# Patient Record
Sex: MALE | Race: WHITE | NOT HISPANIC OR LATINO | Employment: OTHER | URBAN - METROPOLITAN AREA
[De-identification: names, ages, dates, MRNs, and addresses within clinical notes are randomized per-mention and may not be internally consistent; named-entity substitution may affect disease eponyms.]

---

## 2019-02-11 ENCOUNTER — HOSPITAL ENCOUNTER (OUTPATIENT)
Dept: LAB | Facility: HOSPITAL | Age: 74
Discharge: HOME | End: 2019-02-11
Attending: PHYSICIAN ASSISTANT

## 2019-02-11 LAB
25(OH)D3 SERPL-MCNC: 52 NG/ML (ref 30–100)
ALBUMIN SERPL-MCNC: 4.4 G/DL (ref 3.5–5)
ALBUMIN/GLOB SERPL: 1.3 {RATIO} (ref 1.4–2.6)
ALP SERPL-CCNC: 43 U/L (ref 56–155)
ALT SERPL-CCNC: 24 U/L (ref 10–40)
ANION GAP SERPL CALC-SCNC: 23 MMOL/L (ref 8–19)
AST SERPL-CCNC: 15 U/L (ref 15–50)
BASOPHILS # BLD AUTO: 0.07 10*3/UL (ref 0–0.2)
BASOPHILS NFR BLD AUTO: 0.77 % (ref 0–3)
BILIRUB SERPL-MCNC: 0.34 MG/DL (ref 0.2–1.3)
BUN SERPL-MCNC: 16 MG/DL (ref 5–25)
BUN/CREAT SERPL: 17 {RATIO} (ref 6–20)
CALCIUM SERPL-MCNC: 9.7 MG/DL (ref 8.7–10.4)
CHLORIDE SERPL-SCNC: 100 MMOL/L (ref 99–111)
CHOLEST SERPL-MCNC: 150 MG/DL (ref 107–200)
CHOLEST/HDLC SERPL: 3.7 {RATIO} (ref 3–6)
CONV CO2: 20 MMOL/L (ref 22–32)
CONV TOTAL PROTEIN: 7.9 G/DL (ref 6.3–8.2)
CREAT UR-MCNC: 0.93 MG/DL (ref 0.7–1.2)
EOSINOPHIL # BLD AUTO: 0.41 10*3/UL (ref 0–0.7)
EOSINOPHIL # BLD AUTO: 4.58 % (ref 0–7)
ERYTHROCYTE [DISTWIDTH] IN BLOOD BY AUTOMATED COUNT: 12.1 % (ref 11.5–14.5)
GFR SERPLBLD BASED ON 1.73 SQ M-ARVRAT: >60 ML/MIN/{1.73_M2}
GLOBULIN UR ELPH-MCNC: 3.5 G/DL (ref 2–3.5)
GLUCOSE SERPL-MCNC: 141 MG/DL (ref 70–99)
HBA1C MFR BLD: 13.3 G/DL (ref 14–18)
HCT VFR BLD AUTO: 38.9 % (ref 42–52)
HDLC SERPL-MCNC: 41 MG/DL (ref 40–60)
LDLC SERPL CALC-MCNC: 73 MG/DL (ref 70–100)
LYMPHOCYTES # BLD AUTO: 3.28 10*3/UL (ref 1–5)
MCH RBC QN AUTO: 30 PG (ref 27–31)
MCHC RBC AUTO-ENTMCNC: 34.3 G/DL (ref 33–37)
MCV RBC AUTO: 87.6 FL (ref 80–96)
MONOCYTES # BLD AUTO: 0.7 10*3/UL (ref 0.2–1.2)
MONOCYTES NFR BLD AUTO: 7.87 % (ref 3–10)
NEUTROPHILS # BLD AUTO: 4.41 10*3/UL (ref 2–8)
NEUTROPHILS NFR BLD AUTO: 49.8 % (ref 30–85)
NRBC BLD AUTO-RTO: 0 % (ref 0–0.01)
OSMOLALITY SERPL CALC.SUM OF ELEC: 290 MOSM/KG (ref 273–304)
PLATELET # BLD AUTO: 277 10*3/UL (ref 130–400)
PMV BLD AUTO: 8.3 FL (ref 7.4–10.4)
POTASSIUM SERPL-SCNC: 4.8 MMOL/L (ref 3.5–5.3)
PSA SERPL-MCNC: 2.44 NG/ML (ref 0–4)
RBC # BLD AUTO: 4.43 10*6/UL (ref 4.7–6.1)
SODIUM SERPL-SCNC: 138 MMOL/L (ref 135–147)
T4 FREE SERPL-MCNC: 1 NG/DL (ref 0.9–1.8)
TRIGL SERPL-MCNC: 178 MG/DL (ref 40–150)
TSH SERPL-ACNC: 3.6 M[IU]/L (ref 0.27–4.2)
VARIANT LYMPHS NFR BLD MANUAL: 37 % (ref 20–45)
VLDLC SERPL-MCNC: 36 MG/DL (ref 5–37)
WBC # BLD AUTO: 8.86 10*3/UL (ref 4.8–10.8)

## 2019-02-28 ENCOUNTER — HOSPITAL ENCOUNTER (OUTPATIENT)
Dept: LAB | Facility: HOSPITAL | Age: 74
Discharge: HOME | End: 2019-02-28
Attending: PHYSICIAN ASSISTANT

## 2019-02-28 LAB
EST. AVERAGE GLUCOSE BLD GHB EST-MCNC: 169 MG/DL
HBA1C MFR BLD: 7.5 % (ref 3.5–5.7)

## 2019-08-21 ENCOUNTER — HOSPITAL ENCOUNTER (OUTPATIENT)
Dept: LAB | Facility: HOSPITAL | Age: 74
Discharge: HOME | End: 2019-08-21
Attending: PHYSICIAN ASSISTANT

## 2019-08-21 LAB
25(OH)D3 SERPL-MCNC: 53.4 NG/ML (ref 30–100)
ALBUMIN SERPL-MCNC: 4.6 G/DL (ref 3.5–5)
ALBUMIN/GLOB SERPL: 1.5 {RATIO} (ref 1.4–2.6)
ALP SERPL-CCNC: 39 U/L (ref 56–155)
ALT SERPL-CCNC: 19 U/L (ref 10–40)
ANION GAP SERPL CALC-SCNC: 21 MMOL/L (ref 8–19)
AST SERPL-CCNC: 18 U/L (ref 15–50)
BASOPHILS # BLD AUTO: 0.09 10*3/UL (ref 0–0.2)
BASOPHILS NFR BLD AUTO: 1.6 % (ref 0–3)
BILIRUB SERPL-MCNC: 0.43 MG/DL (ref 0.2–1.3)
BUN SERPL-MCNC: 16 MG/DL (ref 5–25)
BUN/CREAT SERPL: 20 {RATIO} (ref 6–20)
CALCIUM SERPL-MCNC: 9.4 MG/DL (ref 8.7–10.4)
CHLORIDE SERPL-SCNC: 98 MMOL/L (ref 99–111)
CHOLEST SERPL-MCNC: 116 MG/DL (ref 107–200)
CHOLEST/HDLC SERPL: 3.2 {RATIO} (ref 3–6)
CONV ABS IMM GRAN: 0.01 10*3/UL (ref 0–0.2)
CONV CO2: 23 MMOL/L (ref 22–32)
CONV IMMATURE GRAN: 0.2 % (ref 0–1.8)
CONV TOTAL PROTEIN: 7.6 G/DL (ref 6.3–8.2)
CREAT UR-MCNC: 0.79 MG/DL (ref 0.7–1.2)
DEPRECATED RDW RBC AUTO: 41.9 FL (ref 35.1–43.9)
EOSINOPHIL # BLD AUTO: 0.27 10*3/UL (ref 0–0.7)
EOSINOPHIL # BLD AUTO: 4.8 % (ref 0–7)
ERYTHROCYTE [DISTWIDTH] IN BLOOD BY AUTOMATED COUNT: 12.8 % (ref 11.6–14.4)
EST. AVERAGE GLUCOSE BLD GHB EST-MCNC: 146 MG/DL
GFR SERPLBLD BASED ON 1.73 SQ M-ARVRAT: >60 ML/MIN/{1.73_M2}
GLOBULIN UR ELPH-MCNC: 3 G/DL (ref 2–3.5)
GLUCOSE SERPL-MCNC: 155 MG/DL (ref 70–99)
HBA1C MFR BLD: 6.7 % (ref 3.5–5.7)
HCT VFR BLD AUTO: 38.7 % (ref 42–52)
HDLC SERPL-MCNC: 36 MG/DL (ref 40–60)
HGB BLD-MCNC: 12.6 G/DL (ref 14–18)
LDLC SERPL CALC-MCNC: 61 MG/DL (ref 70–100)
LYMPHOCYTES # BLD AUTO: 1.99 10*3/UL (ref 1–5)
LYMPHOCYTES NFR BLD AUTO: 35.3 % (ref 20–45)
MCH RBC QN AUTO: 29.6 PG (ref 27–31)
MCHC RBC AUTO-ENTMCNC: 32.6 G/DL (ref 33–37)
MCV RBC AUTO: 90.8 FL (ref 80–96)
MONOCYTES # BLD AUTO: 0.39 10*3/UL (ref 0.2–1.2)
MONOCYTES NFR BLD AUTO: 6.9 % (ref 3–10)
NEUTROPHILS # BLD AUTO: 2.89 10*3/UL (ref 2–8)
NEUTROPHILS NFR BLD AUTO: 51.2 % (ref 30–85)
NRBC CBCN: 0 % (ref 0–0.7)
OSMOLALITY SERPL CALC.SUM OF ELEC: 288 MOSM/KG (ref 273–304)
PLATELET # BLD AUTO: 260 10*3/UL (ref 130–400)
PMV BLD AUTO: 11.8 FL (ref 9.4–12.4)
POTASSIUM SERPL-SCNC: 4.8 MMOL/L (ref 3.5–5.3)
RBC # BLD AUTO: 4.26 10*6/UL (ref 4.7–6.1)
SODIUM SERPL-SCNC: 137 MMOL/L (ref 135–147)
T4 FREE SERPL-MCNC: 1.1 NG/DL (ref 0.9–1.8)
TRIGL SERPL-MCNC: 96 MG/DL (ref 40–150)
TSH SERPL-ACNC: 1.76 M[IU]/L (ref 0.27–4.2)
VLDLC SERPL-MCNC: 19 MG/DL (ref 5–37)
WBC # BLD AUTO: 5.64 10*3/UL (ref 4.8–10.8)

## 2019-09-27 ENCOUNTER — HOSPITAL ENCOUNTER (OUTPATIENT)
Dept: LAB | Facility: HOSPITAL | Age: 74
Discharge: HOME | End: 2019-09-27
Attending: PHYSICIAN ASSISTANT

## 2019-09-27 LAB
BASOPHILS # BLD AUTO: 0.09 10*3/UL (ref 0–0.2)
BASOPHILS NFR BLD AUTO: 1.3 % (ref 0–3)
CONV ABS IMM GRAN: 0.02 10*3/UL (ref 0–0.2)
CONV IMMATURE GRAN: 0.3 % (ref 0–1.8)
DEPRECATED RDW RBC AUTO: 40.5 FL (ref 35.1–43.9)
EOSINOPHIL # BLD AUTO: 0.35 10*3/UL (ref 0–0.7)
EOSINOPHIL # BLD AUTO: 4.9 % (ref 0–7)
ERYTHROCYTE [DISTWIDTH] IN BLOOD BY AUTOMATED COUNT: 12.3 % (ref 11.6–14.4)
FOLATE SERPL-MCNC: 19.3 NG/ML (ref 4.8–20)
HCT VFR BLD AUTO: 38.5 % (ref 42–52)
HGB BLD-MCNC: 12.7 G/DL (ref 14–18)
IRON SATN MFR SERPL: 24 % (ref 20–55)
IRON SERPL-MCNC: 86 UG/DL (ref 70–180)
LYMPHOCYTES # BLD AUTO: 1.95 10*3/UL (ref 1–5)
LYMPHOCYTES NFR BLD AUTO: 27.4 % (ref 20–45)
MCH RBC QN AUTO: 30 PG (ref 27–31)
MCHC RBC AUTO-ENTMCNC: 33 G/DL (ref 33–37)
MCV RBC AUTO: 90.8 FL (ref 80–96)
MONOCYTES # BLD AUTO: 0.46 10*3/UL (ref 0.2–1.2)
MONOCYTES NFR BLD AUTO: 6.5 % (ref 3–10)
NEUTROPHILS # BLD AUTO: 4.24 10*3/UL (ref 2–8)
NEUTROPHILS NFR BLD AUTO: 59.6 % (ref 30–85)
NRBC CBCN: 0 % (ref 0–0.7)
PLATELET # BLD AUTO: 245 10*3/UL (ref 130–400)
PMV BLD AUTO: 11.5 FL (ref 9.4–12.4)
RBC # BLD AUTO: 4.24 10*6/UL (ref 4.7–6.1)
TIBC SERPL-MCNC: 360 UG/DL (ref 245–450)
TRANSFERRIN SERPL-MCNC: 252 MG/DL (ref 215–365)
VIT B12 SERPL-MCNC: 733 PG/ML (ref 211–911)
WBC # BLD AUTO: 7.11 10*3/UL (ref 4.8–10.8)

## 2020-02-14 ENCOUNTER — HOSPITAL ENCOUNTER (OUTPATIENT)
Dept: LAB | Facility: HOSPITAL | Age: 75
Discharge: HOME | End: 2020-02-14
Attending: PHYSICIAN ASSISTANT

## 2020-02-14 LAB
25(OH)D3 SERPL-MCNC: 50.9 NG/ML (ref 30–100)
ALBUMIN SERPL-MCNC: 4.5 G/DL (ref 3.5–5)
ALBUMIN/GLOB SERPL: 1.4 {RATIO} (ref 1.4–2.6)
ALP SERPL-CCNC: 37 U/L (ref 56–155)
ALT SERPL-CCNC: 26 U/L (ref 10–40)
ANION GAP SERPL CALC-SCNC: 23 MMOL/L (ref 8–19)
AST SERPL-CCNC: 20 U/L (ref 15–50)
BASOPHILS # BLD AUTO: 0.11 10*3/UL (ref 0–0.2)
BASOPHILS NFR BLD AUTO: 1.7 % (ref 0–3)
BILIRUB SERPL-MCNC: 0.43 MG/DL (ref 0.2–1.3)
BUN SERPL-MCNC: 11 MG/DL (ref 5–25)
BUN/CREAT SERPL: 14 {RATIO} (ref 6–20)
CALCIUM SERPL-MCNC: 9.2 MG/DL (ref 8.7–10.4)
CHLORIDE SERPL-SCNC: 98 MMOL/L (ref 99–111)
CHOLEST SERPL-MCNC: 141 MG/DL (ref 107–200)
CHOLEST/HDLC SERPL: 3.6 {RATIO} (ref 3–6)
CONV ABS IMM GRAN: 0.01 10*3/UL (ref 0–0.2)
CONV CO2: 20 MMOL/L (ref 22–32)
CONV IMMATURE GRAN: 0.2 % (ref 0–1.8)
CONV TOTAL PROTEIN: 7.7 G/DL (ref 6.3–8.2)
CREAT UR-MCNC: 0.81 MG/DL (ref 0.7–1.2)
DEPRECATED RDW RBC AUTO: 40.9 FL (ref 35.1–43.9)
EOSINOPHIL # BLD AUTO: 0.28 10*3/UL (ref 0–0.7)
EOSINOPHIL # BLD AUTO: 4.2 % (ref 0–7)
ERYTHROCYTE [DISTWIDTH] IN BLOOD BY AUTOMATED COUNT: 12.5 % (ref 11.6–14.4)
EST. AVERAGE GLUCOSE BLD GHB EST-MCNC: 160 MG/DL
FOLATE SERPL-MCNC: 12.9 NG/ML (ref 4.8–20)
GFR SERPLBLD BASED ON 1.73 SQ M-ARVRAT: >60 ML/MIN/{1.73_M2}
GLOBULIN UR ELPH-MCNC: 3.2 G/DL (ref 2–3.5)
GLUCOSE SERPL-MCNC: 141 MG/DL (ref 70–99)
HBA1C MFR BLD: 7.2 % (ref 3.5–5.7)
HCT VFR BLD AUTO: 41.2 % (ref 42–52)
HDLC SERPL-MCNC: 39 MG/DL (ref 40–60)
HGB BLD-MCNC: 13.4 G/DL (ref 14–18)
IRON SATN MFR SERPL: 22 % (ref 20–55)
IRON SERPL-MCNC: 87 UG/DL (ref 70–180)
LDLC SERPL CALC-MCNC: 77 MG/DL (ref 70–100)
LYMPHOCYTES # BLD AUTO: 2.16 10*3/UL (ref 1–5)
LYMPHOCYTES NFR BLD AUTO: 32.5 % (ref 20–45)
MCH RBC QN AUTO: 29.3 PG (ref 27–31)
MCHC RBC AUTO-ENTMCNC: 32.5 G/DL (ref 33–37)
MCV RBC AUTO: 90.2 FL (ref 80–96)
MONOCYTES # BLD AUTO: 0.46 10*3/UL (ref 0.2–1.2)
MONOCYTES NFR BLD AUTO: 6.9 % (ref 3–10)
NEUTROPHILS # BLD AUTO: 3.62 10*3/UL (ref 2–8)
NEUTROPHILS NFR BLD AUTO: 54.5 % (ref 30–85)
NRBC CBCN: 0 % (ref 0–0.7)
OSMOLALITY SERPL CALC.SUM OF ELEC: 284 MOSM/KG (ref 273–304)
PLATELET # BLD AUTO: 268 10*3/UL (ref 130–400)
PMV BLD AUTO: 11.6 FL (ref 9.4–12.4)
POTASSIUM SERPL-SCNC: 4.5 MMOL/L (ref 3.5–5.3)
PSA SERPL-MCNC: 2.95 NG/ML (ref 0–4)
RBC # BLD AUTO: 4.57 10*6/UL (ref 4.7–6.1)
SODIUM SERPL-SCNC: 136 MMOL/L (ref 135–147)
T4 FREE SERPL-MCNC: 1 NG/DL (ref 0.9–1.8)
TIBC SERPL-MCNC: 402 UG/DL (ref 245–450)
TRANSFERRIN SERPL-MCNC: 281 MG/DL (ref 215–365)
TRIGL SERPL-MCNC: 124 MG/DL (ref 40–150)
TSH SERPL-ACNC: 1.91 M[IU]/L (ref 0.27–4.2)
VIT B12 SERPL-MCNC: 923 PG/ML (ref 211–911)
VLDLC SERPL-MCNC: 25 MG/DL (ref 5–37)
WBC # BLD AUTO: 6.64 10*3/UL (ref 4.8–10.8)

## 2020-08-11 ENCOUNTER — HOSPITAL ENCOUNTER (OUTPATIENT)
Dept: GENERAL RADIOLOGY | Facility: HOSPITAL | Age: 75
Discharge: HOME | End: 2020-08-11
Attending: PHYSICIAN ASSISTANT

## 2020-08-12 ENCOUNTER — HOSPITAL ENCOUNTER (OUTPATIENT)
Dept: CT IMAGING | Facility: HOSPITAL | Age: 75
Discharge: HOME | End: 2020-08-12
Attending: PHYSICIAN ASSISTANT

## 2020-08-18 ENCOUNTER — HOSPITAL ENCOUNTER (OUTPATIENT)
Dept: LAB | Facility: HOSPITAL | Age: 75
Discharge: HOME | End: 2020-08-18
Attending: PHYSICIAN ASSISTANT

## 2020-08-18 LAB
25(OH)D3 SERPL-MCNC: 39.2 NG/ML (ref 30–100)
ALBUMIN SERPL-MCNC: 4.2 G/DL (ref 3.5–5)
ALBUMIN/GLOB SERPL: 1.3 {RATIO} (ref 1.4–2.6)
ALP SERPL-CCNC: 43 U/L (ref 56–155)
ALT SERPL-CCNC: 26 U/L (ref 10–40)
ANION GAP SERPL CALC-SCNC: 21 MMOL/L (ref 8–19)
AST SERPL-CCNC: 19 U/L (ref 15–50)
BASOPHILS # BLD AUTO: 0.08 10*3/UL (ref 0–0.2)
BASOPHILS NFR BLD AUTO: 1.1 % (ref 0–3)
BILIRUB SERPL-MCNC: 0.34 MG/DL (ref 0.2–1.3)
BUN SERPL-MCNC: 15 MG/DL (ref 5–25)
BUN/CREAT SERPL: 15 {RATIO} (ref 6–20)
CALCIUM SERPL-MCNC: 9.9 MG/DL (ref 8.7–10.4)
CHLORIDE SERPL-SCNC: 96 MMOL/L (ref 99–111)
CHOLEST SERPL-MCNC: 139 MG/DL (ref 107–200)
CHOLEST/HDLC SERPL: 3.9 {RATIO} (ref 3–6)
CONV ABS IMM GRAN: 0.02 10*3/UL (ref 0–0.2)
CONV CO2: 23 MMOL/L (ref 22–32)
CONV IMMATURE GRAN: 0.3 % (ref 0–1.8)
CONV TOTAL PROTEIN: 7.4 G/DL (ref 6.3–8.2)
CREAT UR-MCNC: 0.97 MG/DL (ref 0.7–1.2)
DEPRECATED RDW RBC AUTO: 40.7 FL (ref 35.1–43.9)
EOSINOPHIL # BLD AUTO: 0.41 10*3/UL (ref 0–0.7)
EOSINOPHIL # BLD AUTO: 5.6 % (ref 0–7)
ERYTHROCYTE [DISTWIDTH] IN BLOOD BY AUTOMATED COUNT: 12.2 % (ref 11.6–14.4)
EST. AVERAGE GLUCOSE BLD GHB EST-MCNC: 166 MG/DL
GFR SERPLBLD BASED ON 1.73 SQ M-ARVRAT: >60 ML/MIN/{1.73_M2}
GLOBULIN UR ELPH-MCNC: 3.2 G/DL (ref 2–3.5)
GLUCOSE SERPL-MCNC: 162 MG/DL (ref 70–99)
HBA1C MFR BLD: 7.4 % (ref 3.5–5.7)
HCT VFR BLD AUTO: 39.7 % (ref 42–52)
HDLC SERPL-MCNC: 36 MG/DL (ref 40–60)
HGB BLD-MCNC: 12.9 G/DL (ref 14–18)
LDLC SERPL CALC-MCNC: 75 MG/DL (ref 70–100)
LYMPHOCYTES # BLD AUTO: 2.59 10*3/UL (ref 1–5)
LYMPHOCYTES NFR BLD AUTO: 35.4 % (ref 20–45)
MCH RBC QN AUTO: 29.7 PG (ref 27–31)
MCHC RBC AUTO-ENTMCNC: 32.5 G/DL (ref 33–37)
MCV RBC AUTO: 91.3 FL (ref 80–96)
MONOCYTES # BLD AUTO: 0.53 10*3/UL (ref 0.2–1.2)
MONOCYTES NFR BLD AUTO: 7.2 % (ref 3–10)
NEUTROPHILS # BLD AUTO: 3.69 10*3/UL (ref 2–8)
NEUTROPHILS NFR BLD AUTO: 50.4 % (ref 30–85)
NRBC CBCN: 0 % (ref 0–0.7)
OSMOLALITY SERPL CALC.SUM OF ELEC: 284 MOSM/KG (ref 273–304)
PLATELET # BLD AUTO: 289 10*3/UL (ref 130–400)
PMV BLD AUTO: 11 FL (ref 9.4–12.4)
POTASSIUM SERPL-SCNC: 5 MMOL/L (ref 3.5–5.3)
RBC # BLD AUTO: 4.35 10*6/UL (ref 4.7–6.1)
SODIUM SERPL-SCNC: 135 MMOL/L (ref 135–147)
T4 FREE SERPL-MCNC: 1 NG/DL (ref 0.9–1.8)
TRIGL SERPL-MCNC: 140 MG/DL (ref 40–150)
TSH SERPL-ACNC: 1.9 M[IU]/L (ref 0.27–4.2)
VLDLC SERPL-MCNC: 28 MG/DL (ref 5–37)
WBC # BLD AUTO: 7.32 10*3/UL (ref 4.8–10.8)

## 2021-01-13 ENCOUNTER — HOSPITAL ENCOUNTER (OUTPATIENT)
Dept: LAB | Facility: HOSPITAL | Age: 76
Discharge: HOME | End: 2021-01-13
Attending: PHYSICIAN ASSISTANT

## 2021-01-13 LAB
25(OH)D3 SERPL-MCNC: 36.1 NG/ML (ref 30–100)
ALBUMIN SERPL-MCNC: 4.5 G/DL (ref 3.5–5)
ALBUMIN/GLOB SERPL: 1.5 {RATIO} (ref 1.4–2.6)
ALP SERPL-CCNC: 44 U/L (ref 56–155)
ALT SERPL-CCNC: 23 U/L (ref 10–40)
AMPHETAMINES UR QL SCN: NEGATIVE
ANION GAP SERPL CALC-SCNC: 22 MMOL/L (ref 8–19)
AST SERPL-CCNC: 16 U/L (ref 15–50)
BARBITURATES UR QL SCN: NEGATIVE
BASOPHILS # BLD AUTO: 0.08 10*3/UL (ref 0–0.2)
BASOPHILS NFR BLD AUTO: 1.1 % (ref 0–3)
BENZODIAZ UR QL SCN: NEGATIVE
BILIRUB SERPL-MCNC: 0.47 MG/DL (ref 0.2–1.3)
BUN SERPL-MCNC: 14 MG/DL (ref 5–25)
BUN/CREAT SERPL: 17 {RATIO} (ref 6–20)
CALCIUM SERPL-MCNC: 9.5 MG/DL (ref 8.7–10.4)
CHLORIDE SERPL-SCNC: 95 MMOL/L (ref 99–111)
CHOLEST SERPL-MCNC: 111 MG/DL (ref 107–200)
CHOLEST/HDLC SERPL: 3.1 {RATIO} (ref 3–6)
CONV ABS IMM GRAN: 0.02 10*3/UL (ref 0–0.2)
CONV CO2: 20 MMOL/L (ref 22–32)
CONV COCAINE, UR: NEGATIVE
CONV CREATININE URINE, RANDOM: 53.8 MG/DL (ref 10–300)
CONV IMMATURE GRAN: 0.3 % (ref 0–1.8)
CONV MICROALBUM.,U,RANDOM: <12 MG/L (ref 0–20)
CONV TOTAL PROTEIN: 7.5 G/DL (ref 6.3–8.2)
CREAT UR-MCNC: 0.84 MG/DL (ref 0.7–1.2)
DEPRECATED RDW RBC AUTO: 40.4 FL (ref 35.1–43.9)
EOSINOPHIL # BLD AUTO: 0.25 10*3/UL (ref 0–0.7)
EOSINOPHIL # BLD AUTO: 3.5 % (ref 0–7)
ERYTHROCYTE [DISTWIDTH] IN BLOOD BY AUTOMATED COUNT: 12.3 % (ref 11.6–14.4)
EST. AVERAGE GLUCOSE BLD GHB EST-MCNC: 140 MG/DL
FOLATE SERPL-MCNC: 12.1 NG/ML (ref 4.8–20)
GFR SERPLBLD BASED ON 1.73 SQ M-ARVRAT: >60 ML/MIN/{1.73_M2}
GLOBULIN UR ELPH-MCNC: 3 G/DL (ref 2–3.5)
GLUCOSE SERPL-MCNC: 127 MG/DL (ref 70–99)
HBA1C MFR BLD: 6.5 % (ref 3.5–5.7)
HCT VFR BLD AUTO: 38.8 % (ref 42–52)
HDLC SERPL-MCNC: 36 MG/DL (ref 40–60)
HGB BLD-MCNC: 12.6 G/DL (ref 14–18)
IRON SATN MFR SERPL: 20 % (ref 20–55)
IRON SERPL-MCNC: 79 UG/DL (ref 70–180)
LDLC SERPL CALC-MCNC: 53 MG/DL (ref 70–100)
LYMPHOCYTES # BLD AUTO: 2.49 10*3/UL (ref 1–5)
LYMPHOCYTES NFR BLD AUTO: 35.2 % (ref 20–45)
MCH RBC QN AUTO: 28.9 PG (ref 27–31)
MCHC RBC AUTO-ENTMCNC: 32.5 G/DL (ref 33–37)
MCV RBC AUTO: 89 FL (ref 80–96)
METHADONE UR QL SCN: NEGATIVE
MICROALBUMIN/CREAT UR: 22.3 MG/G{CRE} (ref 0–25)
MONOCYTES # BLD AUTO: 0.51 10*3/UL (ref 0.2–1.2)
MONOCYTES NFR BLD AUTO: 7.2 % (ref 3–10)
NEUTROPHILS # BLD AUTO: 3.72 10*3/UL (ref 2–8)
NEUTROPHILS NFR BLD AUTO: 52.7 % (ref 30–85)
NRBC CBCN: 0 % (ref 0–0.7)
OPIATES TESTED UR SCN: NEGATIVE
OSMOLALITY SERPL CALC.SUM OF ELEC: 276 MOSM/KG (ref 273–304)
OXYCODONE UR QL SCN: NEGATIVE
PCP UR QL: NEGATIVE
PLATELET # BLD AUTO: 292 10*3/UL (ref 130–400)
PMV BLD AUTO: 11.2 FL (ref 9.4–12.4)
POTASSIUM SERPL-SCNC: 4.6 MMOL/L (ref 3.5–5.3)
RBC # BLD AUTO: 4.36 10*6/UL (ref 4.7–6.1)
SODIUM SERPL-SCNC: 132 MMOL/L (ref 135–147)
T4 FREE SERPL-MCNC: 1 NG/DL (ref 0.9–1.8)
THC SERPLBLD CFM-MCNC: NEGATIVE NG/ML
TIBC SERPL-MCNC: 393 UG/DL (ref 245–450)
TRANSFERRIN SERPL-MCNC: 275 MG/DL (ref 215–365)
TRIGL SERPL-MCNC: 108 MG/DL (ref 40–150)
TSH SERPL-ACNC: 1.71 M[IU]/L (ref 0.27–4.2)
VIT B12 SERPL-MCNC: 704 PG/ML (ref 211–911)
VLDLC SERPL-MCNC: 22 MG/DL (ref 5–37)
WBC # BLD AUTO: 7.07 10*3/UL (ref 4.8–10.8)

## 2021-01-27 ENCOUNTER — HOSPITAL ENCOUNTER (OUTPATIENT)
Dept: GENERAL RADIOLOGY | Facility: HOSPITAL | Age: 76
Discharge: HOME | End: 2021-01-27
Attending: PHYSICIAN ASSISTANT

## 2021-01-29 ENCOUNTER — HOSPITAL ENCOUNTER (OUTPATIENT)
Dept: LAB | Facility: HOSPITAL | Age: 76
Discharge: HOME | End: 2021-01-29
Attending: PHYSICIAN ASSISTANT

## 2021-01-29 LAB
ANION GAP SERPL CALC-SCNC: 21 MMOL/L (ref 8–19)
BUN SERPL-MCNC: 18 MG/DL (ref 5–25)
BUN/CREAT SERPL: 20 {RATIO} (ref 6–20)
CALCIUM SERPL-MCNC: 9.6 MG/DL (ref 8.7–10.4)
CHLORIDE SERPL-SCNC: 101 MMOL/L (ref 99–111)
CONV CO2: 20 MMOL/L (ref 22–32)
CREAT UR-MCNC: 0.9 MG/DL (ref 0.7–1.2)
GFR SERPLBLD BASED ON 1.73 SQ M-ARVRAT: >60 ML/MIN/{1.73_M2}
GLUCOSE SERPL-MCNC: 194 MG/DL (ref 70–99)
OSMOLALITY SERPL CALC.SUM OF ELEC: 291 MOSM/KG (ref 273–304)
POTASSIUM SERPL-SCNC: 5 MMOL/L (ref 3.5–5.3)
SODIUM SERPL-SCNC: 137 MMOL/L (ref 135–147)

## 2021-02-01 ENCOUNTER — OFFICE VISIT CONVERTED (OUTPATIENT)
Dept: SURGERY | Facility: CLINIC | Age: 76
End: 2021-02-01
Attending: NURSE PRACTITIONER

## 2021-02-22 ENCOUNTER — HOSPITAL ENCOUNTER (OUTPATIENT)
Dept: PREADMISSION TESTING | Facility: HOSPITAL | Age: 76
Discharge: HOME | End: 2021-02-22
Attending: SURGERY

## 2021-02-23 LAB — SARS-COV-2 RNA SPEC QL NAA+PROBE: NOT DETECTED

## 2021-02-26 ENCOUNTER — HOSPITAL ENCOUNTER (OUTPATIENT)
Dept: GASTROENTEROLOGY | Facility: HOSPITAL | Age: 76
Setting detail: HOSPITAL OUTPATIENT SURGERY
Discharge: HOME | End: 2021-02-26
Attending: SURGERY

## 2021-02-26 LAB — GLUCOSE BLD-MCNC: 181 MG/DL (ref 70–99)

## 2021-03-10 ENCOUNTER — OFFICE VISIT CONVERTED (OUTPATIENT)
Dept: SURGERY | Facility: CLINIC | Age: 76
End: 2021-03-10
Attending: SURGERY

## 2021-04-14 ENCOUNTER — HOSPITAL ENCOUNTER (OUTPATIENT)
Dept: LAB | Facility: HOSPITAL | Age: 76
Discharge: HOME | End: 2021-04-14
Attending: PHYSICIAN ASSISTANT

## 2021-04-14 LAB
25(OH)D3 SERPL-MCNC: 37.3 NG/ML (ref 30–100)
ALBUMIN SERPL-MCNC: 4.5 G/DL (ref 3.5–5)
ALBUMIN/GLOB SERPL: 1.4 {RATIO} (ref 1.4–2.6)
ALP SERPL-CCNC: 44 U/L (ref 56–155)
ALT SERPL-CCNC: 20 U/L (ref 10–40)
ANION GAP SERPL CALC-SCNC: 19 MMOL/L (ref 8–19)
AST SERPL-CCNC: 17 U/L (ref 15–50)
BASOPHILS # BLD AUTO: 0.08 10*3/UL (ref 0–0.2)
BASOPHILS NFR BLD AUTO: 1.1 % (ref 0–3)
BILIRUB SERPL-MCNC: 0.33 MG/DL (ref 0.2–1.3)
BUN SERPL-MCNC: 21 MG/DL (ref 5–25)
BUN/CREAT SERPL: 22 {RATIO} (ref 6–20)
CALCIUM SERPL-MCNC: 9.7 MG/DL (ref 8.7–10.4)
CHLORIDE SERPL-SCNC: 94 MMOL/L (ref 99–111)
CHOLEST SERPL-MCNC: 138 MG/DL (ref 107–200)
CHOLEST/HDLC SERPL: 3.4 {RATIO} (ref 3–6)
CONV ABS IMM GRAN: 0.03 10*3/UL (ref 0–0.2)
CONV CO2: 22 MMOL/L (ref 22–32)
CONV IMMATURE GRAN: 0.4 % (ref 0–1.8)
CONV TOTAL PROTEIN: 7.8 G/DL (ref 6.3–8.2)
CREAT UR-MCNC: 0.97 MG/DL (ref 0.7–1.2)
DEPRECATED RDW RBC AUTO: 39.7 FL (ref 35.1–43.9)
EOSINOPHIL # BLD AUTO: 0.37 10*3/UL (ref 0–0.7)
EOSINOPHIL # BLD AUTO: 5.1 % (ref 0–7)
ERYTHROCYTE [DISTWIDTH] IN BLOOD BY AUTOMATED COUNT: 12.7 % (ref 11.6–14.4)
EST. AVERAGE GLUCOSE BLD GHB EST-MCNC: 154 MG/DL
GFR SERPLBLD BASED ON 1.73 SQ M-ARVRAT: >60 ML/MIN/{1.73_M2}
GLOBULIN UR ELPH-MCNC: 3.3 G/DL (ref 2–3.5)
GLUCOSE SERPL-MCNC: 129 MG/DL (ref 70–99)
HBA1C MFR BLD: 7 % (ref 3.5–5.7)
HCT VFR BLD AUTO: 38.2 % (ref 42–52)
HDLC SERPL-MCNC: 41 MG/DL (ref 40–60)
HGB BLD-MCNC: 12.6 G/DL (ref 14–18)
LDLC SERPL CALC-MCNC: 72 MG/DL (ref 70–100)
LYMPHOCYTES # BLD AUTO: 2.57 10*3/UL (ref 1–5)
LYMPHOCYTES NFR BLD AUTO: 35.4 % (ref 20–45)
MCH RBC QN AUTO: 28.6 PG (ref 27–31)
MCHC RBC AUTO-ENTMCNC: 33 G/DL (ref 33–37)
MCV RBC AUTO: 86.6 FL (ref 80–96)
MONOCYTES # BLD AUTO: 0.58 10*3/UL (ref 0.2–1.2)
MONOCYTES NFR BLD AUTO: 8 % (ref 3–10)
NEUTROPHILS # BLD AUTO: 3.63 10*3/UL (ref 2–8)
NEUTROPHILS NFR BLD AUTO: 50 % (ref 30–85)
NRBC CBCN: 0 % (ref 0–0.7)
OSMOLALITY SERPL CALC.SUM OF ELEC: 275 MOSM/KG (ref 273–304)
PLATELET # BLD AUTO: 278 10*3/UL (ref 130–400)
PMV BLD AUTO: 11.1 FL (ref 9.4–12.4)
POTASSIUM SERPL-SCNC: 5.1 MMOL/L (ref 3.5–5.3)
PSA SERPL-MCNC: 3.26 NG/ML (ref 0–4)
RBC # BLD AUTO: 4.41 10*6/UL (ref 4.7–6.1)
SODIUM SERPL-SCNC: 130 MMOL/L (ref 135–147)
T4 FREE SERPL-MCNC: 1.1 NG/DL (ref 0.9–1.8)
TRIGL SERPL-MCNC: 127 MG/DL (ref 40–150)
TSH SERPL-ACNC: 1.34 M[IU]/L (ref 0.27–4.2)
VLDLC SERPL-MCNC: 25 MG/DL (ref 5–37)
WBC # BLD AUTO: 7.26 10*3/UL (ref 4.8–10.8)

## 2021-04-21 ENCOUNTER — HOSPITAL ENCOUNTER (OUTPATIENT)
Dept: LAB | Facility: HOSPITAL | Age: 76
Discharge: HOME | End: 2021-04-21
Attending: PHYSICIAN ASSISTANT

## 2021-04-21 LAB
ANION GAP SERPL CALC-SCNC: 21 MMOL/L (ref 8–19)
BUN SERPL-MCNC: 22 MG/DL (ref 5–25)
BUN/CREAT SERPL: 24 {RATIO} (ref 6–20)
CALCIUM SERPL-MCNC: 9.7 MG/DL (ref 8.7–10.4)
CHLORIDE SERPL-SCNC: 102 MMOL/L (ref 99–111)
CONV CO2: 19 MMOL/L (ref 22–32)
CREAT UR-MCNC: 0.91 MG/DL (ref 0.7–1.2)
GFR SERPLBLD BASED ON 1.73 SQ M-ARVRAT: >60 ML/MIN/{1.73_M2}
GLUCOSE SERPL-MCNC: 160 MG/DL (ref 70–99)
OSMOLALITY SERPL CALC.SUM OF ELEC: 291 MOSM/KG (ref 273–304)
POTASSIUM SERPL-SCNC: 4.6 MMOL/L (ref 3.5–5.3)
SODIUM SERPL-SCNC: 137 MMOL/L (ref 135–147)

## 2021-05-10 NOTE — H&P
History and Physical      Patient Name: Trenton Adames   Patient ID: [id number]   Sex: Male   YOB: 1945    Primary Care Provider: Gogo Palomino PA-C   Referring Provider: Gogo Palomino PA-C    Visit Date: February 1, 2021    Provider: DARRELL Wynn   Location: [facility]   Location Address: [address] [place] [address]  [id number]   Location Phone: [phone]          Chief Complaint  · Age 50 or over  · Constipation  · Difficulty Swallowing  · GERD  · Requesting EGD and Colonoscopy     iron def anemia       History Of Present Illness  The patient is a 75 year old /White male presenting to the Surgical Specialist office on a referral from Gogo Palomino PA-C.   Trenton Adames needs to have a diagnostic colonoscopy and EGD.   Patient states that they have had a colonoscopy. 4 years ago   Patient currently complains of: constipation, GERD, difficulty swallowing, and iron def anemia   Patient Does not have family history of colon cancer.      Patient presents today on referral from Gogo Diaz for iron deficiency anemia (rbc: 4.36; hgb: 12.6; hct: 38.8).  Patient reports that he has been a fighting anemia issues for at least a year.  He takes iron twice daily.  Admits to dysphagia, GERD, and constipation issues.  Denies any abdominal pain or rectal bleeding.  Denies any family history of colorectal cancer.  Admits to a history of colonic polyps.    Swallow study (1/21) reveals:   1. No evidence of aspiration or penetration.  2. Vallecular residue.    10/17: Colonoscopy (Julienne): Normal colon.     6/12: Colonoscopy (Casey): sigmoid - serrated hyperplastic polyp.     1/10: Colonoscopy (Casey): Hepatic flexure - tubular adenoma; Rectum - tubular adenoma; Rectum: hyperplastic.       Past Medical History  Disease Name Date Onset Notes   Allergic rhinitis, chronic --  --    Anemia, Unspecified --  --    Asthma --  --    Cervical radiculopathy --   --    Diabetes --  --    Diabetes Mellitus, Type II --  --    Erectile Dysfunction, Organic --  --    High blood pressure --  --    High cholesterol --  --    Hypertension --  --    Kidney stones --  --    Prostate Disorder --  --    Prostatic Hypertrophy, Benign --  --    Renal Calculus --  --    Shortness Of Air --  --          Past Surgical History  Procedure Name Date Notes   *Other --  Umbilical growth surgery 2014-exc of anal tag   Cataract exctraction with lens implant --  --    Colonoscopy --  --    Hemorrhoidectomy --  --    Sinus Surgery --  --          Medication List  Name Date Started Instructions   albuterol sulfate 90 mcg/actuation inhalation HFA aerosol inhaler  inhale 1 puff (90 mcg) by inhalation route every 6 hours as needed   amlodipine 2.5 mg oral tablet  take 1 tablet (2.5 mg) by oral route once daily   aspirin 81 mg oral tablet,delayed release (DR/EC)  take 1 tablet (81 mg) by oral route once daily   B12 5,000-100 mcg sublingual lozenge  --    fluticasone 50 mcg/actuation nasal spray,suspension  inhale 1 spray (50 mcg) in each nostril by intranasal route once daily   gabapentin 300 mg oral capsule  take 1 capsule (300 mg) by oral route 3 times per day   iron 325 mg (65 mg iron) oral capsule, extended release  take 1 capsule by oral route 2 times a day   lisinopril 20 mg oral tablet  take 1 tablet by oral route 2 times a day   loratadine 10 mg oral tablet  take 1 tablet (10 mg) by oral route once daily   MegaRed Omega-3 Krill Oil 908-32-25-50 mg oral capsule  take 1 capsule by oral route daily   meloxicam 7.5 mg oral tablet  take 1 tablet (7.5 mg) by oral route once daily   metformin 1,000 mg oral tablet  take 1 tablet (1,000 mg) by oral route 2 times per day with morning and evening meals   montelukast 10 mg oral tablet  take 1 tablet (10 mg) by oral route once daily in the evening   Stool Softener 100 mg oral capsule  take 1 capsule (100 mg) by oral route 2 times per day   Vitamin D3 1,000  unit oral tablet  take 1 tablet by oral route daily         Allergy List  Allergen Name Date Reaction Notes   NO KNOWN DRUG ALLERGIES --  --  --        Allergies Reconciled  Family Medical History  Disease Name Relative/Age Notes   Heart Disease  --    Cancer, Unspecified  --    Diabetes, unspecified type Father/  Grandmother (paternal)/   Father; Grandmother (paternal)  Father; Grandmother (paternal)  Father   Heart Attack (MI)  --    Family history of breast cancer Mother/40s   Mother/40s  Mother/40s  Mother/30s   Bladder calculus Mother/   Mother         Social History  Finding Status Start/Stop Quantity Notes   Alcohol Current some day --/-- --  drinks weekly; wine and beer  drinks rarely; beer   Caffeine Current every day --/-- --  drinks regularly; coffee; 1-2 times per day  drinks regularly; coffee; 3-4 times per day   Retired --  --/-- --  --    Second hand smoke exposure Never --/-- --  no   Tobacco Former [age] --  former smoker; started smoking at age 18; quit smoking at age 49; smoked 30 cigarette(s) per day  former smoker; started smoking at age 22; quit smoking at age 52         Review of Systems  · Constitutional  o Admits  o : fatigue  o Denies  o : fever, chills  · Eyes  o Denies  o : yellowish discoloration of eyes  · HENT  o Denies  o : difficulty swallowing  · Cardiovascular  o Denies  o : chest pain, chest pain on exertion  · Respiratory  o Denies  o : shortness of breath  · Gastrointestinal  o Admits  o : constipation, dysphagia, heartburn, reflux  o Denies  o : nausea, vomiting, diarrhea  · Genitourinary  o Denies  o : abnormal color of urine  · Integument  o Denies  o : rash  · Neurologic  o Denies  o : tingling or numbness  · Musculoskeletal  o Denies  o : joint pain  · Endocrine  o Denies  o : weight gain, weight loss      Vitals  Date Time BP Position Site L\R Cuff Size HR RR TEMP (F) WT  HT  BMI kg/m2 BSA m2 O2 Sat FR L/min FiO2 HC       02/01/2021 10:53 AM       16  192lbs 4oz 5'  " 10\" 27.58 2.08             Physical Examination  · Constitutional  o Appearance  o : well developed, well-nourished, patient in no apparent distress  · Head and Face  o Head  o :   § Inspection  § : atraumatic, normocephalic  o Face  o :   § Inspection  § : no facial lesions  · Eyes  o Conjunctivae  o : conjunctivae normal  o Sclerae  o : sclerae white  · Neck  o Inspection/Palpation  o : normal appearance, no masses or tenderness, trachea midline  · Respiratory  o Respiratory Effort  o : breathing unlabored  · Skin and Subcutaneous Tissue  o General Inspection  o : no lesions present, no areas of discoloration, skin turgor normal, texture normal  · Neurologic  o Mental Status Examination  o :   § Orientation  § : grossly oriented to person, place and time  § Attention  § : attention normal, concentration abilities normal  § Fund of Knowledge  § : fund of knowledge within normal limits, patient aware of current events  o Gait and Station  o : normal gait, able to stand without difficulty  · Psychiatric  o Judgement and Insight  o : judgment and insight intact  o Mood and Affect  o : mood normal, affect appropriate     Abdomen: flat, soft, non-tender, non-distended, no guarding, no rebound tenderness, BS+.               Assessment  · Constipation     564.00/K59.00  · Difficulty in swallowing     787.20/R13.10  · Iron deficiency anemia     280.9/D50.9  · Pre-op testing     V72.84/Z01.818    Problems Reconciled  Plan  · Orders  o Consent for Colonoscopy with Possible Biopsy - Possible risks/complications, benefits, and alternatives to surgical or invasive procedure have been explained to patient and/or legal guardian. -Patient has been evaluated and can tolerate anesthesia and/or sedation. Risks, benefits, and alternatives to anesthesia and sedation have been explained to patient and/or legal guardian. (49426) - 564.00/K59.00, 787.20/R13.10, 280.9/D50.9 - 02/26/2021  o Consent for Esophagogastroduodenoscopy (EGD) with " dilation - Possible risks/complications, benefits, and alternatives to surgical or invasive procedure have been explained to patient and/or legal guardian. - Patient has been evaluated and can tolerate anesthesia and/or sedation. Risks, benefits, and alternatives to anesthesia and sedation have been explained to patient and/or legal guardian. ([id number]) - 564.00/K59.00, 787.20/R13.10, 280.9/D50.9 - 02/26/2021  o [facility] [place] Covid-19 Screening ([id number]) - V72.84/Z01.818 - 02/22/2021   [facility]  · Medications  o Medications have been Reconciled  o Transition of Care or Provider Policy  · Instructions  o Surgical Facility: [facility]  o Handouts Provided Pre-Procedure Instructions including date, time, and location of procedure.   o PLAN: Proceeed with colonoscopy. Patient understands risks/benefits and is willing to proceed.   o PLAN: Proceeed with EGD. Patient understands risks/benefits and is willing to proceed.   o ***Surgical Orders***  o RISK AND BENEFITS:  o Given these options, the patient has verbally expressed an understanding of the risks of the surgery and finds these risks acceptable. Will proceed with surgery as soon as possible.  o O.R. PREP: Per protocol   o IV: Per Anesthesia  o Please sign permit for: Colonoscopy with possible biopsies by Dr. Causey.  o Please sign permit for: Esophagogastroduodenoscopy with possible biopsies and dilation by Dr. Causey.  o The above History and Physical Examination has been completed within 30 days of admission.  o ***Patient Status***  o Outpatient  o Follow up in the in the office post procedure.  o Electronically Identified Patient Education Materials Provided Electronically  · Disposition  o Call or Return if symptoms worsen or persist.            Electronically Signed by: DARRELL Wynn -Author on February 1, 2021 01:06:31 PM

## 2021-05-14 VITALS — WEIGHT: 194.37 LBS | HEIGHT: 70 IN | RESPIRATION RATE: 14 BRPM | BODY MASS INDEX: 27.82 KG/M2

## 2021-05-14 VITALS — RESPIRATION RATE: 16 BRPM | BODY MASS INDEX: 27.52 KG/M2 | WEIGHT: 192.25 LBS | HEIGHT: 70 IN

## 2021-05-14 NOTE — PROGRESS NOTES
Progress Note      Patient Name: Trenton Adames   Patient ID: [id number]   Sex: Male   YOB: 1945    Primary Care Provider: Gogo Palomino PA-C   Referring Provider: Gogo Palomino PA-C    Visit Date: March 10, 2021    Provider: Fawad Causey MD   Location: [facility]   Location Address: [address] [place] [address]  [id number]   Location Phone: [phone]          Chief Complaint  · Follow Up Surgery      History Of Present Illness  Trenton Adames is a 75 year old /White male who presents today for a postoperative visit. He follows-up status post EGD and colonoscopy. The patient has done well after his procedures. He is not reporting any unexpected signs or symptoms. He is still having the same symptoms he was having prior to the procedures but nothing new. He is still having a little bit of sore throat and dysphagia.       Past Medical History  Allergic rhinitis, chronic; Anemia, Unspecified; Asthma; Cervical radiculopathy; Diabetes; Diabetes Mellitus, Type II; Erectile Dysfunction, Organic; High cholesterol; Hypertension; Kidney stones; Prostate Disorder; Prostatic Hypertrophy, Benign; Renal Calculus; Shortness Of Air         Past Surgical History  *Other; Cataract exctraction with lens implant; Colonoscopy; Hemorrhoidectomy; Sinus Surgery         Medication List  albuterol sulfate 90 mcg/actuation inhalation HFA aerosol inhaler; amlodipine 2.5 mg oral tablet; aspirin 81 mg oral tablet,delayed release (DR/EC); B12 5,000-100 mcg sublingual lozenge; fluconazole 200 mg oral tablet; fluticasone 50 mcg/actuation nasal spray,suspension; gabapentin 300 mg oral capsule; iron 325 mg (65 mg iron) oral capsule, extended release; lisinopril 20 mg oral tablet; loratadine 10 mg oral tablet; MegaRed Omega-3 Krill Oil 627-57-57-50 mg oral capsule; meloxicam 7.5 mg oral tablet; metformin 1,000 mg oral tablet; montelukast 10 mg oral tablet; Stool Softener 100 mg  "oral capsule; Vitamin D3 1,000 unit oral tablet         Allergy List  NO KNOWN DRUG ALLERGIES         Family Medical History  Heart Disease; Cancer, Unspecified; Diabetes, unspecified type; Heart Attack (MI); Family history of breast cancer; Bladder calculus         Social History  Alcohol (Current some day); Caffeine (Current every day); Retired; Second hand smoke exposure (Never); Tobacco (Former)         Review of Systems  · Cardiovascular  o Denies  o : chest pain on exertion, shortness of breath, lower extremity swelling  · Respiratory  o Denies  o : shortness of breath, coughing up blood  · Gastrointestinal  o Denies  o : chronic abdominal pain      Vitals  Date Time BP Position Site L\R Cuff Size HR RR TEMP (F) WT  HT  BMI kg/m2 BSA m2 O2 Sat FR L/min FiO2 HC       03/10/2021 09:23 AM       14  194lbs 6oz 5'  10\" 27.89 2.09             Physical Examination  · Constitutional  o Appearance  o : well developed, well-nourished, alert and in no acute distress  · Head and Face  o Head  o :   § Inspection  § : no deformities or lesions  · Eyes  o Conjunctivae  o : clear  o Sclerae  o : nonicteric  · Neck  o Inspection/Palpation  o : normal appearance, no masses or tenderness, trachea midline  · Respiratory  o Respiratory Effort  o : breathing unlabored  o Inspection of Chest  o : normal appearance, no retractions  · Cardiovascular  o Heart  o : regular rate and rhythm  · Gastrointestinal  o Abdominal Examination  o :   § Abdomen  § : abdomen is soft.   · Lymphatic  o Neck  o : no lymphadenopathy present  o Axilla  o : no lymphadenopathy present  o Groin  o : no lymphadenopathy present  · Skin and Subcutaneous Tissue  o General Inspection  o : no rashes present, no lesions present, no areas of discoloration  · Neurologic  o Cranial Nerves  o : grossly intact  o Sensation  o : grossly intact  o Gait and Station  o :   § Gait Screening  § : normal gait, able to stand without diffculty  o Cerebellar Function  o : no " obvious abnormalities  · Psychiatric  o Judgement and Insight  o : judgment and insight intact  o Mood and Affect  o : mood normal, affect appropriate          Assessment  · Encounter for examination following surgery     V67.00/Z09       Patient status post EGD and colonoscopy. His colonoscopy showed polyps as well as some internal hemorrhoids. His pathology on his polyp came back as a hyperplastic polyp. Given the fact that he had a polyp, I would recommend a repeat colonoscopy in five years if medically reasonable.     In regards to his EGD, he had the findings of some mild inactive gastritis and a little bit of reflux. In the mid esophagus, he did have findings for candidal esophagitis. I had given him Nystatin oral swish and swallow, which he says has helped a little bit.       Plan  · Medications  o Medications have been Reconciled  o Transition of Care or Provider Policy  · Instructions  o Electronically Identified Patient Education Materials Provided Electronically     In regards to the colonoscopy as stated, if medically reasonable, I would recommend a repeat colonoscopy in five years for the finding of polyps. His internal hemorrhoids are completely asymptomatic and I don't recommend any therapy.     In regards to his findings on the EGD, I will prescribe him some systemic antifungal medications and see if that doesn't help his symptoms. If these symptoms persist, he may need to follow up with GI. I also recommend he get in touch with his PCP, as candidal esophagitis is not terribly common. It can sometimes be associated with an immune system disorder. The patient reports he does take Advair and there is a possibility that the Advair has led to this infection.     He can follow-up with me after he completes his course of antibiotics and we will proceed with any further investigation if necessary.             Electronically Signed by: Daija Stephens-, -Author on March 10, 2021 02:24:37  PM  Electronically Co-signed by: Fawad Causey MD -Reviewer on March 10, 2021 04:35:24 PM

## 2021-07-20 DIAGNOSIS — E11.43 TYPE 2 DIABETES MELLITUS WITH AUTONOMIC NEUROPATHY, UNSPECIFIED WHETHER LONG TERM INSULIN USE (HCC): Primary | ICD-10-CM

## 2021-07-20 RX ORDER — GABAPENTIN 300 MG/1
300 CAPSULE ORAL 3 TIMES DAILY
COMMUNITY
End: 2021-07-22 | Stop reason: SDUPTHER

## 2021-07-20 RX ORDER — AMLODIPINE BESYLATE 2.5 MG/1
2.5 TABLET ORAL DAILY
COMMUNITY
End: 2021-07-22 | Stop reason: SDUPTHER

## 2021-07-20 RX ORDER — GABAPENTIN 300 MG/1
300 CAPSULE ORAL 3 TIMES DAILY
Qty: 90 CAPSULE | Refills: 0 | OUTPATIENT
Start: 2021-07-20

## 2021-07-20 NOTE — TELEPHONE ENCOUNTER
1 of these is a controlled substance and I cannot refill this without seeing the patient first.  The patient can have an appointment with me or you can ask the provider who prescribed at last.

## 2021-07-22 RX ORDER — GABAPENTIN 300 MG/1
300 CAPSULE ORAL 3 TIMES DAILY
Qty: 90 CAPSULE | Refills: 0 | Status: SHIPPED | OUTPATIENT
Start: 2021-07-22 | End: 2021-08-23 | Stop reason: SDUPTHER

## 2021-07-22 RX ORDER — AMLODIPINE BESYLATE 2.5 MG/1
2.5 TABLET ORAL DAILY
Qty: 90 TABLET | Refills: 0 | Status: SHIPPED | OUTPATIENT
Start: 2021-07-22 | End: 2021-11-01 | Stop reason: SDUPTHER

## 2021-07-29 PROBLEM — E11.9 DIABETES MELLITUS, TYPE II: Status: ACTIVE | Noted: 2021-07-29

## 2021-07-29 PROBLEM — N20.0 KIDNEY STONES: Status: ACTIVE | Noted: 2021-07-29

## 2021-07-29 PROBLEM — N42.9 PROSTATE DISORDER: Status: ACTIVE | Noted: 2021-07-29

## 2021-07-29 PROBLEM — D64.9 ANEMIA: Status: ACTIVE | Noted: 2021-07-29

## 2021-07-29 PROBLEM — J30.9 ALLERGIC RHINITIS: Status: ACTIVE | Noted: 2021-07-29

## 2021-07-29 PROBLEM — E78.00 HIGH CHOLESTEROL: Status: ACTIVE | Noted: 2021-07-29

## 2021-07-29 PROBLEM — J45.909 ASTHMA: Status: ACTIVE | Noted: 2021-07-29

## 2021-07-29 PROBLEM — N40.0 BENIGN PROSTATIC HYPERPLASIA: Status: ACTIVE | Noted: 2021-07-29

## 2021-07-29 PROBLEM — R06.02 SHORTNESS OF BREATH: Status: ACTIVE | Noted: 2021-07-29

## 2021-07-29 PROBLEM — I10 HYPERTENSION: Status: ACTIVE | Noted: 2021-07-29

## 2021-07-29 PROBLEM — N20.0 RENAL CALCULUS: Status: ACTIVE | Noted: 2021-07-29

## 2021-07-29 PROBLEM — M54.12 CERVICAL RADICULOPATHY: Status: ACTIVE | Noted: 2021-07-29

## 2021-07-29 PROBLEM — N52.9 IMPOTENCE OF ORGANIC ORIGIN: Status: ACTIVE | Noted: 2021-07-29

## 2021-07-29 RX ORDER — PSEUDOEPHEDRINE HCL 30 MG
TABLET ORAL
COMMUNITY
End: 2021-12-10 | Stop reason: SDUPTHER

## 2021-07-29 RX ORDER — SILDENAFIL 100 MG/1
1 TABLET, FILM COATED ORAL AS NEEDED
COMMUNITY
End: 2021-08-23

## 2021-07-29 RX ORDER — ATORVASTATIN CALCIUM 10 MG/1
1 TABLET, FILM COATED ORAL NIGHTLY
COMMUNITY
End: 2021-08-23

## 2021-07-29 RX ORDER — LORATADINE 10 MG/1
1 TABLET ORAL DAILY
COMMUNITY

## 2021-07-29 RX ORDER — MELOXICAM 7.5 MG/1
1 TABLET ORAL DAILY
COMMUNITY
End: 2021-08-23 | Stop reason: SDUPTHER

## 2021-07-29 RX ORDER — ASPIRIN 81 MG/1
1 TABLET ORAL DAILY
COMMUNITY

## 2021-07-29 RX ORDER — FLUTICASONE PROPIONATE 50 MCG
1 SPRAY, SUSPENSION (ML) NASAL DAILY
COMMUNITY

## 2021-07-29 RX ORDER — LISINOPRIL 20 MG/1
1 TABLET ORAL DAILY
COMMUNITY
End: 2021-08-23

## 2021-07-29 RX ORDER — MELATONIN
1 DAILY
COMMUNITY
End: 2021-08-23

## 2021-07-29 RX ORDER — MONTELUKAST SODIUM 10 MG/1
1 TABLET ORAL DAILY
COMMUNITY
End: 2022-05-02 | Stop reason: SDUPTHER

## 2021-07-29 RX ORDER — ALBUTEROL SULFATE 90 UG/1
2 AEROSOL, METERED RESPIRATORY (INHALATION) EVERY 4 HOURS PRN
COMMUNITY
End: 2022-05-25 | Stop reason: SDUPTHER

## 2021-08-02 ENCOUNTER — CLINICAL SUPPORT (OUTPATIENT)
Dept: INTERNAL MEDICINE | Facility: CLINIC | Age: 76
End: 2021-08-02

## 2021-08-02 DIAGNOSIS — E53.8 B12 DEFICIENCY: ICD-10-CM

## 2021-08-02 PROCEDURE — 96372 THER/PROPH/DIAG INJ SC/IM: CPT | Performed by: NURSE PRACTITIONER

## 2021-08-02 RX ORDER — CYANOCOBALAMIN 1000 UG/ML
1000 INJECTION, SOLUTION INTRAMUSCULAR; SUBCUTANEOUS
Status: DISCONTINUED | OUTPATIENT
Start: 2021-08-02 | End: 2022-05-29

## 2021-08-02 RX ADMIN — CYANOCOBALAMIN 1000 MCG: 1000 INJECTION, SOLUTION INTRAMUSCULAR; SUBCUTANEOUS at 17:29

## 2021-08-17 ENCOUNTER — TELEPHONE (OUTPATIENT)
Dept: INTERNAL MEDICINE | Facility: CLINIC | Age: 76
End: 2021-08-17

## 2021-08-17 DIAGNOSIS — E03.9 HYPOTHYROIDISM, UNSPECIFIED TYPE: ICD-10-CM

## 2021-08-17 DIAGNOSIS — E78.5 HYPERLIPIDEMIA, UNSPECIFIED HYPERLIPIDEMIA TYPE: ICD-10-CM

## 2021-08-17 DIAGNOSIS — I10 ESSENTIAL HYPERTENSION: ICD-10-CM

## 2021-08-17 DIAGNOSIS — E55.9 VITAMIN D DEFICIENCY: ICD-10-CM

## 2021-08-17 DIAGNOSIS — E11.43 TYPE 2 DIABETES MELLITUS WITH AUTONOMIC NEUROPATHY, UNSPECIFIED WHETHER LONG TERM INSULIN USE (HCC): Primary | ICD-10-CM

## 2021-08-18 ENCOUNTER — LAB (OUTPATIENT)
Dept: LAB | Facility: HOSPITAL | Age: 76
End: 2021-08-18

## 2021-08-18 DIAGNOSIS — I10 ESSENTIAL HYPERTENSION: ICD-10-CM

## 2021-08-18 DIAGNOSIS — E03.9 HYPOTHYROIDISM, UNSPECIFIED TYPE: ICD-10-CM

## 2021-08-18 DIAGNOSIS — E78.5 HYPERLIPIDEMIA, UNSPECIFIED HYPERLIPIDEMIA TYPE: ICD-10-CM

## 2021-08-18 DIAGNOSIS — E55.9 VITAMIN D DEFICIENCY: ICD-10-CM

## 2021-08-18 DIAGNOSIS — E11.43 TYPE 2 DIABETES MELLITUS WITH AUTONOMIC NEUROPATHY, UNSPECIFIED WHETHER LONG TERM INSULIN USE (HCC): ICD-10-CM

## 2021-08-18 LAB
25(OH)D3 SERPL-MCNC: 41 NG/ML
ALBUMIN SERPL-MCNC: 4.7 G/DL (ref 3.5–5.2)
ALBUMIN/GLOB SERPL: 1.4 G/DL
ALP SERPL-CCNC: 39 U/L (ref 39–117)
ALT SERPL W P-5'-P-CCNC: 23 U/L (ref 1–41)
ANION GAP SERPL CALCULATED.3IONS-SCNC: 11.9 MMOL/L (ref 5–15)
AST SERPL-CCNC: 18 U/L (ref 1–40)
BASOPHILS # BLD AUTO: 0.09 10*3/MM3 (ref 0–0.2)
BASOPHILS NFR BLD AUTO: 1.3 % (ref 0–1.5)
BILIRUB SERPL-MCNC: 0.4 MG/DL (ref 0–1.2)
BUN SERPL-MCNC: 12 MG/DL (ref 8–23)
BUN/CREAT SERPL: 14.1 (ref 7–25)
CALCIUM SPEC-SCNC: 9.5 MG/DL (ref 8.6–10.5)
CHLORIDE SERPL-SCNC: 100 MMOL/L (ref 98–107)
CHOLEST SERPL-MCNC: 152 MG/DL (ref 0–200)
CO2 SERPL-SCNC: 24.1 MMOL/L (ref 22–29)
CREAT SERPL-MCNC: 0.85 MG/DL (ref 0.76–1.27)
DEPRECATED RDW RBC AUTO: 38.9 FL (ref 37–54)
EOSINOPHIL # BLD AUTO: 0.28 10*3/MM3 (ref 0–0.4)
EOSINOPHIL NFR BLD AUTO: 4 % (ref 0.3–6.2)
ERYTHROCYTE [DISTWIDTH] IN BLOOD BY AUTOMATED COUNT: 12.4 % (ref 12.3–15.4)
GFR SERPL CREATININE-BSD FRML MDRD: 88 ML/MIN/1.73
GLOBULIN UR ELPH-MCNC: 3.3 GM/DL
GLUCOSE SERPL-MCNC: 148 MG/DL (ref 65–99)
HBA1C MFR BLD: 7.02 % (ref 4.8–5.6)
HCT VFR BLD AUTO: 40.3 % (ref 37.5–51)
HDLC SERPL-MCNC: 39 MG/DL (ref 40–60)
HGB BLD-MCNC: 13.9 G/DL (ref 13–17.7)
IMM GRANULOCYTES # BLD AUTO: 0.01 10*3/MM3 (ref 0–0.05)
IMM GRANULOCYTES NFR BLD AUTO: 0.1 % (ref 0–0.5)
LDLC SERPL CALC-MCNC: 87 MG/DL (ref 0–100)
LDLC/HDLC SERPL: 2.15 {RATIO}
LYMPHOCYTES # BLD AUTO: 2.23 10*3/MM3 (ref 0.7–3.1)
LYMPHOCYTES NFR BLD AUTO: 32.1 % (ref 19.6–45.3)
MCH RBC QN AUTO: 30.1 PG (ref 26.6–33)
MCHC RBC AUTO-ENTMCNC: 34.5 G/DL (ref 31.5–35.7)
MCV RBC AUTO: 87.2 FL (ref 79–97)
MONOCYTES # BLD AUTO: 0.51 10*3/MM3 (ref 0.1–0.9)
MONOCYTES NFR BLD AUTO: 7.3 % (ref 5–12)
NEUTROPHILS NFR BLD AUTO: 3.82 10*3/MM3 (ref 1.7–7)
NEUTROPHILS NFR BLD AUTO: 55.2 % (ref 42.7–76)
NRBC BLD AUTO-RTO: 0 /100 WBC (ref 0–0.2)
PLATELET # BLD AUTO: 254 10*3/MM3 (ref 140–450)
PMV BLD AUTO: 11.6 FL (ref 6–12)
POTASSIUM SERPL-SCNC: 4.6 MMOL/L (ref 3.5–5.2)
PROT SERPL-MCNC: 8 G/DL (ref 6–8.5)
RBC # BLD AUTO: 4.62 10*6/MM3 (ref 4.14–5.8)
SODIUM SERPL-SCNC: 136 MMOL/L (ref 136–145)
T4 FREE SERPL-MCNC: 1 NG/DL (ref 0.93–1.7)
TRIGL SERPL-MCNC: 145 MG/DL (ref 0–150)
TSH SERPL DL<=0.05 MIU/L-ACNC: 1.83 UIU/ML (ref 0.27–4.2)
VLDLC SERPL-MCNC: 26 MG/DL (ref 5–40)
WBC # BLD AUTO: 6.94 10*3/MM3 (ref 3.4–10.8)

## 2021-08-18 PROCEDURE — 84439 ASSAY OF FREE THYROXINE: CPT

## 2021-08-18 PROCEDURE — 84443 ASSAY THYROID STIM HORMONE: CPT

## 2021-08-18 PROCEDURE — 36415 COLL VENOUS BLD VENIPUNCTURE: CPT

## 2021-08-18 PROCEDURE — 80061 LIPID PANEL: CPT

## 2021-08-18 PROCEDURE — 82306 VITAMIN D 25 HYDROXY: CPT

## 2021-08-18 PROCEDURE — 85025 COMPLETE CBC W/AUTO DIFF WBC: CPT

## 2021-08-18 PROCEDURE — 80053 COMPREHEN METABOLIC PANEL: CPT

## 2021-08-18 PROCEDURE — 83036 HEMOGLOBIN GLYCOSYLATED A1C: CPT

## 2021-08-21 PROBLEM — E78.5 HYPERLIPIDEMIA: Status: ACTIVE | Noted: 2021-07-29

## 2021-08-21 PROBLEM — E53.8 VITAMIN B 12 DEFICIENCY: Status: ACTIVE | Noted: 2021-08-21

## 2021-08-21 PROBLEM — E55.9 VITAMIN D DEFICIENCY: Status: ACTIVE | Noted: 2021-08-21

## 2021-08-22 NOTE — PROGRESS NOTES
"Chief Complaint  Follow-up (4 mo f/u )    Subjective    History of Present Illness:  Trenton Adames presents to [facility]  for follow-up chronic disease management.  The patient is not having any medication side effects. Labs were reviewed.  Diabetes at goal.  See chart below.  Home blood sugars have been 140-150s.   Denies chest pain, shortness of air, abdominal pain, or change in bowel habits. He has had 2 doses of covid vaccine.    Component      Latest Ref Rng & Units 2/28/2019 8/21/2019 2/14/2020 8/18/2020   Hemoglobin A1C      4.80 - 5.60 % 7.5 (H) 6.7 (H) 7.2 (H) 7.4 (H)     Component      Latest Ref Rng & Units 1/13/2021 4/14/2021 8/18/2021   Hemoglobin A1C      4.80 - 5.60 % 6.5 (H) 7.0 (H) 7.02 (H)       Objective   Vital Signs:   /75 (BP Location: Left arm, Patient Position: Sitting, Cuff Size: Adult)   Pulse 62   Temp 97.5 °F (36.4 °C) (Temporal)   Ht 177.8 cm (70\")   Wt 88 kg (194 lb)   BMI 27.84 kg/m²       Physical Exam :  General: Well nourished, well hydrated, in no acute distress  HEENT: Conjunctiva clear, no exudate bilateral  Neck: Supple, non-tender, no adenopathy, no bruit  Skin: Warm and dry  Cardiovascular: S1 S2, regular rate and rhythm, no murmur  Respiratory: Clear to auscultation bilateral, no wheezes, rhonchi, or rales  Chest: Normal shape, no deformity, normal work of breathing  Extremities: No lower extremity edema  Neurological: Alert, conversing normally  Psychological: Good eye contact, mood good, and judgment intact        Assessment and Plan:  Diagnoses and all orders for this visit:    1. Type 2 diabetes mellitus with diabetic neuropathy, unspecified whether long term insulin use (CMS/LTAC, located within [facility]) (Primary)  Comments:   Stable on medication and to continue current treatment plan.  Orders:  -     Lipid Panel; Future  -     TSH; Future  -     T4, Free; Future  -     Hemoglobin A1c; Future  -     CBC & Differential; Future  -     " Comprehensive Metabolic Panel; Future  -     gabapentin (NEURONTIN) 300 MG capsule; Take 1 capsule by mouth 3 (Three) Times a Day for 90 days.  Dispense: 90 capsule; Refill: 1  -     metFORMIN (GLUCOPHAGE) 1000 MG tablet; Take 1 tablet by mouth 2 (two) times a day for 90 days.  Dispense: 180 tablet; Refill: 1    2. Essential hypertension  Comments:  Stable on medication and to continue current treatment plan.  Orders:  -     Lipid Panel; Future  -     TSH; Future  -     T4, Free; Future  -     CBC & Differential; Future  -     Comprehensive Metabolic Panel; Future    3. Hyperlipidemia, unspecified hyperlipidemia type  Comments:  Stable on medication and to continue current treatment plan.  Orders:  -     Lipid Panel; Future  -     TSH; Future  -     T4, Free; Future  -     CBC & Differential; Future  -     Comprehensive Metabolic Panel; Future    4. Iron deficiency anemia, unspecified iron deficiency anemia type  Comments:  Improved and to continue current treatment plan. Check lab next  visit.  Orders:  -     ferrous sulfate 325 (65 FE) MG EC tablet; Take 1 tablet by mouth 2 (Two) Times a Day With Meals for 90 days.  Dispense: 60 tablet; Refill: 0    5. Vitamin B 12 deficiency  Comments:  Stable on medication and to continue current treatment plan.  Orders:  -     Vitamin B12; Future    6. Vitamin D deficiency  Comments:  Stable on medication and to continue current treatment plan.  Orders:  -     Vitamin D 25 Hydroxy; Future    7. Other long term (current) drug therapy  Comments:  HERMILO reviewed. Discussed controlled substance contract including addiction, security, side effects, disposal, etc Plan to get UDS at next visit.  Orders:  -     Urine Drug Screen - Urine, Clean Catch; Future    Other orders  -     meloxicam (MOBIC) 7.5 MG tablet; Take 1 tablet by mouth Daily for 90 days.  Dispense: 90 tablet; Refill: 1  -     clobetasol (TEMOVATE) 0.05 % cream; Apply 1 application topically to the appropriate area as  directed 2 (Two) Times a Day.  Dispense: 30 g; Refill: 2            Follow Up:  Patient was given instructions and counseling regarding condition. Return in about 3 months (around 11/23/2021) for Recheck.   Answers for HPI/ROS submitted by the patient on 8/16/2021  What is the primary reason for your visit?: Diabetes

## 2021-08-23 ENCOUNTER — OFFICE VISIT (OUTPATIENT)
Dept: INTERNAL MEDICINE | Facility: CLINIC | Age: 76
End: 2021-08-23

## 2021-08-23 VITALS
WEIGHT: 194 LBS | HEART RATE: 62 BPM | SYSTOLIC BLOOD PRESSURE: 180 MMHG | HEIGHT: 70 IN | BODY MASS INDEX: 27.77 KG/M2 | DIASTOLIC BLOOD PRESSURE: 75 MMHG | TEMPERATURE: 97.5 F

## 2021-08-23 DIAGNOSIS — E55.9 VITAMIN D DEFICIENCY: ICD-10-CM

## 2021-08-23 DIAGNOSIS — I10 ESSENTIAL HYPERTENSION: ICD-10-CM

## 2021-08-23 DIAGNOSIS — Z79.899 OTHER LONG TERM (CURRENT) DRUG THERAPY: ICD-10-CM

## 2021-08-23 DIAGNOSIS — E11.40 TYPE 2 DIABETES MELLITUS WITH DIABETIC NEUROPATHY, UNSPECIFIED WHETHER LONG TERM INSULIN USE (HCC): Primary | ICD-10-CM

## 2021-08-23 DIAGNOSIS — D50.9 IRON DEFICIENCY ANEMIA, UNSPECIFIED IRON DEFICIENCY ANEMIA TYPE: ICD-10-CM

## 2021-08-23 DIAGNOSIS — E78.5 HYPERLIPIDEMIA, UNSPECIFIED HYPERLIPIDEMIA TYPE: ICD-10-CM

## 2021-08-23 DIAGNOSIS — E53.8 VITAMIN B 12 DEFICIENCY: ICD-10-CM

## 2021-08-23 PROCEDURE — 99214 OFFICE O/P EST MOD 30 MIN: CPT | Performed by: NURSE PRACTITIONER

## 2021-08-23 RX ORDER — LANOLIN ALCOHOL/MO/W.PET/CERES
325 CREAM (GRAM) TOPICAL 2 TIMES DAILY WITH MEALS
COMMUNITY
End: 2021-08-23 | Stop reason: SDUPTHER

## 2021-08-23 RX ORDER — ATORVASTATIN CALCIUM 40 MG/1
40 TABLET, FILM COATED ORAL DAILY
COMMUNITY
End: 2022-05-05 | Stop reason: SDUPTHER

## 2021-08-23 RX ORDER — GABAPENTIN 300 MG/1
300 CAPSULE ORAL 3 TIMES DAILY
Qty: 90 CAPSULE | Refills: 1 | Status: SHIPPED | OUTPATIENT
Start: 2021-08-23 | End: 2021-10-22

## 2021-08-23 RX ORDER — CLOBETASOL PROPIONATE 0.5 MG/G
1 CREAM TOPICAL 2 TIMES DAILY
Qty: 30 G | Refills: 2 | Status: SHIPPED | OUTPATIENT
Start: 2021-08-23

## 2021-08-23 RX ORDER — LISINOPRIL 40 MG/1
40 TABLET ORAL DAILY
COMMUNITY
End: 2021-12-14 | Stop reason: SDUPTHER

## 2021-08-23 RX ORDER — CLOBETASOL PROPIONATE 0.5 MG/G
1 CREAM TOPICAL 2 TIMES DAILY
COMMUNITY
End: 2021-08-23 | Stop reason: SDUPTHER

## 2021-08-23 RX ORDER — LANOLIN ALCOHOL/MO/W.PET/CERES
325 CREAM (GRAM) TOPICAL 2 TIMES DAILY WITH MEALS
Qty: 60 TABLET | Refills: 0 | Status: SHIPPED | OUTPATIENT
Start: 2021-08-23 | End: 2021-08-25

## 2021-08-23 RX ORDER — KRILL/OM-3/DHA/EPA/PHOSPHO/AST 500-110 MG
1 CAPSULE ORAL DAILY
COMMUNITY

## 2021-08-23 RX ORDER — MELOXICAM 7.5 MG/1
7.5 TABLET ORAL DAILY
Qty: 90 TABLET | Refills: 1 | Status: SHIPPED | OUTPATIENT
Start: 2021-08-23 | End: 2021-11-21

## 2021-08-25 DIAGNOSIS — D50.9 IRON DEFICIENCY ANEMIA, UNSPECIFIED IRON DEFICIENCY ANEMIA TYPE: ICD-10-CM

## 2021-08-25 DIAGNOSIS — E11.40 TYPE 2 DIABETES MELLITUS WITH DIABETIC NEUROPATHY, UNSPECIFIED WHETHER LONG TERM INSULIN USE (HCC): ICD-10-CM

## 2021-08-25 RX ORDER — FERROUS SULFATE 325(65) MG
325 TABLET ORAL
Qty: 90 TABLET | Refills: 0 | Status: CANCELLED | OUTPATIENT
Start: 2021-08-25

## 2021-08-25 RX ORDER — GABAPENTIN 300 MG/1
300 CAPSULE ORAL 3 TIMES DAILY
Qty: 270 CAPSULE | Refills: 0 | Status: CANCELLED | OUTPATIENT
Start: 2021-08-25 | End: 2021-11-23

## 2021-08-25 RX ORDER — FERROUS SULFATE 325(65) MG
325 TABLET ORAL 2 TIMES DAILY WITH MEALS
COMMUNITY

## 2021-08-30 ENCOUNTER — CLINICAL SUPPORT (OUTPATIENT)
Dept: INTERNAL MEDICINE | Facility: CLINIC | Age: 76
End: 2021-08-30

## 2021-08-30 PROCEDURE — 96372 THER/PROPH/DIAG INJ SC/IM: CPT | Performed by: NURSE PRACTITIONER

## 2021-08-30 RX ADMIN — CYANOCOBALAMIN 1000 MCG: 1000 INJECTION, SOLUTION INTRAMUSCULAR; SUBCUTANEOUS at 10:55

## 2021-09-22 RX ORDER — FERROUS SULFATE 325(65) MG
325 TABLET ORAL 2 TIMES DAILY
Qty: 180 TABLET | Refills: 0 | Status: SHIPPED | OUTPATIENT
Start: 2021-09-22 | End: 2021-12-19 | Stop reason: SDUPTHER

## 2021-10-01 ENCOUNTER — CLINICAL SUPPORT (OUTPATIENT)
Dept: INTERNAL MEDICINE | Facility: CLINIC | Age: 76
End: 2021-10-01

## 2021-10-01 DIAGNOSIS — E53.8 VITAMIN B 12 DEFICIENCY: ICD-10-CM

## 2021-10-01 PROCEDURE — 96372 THER/PROPH/DIAG INJ SC/IM: CPT | Performed by: NURSE PRACTITIONER

## 2021-10-01 RX ADMIN — CYANOCOBALAMIN 1000 MCG: 1000 INJECTION, SOLUTION INTRAMUSCULAR; SUBCUTANEOUS at 11:09

## 2021-10-22 DIAGNOSIS — E11.40 TYPE 2 DIABETES MELLITUS WITH DIABETIC NEUROPATHY, UNSPECIFIED WHETHER LONG TERM INSULIN USE (HCC): ICD-10-CM

## 2021-10-22 RX ORDER — GABAPENTIN 300 MG/1
300 CAPSULE ORAL 3 TIMES DAILY
Qty: 90 CAPSULE | Refills: 1 | Status: CANCELLED | OUTPATIENT
Start: 2021-10-22 | End: 2022-01-20

## 2021-10-22 RX ORDER — GABAPENTIN 300 MG/1
300 CAPSULE ORAL 3 TIMES DAILY
Qty: 90 CAPSULE | Refills: 2 | Status: SHIPPED | OUTPATIENT
Start: 2021-10-22 | End: 2022-01-22 | Stop reason: SDUPTHER

## 2021-11-01 ENCOUNTER — CLINICAL SUPPORT (OUTPATIENT)
Dept: INTERNAL MEDICINE | Facility: CLINIC | Age: 76
End: 2021-11-01

## 2021-11-01 DIAGNOSIS — E53.8 VITAMIN B 12 DEFICIENCY: ICD-10-CM

## 2021-11-01 PROCEDURE — 96372 THER/PROPH/DIAG INJ SC/IM: CPT | Performed by: NURSE PRACTITIONER

## 2021-11-01 RX ORDER — AMLODIPINE BESYLATE 2.5 MG/1
2.5 TABLET ORAL DAILY
Qty: 90 TABLET | Refills: 0 | Status: SHIPPED | OUTPATIENT
Start: 2021-11-01 | End: 2021-11-24 | Stop reason: DRUGHIGH

## 2021-11-01 RX ADMIN — CYANOCOBALAMIN 1000 MCG: 1000 INJECTION, SOLUTION INTRAMUSCULAR; SUBCUTANEOUS at 14:26

## 2021-11-17 ENCOUNTER — LAB (OUTPATIENT)
Dept: LAB | Facility: HOSPITAL | Age: 76
End: 2021-11-17

## 2021-11-17 DIAGNOSIS — E11.40 TYPE 2 DIABETES MELLITUS WITH DIABETIC NEUROPATHY, UNSPECIFIED WHETHER LONG TERM INSULIN USE (HCC): ICD-10-CM

## 2021-11-17 DIAGNOSIS — I10 ESSENTIAL HYPERTENSION: ICD-10-CM

## 2021-11-17 DIAGNOSIS — E53.8 VITAMIN B 12 DEFICIENCY: ICD-10-CM

## 2021-11-17 DIAGNOSIS — E55.9 VITAMIN D DEFICIENCY: ICD-10-CM

## 2021-11-17 DIAGNOSIS — Z79.899 OTHER LONG TERM (CURRENT) DRUG THERAPY: ICD-10-CM

## 2021-11-17 DIAGNOSIS — E78.5 HYPERLIPIDEMIA, UNSPECIFIED HYPERLIPIDEMIA TYPE: ICD-10-CM

## 2021-11-17 LAB
25(OH)D3 SERPL-MCNC: 47.3 NG/ML
ALBUMIN SERPL-MCNC: 4.7 G/DL (ref 3.5–5.2)
ALBUMIN/GLOB SERPL: 1.5 G/DL
ALP SERPL-CCNC: 47 U/L (ref 39–117)
ALT SERPL W P-5'-P-CCNC: 25 U/L (ref 1–41)
AMPHET+METHAMPHET UR QL: NEGATIVE
ANION GAP SERPL CALCULATED.3IONS-SCNC: 9.6 MMOL/L (ref 5–15)
AST SERPL-CCNC: 24 U/L (ref 1–40)
BARBITURATES UR QL SCN: NEGATIVE
BASOPHILS # BLD AUTO: 0.09 10*3/MM3 (ref 0–0.2)
BASOPHILS NFR BLD AUTO: 1.1 % (ref 0–1.5)
BENZODIAZ UR QL SCN: NEGATIVE
BILIRUB SERPL-MCNC: 0.4 MG/DL (ref 0–1.2)
BUN SERPL-MCNC: 17 MG/DL (ref 8–23)
BUN/CREAT SERPL: 19.1 (ref 7–25)
CALCIUM SPEC-SCNC: 9.5 MG/DL (ref 8.6–10.5)
CANNABINOIDS SERPL QL: NEGATIVE
CHLORIDE SERPL-SCNC: 99 MMOL/L (ref 98–107)
CHOLEST SERPL-MCNC: 155 MG/DL (ref 0–200)
CO2 SERPL-SCNC: 24.4 MMOL/L (ref 22–29)
COCAINE UR QL: NEGATIVE
CREAT SERPL-MCNC: 0.89 MG/DL (ref 0.76–1.27)
DEPRECATED RDW RBC AUTO: 38.1 FL (ref 37–54)
EOSINOPHIL # BLD AUTO: 0.35 10*3/MM3 (ref 0–0.4)
EOSINOPHIL NFR BLD AUTO: 4.3 % (ref 0.3–6.2)
ERYTHROCYTE [DISTWIDTH] IN BLOOD BY AUTOMATED COUNT: 12.2 % (ref 12.3–15.4)
GFR SERPL CREATININE-BSD FRML MDRD: 84 ML/MIN/1.73
GLOBULIN UR ELPH-MCNC: 3.1 GM/DL
GLUCOSE SERPL-MCNC: 142 MG/DL (ref 65–99)
HBA1C MFR BLD: 7.61 % (ref 4.8–5.6)
HCT VFR BLD AUTO: 38.3 % (ref 37.5–51)
HDLC SERPL-MCNC: 37 MG/DL (ref 40–60)
HGB BLD-MCNC: 13.2 G/DL (ref 13–17.7)
IMM GRANULOCYTES # BLD AUTO: 0.03 10*3/MM3 (ref 0–0.05)
IMM GRANULOCYTES NFR BLD AUTO: 0.4 % (ref 0–0.5)
LDLC SERPL CALC-MCNC: 94 MG/DL (ref 0–100)
LDLC/HDLC SERPL: 2.45 {RATIO}
LYMPHOCYTES # BLD AUTO: 3.13 10*3/MM3 (ref 0.7–3.1)
LYMPHOCYTES NFR BLD AUTO: 38.2 % (ref 19.6–45.3)
MCH RBC QN AUTO: 29.7 PG (ref 26.6–33)
MCHC RBC AUTO-ENTMCNC: 34.5 G/DL (ref 31.5–35.7)
MCV RBC AUTO: 86.3 FL (ref 79–97)
METHADONE UR QL SCN: NEGATIVE
MONOCYTES # BLD AUTO: 0.6 10*3/MM3 (ref 0.1–0.9)
MONOCYTES NFR BLD AUTO: 7.3 % (ref 5–12)
NEUTROPHILS NFR BLD AUTO: 3.99 10*3/MM3 (ref 1.7–7)
NEUTROPHILS NFR BLD AUTO: 48.7 % (ref 42.7–76)
NRBC BLD AUTO-RTO: 0 /100 WBC (ref 0–0.2)
OPIATES UR QL: NEGATIVE
OXYCODONE UR QL SCN: NEGATIVE
PLATELET # BLD AUTO: 273 10*3/MM3 (ref 140–450)
PMV BLD AUTO: 11.6 FL (ref 6–12)
POTASSIUM SERPL-SCNC: 4.7 MMOL/L (ref 3.5–5.2)
PROT SERPL-MCNC: 7.8 G/DL (ref 6–8.5)
RBC # BLD AUTO: 4.44 10*6/MM3 (ref 4.14–5.8)
SODIUM SERPL-SCNC: 133 MMOL/L (ref 136–145)
T4 FREE SERPL-MCNC: 1.1 NG/DL (ref 0.93–1.7)
TRIGL SERPL-MCNC: 136 MG/DL (ref 0–150)
TSH SERPL DL<=0.05 MIU/L-ACNC: 1.75 UIU/ML (ref 0.27–4.2)
VIT B12 BLD-MCNC: 655 PG/ML (ref 211–946)
VLDLC SERPL-MCNC: 24 MG/DL (ref 5–40)
WBC NRBC COR # BLD: 8.19 10*3/MM3 (ref 3.4–10.8)

## 2021-11-17 PROCEDURE — 80307 DRUG TEST PRSMV CHEM ANLYZR: CPT

## 2021-11-17 PROCEDURE — 36415 COLL VENOUS BLD VENIPUNCTURE: CPT

## 2021-11-17 PROCEDURE — 82607 VITAMIN B-12: CPT

## 2021-11-17 PROCEDURE — 80053 COMPREHEN METABOLIC PANEL: CPT

## 2021-11-17 PROCEDURE — 84439 ASSAY OF FREE THYROXINE: CPT

## 2021-11-17 PROCEDURE — 82306 VITAMIN D 25 HYDROXY: CPT

## 2021-11-17 PROCEDURE — 84443 ASSAY THYROID STIM HORMONE: CPT

## 2021-11-17 PROCEDURE — 83036 HEMOGLOBIN GLYCOSYLATED A1C: CPT

## 2021-11-17 PROCEDURE — 85025 COMPLETE CBC W/AUTO DIFF WBC: CPT

## 2021-11-17 PROCEDURE — 80061 LIPID PANEL: CPT

## 2021-11-23 PROBLEM — D50.9 IRON DEFICIENCY ANEMIA: Status: ACTIVE | Noted: 2021-11-23

## 2021-11-24 ENCOUNTER — OFFICE VISIT (OUTPATIENT)
Dept: INTERNAL MEDICINE | Facility: CLINIC | Age: 76
End: 2021-11-24

## 2021-11-24 VITALS
DIASTOLIC BLOOD PRESSURE: 81 MMHG | TEMPERATURE: 98.3 F | OXYGEN SATURATION: 97 % | SYSTOLIC BLOOD PRESSURE: 169 MMHG | HEART RATE: 64 BPM | HEIGHT: 70 IN | BODY MASS INDEX: 27.77 KG/M2 | WEIGHT: 194 LBS

## 2021-11-24 DIAGNOSIS — E78.5 HYPERLIPIDEMIA, UNSPECIFIED HYPERLIPIDEMIA TYPE: Chronic | ICD-10-CM

## 2021-11-24 DIAGNOSIS — I10 PRIMARY HYPERTENSION: Chronic | ICD-10-CM

## 2021-11-24 DIAGNOSIS — E55.9 VITAMIN D DEFICIENCY: ICD-10-CM

## 2021-11-24 DIAGNOSIS — E11.9 TYPE 2 DIABETES MELLITUS WITHOUT COMPLICATION, UNSPECIFIED WHETHER LONG TERM INSULIN USE (HCC): Primary | Chronic | ICD-10-CM

## 2021-11-24 PROBLEM — B02.30 OPHTHALMIC HERPES ZOSTER: Status: ACTIVE | Noted: 2021-11-24

## 2021-11-24 PROCEDURE — 99214 OFFICE O/P EST MOD 30 MIN: CPT | Performed by: NURSE PRACTITIONER

## 2021-11-24 RX ORDER — AMLODIPINE BESYLATE 5 MG/1
5 TABLET ORAL DAILY
Qty: 90 TABLET | Refills: 1 | Status: SHIPPED | OUTPATIENT
Start: 2021-11-24 | End: 2022-05-17 | Stop reason: SDUPTHER

## 2021-11-24 RX ORDER — PREDNISOLONE ACETATE 10 MG/ML
SUSPENSION/ DROPS OPHTHALMIC
COMMUNITY
Start: 2021-11-03

## 2021-11-24 RX ORDER — VALACYCLOVIR HYDROCHLORIDE 500 MG/1
TABLET, FILM COATED ORAL
COMMUNITY
Start: 2021-11-03 | End: 2021-11-24

## 2021-11-24 NOTE — PROGRESS NOTES
Chief Complaint  Follow-up (follow up on you bloodwork. pt has a few question. pt is needing a rapid covid test, he is going on a cruise. )  Subjective        History of Present Illness  Trenton Adames is a 76 y.o. male who presents to [facility] for follow-up of chronic disease management.  The patient is not having any medication side effects. Denies chest pain, shortness of air, abdominal pain, or change in bowel habits. Recent labs were reviewed. Home blood pressures have been 140-160/70-80s. He is going on a cruise in January.    Past Medical History  Allergic rhinitis, chronic; Anemia, Unspecified; Asthma; Cervical radiculopathy; Diabetes; Diabetes Mellitus, Type II; Erectile Dysfunction, Organic; High cholesterol; Hypertension; Kidney stones; Prostate Disorder; Prostatic Hypertrophy, Benign; Renal Calculus; Shortness Of Air      Past Surgical History  *Other; Cataract exctraction with lens implant; Colonoscopy; Hemorrhoidectomy; Sinus Surgery          Current Outpatient Medications:   •  albuterol sulfate  (90 Base) MCG/ACT inhaler, Inhale 2 puffs Every 4 (Four) Hours As Needed., Disp: , Rfl:   •  aspirin (aspirin) 81 MG EC tablet, Take 1 tablet by mouth Daily., Disp: , Rfl:   •  atorvastatin (LIPITOR) 40 MG tablet, Take 40 mg by mouth Daily., Disp: , Rfl:   •  clobetasol (TEMOVATE) 0.05 % cream, Apply 1 application topically to the appropriate area as directed 2 (Two) Times a Day., Disp: 30 g, Rfl: 2  •  docusate sodium 100 MG capsule, Stool Softener 100 mg oral capsule take 1 capsule (100 mg) by oral route 2 times per day   Active, Disp: , Rfl:   •  esomeprazole (nexIUM) 20 MG capsule, Take 20 mg by mouth Every Morning Before Breakfast., Disp: , Rfl:   •  ferrous sulfate (FeroSul) 325 (65 FE) MG tablet, Take 1 tablet by mouth 2 (two) times a day., Disp: 180 tablet, Rfl: 0  •  ferrous sulfate 325 (65 FE) MG tablet, Take 325 mg by mouth 2 (Two) Times a Day With  "Meals., Disp: , Rfl:   •  fluticasone (FLONASE) 50 MCG/ACT nasal spray, 1 spray into the nostril(s) as directed by provider Daily., Disp: , Rfl:   •  gabapentin (NEURONTIN) 300 MG capsule, Take 1 capsule by mouth 3 (Three) Times a Day., Disp: 90 capsule, Rfl: 2  •  Krill Oil (Omega-3) 500 MG capsule, Take 1 capsule by mouth Daily., Disp: , Rfl:   •  lisinopril (PRINIVIL,ZESTRIL) 40 MG tablet, Take 40 mg by mouth Daily., Disp: , Rfl:   •  loratadine (Claritin) 10 MG tablet, Take 1 tablet by mouth Daily., Disp: , Rfl:   •  metFORMIN (GLUCOPHAGE) 1000 MG tablet, Take 1 tablet by mouth 2 (two) times a day for 90 days., Disp: 180 tablet, Rfl: 1  •  montelukast (SINGULAIR) 10 MG tablet, Take 1 tablet by mouth Daily., Disp: , Rfl:   •  prednisoLONE acetate (PRED FORTE) 1 % ophthalmic suspension, , Disp: , Rfl:   •  vitamin D3 125 MCG (5000 UT) capsule capsule, Take 5,000 Units by mouth Daily., Disp: , Rfl:   •  amLODIPine (NORVASC) 5 MG tablet, Take 1 tablet by mouth Daily., Disp: 90 tablet, Rfl: 1    Current Facility-Administered Medications:   •  cyanocobalamin injection 1,000 mcg, 1,000 mcg, Intramuscular, Q28 Days, Jacquelin Pelaez APRN, 1,000 mcg at 11/01/21 1426  Objective   /81 (BP Location: Right arm, Patient Position: Sitting, Cuff Size: Adult)   Pulse 64   Temp 98.3 °F (36.8 °C)   Ht 177.8 cm (70\")   Wt 88 kg (194 lb)   SpO2 97%   BMI 27.84 kg/m²    Estimated body mass index is 27.84 kg/m² as calculated from the following:    Height as of this encounter: 177.8 cm (70\").    Weight as of this encounter: 88 kg (194 lb).   Physical Exam  Vitals reviewed.   Constitutional:       General: He is not in acute distress.     Appearance: Normal appearance.   HENT:      Head: Normocephalic and atraumatic.   Eyes:      Conjunctiva/sclera: Conjunctivae normal.   Neck:      Comments: No thyroid enlargement  Cardiovascular:      Rate and Rhythm: Normal rate and regular rhythm.      Heart sounds: Normal heart sounds. "   Pulmonary:      Effort: Pulmonary effort is normal.      Breath sounds: Normal breath sounds. No wheezing, rhonchi or rales.   Musculoskeletal:      Cervical back: Neck supple. No tenderness.      Right lower leg: No edema.      Left lower leg: No edema.   Lymphadenopathy:      Cervical: No cervical adenopathy.   Skin:     General: Skin is warm and dry.   Neurological:      General: No focal deficit present.      Mental Status: He is alert.   Psychiatric:         Mood and Affect: Mood normal.         Behavior: Behavior normal.         Thought Content: Thought content normal.         Judgment: Judgment normal.        Result Review :   The following data was reviewed by: DARRELL Pardo on 11/24/2021:  CMP    CMP 4/21/21 8/18/21 11/17/21   Glucose 160 (A) 148 (A) 142 (A)   BUN 22 12 17   Creatinine 0.91 0.85 0.89   eGFR Non African Am  88 84   Sodium 137 136 133 (A)   Potassium 4.6 4.6 4.7   Chloride 102 100 99   Calcium 9.7 9.5 9.5   Albumin  4.70 4.70   Total Bilirubin  0.4 0.4   Alkaline Phosphatase  39 47   AST (SGOT)  18 24   ALT (SGPT)  23 25   (A) Abnormal value            CBC w/diff    CBC w/Diff 4/14/21 8/18/21 11/17/21   WBC 7.26 6.94 8.19   RBC 4.41 (A) 4.62 4.44   Hemoglobin 12.6 (A) 13.9 13.2   Hematocrit 38.2 (A) 40.3 38.3   MCV 86.6 87.2 86.3   MCH 28.6 30.1 29.7   MCHC 33.0 34.5 34.5   RDW 12.7 12.4 12.2 (A)   Platelets 278 254 273   Neutrophil Rel % 50.0 55.2 48.7   Immature Granulocyte Rel %  0.1 0.4   Lymphocyte Rel % 35.4 32.1 38.2   Monocyte Rel % 8.0 7.3 7.3   Eosinophil Rel % 5.1 4.0 4.3   Basophil Rel % 1.1 1.3 1.1   (A) Abnormal value            Lipid Panel    Lipid Panel 4/14/21 8/18/21 11/17/21   Total Cholesterol  152 155   Total Cholesterol 138     Triglycerides 127 145 136   HDL Cholesterol 41 39 (A) 37 (A)   VLDL Cholesterol 25 26 24   LDL Cholesterol  72 87 94   LDL/HDL Ratio  2.15 2.45   (A) Abnormal value       Comments are available for some flowsheets but are not being  displayed.           TSH    TSH 4/14/21 8/18/21 11/17/21   TSH 1.340 1.830 1.750           Most Recent A1C    HGBA1C Most Recent 11/17/21   Hemoglobin A1C 7.61 (A)   (A) Abnormal value                        Assessment and Plan    Diagnoses and all orders for this visit:    1. Type 2 diabetes mellitus without complication, unspecified whether long term insulin use (HCC) (Primary)  Comments:  Slightly worse and discussed diet. Continue medication and monitor.  Orders:  -     Hemoglobin A1c; Future    2. Primary hypertension  Comments:  Stable and to continue medication and current treatment plan.  Orders:  -     Comprehensive metabolic panel; Future  -     CBC w AUTO Differential; Future  -     amLODIPine (NORVASC) 5 MG tablet; Take 1 tablet by mouth Daily.  Dispense: 90 tablet; Refill: 1    3. Hyperlipidemia, unspecified hyperlipidemia type  Comments:  Stable on medicationa and to continue currenet treatment.  Orders:  -     Lipid panel; Future    4. Vitamin D deficiency  Comments:  Improved and to continue to montor.      Follow Up     Patient was given instructions and counseling regarding his condition or for health maintenance advice. Please see specific information pulled into the AVS if appropriate.   Return in about 6 months (around 5/24/2022) for Recheck.    DARRELL Pardo

## 2021-11-30 ENCOUNTER — CLINICAL SUPPORT (OUTPATIENT)
Dept: INTERNAL MEDICINE | Facility: CLINIC | Age: 76
End: 2021-11-30

## 2021-11-30 PROCEDURE — 96372 THER/PROPH/DIAG INJ SC/IM: CPT | Performed by: NURSE PRACTITIONER

## 2021-11-30 RX ADMIN — CYANOCOBALAMIN 1000 MCG: 1000 INJECTION, SOLUTION INTRAMUSCULAR; SUBCUTANEOUS at 14:10

## 2021-12-10 RX ORDER — PSEUDOEPHEDRINE HCL 30 MG
100 TABLET ORAL 2 TIMES DAILY
Qty: 90 CAPSULE | Refills: 1 | Status: SHIPPED | OUTPATIENT
Start: 2021-12-10 | End: 2022-01-23 | Stop reason: SDUPTHER

## 2021-12-14 DIAGNOSIS — I10 ESSENTIAL HYPERTENSION: ICD-10-CM

## 2021-12-14 RX ORDER — LISINOPRIL 40 MG/1
40 TABLET ORAL DAILY
Qty: 90 TABLET | Refills: 1 | Status: SHIPPED | OUTPATIENT
Start: 2021-12-14 | End: 2022-06-06 | Stop reason: SDUPTHER

## 2021-12-21 RX ORDER — FERROUS SULFATE 325(65) MG
325 TABLET ORAL 2 TIMES DAILY WITH MEALS
Qty: 180 TABLET | Refills: 3 | Status: SHIPPED | OUTPATIENT
Start: 2021-12-21 | End: 2022-06-20 | Stop reason: SDUPTHER

## 2022-01-04 ENCOUNTER — CLINICAL SUPPORT (OUTPATIENT)
Dept: INTERNAL MEDICINE | Facility: CLINIC | Age: 77
End: 2022-01-04

## 2022-01-04 DIAGNOSIS — E53.8 VITAMIN B 12 DEFICIENCY: Primary | ICD-10-CM

## 2022-01-04 PROCEDURE — 96372 THER/PROPH/DIAG INJ SC/IM: CPT | Performed by: NURSE PRACTITIONER

## 2022-01-04 RX ADMIN — CYANOCOBALAMIN 1000 MCG: 1000 INJECTION, SOLUTION INTRAMUSCULAR; SUBCUTANEOUS at 09:55

## 2022-01-22 DIAGNOSIS — E11.40 TYPE 2 DIABETES MELLITUS WITH DIABETIC NEUROPATHY, UNSPECIFIED WHETHER LONG TERM INSULIN USE: ICD-10-CM

## 2022-01-24 RX ORDER — PSEUDOEPHEDRINE HCL 30 MG
100 TABLET ORAL 2 TIMES DAILY
Qty: 90 CAPSULE | Refills: 1 | Status: SHIPPED | OUTPATIENT
Start: 2022-01-24 | End: 2022-04-29 | Stop reason: SDUPTHER

## 2022-01-24 RX ORDER — GABAPENTIN 300 MG/1
300 CAPSULE ORAL 3 TIMES DAILY
Qty: 90 CAPSULE | Refills: 2 | Status: SHIPPED | OUTPATIENT
Start: 2022-01-24 | End: 2022-04-22 | Stop reason: SDUPTHER

## 2022-02-01 ENCOUNTER — CLINICAL SUPPORT (OUTPATIENT)
Dept: INTERNAL MEDICINE | Facility: CLINIC | Age: 77
End: 2022-02-01

## 2022-02-01 DIAGNOSIS — E53.8 VITAMIN B 12 DEFICIENCY: ICD-10-CM

## 2022-02-01 PROCEDURE — 96372 THER/PROPH/DIAG INJ SC/IM: CPT

## 2022-02-01 RX ADMIN — CYANOCOBALAMIN 1000 MCG: 1000 INJECTION, SOLUTION INTRAMUSCULAR; SUBCUTANEOUS at 09:39

## 2022-02-01 NOTE — TELEPHONE ENCOUNTER
I am fixing the pts medications. His docusate sodium he takes twice a day, so he needs 180 quantity. And he also needed us to add fluticasone propionate diskus.

## 2022-02-07 ENCOUNTER — LAB (OUTPATIENT)
Dept: LAB | Facility: HOSPITAL | Age: 77
End: 2022-02-07

## 2022-02-07 ENCOUNTER — OFFICE VISIT (OUTPATIENT)
Dept: INTERNAL MEDICINE | Facility: CLINIC | Age: 77
End: 2022-02-07

## 2022-02-07 VITALS
HEIGHT: 70 IN | DIASTOLIC BLOOD PRESSURE: 78 MMHG | WEIGHT: 192.8 LBS | HEART RATE: 79 BPM | TEMPERATURE: 97.2 F | BODY MASS INDEX: 27.6 KG/M2 | OXYGEN SATURATION: 98 % | SYSTOLIC BLOOD PRESSURE: 153 MMHG

## 2022-02-07 DIAGNOSIS — J06.9 ACUTE URI: ICD-10-CM

## 2022-02-07 DIAGNOSIS — J06.9 ACUTE URI: Primary | ICD-10-CM

## 2022-02-07 DIAGNOSIS — J45.20 MILD INTERMITTENT ASTHMA, UNSPECIFIED WHETHER COMPLICATED: ICD-10-CM

## 2022-02-07 PROCEDURE — 99214 OFFICE O/P EST MOD 30 MIN: CPT | Performed by: NURSE PRACTITIONER

## 2022-02-07 PROCEDURE — U0004 COV-19 TEST NON-CDC HGH THRU: HCPCS

## 2022-02-07 RX ORDER — AMOXICILLIN AND CLAVULANATE POTASSIUM 875; 125 MG/1; MG/1
1 TABLET, FILM COATED ORAL 2 TIMES DAILY
Qty: 14 TABLET | Refills: 0 | Status: SHIPPED | OUTPATIENT
Start: 2022-02-07 | End: 2022-02-14

## 2022-02-07 NOTE — PATIENT INSTRUCTIONS

## 2022-02-07 NOTE — PROGRESS NOTES
Chief Complaint  Sinus Problem (started late friday afternooon. hes using saline, and is complaining of congestion. pt is having drainage. he says the left side is thick. the pts ears are not hurting. )  Subjective        History of Present Illness  Trenton Adames is a 76 y.o. male who presents to [facility] for complaint of snius pain and pressure for the past 5 days and worsening.  The sinus pain is on the left side.  He has thick yellow to green and slightly bloody nasal secretions.  Positive for scratchy sore throat, fatigue, malaise, and shortness of air with asthma.  Negative for loss of taste/smell, myalgia, fever, chills, nausea, vomiting, diarrhea, earache, or headaches.     Past Medical History:   Diagnosis Date   • Dysphagia, oral phase    • Essential (primary) hypertension    • Hyperlipidemia, unspecified    • Type 2 diabetes mellitus without complication (HCC)    • Vitamin D deficiency, unspecified         Past Surgical History:   Procedure Laterality Date   • CATARACT EXTRACTION, BILATERAL  1/28/2014; 1/7/2014   • HEMORRHOIDECTOMY  06/20/2014   • POLYPECTOMY          No Known Allergies       Current Outpatient Medications:   •  albuterol sulfate  (90 Base) MCG/ACT inhaler, Inhale 2 puffs Every 4 (Four) Hours As Needed., Disp: , Rfl:   •  amLODIPine (NORVASC) 5 MG tablet, Take 1 tablet by mouth Daily., Disp: 90 tablet, Rfl: 1  •  aspirin (aspirin) 81 MG EC tablet, Take 1 tablet by mouth Daily., Disp: , Rfl:   •  atorvastatin (LIPITOR) 40 MG tablet, Take 40 mg by mouth Daily., Disp: , Rfl:   •  clobetasol (TEMOVATE) 0.05 % cream, Apply 1 application topically to the appropriate area as directed 2 (Two) Times a Day., Disp: 30 g, Rfl: 2  •  docusate sodium 100 MG capsule, Take 1 capsule by mouth 2 (Two) Times a Day., Disp: 90 capsule, Rfl: 1  •  esomeprazole (nexIUM) 20 MG capsule, Take 20 mg by mouth Every Morning Before Breakfast., Disp: , Rfl:   •   "ferrous sulfate (FeroSul) 325 (65 FE) MG tablet, Take 1 tablet by mouth 2 (Two) Times a Day With Meals., Disp: 180 tablet, Rfl: 3  •  ferrous sulfate 325 (65 FE) MG tablet, Take 325 mg by mouth 2 (Two) Times a Day With Meals., Disp: , Rfl:   •  fluticasone (FLONASE) 50 MCG/ACT nasal spray, 1 spray into the nostril(s) as directed by provider Daily., Disp: , Rfl:   •  gabapentin (NEURONTIN) 300 MG capsule, Take 1 capsule by mouth 3 (Three) Times a Day., Disp: 90 capsule, Rfl: 2  •  Krill Oil (Omega-3) 500 MG capsule, Take 1 capsule by mouth Daily., Disp: , Rfl:   •  lisinopril (PRINIVIL,ZESTRIL) 40 MG tablet, Take 1 tablet by mouth Daily., Disp: 90 tablet, Rfl: 1  •  loratadine (Claritin) 10 MG tablet, Take 1 tablet by mouth Daily., Disp: , Rfl:   •  montelukast (SINGULAIR) 10 MG tablet, Take 1 tablet by mouth Daily., Disp: , Rfl:   •  prednisoLONE acetate (PRED FORTE) 1 % ophthalmic suspension, , Disp: , Rfl:   •  vitamin D3 125 MCG (5000 UT) capsule capsule, Take 5,000 Units by mouth Daily., Disp: , Rfl:   •  amoxicillin-clavulanate (Augmentin) 875-125 MG per tablet, Take 1 tablet by mouth 2 (Two) Times a Day for 7 days. Take with food., Disp: 14 tablet, Rfl: 0  •  fluticasone-salmeterol (Advair Diskus) 250-50 MCG/DOSE DISKUS, Inhale 2 puffs 2 (Two) Times a Day., Disp: 60 each, Rfl: 11  •  metFORMIN (GLUCOPHAGE) 1000 MG tablet, Take 1 tablet by mouth 2 (two) times a day for 90 days., Disp: 180 tablet, Rfl: 1    Current Facility-Administered Medications:   •  cyanocobalamin injection 1,000 mcg, 1,000 mcg, Intramuscular, Q28 Days, Jacquelin Pelaez APRN, 1,000 mcg at 02/01/22 0939    Objective   /78 (BP Location: Right arm, Patient Position: Sitting, Cuff Size: Adult)   Pulse 79   Temp 97.2 °F (36.2 °C) (Temporal)   Ht 177.8 cm (70\")   Wt 87.5 kg (192 lb 12.8 oz)   SpO2 98%   BMI 27.66 kg/m²    Estimated body mass index is 27.66 kg/m² as calculated from the following:    Height as of this encounter: 177.8 " "cm (70\").    Weight as of this encounter: 87.5 kg (192 lb 12.8 oz).   Physical Exam  Vitals reviewed.   Constitutional:       General: He is not in acute distress.     Appearance: Normal appearance.   HENT:      Head: Normocephalic and atraumatic.      Right Ear: Tympanic membrane and ear canal normal.      Left Ear: Tympanic membrane and ear canal normal.      Nose: Congestion present.      Right Sinus: No maxillary sinus tenderness or frontal sinus tenderness.      Left Sinus: Maxillary sinus tenderness present. No frontal sinus tenderness.      Mouth/Throat:      Tonsils: No tonsillar exudate.   Eyes:      Conjunctiva/sclera: Conjunctivae normal.      Right eye: Right conjunctiva is not injected. No exudate.     Left eye: Left conjunctiva is not injected. No exudate.  Cardiovascular:      Rate and Rhythm: Normal rate and regular rhythm.      Heart sounds: Normal heart sounds, S1 normal and S2 normal.   Pulmonary:      Effort: Pulmonary effort is normal. No respiratory distress.      Breath sounds: Normal breath sounds. No wheezing.   Lymphadenopathy:      Cervical: No cervical adenopathy.   Skin:     General: Skin is warm and dry.      Findings: No rash.   Neurological:      General: No focal deficit present.      Mental Status: He is alert.   Psychiatric:         Mood and Affect: Mood normal.         Behavior: Behavior normal.         Thought Content: Thought content normal.         Judgment: Judgment normal.           Assessment and Plan    Diagnoses and all orders for this visit:    1. Acute URI (Primary)  Comments:  Education on diagnosis, medication, and treatment plan. Continue nasal sinsus rinses; use twice a day and flonase after.   Orders:  -     Discontinue: Fluticasone Propionate, Inhal, 250 MCG/BLIST aerosol powder ; Inhale 1 puff 2 (Two) Times a Day.  Dispense: 60 each; Refill: 0  -     COVID-19,APTIMA PANTHER(SEAN),[facility], NP/OP SWAB IN UTM/VTM/SALINE TRANSPORT MEDIA,24 HR TAT - Swab, Nasal " Cavity; Future  -     amoxicillin-clavulanate (Augmentin) 875-125 MG per tablet; Take 1 tablet by mouth 2 (Two) Times a Day for 7 days. Take with food.  Dispense: 14 tablet; Refill: 0    2. Mild intermittent asthma, unspecified whether complicated  Comments:  Refill given and discussed thrush and to rinse mouth after each use.   Orders:  -     fluticasone-salmeterol (Advair Diskus) 250-50 MCG/DOSE DISKUS; Inhale 2 puffs 2 (Two) Times a Day.  Dispense: 60 each; Refill: 11      Follow Up     Patient was given instructions and counseling regarding his condition. Please see specific information pulled into the AVS if appropriate.   Return if symptoms worsen or fail to improve, for Next scheduled follow up.    DARRELL Pardo

## 2022-02-08 LAB — SARS-COV-2 RNA PNL SPEC NAA+PROBE: NOT DETECTED

## 2022-02-16 RX ORDER — MELOXICAM 7.5 MG/1
7.5 TABLET ORAL DAILY
Qty: 90 TABLET | Refills: 0 | Status: SHIPPED | OUTPATIENT
Start: 2022-02-16 | End: 2022-05-17 | Stop reason: SDUPTHER

## 2022-02-16 NOTE — TELEPHONE ENCOUNTER
Caller: Trenton Adames    Relationship: Self    Best call back number: [phone]    Requested Prescriptions:   Requested Prescriptions      No prescriptions requested or ordered in this encounter   MELOXICAM 7.5 MG TABLET  1 A DAY  90 DAY SUPPLY     Pharmacy where request should be sent:  [facility] [address] [place] [address], [place] - [address] AT [facility] [address] - [phone]  - [phone] FX  [phone]    Additional details provided by patient: PATIENT STATES HE HAD BEEN GETTING MEDICATIONS FILLED BY [facility] ON MYCHART BUT CANNOT SEE IT ANYMORE. HE IS NEARLY OUT.    Does the patient have less than a 3 day supply:  [x] Yes  [] No    Veronica Louie Rep   02/16/22 08:44 EST

## 2022-03-03 ENCOUNTER — CLINICAL SUPPORT (OUTPATIENT)
Dept: INTERNAL MEDICINE | Facility: CLINIC | Age: 77
End: 2022-03-03

## 2022-03-03 DIAGNOSIS — E53.8 VITAMIN B 12 DEFICIENCY: ICD-10-CM

## 2022-03-03 PROCEDURE — 96372 THER/PROPH/DIAG INJ SC/IM: CPT | Performed by: NURSE PRACTITIONER

## 2022-03-04 DIAGNOSIS — E11.40 TYPE 2 DIABETES MELLITUS WITH DIABETIC NEUROPATHY, UNSPECIFIED WHETHER LONG TERM INSULIN USE: ICD-10-CM

## 2022-03-24 RX ADMIN — CYANOCOBALAMIN 1000 MCG: 1000 INJECTION, SOLUTION INTRAMUSCULAR; SUBCUTANEOUS at 17:03

## 2022-04-01 ENCOUNTER — CLINICAL SUPPORT (OUTPATIENT)
Dept: INTERNAL MEDICINE | Facility: CLINIC | Age: 77
End: 2022-04-01

## 2022-04-01 DIAGNOSIS — E53.8 VITAMIN B 12 DEFICIENCY: ICD-10-CM

## 2022-04-22 DIAGNOSIS — E11.40 TYPE 2 DIABETES MELLITUS WITH DIABETIC NEUROPATHY, UNSPECIFIED WHETHER LONG TERM INSULIN USE: ICD-10-CM

## 2022-04-22 RX ORDER — GABAPENTIN 300 MG/1
300 CAPSULE ORAL 3 TIMES DAILY
Qty: 90 CAPSULE | Refills: 2 | Status: SHIPPED | OUTPATIENT
Start: 2022-04-22 | End: 2022-05-25 | Stop reason: DRUGHIGH

## 2022-04-29 RX ORDER — PSEUDOEPHEDRINE HCL 30 MG
100 TABLET ORAL 2 TIMES DAILY
Qty: 180 CAPSULE | Refills: 1 | Status: SHIPPED | OUTPATIENT
Start: 2022-04-29 | End: 2022-10-25 | Stop reason: SDUPTHER

## 2022-05-02 RX ORDER — MONTELUKAST SODIUM 10 MG/1
10 TABLET ORAL DAILY
Qty: 90 TABLET | Refills: 0 | Status: SHIPPED | OUTPATIENT
Start: 2022-05-02 | End: 2022-07-29 | Stop reason: SDUPTHER

## 2022-05-02 NOTE — TELEPHONE ENCOUNTER
Caller: Trenton Adames    Relationship: Self    Best call back number: [phone]    Requested Prescriptions:   Requested Prescriptions     Pending Prescriptions Disp Refills   • montelukast (SINGULAIR) 10 MG tablet       Sig: Take 1 tablet by mouth Daily.        Pharmacy where request should be sent: [facility] [address] [place] [address], [place] - [address] - [phone] [facility] [phone] FX     Additional details provided by patient: THE PATIENT STATED MYCHART IS NOT ALLOWING HIM TO REFILL    Does the patient have less than a 3 day supply:  [x] Yes  [] No    Veronica Perez Rep   05/02/22 09:10 EDT

## 2022-05-05 ENCOUNTER — CLINICAL SUPPORT (OUTPATIENT)
Dept: INTERNAL MEDICINE | Facility: CLINIC | Age: 77
End: 2022-05-05

## 2022-05-05 DIAGNOSIS — I10 ESSENTIAL HYPERTENSION: ICD-10-CM

## 2022-05-05 DIAGNOSIS — E53.8 VITAMIN B 12 DEFICIENCY: ICD-10-CM

## 2022-05-05 PROCEDURE — 96372 THER/PROPH/DIAG INJ SC/IM: CPT | Performed by: NURSE PRACTITIONER

## 2022-05-05 RX ORDER — ATORVASTATIN CALCIUM 40 MG/1
40 TABLET, FILM COATED ORAL DAILY
Qty: 90 TABLET | Refills: 1 | Status: SHIPPED | OUTPATIENT
Start: 2022-05-05 | End: 2022-11-02 | Stop reason: SDUPTHER

## 2022-05-17 DIAGNOSIS — I10 PRIMARY HYPERTENSION: Chronic | ICD-10-CM

## 2022-05-17 RX ORDER — AMLODIPINE BESYLATE 5 MG/1
5 TABLET ORAL DAILY
Qty: 90 TABLET | Refills: 1 | Status: SHIPPED | OUTPATIENT
Start: 2022-05-17 | End: 2022-11-14 | Stop reason: SDUPTHER

## 2022-05-17 RX ORDER — MELOXICAM 7.5 MG/1
7.5 TABLET ORAL DAILY
Qty: 90 TABLET | Refills: 0 | Status: SHIPPED | OUTPATIENT
Start: 2022-05-17 | End: 2022-08-16 | Stop reason: SDUPTHER

## 2022-05-18 ENCOUNTER — LAB (OUTPATIENT)
Dept: LAB | Facility: HOSPITAL | Age: 77
End: 2022-05-18

## 2022-05-18 DIAGNOSIS — E11.9 TYPE 2 DIABETES MELLITUS WITHOUT COMPLICATION, UNSPECIFIED WHETHER LONG TERM INSULIN USE: Chronic | ICD-10-CM

## 2022-05-18 DIAGNOSIS — E78.5 HYPERLIPIDEMIA, UNSPECIFIED HYPERLIPIDEMIA TYPE: Chronic | ICD-10-CM

## 2022-05-18 DIAGNOSIS — I10 PRIMARY HYPERTENSION: ICD-10-CM

## 2022-05-18 LAB
ALBUMIN SERPL-MCNC: 4.7 G/DL (ref 3.5–5.2)
ALBUMIN/GLOB SERPL: 1.7 G/DL
ALP SERPL-CCNC: 38 U/L (ref 39–117)
ALT SERPL W P-5'-P-CCNC: 36 U/L (ref 1–41)
ANION GAP SERPL CALCULATED.3IONS-SCNC: 14.6 MMOL/L (ref 5–15)
AST SERPL-CCNC: 28 U/L (ref 1–40)
BASOPHILS # BLD AUTO: 0.08 10*3/MM3 (ref 0–0.2)
BASOPHILS NFR BLD AUTO: 1.4 % (ref 0–1.5)
BILIRUB SERPL-MCNC: 0.4 MG/DL (ref 0–1.2)
BUN SERPL-MCNC: 14 MG/DL (ref 8–23)
BUN/CREAT SERPL: 16.7 (ref 7–25)
CALCIUM SPEC-SCNC: 9.6 MG/DL (ref 8.6–10.5)
CHLORIDE SERPL-SCNC: 101 MMOL/L (ref 98–107)
CHOLEST SERPL-MCNC: 140 MG/DL (ref 0–200)
CO2 SERPL-SCNC: 21.4 MMOL/L (ref 22–29)
CREAT SERPL-MCNC: 0.84 MG/DL (ref 0.76–1.27)
DEPRECATED RDW RBC AUTO: 39.9 FL (ref 37–54)
EGFRCR SERPLBLD CKD-EPI 2021: 91.5 ML/MIN/1.73
EOSINOPHIL # BLD AUTO: 0.26 10*3/MM3 (ref 0–0.4)
EOSINOPHIL NFR BLD AUTO: 4.4 % (ref 0.3–6.2)
ERYTHROCYTE [DISTWIDTH] IN BLOOD BY AUTOMATED COUNT: 12.4 % (ref 12.3–15.4)
GLOBULIN UR ELPH-MCNC: 2.8 GM/DL
GLUCOSE SERPL-MCNC: 136 MG/DL (ref 65–99)
HBA1C MFR BLD: 6.7 % (ref 4.8–5.6)
HCT VFR BLD AUTO: 39.9 % (ref 37.5–51)
HDLC SERPL-MCNC: 35 MG/DL (ref 40–60)
HGB BLD-MCNC: 13.2 G/DL (ref 13–17.7)
IMM GRANULOCYTES # BLD AUTO: 0.02 10*3/MM3 (ref 0–0.05)
IMM GRANULOCYTES NFR BLD AUTO: 0.3 % (ref 0–0.5)
LDLC SERPL CALC-MCNC: 85 MG/DL (ref 0–100)
LDLC/HDLC SERPL: 2.4 {RATIO}
LYMPHOCYTES # BLD AUTO: 2.04 10*3/MM3 (ref 0.7–3.1)
LYMPHOCYTES NFR BLD AUTO: 34.5 % (ref 19.6–45.3)
MCH RBC QN AUTO: 29.2 PG (ref 26.6–33)
MCHC RBC AUTO-ENTMCNC: 33.1 G/DL (ref 31.5–35.7)
MCV RBC AUTO: 88.3 FL (ref 79–97)
MONOCYTES # BLD AUTO: 0.5 10*3/MM3 (ref 0.1–0.9)
MONOCYTES NFR BLD AUTO: 8.5 % (ref 5–12)
NEUTROPHILS NFR BLD AUTO: 3.01 10*3/MM3 (ref 1.7–7)
NEUTROPHILS NFR BLD AUTO: 50.9 % (ref 42.7–76)
NRBC BLD AUTO-RTO: 0 /100 WBC (ref 0–0.2)
PLATELET # BLD AUTO: 262 10*3/MM3 (ref 140–450)
PMV BLD AUTO: 11.1 FL (ref 6–12)
POTASSIUM SERPL-SCNC: 4.9 MMOL/L (ref 3.5–5.2)
PROT SERPL-MCNC: 7.5 G/DL (ref 6–8.5)
RBC # BLD AUTO: 4.52 10*6/MM3 (ref 4.14–5.8)
SODIUM SERPL-SCNC: 137 MMOL/L (ref 136–145)
TRIGL SERPL-MCNC: 105 MG/DL (ref 0–150)
VLDLC SERPL-MCNC: 20 MG/DL (ref 5–40)
WBC NRBC COR # BLD: 5.91 10*3/MM3 (ref 3.4–10.8)

## 2022-05-18 PROCEDURE — 83036 HEMOGLOBIN GLYCOSYLATED A1C: CPT

## 2022-05-18 PROCEDURE — 36415 COLL VENOUS BLD VENIPUNCTURE: CPT

## 2022-05-18 PROCEDURE — 80061 LIPID PANEL: CPT

## 2022-05-18 PROCEDURE — 85025 COMPLETE CBC W/AUTO DIFF WBC: CPT

## 2022-05-18 PROCEDURE — 80053 COMPREHEN METABOLIC PANEL: CPT

## 2022-05-25 ENCOUNTER — OFFICE VISIT (OUTPATIENT)
Dept: INTERNAL MEDICINE | Facility: CLINIC | Age: 77
End: 2022-05-25

## 2022-05-25 VITALS
HEART RATE: 62 BPM | OXYGEN SATURATION: 99 % | BODY MASS INDEX: 27.2 KG/M2 | DIASTOLIC BLOOD PRESSURE: 84 MMHG | HEIGHT: 70 IN | WEIGHT: 190 LBS | TEMPERATURE: 97.3 F | SYSTOLIC BLOOD PRESSURE: 147 MMHG

## 2022-05-25 DIAGNOSIS — E78.5 HYPERLIPIDEMIA, UNSPECIFIED HYPERLIPIDEMIA TYPE: Chronic | ICD-10-CM

## 2022-05-25 DIAGNOSIS — E55.9 VITAMIN D DEFICIENCY: ICD-10-CM

## 2022-05-25 DIAGNOSIS — J45.20 MILD INTERMITTENT ASTHMA, UNSPECIFIED WHETHER COMPLICATED: Chronic | ICD-10-CM

## 2022-05-25 DIAGNOSIS — E11.40 TYPE 2 DIABETES MELLITUS WITH DIABETIC NEUROPATHY, WITHOUT LONG-TERM CURRENT USE OF INSULIN: Primary | Chronic | ICD-10-CM

## 2022-05-25 DIAGNOSIS — Z11.59 NEED FOR HEPATITIS C SCREENING TEST: ICD-10-CM

## 2022-05-25 DIAGNOSIS — I10 ESSENTIAL HYPERTENSION: Chronic | ICD-10-CM

## 2022-05-25 DIAGNOSIS — Z12.5 SCREENING FOR MALIGNANT NEOPLASM OF PROSTATE: ICD-10-CM

## 2022-05-25 DIAGNOSIS — Z79.899 OTHER LONG TERM (CURRENT) DRUG THERAPY: ICD-10-CM

## 2022-05-25 DIAGNOSIS — Z23 NEED FOR TDAP VACCINATION: ICD-10-CM

## 2022-05-25 PROCEDURE — 90715 TDAP VACCINE 7 YRS/> IM: CPT | Performed by: NURSE PRACTITIONER

## 2022-05-25 PROCEDURE — 90471 IMMUNIZATION ADMIN: CPT | Performed by: NURSE PRACTITIONER

## 2022-05-25 PROCEDURE — 99214 OFFICE O/P EST MOD 30 MIN: CPT | Performed by: NURSE PRACTITIONER

## 2022-05-25 RX ORDER — ALBUTEROL SULFATE 90 UG/1
2 AEROSOL, METERED RESPIRATORY (INHALATION) EVERY 4 HOURS PRN
Qty: 18 G | Refills: 5 | Status: SHIPPED | OUTPATIENT
Start: 2022-05-25

## 2022-05-25 RX ORDER — GABAPENTIN 400 MG/1
400 CAPSULE ORAL 3 TIMES DAILY
Qty: 90 CAPSULE | Refills: 5 | Status: SHIPPED | OUTPATIENT
Start: 2022-05-25 | End: 2022-11-22 | Stop reason: SDUPTHER

## 2022-05-25 NOTE — PROGRESS NOTES
Chief Complaint  Follow-up (Albuterol sulfate  (90 base) MCG/ACT inhaler he wants to discuss about requesting a refill when he needs it through Mychart & He is requesting a lab order for PSA it is due patient says.)  Subjective        History of Present Illness  Trenton Adames is a 76 y.o. male who presents to [facility] for follow-up of diabetes, hypertension, hyperlipidemia, and neuropathy.  The patient had a Medicare wellness exam done at his home; vaccinations were discussed but no screenings were ordered. Negative for chest pain, heart palpitations, or dizziness. The patient reports gabapentin is not working as well and he is taking 300 mg three times a day.    Past Medical History:   Diagnosis Date   • Dysphagia, oral phase    • Essential (primary) hypertension    • Hyperlipidemia, unspecified    • Type 2 diabetes mellitus without complication (HCC)    • Vitamin D deficiency, unspecified         Past Surgical History:   Procedure Laterality Date   • CATARACT EXTRACTION, BILATERAL  1/28/2014; 1/7/2014   • HEMORRHOIDECTOMY  06/20/2014   • POLYPECTOMY          No Known Allergies       Current Outpatient Medications:   •  albuterol sulfate  (90 Base) MCG/ACT inhaler, Inhale 2 puffs Every 4 (Four) Hours As Needed for Wheezing., Disp: 18 g, Rfl: 5  •  amLODIPine (NORVASC) 5 MG tablet, Take 1 tablet by mouth Daily., Disp: 90 tablet, Rfl: 1  •  aspirin 81 MG EC tablet, Take 1 tablet by mouth Daily., Disp: , Rfl:   •  atorvastatin (LIPITOR) 40 MG tablet, Take 1 tablet by mouth Daily., Disp: 90 tablet, Rfl: 1  •  clobetasol (TEMOVATE) 0.05 % cream, Apply 1 application topically to the appropriate area as directed 2 (Two) Times a Day., Disp: 30 g, Rfl: 2  •  docusate sodium 100 MG capsule, Take 1 capsule by mouth 2 (Two) Times a Day., Disp: 180 capsule, Rfl: 1  •  esomeprazole (nexIUM) 20 MG capsule, Take 20 mg by mouth Every Morning Before Breakfast., Disp: , Rfl:  "  •  ferrous sulfate (FeroSul) 325 (65 FE) MG tablet, Take 1 tablet by mouth 2 (Two) Times a Day With Meals., Disp: 180 tablet, Rfl: 3  •  ferrous sulfate 325 (65 FE) MG tablet, Take 325 mg by mouth 2 (Two) Times a Day With Meals., Disp: , Rfl:   •  fluticasone (FLONASE) 50 MCG/ACT nasal spray, 1 spray into the nostril(s) as directed by provider Daily., Disp: , Rfl:   •  fluticasone-salmeterol (Advair Diskus) 250-50 MCG/DOSE DISKUS, Inhale 2 puffs 2 (Two) Times a Day., Disp: 60 each, Rfl: 11  •  Krill Oil (Omega-3) 500 MG capsule, Take 1 capsule by mouth Daily., Disp: , Rfl:   •  lisinopril (PRINIVIL,ZESTRIL) 40 MG tablet, Take 1 tablet by mouth Daily., Disp: 90 tablet, Rfl: 1  •  loratadine (CLARITIN) 10 MG tablet, Take 1 tablet by mouth Daily., Disp: , Rfl:   •  meloxicam (Mobic) 7.5 MG tablet, Take 1 tablet by mouth Daily., Disp: 90 tablet, Rfl: 0  •  metFORMIN (GLUCOPHAGE) 1000 MG tablet, Take 1 tablet by mouth 2 (Two) Times a Day With Meals for 90 days., Disp: 180 tablet, Rfl: 1  •  montelukast (SINGULAIR) 10 MG tablet, Take 1 tablet by mouth Daily., Disp: 90 tablet, Rfl: 0  •  prednisoLONE acetate (PRED FORTE) 1 % ophthalmic suspension, , Disp: , Rfl:   •  vitamin D3 125 MCG (5000 UT) capsule capsule, Take 5,000 Units by mouth Daily., Disp: , Rfl:   •  gabapentin (NEURONTIN) 400 MG capsule, Take 1 capsule by mouth 3 (Three) Times a Day., Disp: 90 capsule, Rfl: 5    Current Facility-Administered Medications:   •  cyanocobalamin injection 1,000 mcg, 1,000 mcg, Intramuscular, Q28 Days, Jacquelin Pelaez APRN, 1,000 mcg at 03/24/22 1703    Objective   /84 (BP Location: Left arm, Patient Position: Sitting, Cuff Size: Adult)   Pulse 62   Temp 97.3 °F (36.3 °C) (Temporal)   Ht 177.8 cm (70\")   Wt 86.2 kg (190 lb)   SpO2 99%   BMI 27.26 kg/m²    Estimated body mass index is 27.26 kg/m² as calculated from the following:    Height as of this encounter: 177.8 cm (70\").    Weight as of this encounter: 86.2 kg " (190 lb).   Physical Exam  Vitals reviewed.   Constitutional:       General: He is not in acute distress.     Appearance: Normal appearance.   HENT:      Head: Normocephalic and atraumatic.      Right Ear: Tympanic membrane and ear canal normal.      Left Ear: Tympanic membrane and ear canal normal.   Eyes:      Conjunctiva/sclera: Conjunctivae normal.      Pupils: Pupils are equal, round, and reactive to light.   Cardiovascular:      Rate and Rhythm: Normal rate and regular rhythm.      Heart sounds: Normal heart sounds. No murmur heard.  Pulmonary:      Effort: Pulmonary effort is normal.      Breath sounds: Normal breath sounds. No wheezing, rhonchi or rales.   Abdominal:      General: There is no distension.      Palpations: Abdomen is soft. There is no mass.      Tenderness: There is no abdominal tenderness.   Musculoskeletal:      Right lower leg: No edema.      Left lower leg: No edema.   Lymphadenopathy:      Cervical: No cervical adenopathy.   Skin:     General: Skin is warm and dry.      Coloration: Skin is not jaundiced or pale.   Neurological:      General: No focal deficit present.      Mental Status: He is alert.   Psychiatric:         Mood and Affect: Mood normal.         Thought Content: Thought content normal.        Result Review :   The following data was reviewed by: DARRELL Pardo on 05/25/2022:  CMP    CMP 8/18/21 11/17/21 5/18/22   Glucose 148 (A) 142 (A) 136 (A)   BUN 12 17 14   Creatinine 0.85 0.89 0.84   eGFR Non African Am 88 84    Sodium 136 133 (A) 137   Potassium 4.6 4.7 4.9   Chloride 100 99 101   Calcium 9.5 9.5 9.6   Albumin 4.70 4.70 4.70   Total Bilirubin 0.4 0.4 0.4   Alkaline Phosphatase 39 47 38 (A)   AST (SGOT) 18 24 28   ALT (SGPT) 23 25 36   (A) Abnormal value            CBC    CBC 8/18/21 11/17/21 5/18/22   WBC 6.94 8.19 5.91   RBC 4.62 4.44 4.52   Hemoglobin 13.9 13.2 13.2   Hematocrit 40.3 38.3 39.9   MCV 87.2 86.3 88.3   MCH 30.1 29.7 29.2   MCHC 34.5 34.5 33.1    RDW 12.4 12.2 (A) 12.4   Platelets 254 273 262   (A) Abnormal value            CBC w/diff    CBC w/Diff 8/18/21 11/17/21 5/18/22   WBC 6.94 8.19 5.91   RBC 4.62 4.44 4.52   Hemoglobin 13.9 13.2 13.2   Hematocrit 40.3 38.3 39.9   MCV 87.2 86.3 88.3   MCH 30.1 29.7 29.2   MCHC 34.5 34.5 33.1   RDW 12.4 12.2 (A) 12.4   Platelets 254 273 262   Neutrophil Rel % 55.2 48.7 50.9   Immature Granulocyte Rel % 0.1 0.4 0.3   Lymphocyte Rel % 32.1 38.2 34.5   Monocyte Rel % 7.3 7.3 8.5   Eosinophil Rel % 4.0 4.3 4.4   Basophil Rel % 1.3 1.1 1.4   (A) Abnormal value            Lipid Panel    Lipid Panel 8/18/21 11/17/21 5/18/22   Total Cholesterol 152 155 140   Triglycerides 145 136 105   HDL Cholesterol 39 (A) 37 (A) 35 (A)   VLDL Cholesterol 26 24 20   LDL Cholesterol  87 94 85   LDL/HDL Ratio 2.15 2.45 2.40   (A) Abnormal value            A1C Last 3 Results    HGBA1C Last 3 Results 8/18/21 11/17/21 5/18/22   Hemoglobin A1C 7.02 (A) 7.61 (A) 6.70 (A)   (A) Abnormal value                     Assessment and Plan    Diagnoses and all orders for this visit:    1. Type 2 diabetes mellitus with diabetic neuropathy, without long-term current use of insulin (HCC) (Primary)  Comments:  Improved from 7.61 to 6.70 on metformin 1000 mg twice a day continue. Worsening of neuropathy and increased gabepentin to 400 mg tid.  Orders:  -     Microalbumin / Creatinine Urine Ratio - Urine, Clean Catch; Future  -     Comprehensive Metabolic Panel; Future  -     T4, Free; Future  -     TSH; Future  -     Hemoglobin A1c; Future  -     gabapentin (NEURONTIN) 400 MG capsule; Take 1 capsule by mouth 3 (Three) Times a Day.  Dispense: 90 capsule; Refill: 5    2. Essential hypertension  Comments:  Stable on amlodipine 5 mg and lisinopril 40 mg daily and to continue.  Orders:  -     CBC & Differential; Future    3. Hyperlipidemia, unspecified hyperlipidemia type  Comments:  Stable on Krill oil/omega-3 500 mg capsule and atorvastatin 40 mg half tablet  daily and to continue.  Orders:  -     Lipid Panel; Future    4. Mild intermittent asthma, unspecified whether complicated  Comments:  Refill albuterol IH to use when needed.     5. Vitamin D deficiency  -     Vitamin D 25 Hydroxy; Future    6. Screening for malignant neoplasm of prostate  -     PSA Screen; Future    7. Need for hepatitis C screening test  -     Hepatitis C Antibody; Future    8. Other long term (current) drug therapy  -     Urine Drug Screen - Urine, Clean Catch; Future    9. Need for Tdap vaccination  -     Tdap Vaccine Greater Than or Equal To 8yo IM    Other orders  -     albuterol sulfate  (90 Base) MCG/ACT inhaler; Inhale 2 puffs Every 4 (Four) Hours As Needed for Wheezing.  Dispense: 18 g; Refill: 5      Follow Up     Patient was given instructions and counseling regarding his condition or for health maintenance advice. Please see specific information pulled into the AVS if appropriate.   Return in 5 months (on 10/11/2022) for Recheck.    DARRELL Pardo

## 2022-05-25 NOTE — PROGRESS NOTES
"The ABCs of the Annual Wellness Visit  Subsequent Medicare Wellness Visit    No chief complaint on file.     Subjective    History of Present Illness:  Trenton Adames is a 76 y.o. male who presents for a Subsequent Medicare Wellness Visit.    The following portions of the patient's history were reviewed and   updated as appropriate: {history reviewed:20406::\"allergies\",\"current medications\",\"past family history\",\"past medical history\",\"past social history\",\"past surgical history\",\"problem list\"}.    Compared to one year ago, the patient feels his physical   health is {better worse same:59135}.    Compared to one year ago, the patient feels his mental   health is {better worse same:21966}.    Recent Hospitalizations:  {Hospital Admission Status in the last 365 days:33045}      Current Medical Providers:  Patient Care Team:  Jacquelin Pelaez APRN as PCP - General (Nurse Practitioner)    Outpatient Medications Prior to Visit   Medication Sig Dispense Refill   • albuterol sulfate  (90 Base) MCG/ACT inhaler Inhale 2 puffs Every 4 (Four) Hours As Needed.     • amLODIPine (NORVASC) 5 MG tablet Take 1 tablet by mouth Daily. 90 tablet 1   • aspirin (aspirin) 81 MG EC tablet Take 1 tablet by mouth Daily.     • atorvastatin (LIPITOR) 40 MG tablet Take 1 tablet by mouth Daily. 90 tablet 1   • clobetasol (TEMOVATE) 0.05 % cream Apply 1 application topically to the appropriate area as directed 2 (Two) Times a Day. 30 g 2   • docusate sodium 100 MG capsule Take 1 capsule by mouth 2 (Two) Times a Day. 180 capsule 1   • esomeprazole (nexIUM) 20 MG capsule Take 20 mg by mouth Every Morning Before Breakfast.     • ferrous sulfate (FeroSul) 325 (65 FE) MG tablet Take 1 tablet by mouth 2 (Two) Times a Day With Meals. 180 tablet 3   • ferrous sulfate 325 (65 FE) MG tablet Take 325 mg by mouth 2 (Two) Times a Day With Meals.     • fluticasone (FLONASE) 50 MCG/ACT nasal spray 1 spray into the nostril(s) as directed by " provider Daily.     • fluticasone-salmeterol (Advair Diskus) 250-50 MCG/DOSE DISKUS Inhale 2 puffs 2 (Two) Times a Day. 60 each 11   • gabapentin (NEURONTIN) 300 MG capsule Take 1 capsule by mouth 3 (Three) Times a Day. 90 capsule 2   • Krill Oil (Omega-3) 500 MG capsule Take 1 capsule by mouth Daily.     • lisinopril (PRINIVIL,ZESTRIL) 40 MG tablet Take 1 tablet by mouth Daily. 90 tablet 1   • loratadine (Claritin) 10 MG tablet Take 1 tablet by mouth Daily.     • meloxicam (Mobic) 7.5 MG tablet Take 1 tablet by mouth Daily. 90 tablet 0   • metFORMIN (GLUCOPHAGE) 1000 MG tablet Take 1 tablet by mouth 2 (Two) Times a Day With Meals for 90 days. 180 tablet 1   • montelukast (SINGULAIR) 10 MG tablet Take 1 tablet by mouth Daily. 90 tablet 0   • prednisoLONE acetate (PRED FORTE) 1 % ophthalmic suspension      • vitamin D3 125 MCG (5000 UT) capsule capsule Take 5,000 Units by mouth Daily.       Facility-Administered Medications Prior to Visit   Medication Dose Route Frequency Provider Last Rate Last Admin   • cyanocobalamin injection 1,000 mcg  1,000 mcg Intramuscular Q28 Days Jacquelin Pelaez APRN   1,000 mcg at 03/24/22 1703       No opioid medication identified on active medication list. I have reviewed chart for other potential  high risk medication/s and harmful drug interactions in the elderly.          Aspirin is on active medication list. {ASPIRIN ON MEDICATION LIST INDICATED/NOT INDICATED:82755}.      Patient Active Problem List   Diagnosis   • Allergic rhinitis   • Anemia   • Asthma   • Benign prostatic hyperplasia   • Cervical radiculopathy   • Diabetes mellitus, type II (HCC)   • Hyperlipidemia   • Hypertension   • Impotence of organic origin   • Kidney stones   • Renal calculus   • Prostate disorder   • Shortness of breath   • Vitamin D deficiency   • Vitamin B 12 deficiency   • Iron deficiency anemia   • Ophthalmic herpes zoster     Advance Care Planning  Advance Directive is not on file.  {ACP Discussion,  Advance Directive not in EMR:49948}          Objective    There were no vitals filed for this visit.  There is no height or weight on file to calculate BMI.  {BMIFOLLOWUP:34409}    Does the patient have evidence of cognitive impairment? {Yes/No:97233}    Physical Exam  Lab Results   Component Value Date    TRIG 105 2022    HDL 35 (L) 2022    LDL 85 2022    VLDL 20 2022    HGBA1C 6.70 (H) 2022            HEALTH RISK ASSESSMENT    Smoking Status:  Social History     Tobacco Use   Smoking Status Former Smoker   • Packs/day: 2.00   • Quit date:    • Years since quittin.4   Smokeless Tobacco Never Used     Alcohol Consumption:  Social History     Substance and Sexual Activity   Alcohol Use Yes    Comment: Occasional      Fall Risk Screen:    NASIM Fall Risk Assessment has not been completed.    Depression Screening:  PHQ-2/PHQ-9 Depression Screening 2021   Retired PHQ-9 Total Score 0   Retired Total Score 0       Health Habits and Functional and Cognitive Screening:  No flowsheet data found.    Age-appropriate Screening Schedule:  Refer to the list below for future screening recommendations based on patient's age, sex and/or medical conditions. Orders for these recommended tests are listed in the plan section. The patient has been provided with a written plan.    Health Maintenance   Topic Date Due   • TDAP/TD VACCINES (1 - Tdap) Never done   • ZOSTER VACCINE (1 of 2) Never done   • DIABETIC FOOT EXAM  Never done   • URINE MICROALBUMIN  2022   • INFLUENZA VACCINE  2022   • HEMOGLOBIN A1C  2022   • DIABETIC EYE EXAM  2023   • LIPID PANEL  2023              Assessment & Plan   CMS Preventative Services Quick Reference  Risk Factors Identified During Encounter  {Medicare Wellness Risk Factors:57337}  The above risks/problems have been discussed with the patient.  Follow up actions/plans if indicated are seen below in the Assessment/Plan  Section.  Pertinent information has been shared with the patient in the After Visit Summary.    There are no diagnoses linked to this encounter.    Follow Up:   No follow-ups on file.     An After Visit Summary and PPPS were made available to the patient.    {Optional Chart Navigation Links Wrapup  Review (Popup)  Advance Care Planning  Labs  CC  Problem List  Visit Diagnosis  Medications  Result Review  Imaging  [facility]  Quality  BestPractice  SmartSets  SnapShot  Encounters  Notes  Media  Procedures :23}      {Time Spent-Use for E/M Coding (Optional):81528}

## 2022-06-02 ENCOUNTER — CLINICAL SUPPORT (OUTPATIENT)
Dept: INTERNAL MEDICINE | Facility: CLINIC | Age: 77
End: 2022-06-02

## 2022-06-02 DIAGNOSIS — E53.8 VITAMIN B 12 DEFICIENCY: Primary | ICD-10-CM

## 2022-06-02 PROCEDURE — 96372 THER/PROPH/DIAG INJ SC/IM: CPT | Performed by: NURSE PRACTITIONER

## 2022-06-02 RX ORDER — CYANOCOBALAMIN 1000 UG/ML
1000 INJECTION, SOLUTION INTRAMUSCULAR; SUBCUTANEOUS
Status: SHIPPED | OUTPATIENT
Start: 2022-06-02

## 2022-06-02 RX ADMIN — CYANOCOBALAMIN 1000 MCG: 1000 INJECTION, SOLUTION INTRAMUSCULAR; SUBCUTANEOUS at 09:42

## 2022-06-06 DIAGNOSIS — I10 ESSENTIAL HYPERTENSION: ICD-10-CM

## 2022-06-06 RX ORDER — LISINOPRIL 40 MG/1
40 TABLET ORAL DAILY
Qty: 90 TABLET | Refills: 1 | Status: SHIPPED | OUTPATIENT
Start: 2022-06-06

## 2022-06-20 RX ORDER — FERROUS SULFATE 325(65) MG
325 TABLET ORAL 2 TIMES DAILY WITH MEALS
Qty: 180 TABLET | Refills: 3 | Status: SHIPPED | OUTPATIENT
Start: 2022-06-20

## 2022-07-01 ENCOUNTER — CLINICAL SUPPORT (OUTPATIENT)
Dept: INTERNAL MEDICINE | Facility: CLINIC | Age: 77
End: 2022-07-01

## 2022-07-01 DIAGNOSIS — E53.8 VITAMIN B 12 DEFICIENCY: ICD-10-CM

## 2022-07-01 PROCEDURE — 96372 THER/PROPH/DIAG INJ SC/IM: CPT | Performed by: NURSE PRACTITIONER

## 2022-07-01 RX ADMIN — CYANOCOBALAMIN 1000 MCG: 1000 INJECTION, SOLUTION INTRAMUSCULAR; SUBCUTANEOUS at 08:49

## 2022-07-29 RX ORDER — MONTELUKAST SODIUM 10 MG/1
10 TABLET ORAL DAILY
Qty: 90 TABLET | Refills: 0 | Status: SHIPPED | OUTPATIENT
Start: 2022-07-29 | End: 2022-10-25 | Stop reason: SDUPTHER

## 2022-08-01 ENCOUNTER — CLINICAL SUPPORT (OUTPATIENT)
Dept: INTERNAL MEDICINE | Facility: CLINIC | Age: 77
End: 2022-08-01

## 2022-08-01 DIAGNOSIS — E53.8 VITAMIN B 12 DEFICIENCY: ICD-10-CM

## 2022-08-01 PROCEDURE — 96372 THER/PROPH/DIAG INJ SC/IM: CPT | Performed by: NURSE PRACTITIONER

## 2022-08-01 RX ADMIN — CYANOCOBALAMIN 1000 MCG: 1000 INJECTION, SOLUTION INTRAMUSCULAR; SUBCUTANEOUS at 13:32

## 2022-08-16 RX ORDER — MELOXICAM 7.5 MG/1
7.5 TABLET ORAL DAILY
Qty: 90 TABLET | Refills: 0 | Status: SHIPPED | OUTPATIENT
Start: 2022-08-16 | End: 2022-11-14 | Stop reason: SDUPTHER

## 2022-08-30 DIAGNOSIS — E11.40 TYPE 2 DIABETES MELLITUS WITH DIABETIC NEUROPATHY, UNSPECIFIED WHETHER LONG TERM INSULIN USE: ICD-10-CM

## 2022-09-02 ENCOUNTER — CLINICAL SUPPORT (OUTPATIENT)
Dept: INTERNAL MEDICINE | Facility: CLINIC | Age: 77
End: 2022-09-02

## 2022-09-02 DIAGNOSIS — E53.8 VITAMIN B 12 DEFICIENCY: ICD-10-CM

## 2022-09-02 PROCEDURE — 96372 THER/PROPH/DIAG INJ SC/IM: CPT | Performed by: NURSE PRACTITIONER

## 2022-09-02 RX ADMIN — CYANOCOBALAMIN 1000 MCG: 1000 INJECTION, SOLUTION INTRAMUSCULAR; SUBCUTANEOUS at 09:18

## 2022-10-03 ENCOUNTER — CLINICAL SUPPORT (OUTPATIENT)
Dept: INTERNAL MEDICINE | Facility: CLINIC | Age: 77
End: 2022-10-03

## 2022-10-03 DIAGNOSIS — E53.8 VITAMIN B 12 DEFICIENCY: ICD-10-CM

## 2022-10-03 PROCEDURE — 96372 THER/PROPH/DIAG INJ SC/IM: CPT | Performed by: NURSE PRACTITIONER

## 2022-10-03 RX ADMIN — CYANOCOBALAMIN 1000 MCG: 1000 INJECTION, SOLUTION INTRAMUSCULAR; SUBCUTANEOUS at 11:28

## 2022-10-05 ENCOUNTER — LAB (OUTPATIENT)
Dept: INTERNAL MEDICINE | Facility: CLINIC | Age: 77
End: 2022-10-05

## 2022-10-05 DIAGNOSIS — E78.5 HYPERLIPIDEMIA, UNSPECIFIED HYPERLIPIDEMIA TYPE: Chronic | ICD-10-CM

## 2022-10-05 DIAGNOSIS — Z12.5 SCREENING FOR MALIGNANT NEOPLASM OF PROSTATE: ICD-10-CM

## 2022-10-05 DIAGNOSIS — Z11.59 NEED FOR HEPATITIS C SCREENING TEST: ICD-10-CM

## 2022-10-05 DIAGNOSIS — E11.40 TYPE 2 DIABETES MELLITUS WITH DIABETIC NEUROPATHY, WITHOUT LONG-TERM CURRENT USE OF INSULIN: Chronic | ICD-10-CM

## 2022-10-05 DIAGNOSIS — E55.9 VITAMIN D DEFICIENCY: ICD-10-CM

## 2022-10-05 DIAGNOSIS — I10 ESSENTIAL HYPERTENSION: ICD-10-CM

## 2022-10-05 DIAGNOSIS — Z79.899 OTHER LONG TERM (CURRENT) DRUG THERAPY: Primary | ICD-10-CM

## 2022-10-05 LAB
25(OH)D3 SERPL-MCNC: 62.1 NG/ML (ref 30–100)
ALBUMIN SERPL-MCNC: 4.7 G/DL (ref 3.5–5.2)
ALBUMIN UR-MCNC: <1.2 MG/DL
ALBUMIN/GLOB SERPL: 1.6 G/DL
ALP SERPL-CCNC: 42 U/L (ref 39–117)
ALT SERPL W P-5'-P-CCNC: 34 U/L (ref 1–41)
AMPHET+METHAMPHET UR QL: NEGATIVE
AMPHETAMINE INTERNAL CONTROL: ABNORMAL
AMPHETAMINES UR QL: NEGATIVE
ANION GAP SERPL CALCULATED.3IONS-SCNC: 12 MMOL/L (ref 5–15)
AST SERPL-CCNC: 33 U/L (ref 1–40)
BARBITURATE INTERNAL CONTROL: ABNORMAL
BARBITURATES UR QL SCN: NEGATIVE
BASOPHILS # BLD AUTO: 0.08 10*3/MM3 (ref 0–0.2)
BASOPHILS NFR BLD AUTO: 0.9 % (ref 0–1.5)
BENZODIAZ UR QL SCN: NEGATIVE
BENZODIAZEPINE INTERNAL CONTROL: ABNORMAL
BILIRUB SERPL-MCNC: 0.6 MG/DL (ref 0–1.2)
BUN SERPL-MCNC: 17 MG/DL (ref 8–23)
BUN/CREAT SERPL: 19.3 (ref 7–25)
BUPRENORPHINE INTERNAL CONTROL: ABNORMAL
BUPRENORPHINE SERPL-MCNC: NEGATIVE NG/ML
CALCIUM SPEC-SCNC: 9.9 MG/DL (ref 8.6–10.5)
CANNABINOIDS SERPL QL: NEGATIVE
CHLORIDE SERPL-SCNC: 101 MMOL/L (ref 98–107)
CHOLEST SERPL-MCNC: 152 MG/DL (ref 0–200)
CO2 SERPL-SCNC: 24 MMOL/L (ref 22–29)
COCAINE INTERNAL CONTROL: ABNORMAL
COCAINE UR QL: NEGATIVE
CREAT SERPL-MCNC: 0.88 MG/DL (ref 0.76–1.27)
CREAT UR-MCNC: 45 MG/DL
DEPRECATED RDW RBC AUTO: 37.9 FL (ref 37–54)
EGFRCR SERPLBLD CKD-EPI 2021: 90.2 ML/MIN/1.73
EOSINOPHIL # BLD AUTO: 0.26 10*3/MM3 (ref 0–0.4)
EOSINOPHIL NFR BLD AUTO: 2.8 % (ref 0.3–6.2)
ERYTHROCYTE [DISTWIDTH] IN BLOOD BY AUTOMATED COUNT: 12 % (ref 12.3–15.4)
EXPIRATION DATE: ABNORMAL
GLOBULIN UR ELPH-MCNC: 2.9 GM/DL
GLUCOSE SERPL-MCNC: 134 MG/DL (ref 65–99)
HBA1C MFR BLD: 6.6 % (ref 4.8–5.6)
HCT VFR BLD AUTO: 37.4 % (ref 37.5–51)
HCV AB SER DONR QL: NORMAL
HDLC SERPL-MCNC: 46 MG/DL (ref 40–60)
HGB BLD-MCNC: 13.2 G/DL (ref 13–17.7)
IMM GRANULOCYTES # BLD AUTO: 0.02 10*3/MM3 (ref 0–0.05)
IMM GRANULOCYTES NFR BLD AUTO: 0.2 % (ref 0–0.5)
LDLC SERPL CALC-MCNC: 88 MG/DL (ref 0–100)
LDLC/HDLC SERPL: 1.88 {RATIO}
LYMPHOCYTES # BLD AUTO: 2.15 10*3/MM3 (ref 0.7–3.1)
LYMPHOCYTES NFR BLD AUTO: 22.8 % (ref 19.6–45.3)
Lab: ABNORMAL
MCH RBC QN AUTO: 30.5 PG (ref 26.6–33)
MCHC RBC AUTO-ENTMCNC: 35.3 G/DL (ref 31.5–35.7)
MCV RBC AUTO: 86.4 FL (ref 79–97)
MDMA (ECSTASY) INTERNAL CONTROL: ABNORMAL
MDMA UR QL SCN: NEGATIVE
METHADONE INTERNAL CONTROL: ABNORMAL
METHADONE UR QL SCN: NEGATIVE
METHAMPHETAMINE INTERNAL CONTROL: ABNORMAL
MICROALBUMIN/CREAT UR: NORMAL MG/G{CREAT}
MONOCYTES # BLD AUTO: 0.59 10*3/MM3 (ref 0.1–0.9)
MONOCYTES NFR BLD AUTO: 6.3 % (ref 5–12)
NEUTROPHILS NFR BLD AUTO: 6.31 10*3/MM3 (ref 1.7–7)
NEUTROPHILS NFR BLD AUTO: 67 % (ref 42.7–76)
NRBC BLD AUTO-RTO: 0 /100 WBC (ref 0–0.2)
OPIATES INTERNAL CONTROL: ABNORMAL
OPIATES UR QL: NEGATIVE
OXYCODONE INTERNAL CONTROL: ABNORMAL
OXYCODONE UR QL SCN: NEGATIVE
PCP UR QL SCN: NEGATIVE
PHENCYCLIDINE INTERNAL CONTROL: ABNORMAL
PLATELET # BLD AUTO: 236 10*3/MM3 (ref 140–450)
PMV BLD AUTO: 11.4 FL (ref 6–12)
POTASSIUM SERPL-SCNC: 5.6 MMOL/L (ref 3.5–5.2)
PROT SERPL-MCNC: 7.6 G/DL (ref 6–8.5)
PSA SERPL-MCNC: 3.46 NG/ML (ref 0–4)
RBC # BLD AUTO: 4.33 10*6/MM3 (ref 4.14–5.8)
SODIUM SERPL-SCNC: 137 MMOL/L (ref 136–145)
T4 FREE SERPL-MCNC: 1.01 NG/DL (ref 0.93–1.7)
THC INTERNAL CONTROL: ABNORMAL
TRIGL SERPL-MCNC: 98 MG/DL (ref 0–150)
TSH SERPL DL<=0.05 MIU/L-ACNC: 1.21 UIU/ML (ref 0.27–4.2)
VLDLC SERPL-MCNC: 18 MG/DL (ref 5–40)
WBC NRBC COR # BLD: 9.41 10*3/MM3 (ref 3.4–10.8)

## 2022-10-05 PROCEDURE — 36415 COLL VENOUS BLD VENIPUNCTURE: CPT | Performed by: NURSE PRACTITIONER

## 2022-10-05 PROCEDURE — 85025 COMPLETE CBC W/AUTO DIFF WBC: CPT | Performed by: NURSE PRACTITIONER

## 2022-10-05 PROCEDURE — 80305 DRUG TEST PRSMV DIR OPT OBS: CPT | Performed by: NURSE PRACTITIONER

## 2022-10-05 PROCEDURE — 82570 ASSAY OF URINE CREATININE: CPT | Performed by: NURSE PRACTITIONER

## 2022-10-05 PROCEDURE — 84439 ASSAY OF FREE THYROXINE: CPT | Performed by: NURSE PRACTITIONER

## 2022-10-05 PROCEDURE — 83036 HEMOGLOBIN GLYCOSYLATED A1C: CPT | Performed by: NURSE PRACTITIONER

## 2022-10-05 PROCEDURE — 82306 VITAMIN D 25 HYDROXY: CPT | Performed by: NURSE PRACTITIONER

## 2022-10-05 PROCEDURE — 84443 ASSAY THYROID STIM HORMONE: CPT | Performed by: NURSE PRACTITIONER

## 2022-10-05 PROCEDURE — G0103 PSA SCREENING: HCPCS | Performed by: NURSE PRACTITIONER

## 2022-10-05 PROCEDURE — 80053 COMPREHEN METABOLIC PANEL: CPT | Performed by: NURSE PRACTITIONER

## 2022-10-05 PROCEDURE — 80061 LIPID PANEL: CPT | Performed by: NURSE PRACTITIONER

## 2022-10-05 PROCEDURE — 86803 HEPATITIS C AB TEST: CPT | Performed by: NURSE PRACTITIONER

## 2022-10-05 PROCEDURE — 82043 UR ALBUMIN QUANTITATIVE: CPT | Performed by: NURSE PRACTITIONER

## 2022-10-11 ENCOUNTER — OFFICE VISIT (OUTPATIENT)
Dept: INTERNAL MEDICINE | Facility: CLINIC | Age: 77
End: 2022-10-11

## 2022-10-11 VITALS
DIASTOLIC BLOOD PRESSURE: 60 MMHG | SYSTOLIC BLOOD PRESSURE: 100 MMHG | BODY MASS INDEX: 27.11 KG/M2 | HEIGHT: 70 IN | HEART RATE: 67 BPM | WEIGHT: 189.4 LBS | OXYGEN SATURATION: 98 % | TEMPERATURE: 97.3 F

## 2022-10-11 DIAGNOSIS — G89.29 CHRONIC LEFT-SIDED LOW BACK PAIN WITH LEFT-SIDED SCIATICA: ICD-10-CM

## 2022-10-11 DIAGNOSIS — I10 ESSENTIAL HYPERTENSION: ICD-10-CM

## 2022-10-11 DIAGNOSIS — Z87.891 ENCOUNTER FOR SCREENING FOR ABDOMINAL AORTIC ANEURYSM (AAA) IN PATIENT 50 YEARS OF AGE OR OLDER WITH HISTORY OF SMOKING: ICD-10-CM

## 2022-10-11 DIAGNOSIS — E11.40 TYPE 2 DIABETES MELLITUS WITH DIABETIC NEUROPATHY, WITHOUT LONG-TERM CURRENT USE OF INSULIN: Primary | ICD-10-CM

## 2022-10-11 DIAGNOSIS — E55.9 VITAMIN D DEFICIENCY: ICD-10-CM

## 2022-10-11 DIAGNOSIS — Z13.6 ENCOUNTER FOR SCREENING FOR ABDOMINAL AORTIC ANEURYSM (AAA) IN PATIENT 50 YEARS OF AGE OR OLDER WITH HISTORY OF SMOKING: ICD-10-CM

## 2022-10-11 DIAGNOSIS — Z23 NEED FOR INFLUENZA VACCINATION: ICD-10-CM

## 2022-10-11 DIAGNOSIS — J45.20 MILD INTERMITTENT ASTHMA, UNSPECIFIED WHETHER COMPLICATED: ICD-10-CM

## 2022-10-11 DIAGNOSIS — E78.5 HYPERLIPIDEMIA, UNSPECIFIED HYPERLIPIDEMIA TYPE: ICD-10-CM

## 2022-10-11 DIAGNOSIS — M54.42 CHRONIC LEFT-SIDED LOW BACK PAIN WITH LEFT-SIDED SCIATICA: ICD-10-CM

## 2022-10-11 PROCEDURE — 99214 OFFICE O/P EST MOD 30 MIN: CPT | Performed by: NURSE PRACTITIONER

## 2022-10-11 PROCEDURE — 90662 IIV NO PRSV INCREASED AG IM: CPT | Performed by: NURSE PRACTITIONER

## 2022-10-11 PROCEDURE — G0008 ADMIN INFLUENZA VIRUS VAC: HCPCS | Performed by: NURSE PRACTITIONER

## 2022-10-11 RX ORDER — PHENOL 1.4 %
10 AEROSOL, SPRAY (ML) MUCOUS MEMBRANE AS NEEDED
COMMUNITY

## 2022-10-11 NOTE — PROGRESS NOTES
Chief Complaint  Diabetes and Follow-up (5 month follow up. The patient states the sole of his left foot stays sore most of the time. He states it may be his lower back. )  Subjective        History of Present Illness  Trenton Adames is a 76 y.o. male who presents to [facility] for:     1.  Follow-up of diabetes with neuropathy, hypertension and hyperlipidemia.  The patient is not having any medication side effects. Gabapentin was increased at last visit to 400 mg three times per day.  Negative for chest pain, heart palpitations, dizziness, headaches, shortness of air and fatigue.  Recent labs reviewed. Potassium is high at 5.6.  The patient has history of smoking at age 11 until 1995.  He has never had a screening for aortic aneurysm.    2.  Complaint of chronic low back pain that radiates into his left leg all the way to his foot.  He has had this pain for 20 years.  The pain used to radiate down the right leg.  In the past 2 months he has had more consistent pain.  The pain is described as throbbing, sharp and jabbing the pain is worse with bending and stooping.  The patient denies bowel or bladder incontinence, dysuria, frequency and extremity weakness.  He has seen the neurosurgeon, Dr. Selby, for cervical disc problems in the past.    Past Medical History:   Diagnosis Date   • Dysphagia, oral phase    • Essential (primary) hypertension    • Hyperlipidemia, unspecified    • Type 2 diabetes mellitus without complication (HCC)    • Vitamin D deficiency, unspecified         Past Surgical History:   Procedure Laterality Date   • CATARACT EXTRACTION, BILATERAL  1/28/2014; 1/7/2014   • HEMORRHOIDECTOMY  06/20/2014   • POLYPECTOMY          No Known Allergies       Current Outpatient Medications:   •  albuterol sulfate  (90 Base) MCG/ACT inhaler, Inhale 2 puffs Every 4 (Four) Hours As Needed for Wheezing., Disp: 18 g, Rfl: 5  •  amLODIPine (NORVASC) 5 MG tablet, Take 1  tablet by mouth Daily., Disp: 90 tablet, Rfl: 1  •  aspirin 81 MG EC tablet, Take 1 tablet by mouth Daily., Disp: , Rfl:   •  atorvastatin (LIPITOR) 40 MG tablet, Take 1 tablet by mouth Daily., Disp: 90 tablet, Rfl: 1  •  clobetasol (TEMOVATE) 0.05 % cream, Apply 1 application topically to the appropriate area as directed 2 (Two) Times a Day., Disp: 30 g, Rfl: 2  •  docusate sodium 100 MG capsule, Take 1 capsule by mouth 2 (Two) Times a Day., Disp: 180 capsule, Rfl: 1  •  esomeprazole (nexIUM) 20 MG capsule, Take 20 mg by mouth Every Morning Before Breakfast., Disp: , Rfl:   •  ferrous sulfate (FeroSul) 325 (65 FE) MG tablet, Take 1 tablet by mouth 2 (Two) Times a Day With Meals., Disp: 180 tablet, Rfl: 3  •  ferrous sulfate 325 (65 FE) MG tablet, Take 325 mg by mouth 2 (Two) Times a Day With Meals., Disp: , Rfl:   •  fluticasone (FLONASE) 50 MCG/ACT nasal spray, 1 spray into the nostril(s) as directed by provider Daily., Disp: , Rfl:   •  fluticasone-salmeterol (Advair Diskus) 250-50 MCG/DOSE DISKUS, Inhale 2 puffs 2 (Two) Times a Day., Disp: 60 each, Rfl: 11  •  gabapentin (NEURONTIN) 400 MG capsule, Take 1 capsule by mouth 3 (Three) Times a Day., Disp: 90 capsule, Rfl: 5  •  Krill Oil (Omega-3) 500 MG capsule, Take 1 capsule by mouth Daily., Disp: , Rfl:   •  lisinopril (PRINIVIL,ZESTRIL) 40 MG tablet, Take 1 tablet by mouth Daily., Disp: 90 tablet, Rfl: 1  •  loratadine (CLARITIN) 10 MG tablet, Take 1 tablet by mouth Daily., Disp: , Rfl:   •  Melatonin 10 MG tablet, Take 1 tablet by mouth As Needed., Disp: , Rfl:   •  meloxicam (Mobic) 7.5 MG tablet, Take 1 tablet by mouth Daily., Disp: 90 tablet, Rfl: 0  •  metFORMIN (GLUCOPHAGE) 1000 MG tablet, Take 1 tablet by mouth 2 (Two) Times a Day With Meals for 90 days., Disp: 180 tablet, Rfl: 1  •  montelukast (SINGULAIR) 10 MG tablet, Take 1 tablet by mouth Daily., Disp: 90 tablet, Rfl: 0  •  prednisoLONE acetate (PRED FORTE) 1 % ophthalmic suspension, , Disp: , Rfl:  "  •  vitamin D3 125 MCG (5000 UT) capsule capsule, Take 5,000 Units by mouth Daily., Disp: , Rfl:     Current Facility-Administered Medications:   •  cyanocobalamin injection 1,000 mcg, 1,000 mcg, Intramuscular, Q28 Days, Jacquelin Pelaez APRN, 1,000 mcg at 10/03/22 1128    Objective   /60 (BP Location: Right arm, Patient Position: Sitting, Cuff Size: Large Adult)   Pulse 67   Temp 97.3 °F (36.3 °C) (Temporal)   Ht 177.8 cm (70\")   Wt 85.9 kg (189 lb 6.4 oz)   SpO2 98%   BMI 27.18 kg/m²    Estimated body mass index is 27.18 kg/m² as calculated from the following:    Height as of this encounter: 177.8 cm (70\").    Weight as of this encounter: 85.9 kg (189 lb 6.4 oz).   Physical Exam  Vitals reviewed.   Constitutional:       General: He is not in acute distress.  HENT:      Head: Normocephalic and atraumatic.   Eyes:      Conjunctiva/sclera: Conjunctivae normal.   Neck:      Comments: No thyroid enlargement  Cardiovascular:      Rate and Rhythm: Normal rate and regular rhythm.   Pulmonary:      Effort: Pulmonary effort is normal.      Breath sounds: Normal breath sounds. No wheezing, rhonchi or rales.   Musculoskeletal:      Lumbar back: No tenderness or bony tenderness.      Right lower leg: No edema.      Left lower leg: No edema.   Lymphadenopathy:      Cervical: No cervical adenopathy.   Skin:     General: Skin is warm and dry.   Neurological:      General: No focal deficit present.      Mental Status: He is alert.   Psychiatric:         Thought Content: Thought content normal.        Result Review :   The following data was reviewed by: DARRELL Pardo on 10/11/2022:  CMP    CMP 11/17/21 5/18/22 10/5/22   Glucose 142 (A) 136 (A) 134 (A)   BUN 17 14 17   Creatinine 0.89 0.84 0.88   eGFR Non African Am 84     Sodium 133 (A) 137 137   Potassium 4.7 4.9 5.6 (A)   Chloride 99 101 101   Calcium 9.5 9.6 9.9   Albumin 4.70 4.70 4.70   Total Bilirubin 0.4 0.4 0.6   Alkaline Phosphatase 47 38 (A) 42 "   AST (SGOT) 24 28 33   ALT (SGPT) 25 36 34   (A) Abnormal value            CBC w/diff    CBC w/Diff 11/17/21 5/18/22 10/5/22   WBC 8.19 5.91 9.41   RBC 4.44 4.52 4.33   Hemoglobin 13.2 13.2 13.2   Hematocrit 38.3 39.9 37.4 (A)   MCV 86.3 88.3 86.4   MCH 29.7 29.2 30.5   MCHC 34.5 33.1 35.3   RDW 12.2 (A) 12.4 12.0 (A)   Platelets 273 262 236   Neutrophil Rel % 48.7 50.9 67.0   Immature Granulocyte Rel % 0.4 0.3 0.2   Lymphocyte Rel % 38.2 34.5 22.8   Monocyte Rel % 7.3 8.5 6.3   Eosinophil Rel % 4.3 4.4 2.8   Basophil Rel % 1.1 1.4 0.9   (A) Abnormal value            Lipid Panel    Lipid Panel 11/17/21 5/18/22 10/5/22   Total Cholesterol 155 140 152   Triglycerides 136 105 98   HDL Cholesterol 37 (A) 35 (A) 46   VLDL Cholesterol 24 20 18   LDL Cholesterol  94 85 88   LDL/HDL Ratio 2.45 2.40 1.88   (A) Abnormal value            TSH    TSH 11/17/21 10/5/22   TSH 1.750 1.210           A1C Last 3 Results    HGBA1C Last 3 Results 11/17/21 5/18/22 10/5/22   Hemoglobin A1C 7.61 (A) 6.70 (A) 6.60 (A)   (A) Abnormal value            Microalbumin    Microalbumin 10/5/22   Microalbumin, Urine <1.2           PSA    PSA 10/5/22   PSA 3.460                       Assessment and Plan    Diagnoses and all orders for this visit:    1. Type 2 diabetes mellitus with diabetic neuropathy, without long-term current use of insulin (HCC) (Primary)  -     Ambulatory Referral to Podiatry  -     Hemoglobin A1c; Future    2. Essential hypertension  -     Basic metabolic panel; Future  -     Comprehensive Metabolic Panel; Future  -     CBC & Differential; Future  -     T4, Free; Future  -     TSH; Future    3. Hyperlipidemia, unspecified hyperlipidemia type  -     Lipid Panel; Future    4. Mild intermittent asthma, unspecified whether complicated    5. Vitamin D deficiency  -     Vitamin D 25 Hydroxy; Future    6. Chronic left-sided low back pain with left-sided sciatica  -     MRI Lumbar Spine Without Contrast; Future    7. Need for influenza  vaccination  -     Fluzone High-Dose 65+yrs (5414-4443)    8. Encounter for screening for abdominal aortic aneurysm (AAA) in patient 50 years of age or older with history of smoking  -     Abdominal Aortic Aneurysm Screening Medicare CAR; Future    1. Type 2 diabetes mellitus with diabetic neuropathy, without long-term current use of insulin:  Stable with HA1C 6.60 on metformin 1000 mg twice a day. Improved neuropathy on gabapentin to 400 mg tid. UDS and HERMILO reviewed.  Controlled substance contract discussed and signed today.    2. Hypertension:  BP well-controlled on amlodipine 5 mg. Stop lisinopril 40 mg daily. Potassium is 5.6.  Monitor BP and repeat BMP in 1-2 weeks. Bring in BP log (will increase amlodipine if elevated).     3. Hyperlipidemia:  LDL is 88 and lipid panel stable on Krill oil/omega-3 500 mg capsule and atorvastatin 40 mg half tablet daily and to continue.     4. Mild intermittent asthma: Stable on albuterol IH to use when needed.      5. Vitamin D deficiency: Level is normal. Monitor.    6. Low back pain with left sciatica: Worsening of pain over the past 2 months. MRI and follow-up post results. Has seen Dr. Selby for cervical spine in the past.      Follow Up     Patient was given instructions and counseling regarding his condition or for health maintenance advice. Please see specific information pulled into the AVS if appropriate.   Return in about 6 months (around 4/11/2023) for Medicare Wellness.    DARRELL Pardo

## 2022-10-24 ENCOUNTER — LAB (OUTPATIENT)
Dept: INTERNAL MEDICINE | Facility: CLINIC | Age: 77
End: 2022-10-24

## 2022-10-24 DIAGNOSIS — I10 ESSENTIAL HYPERTENSION: ICD-10-CM

## 2022-10-24 LAB
ANION GAP SERPL CALCULATED.3IONS-SCNC: 12.7 MMOL/L (ref 5–15)
BUN SERPL-MCNC: 16 MG/DL (ref 8–23)
BUN/CREAT SERPL: 22.2 (ref 7–25)
CALCIUM SPEC-SCNC: 9.7 MG/DL (ref 8.6–10.5)
CHLORIDE SERPL-SCNC: 98 MMOL/L (ref 98–107)
CO2 SERPL-SCNC: 25.3 MMOL/L (ref 22–29)
CREAT SERPL-MCNC: 0.72 MG/DL (ref 0.76–1.27)
EGFRCR SERPLBLD CKD-EPI 2021: 95.3 ML/MIN/1.73
GLUCOSE SERPL-MCNC: 118 MG/DL (ref 65–99)
POTASSIUM SERPL-SCNC: 5.1 MMOL/L (ref 3.5–5.2)
SODIUM SERPL-SCNC: 136 MMOL/L (ref 136–145)

## 2022-10-24 PROCEDURE — 36415 COLL VENOUS BLD VENIPUNCTURE: CPT | Performed by: NURSE PRACTITIONER

## 2022-10-24 PROCEDURE — 80048 BASIC METABOLIC PNL TOTAL CA: CPT | Performed by: NURSE PRACTITIONER

## 2022-10-25 RX ORDER — PSEUDOEPHEDRINE HCL 30 MG
100 TABLET ORAL 2 TIMES DAILY
Qty: 180 CAPSULE | Refills: 1 | Status: SHIPPED | OUTPATIENT
Start: 2022-10-25

## 2022-10-25 RX ORDER — MONTELUKAST SODIUM 10 MG/1
10 TABLET ORAL DAILY
Qty: 90 TABLET | Refills: 0 | Status: SHIPPED | OUTPATIENT
Start: 2022-10-25 | End: 2023-01-27 | Stop reason: SDUPTHER

## 2022-11-02 DIAGNOSIS — I10 ESSENTIAL HYPERTENSION: ICD-10-CM

## 2022-11-02 RX ORDER — ATORVASTATIN CALCIUM 40 MG/1
40 TABLET, FILM COATED ORAL DAILY
Qty: 90 TABLET | Refills: 1 | Status: SHIPPED | OUTPATIENT
Start: 2022-11-02

## 2022-11-03 ENCOUNTER — TELEPHONE (OUTPATIENT)
Dept: INTERNAL MEDICINE | Facility: CLINIC | Age: 77
End: 2022-11-03

## 2022-11-03 ENCOUNTER — CLINICAL SUPPORT (OUTPATIENT)
Dept: INTERNAL MEDICINE | Facility: CLINIC | Age: 77
End: 2022-11-03

## 2022-11-03 DIAGNOSIS — E53.8 VITAMIN B 12 DEFICIENCY: ICD-10-CM

## 2022-11-03 PROCEDURE — 96372 THER/PROPH/DIAG INJ SC/IM: CPT | Performed by: NURSE PRACTITIONER

## 2022-11-03 RX ADMIN — CYANOCOBALAMIN 1000 MCG: 1000 INJECTION, SOLUTION INTRAMUSCULAR; SUBCUTANEOUS at 09:43

## 2022-11-13 NOTE — TELEPHONE ENCOUNTER
This is ready   [address]  [facility] EMERGENCY DEPT  [address] [place] [address]  [place] [address]  [phone]    Work/School Note    Date: 11/13/2022    To Whom It May concern:    Meche Starr was seen and treated today in the emergency room by the following provider(s):  Attending Provider: Lino Mendez MD  Physician Assistant: Joaquim Estes, [address]. Meche Starr is excused from work/school on 11/13/2022 through 11/15/2022. She is medically clear to return to work/school on 11/16/2022.          Sincerely,          Keysha Dodd, [address]

## 2022-11-14 DIAGNOSIS — I10 PRIMARY HYPERTENSION: Chronic | ICD-10-CM

## 2022-11-14 RX ORDER — MELOXICAM 7.5 MG/1
7.5 TABLET ORAL DAILY
Qty: 90 TABLET | Refills: 0 | Status: SHIPPED | OUTPATIENT
Start: 2022-11-14 | End: 2023-02-14 | Stop reason: SDUPTHER

## 2022-11-14 RX ORDER — AMLODIPINE BESYLATE 5 MG/1
5 TABLET ORAL DAILY
Qty: 90 TABLET | Refills: 1 | Status: SHIPPED | OUTPATIENT
Start: 2022-11-14 | End: 2022-12-07 | Stop reason: DRUGHIGH

## 2022-11-22 DIAGNOSIS — E11.40 TYPE 2 DIABETES MELLITUS WITH DIABETIC NEUROPATHY, WITHOUT LONG-TERM CURRENT USE OF INSULIN: Chronic | ICD-10-CM

## 2022-11-22 RX ORDER — GABAPENTIN 400 MG/1
400 CAPSULE ORAL 3 TIMES DAILY
Qty: 90 CAPSULE | Refills: 5 | Status: SHIPPED | OUTPATIENT
Start: 2022-11-22 | End: 2022-12-20 | Stop reason: SDUPTHER

## 2022-12-01 ENCOUNTER — CLINICAL SUPPORT (OUTPATIENT)
Dept: INTERNAL MEDICINE | Facility: CLINIC | Age: 77
End: 2022-12-01

## 2022-12-01 DIAGNOSIS — E53.8 VITAMIN B 12 DEFICIENCY: Primary | ICD-10-CM

## 2022-12-01 PROCEDURE — 96372 THER/PROPH/DIAG INJ SC/IM: CPT | Performed by: NURSE PRACTITIONER

## 2022-12-01 RX ORDER — CYANOCOBALAMIN 1000 UG/ML
1000 INJECTION, SOLUTION INTRAMUSCULAR; SUBCUTANEOUS
Status: DISCONTINUED | OUTPATIENT
Start: 2022-12-01 | End: 2023-01-03

## 2022-12-01 RX ADMIN — CYANOCOBALAMIN 1000 MCG: 1000 INJECTION, SOLUTION INTRAMUSCULAR; SUBCUTANEOUS at 10:37

## 2022-12-07 RX ORDER — AMLODIPINE BESYLATE 10 MG/1
10 TABLET ORAL DAILY
Qty: 90 TABLET | Refills: 1 | Status: SHIPPED | OUTPATIENT
Start: 2022-12-07

## 2022-12-07 NOTE — TELEPHONE ENCOUNTER
Spoke with patient today. He is concerned because his potassium is 5.6. The Lisinopril was stopped and he was switched to Amlodipine 5 mgs. When he gets up, his B/P runs around 130-140/70-80's. After he gets up and going, it goes up to 150-160's/80. Please advise.

## 2022-12-20 DIAGNOSIS — E11.40 TYPE 2 DIABETES MELLITUS WITH DIABETIC NEUROPATHY, WITHOUT LONG-TERM CURRENT USE OF INSULIN: Chronic | ICD-10-CM

## 2022-12-20 RX ORDER — GABAPENTIN 400 MG/1
400 CAPSULE ORAL 3 TIMES DAILY
Qty: 90 CAPSULE | Refills: 5 | Status: SHIPPED | OUTPATIENT
Start: 2022-12-20 | End: 2023-01-24 | Stop reason: DRUGHIGH

## 2022-12-20 NOTE — TELEPHONE ENCOUNTER
Caller: Trenton Adames    Relationship: Self    Best call back number: [phone]    Requested Prescriptions:   Requested Prescriptions     Pending Prescriptions Disp Refills   • gabapentin (NEURONTIN) 400 MG capsule 90 capsule 5     Sig: Take 1 capsule by mouth 3 (Three) Times a Day.        Pharmacy where request should be sent: [facility] [id number]  [place], [place] - [address] AT [facility] [address] - [phone] [facility] [phone] FX     Additional details provided by patient:    THE PATIENT IS LEAVING FOR [place] EARLY TOMORROW. MORNING AND IS WANTING TO KNOW IF PCP WOULD OK IT TO BE REFILLED EARLY. HE SAID HE WENT TO  THE PHARMACY AND THEY SAID IT WAS A DAY  EARLY  FOR THEM TO FILL.  HE IS WANTING THIS TO BE FILLED TODAY.     PLEASE CALL AND ADVISE PATIENT           Does the patient have less than a 3 day supply:  [] Yes  [] No    Would you like a call back once the refill request has been completed: [x] Yes [] No    If the office needs to give you a call back, can they leave a voicemail: [x] Yes [] No    Veronica Mendoza Rep   12/20/22 08:53 EST

## 2023-01-03 ENCOUNTER — HOSPITAL ENCOUNTER (OUTPATIENT)
Dept: CARDIOLOGY | Facility: HOSPITAL | Age: 78
Discharge: HOME | End: 2023-01-03
Payer: MEDICARE

## 2023-01-03 ENCOUNTER — HOSPITAL ENCOUNTER (OUTPATIENT)
Dept: MRI IMAGING | Facility: HOSPITAL | Age: 78
Discharge: HOME | End: 2023-01-03
Payer: MEDICARE

## 2023-01-03 ENCOUNTER — CLINICAL SUPPORT (OUTPATIENT)
Dept: INTERNAL MEDICINE | Facility: CLINIC | Age: 78
End: 2023-01-03
Payer: MEDICARE

## 2023-01-03 DIAGNOSIS — G89.29 CHRONIC LEFT-SIDED LOW BACK PAIN WITH LEFT-SIDED SCIATICA: ICD-10-CM

## 2023-01-03 DIAGNOSIS — E53.8 VITAMIN B 12 DEFICIENCY: ICD-10-CM

## 2023-01-03 DIAGNOSIS — M54.42 CHRONIC LEFT-SIDED LOW BACK PAIN WITH LEFT-SIDED SCIATICA: ICD-10-CM

## 2023-01-03 DIAGNOSIS — G89.29 CHRONIC LEFT-SIDED LOW BACK PAIN WITH LEFT-SIDED SCIATICA: Primary | ICD-10-CM

## 2023-01-03 DIAGNOSIS — M54.42 CHRONIC LEFT-SIDED LOW BACK PAIN WITH LEFT-SIDED SCIATICA: Primary | ICD-10-CM

## 2023-01-03 DIAGNOSIS — Z87.891 ENCOUNTER FOR SCREENING FOR ABDOMINAL AORTIC ANEURYSM (AAA) IN PATIENT 50 YEARS OF AGE OR OLDER WITH HISTORY OF SMOKING: ICD-10-CM

## 2023-01-03 DIAGNOSIS — Z13.6 ENCOUNTER FOR SCREENING FOR ABDOMINAL AORTIC ANEURYSM (AAA) IN PATIENT 50 YEARS OF AGE OR OLDER WITH HISTORY OF SMOKING: ICD-10-CM

## 2023-01-03 LAB
BH CV ECHO MEAS - DIST AO DIAM: 1.39 CM
BH CV XLRA MEAS - MID AO DIAM: 1.66 CM
BH CV XLRA MEAS - PROX AO DIAM: 1.92 CM
MAXIMAL PREDICTED HEART RATE: 145 BPM
STRESS TARGET HR: 123 BPM

## 2023-01-03 PROCEDURE — 96372 THER/PROPH/DIAG INJ SC/IM: CPT | Performed by: NURSE PRACTITIONER

## 2023-01-03 PROCEDURE — 76775 US EXAM ABDO BACK WALL LIM: CPT

## 2023-01-03 PROCEDURE — 72148 MRI LUMBAR SPINE W/O DYE: CPT

## 2023-01-03 PROCEDURE — 76706 US ABDL AORTA SCREEN AAA: CPT | Performed by: SURGERY

## 2023-01-03 RX ADMIN — CYANOCOBALAMIN 1000 MCG: 1000 INJECTION, SOLUTION INTRAMUSCULAR; SUBCUTANEOUS at 12:55

## 2023-01-12 ENCOUNTER — OFFICE VISIT (OUTPATIENT)
Dept: PODIATRY | Facility: CLINIC | Age: 78
End: 2023-01-12
Payer: MEDICARE

## 2023-01-12 VITALS
BODY MASS INDEX: 26.63 KG/M2 | SYSTOLIC BLOOD PRESSURE: 153 MMHG | OXYGEN SATURATION: 100 % | WEIGHT: 186 LBS | HEART RATE: 77 BPM | TEMPERATURE: 97.3 F | DIASTOLIC BLOOD PRESSURE: 62 MMHG | HEIGHT: 70 IN

## 2023-01-12 DIAGNOSIS — Z79.4 TYPE 2 DIABETES MELLITUS WITH DIABETIC POLYNEUROPATHY, WITH LONG-TERM CURRENT USE OF INSULIN: Primary | ICD-10-CM

## 2023-01-12 DIAGNOSIS — E11.42 TYPE 2 DIABETES MELLITUS WITH DIABETIC POLYNEUROPATHY, WITH LONG-TERM CURRENT USE OF INSULIN: Primary | ICD-10-CM

## 2023-01-12 DIAGNOSIS — M79.672 FOOT PAIN, BILATERAL: ICD-10-CM

## 2023-01-12 DIAGNOSIS — M79.671 FOOT PAIN, BILATERAL: ICD-10-CM

## 2023-01-12 DIAGNOSIS — G62.9 NEUROPATHY: ICD-10-CM

## 2023-01-12 PROCEDURE — G8404 LOW EXTEMITY NEUR EXAM DOCUM: HCPCS | Performed by: PODIATRIST

## 2023-01-12 PROCEDURE — 99203 OFFICE O/P NEW LOW 30 MIN: CPT | Performed by: PODIATRIST

## 2023-01-12 RX ORDER — FLUOROURACIL 50 MG/G
CREAM TOPICAL
COMMUNITY
Start: 2022-11-14

## 2023-01-12 NOTE — PROGRESS NOTES
[facility] - PODIATRY    Today's Date: 23    Patient Name: Trenton Adames  MRN: [id number]  CSN: [id number]  PCP: Jacquelin Pelaez APRN, Last PCP Visit:  2022  Referring Provider: Jacquelin Pelaez APRN    SUBJECTIVE     Chief Complaint   Patient presents with   • Left Foot - Establish Care, Annual Exam, Diabetes, Pain     Pain on bottom of foot   • Right Foot - Establish Care, Annual Exam, Diabetes, Pain     Pain on bottom of foot     HPI: Trenton Adames, a 77 y.o.male, presents to clinic for a diabetic foot evaluation.    New, Established, New Problem:  new    Onset: Insidious    Nature:  NIDDM    Stable, worsening, improving:  stable    Patient controlling diabetes via:  Oral meds    Patient states their most recent blood glucose readin.      Patient denies any fevers, chills, nausea, vomiting, shortness of breath, nor any other constitutional signs nor symptoms.    Increasing neuropathy symptoms.    Past Medical History:   Diagnosis Date   • Asthma    • Dysphagia, oral phase    • Essential (primary) hypertension    • Foot pain, bilateral    • Hyperlipidemia, unspecified    • Neuropathy in diabetes (HCC)    • Type 2 diabetes mellitus without complication (HCC)    • Vitamin D deficiency, unspecified      Past Surgical History:   Procedure Laterality Date   • CATARACT EXTRACTION, BILATERAL  2014; 2014   • HEMORRHOIDECTOMY  2014   • POLYPECTOMY       Family History   Problem Relation Age of Onset   • Cancer Mother    • Heart disease Father    • Diabetes Father    • Heart disease Maternal Grandmother    • Cancer Maternal Grandfather    • Diabetes Paternal Grandmother      Social History     Socioeconomic History   • Marital status:    Tobacco Use   • Smoking status: Former     Packs/day: 2.00     Years: 15.00     Pack years: 30.00     Types: Cigarettes, Pipe     Quit date: 1995     Years since quittin.0   • Smokeless tobacco: Never   Vaping  Use   • Vaping Use: Never used   Substance and Sexual Activity   • Alcohol use: Yes     Alcohol/week: 9.0 standard drinks     Types: 6 Cans of beer, 3 Drinks containing 0.5 oz of alcohol per week     Comment: Occasional    • Drug use: Never   • Sexual activity: Not Currently     Partners: Female     No Known Allergies  Current Outpatient Medications   Medication Sig Dispense Refill   • albuterol sulfate  (90 Base) MCG/ACT inhaler Inhale 2 puffs Every 4 (Four) Hours As Needed for Wheezing. 18 g 5   • amLODIPine (NORVASC) 10 MG tablet Take 1 tablet by mouth Daily. 90 tablet 1   • aspirin 81 MG EC tablet Take 1 tablet by mouth Daily.     • atorvastatin (LIPITOR) 40 MG tablet Take 1 tablet by mouth Daily. 90 tablet 1   • clobetasol (TEMOVATE) 0.05 % cream Apply 1 application topically to the appropriate area as directed 2 (Two) Times a Day. 30 g 2   • docusate sodium 100 MG capsule Take 1 capsule by mouth 2 (Two) Times a Day. 180 capsule 1   • esomeprazole (nexIUM) 20 MG capsule Take 20 mg by mouth Every Morning Before Breakfast.     • ferrous sulfate (FeroSul) 325 (65 FE) MG tablet Take 1 tablet by mouth 2 (Two) Times a Day With Meals. 180 tablet 3   • ferrous sulfate 325 (65 FE) MG tablet Take 325 mg by mouth 2 (Two) Times a Day With Meals.     • fluorouracil (EFUDEX) 5 % cream      • fluticasone (FLONASE) 50 MCG/ACT nasal spray 1 spray into the nostril(s) as directed by provider Daily.     • fluticasone-salmeterol (Advair Diskus) 250-50 MCG/DOSE DISKUS Inhale 2 puffs 2 (Two) Times a Day. 60 each 11   • gabapentin (NEURONTIN) 400 MG capsule Take 1 capsule by mouth 3 (Three) Times a Day. 90 capsule 5   • Krill Oil (Omega-3) 500 MG capsule Take 1 capsule by mouth Daily.     • lisinopril (PRINIVIL,ZESTRIL) 40 MG tablet Take 1 tablet by mouth Daily. 90 tablet 1   • loratadine (CLARITIN) 10 MG tablet Take 1 tablet by mouth Daily.     • Melatonin 10 MG tablet Take 1 tablet by mouth As Needed.     • meloxicam (Mobic)  7.5 MG tablet Take 1 tablet by mouth Daily. 90 tablet 0   • montelukast (SINGULAIR) 10 MG tablet Take 1 tablet by mouth Daily. 90 tablet 0   • prednisoLONE acetate (PRED FORTE) 1 % ophthalmic suspension      • vitamin D3 125 MCG (5000 UT) capsule capsule Take 5,000 Units by mouth Daily.     • metFORMIN (GLUCOPHAGE) 1000 MG tablet Take 1 tablet by mouth 2 (Two) Times a Day With Meals for 90 days. 180 tablet 1     Current Facility-Administered Medications   Medication Dose Route Frequency Provider Last Rate Last Admin   • cyanocobalamin injection 1,000 mcg  1,000 mcg Intramuscular Q28 Days Latanya JacquelinDARRELL   1,000 mcg at 01/03/23 1255     Review of Systems   Constitutional: Negative.    Neurological: Positive for numbness.   All other systems reviewed and are negative.      OBJECTIVE     Vitals:    01/12/23 0847   BP: 153/62   Pulse: 77   Temp: 97.3 °F (36.3 °C)   SpO2: 100%       Body mass index is 26.69 kg/m².    Lab Results   Component Value Date    HGBA1C 6.60 (H) 10/05/2022       Lab Results   Component Value Date    GLUCOSE 118 (H) 10/24/2022    CALCIUM 9.7 10/24/2022     10/24/2022    K 5.1 10/24/2022    CO2 25.3 10/24/2022    CL 98 10/24/2022    BUN 16 10/24/2022    CREATININE 0.72 (L) 10/24/2022    EGFRIFNONA 84 11/17/2021    BCR 22.2 10/24/2022    ANIONGAP 12.7 10/24/2022       Patient seen in no apparent distress.      PHYSICAL EXAM:     Foot/Ankle Exam:       General:   Diabetic Foot Exam Performed    Appearance: elderly    Orientation: AAOx3    Affect: appropriate    Gait: unimpaired    Shoe Gear:  Casual shoes    VASCULAR      Right Foot Vascularity   Normal vascular exam    Dorsalis pedis:  2+  Posterior tibial:  2+  Skin Temperature: warm    Edema Grading:  None  CFT:  < 3 seconds  Pedal Hair Growth:  Present  Varicosities: mild varicosities       Left Foot Vascularity   Normal vascular exam    Dorsalis pedis:  2+  Posterior tibial:  2+  Skin Temperature: warm    Edema Grading:  None  CFT:   < 3 seconds  Pedal Hair Growth:  Present  Varicosities: mild varicosities        NEUROLOGIC     Right Foot Neurologic   Light touch sensation:  Diminished  Vibratory sensation:  Diminished  Hot/Cold sensation: diminished    Protective Sensation using Whatley-Henri Monofilament:  4     Left Foot Neurologic   Light touch sensation:  Diminished  Vibratory sensation:  Diminished  Hot/cold sensation: diminished    Protective Sensation using Whatley-Henri Monofilament:  4     MUSCLE STRENGTH     Right Foot Muscle Strength   Foot dorsiflexion:  4  Foot plantar flexion:  4  Foot inversion:  4  Foot eversion:  4     Left Foot Muscle Strength   Foot dorsiflexion:  4  Foot plantar flexion:  4  Foot inversion:  4  Foot eversion:  4     RANGE OF MOTION      Right Foot Range of Motion   Foot and ankle ROM within normal limits       Left Foot Range of Motion   Foot and ankle ROM within normal limits       DERMATOLOGIC     Right Foot Dermatologic   Skin: skin intact    Nails: normal       Left Foot Dermatologic   Skin: skin intact    Nails: normal        Diabetic Foot Exam Performed      ASSESSMENT/PLAN     Diagnoses and all orders for this visit:    1. Type 2 diabetes mellitus with diabetic polyneuropathy, with long-term current use of insulin (HCC) (Primary)    2. Neuropathy    3. Foot pain, bilateral    Rx:  Topical, compounded, neuropathy medication was written; see attached prescription.    Comprehensive lower extremity examination and evaluation was performed.    Discussed findings and treatment plan including risks, benefits, and treatment options with patient in detail. Patient agreed with treatment plan.    Medications and allergies reviewed.  Reviewed available blood glucose and HgB A1C lab values along with other pertinent labs.  These were discussed with the patient as to their importance of diabetic maintenance.    Diabetic foot exam performed and documented this date, compliant with [facility] required standards.  Detail of findings as noted in physical exam.  Lower extremity Neurologic exam for diabetic patient performed and documented this date, compliant with PQRS required standards. Detail of findings as noted in physical exam.  Advised patient importance of good routine lower extremity hygiene. Advised patient importance of evaluating for intact skin and pain free nail borders.  Advised patient to use mirror to evaluate plantar/ soles of feet for better visualization. Advised patient monitor and phone office to be seen if any cracking to skin, open lesions, painful nail borders or if nails become elongated prior to next visit. Advised patient importance of daily cleansing of lower extremities, followed by good skin cream to maintain normal hydration of skin. Also advised patient importance of close daily monitoring of blood sugar. Advised to regulate diet and medications to maintain control of blood sugar in optimal range. Contact primary care provider if difficulties maintaining blood sugar levels.  Advised Patient of presence of Diabetes Mellitus condition.  Advised Patient risk of progression and worsening or improvement, then return of condition.  Will monitor condition for any change in future. Treat with most appropriate treatment pending status of condition.  Counseled and advised patient extensively on nature and ramifications of diabetes. Standard instructions given to patient for good diabetic foot care and maintenance. Advised importance of careful monitoring to avoid break down and complications secondary to diabetes. Advised patient importance of strict maintenance of blood sugar control. Advised patient of possible ominous results from neglect of condition, i.e.: amputation/ loss of digits, feet and legs, or even death.  Patient states understands counseling, will monitor closely, continue good hygiene and routine diabetic foot care. Patient will contact office is questions or problems.      An After Visit  Summary was printed and given to the patient at discharge, including (if requested) any available informative/educational handouts regarding diagnosis, treatment, or medications. All questions were answered to patient/family satisfaction. Should symptoms fail to improve or worsen they agree to call or return to clinic or to go to the Emergency Department. Discussed the importance of following up with any needed screening tests/labs/specialist appointments and any requested follow-up recommended by me today. Importance of maintaining follow-up discussed and patient accepts that missed appointments can delay diagnosis and potentially lead to worsening of conditions.    Return in about 1 year (around 1/12/2024) for Podiatry Diabetic Foot Exam., or sooner if acute issues arise.    This document has been electronically signed by Sonny Parish DPM on January 12, 2023 09:16 EST

## 2023-01-23 NOTE — PROGRESS NOTES
Chief Complaint  Diabetes (Patient states he has been having right ankle swelling and he noticed a rash. BP Check. And would like to talk about gabapentin strength. )  Subjective      History of Present Illness  Trenton Adames is a 77 y.o. male with diabetes, neuropathy, hypertension and hyperlipidemia presents to [facility] for follow-up.  He feels his gabapentin is not working as well and is asking about increasing the dose. Amlodipine is now at 10 mg daily. Blood pressure cuff from home here and reading high compared to manual cuff. Chronic intermittent rash bilateral legs that improved with steroid cream. None today.  He follows with Dr. Stephens for other skin issues.  He is complaining of intermittent right ankle swelling and pain that started about a month ago. No known injury.    Past Medical History:   Diagnosis Date   • Asthma    • Dysphagia, oral phase    • Essential (primary) hypertension    • Foot pain, bilateral    • Hyperlipidemia, unspecified    • Neuropathy in diabetes (HCC)    • Type 2 diabetes mellitus without complication (HCC)    • Vitamin D deficiency, unspecified         Past Surgical History:   Procedure Laterality Date   • CATARACT EXTRACTION, BILATERAL  1/28/2014; 1/7/2014   • COLONOSCOPY  2022   • HEMORRHOIDECTOMY  06/20/2014   • POLYPECTOMY          No Known Allergies       Current Outpatient Medications:   •  albuterol sulfate  (90 Base) MCG/ACT inhaler, Inhale 2 puffs Every 4 (Four) Hours As Needed for Wheezing., Disp: 18 g, Rfl: 5  •  amLODIPine (NORVASC) 10 MG tablet, Take 1 tablet by mouth Daily., Disp: 90 tablet, Rfl: 1  •  aspirin 81 MG EC tablet, Take 1 tablet by mouth Daily., Disp: , Rfl:   •  atorvastatin (LIPITOR) 40 MG tablet, Take 1 tablet by mouth Daily., Disp: 90 tablet, Rfl: 1  •  clobetasol (TEMOVATE) 0.05 % cream, Apply 1 application topically to the appropriate area as directed 2 (Two) Times a Day., Disp: 30 g, Rfl: 2  •   docusate sodium 100 MG capsule, Take 1 capsule by mouth 2 (Two) Times a Day., Disp: 180 capsule, Rfl: 1  •  esomeprazole (nexIUM) 20 MG capsule, Take 20 mg by mouth Every Morning Before Breakfast., Disp: , Rfl:   •  ferrous sulfate (FeroSul) 325 (65 FE) MG tablet, Take 1 tablet by mouth 2 (Two) Times a Day With Meals., Disp: 180 tablet, Rfl: 3  •  ferrous sulfate 325 (65 FE) MG tablet, Take 325 mg by mouth 2 (Two) Times a Day With Meals., Disp: , Rfl:   •  fluorouracil (EFUDEX) 5 % cream, , Disp: , Rfl:   •  fluticasone (FLONASE) 50 MCG/ACT nasal spray, 1 spray into the nostril(s) as directed by provider Daily., Disp: , Rfl:   •  fluticasone-salmeterol (Advair Diskus) 250-50 MCG/DOSE DISKUS, Inhale 2 puffs 2 (Two) Times a Day., Disp: 60 each, Rfl: 11  •  Krill Oil (Omega-3) 500 MG capsule, Take 1 capsule by mouth Daily., Disp: , Rfl:   •  lisinopril (PRINIVIL,ZESTRIL) 40 MG tablet, Take 1 tablet by mouth Daily., Disp: 90 tablet, Rfl: 1  •  loratadine (CLARITIN) 10 MG tablet, Take 1 tablet by mouth Daily., Disp: , Rfl:   •  Melatonin 10 MG tablet, Take 1 tablet by mouth As Needed., Disp: , Rfl:   •  meloxicam (Mobic) 7.5 MG tablet, Take 1 tablet by mouth Daily., Disp: 90 tablet, Rfl: 0  •  montelukast (SINGULAIR) 10 MG tablet, Take 1 tablet by mouth Daily., Disp: 90 tablet, Rfl: 0  •  prednisoLONE acetate (PRED FORTE) 1 % ophthalmic suspension, , Disp: , Rfl:   •  vitamin D3 125 MCG (5000 UT) capsule capsule, Take 5,000 Units by mouth Daily., Disp: , Rfl:   •  gabapentin (NEURONTIN) 600 MG tablet, Take 1 tablet by mouth 3 (Three) Times a Day., Disp: 90 tablet, Rfl: 3  •  metFORMIN (GLUCOPHAGE) 1000 MG tablet, Take 1 tablet by mouth 2 (Two) Times a Day With Meals for 90 days., Disp: 180 tablet, Rfl: 1    Current Facility-Administered Medications:   •  cyanocobalamin injection 1,000 mcg, 1,000 mcg, Intramuscular, Q28 Days, Jacquelin Pelaez APRN, 1,000 mcg at 01/03/23 1255    Objective   /70 (BP Location: Right  "arm, Patient Position: Sitting, Cuff Size: Large Adult)   Pulse 67   Temp 97.6 °F (36.4 °C) (Temporal)   Ht 177.8 cm (70\")   Wt 87.8 kg (193 lb 9.6 oz)   SpO2 99%   BMI 27.78 kg/m²    Estimated body mass index is 27.78 kg/m² as calculated from the following:    Height as of this encounter: 177.8 cm (70\").    Weight as of this encounter: 87.8 kg (193 lb 9.6 oz).   Physical Exam  Vitals reviewed.   Constitutional:       General: He is not in acute distress.  HENT:      Head: Normocephalic and atraumatic.   Pulmonary:      Effort: Pulmonary effort is normal.   Musculoskeletal:         General: Swelling present.      Right ankle: Swelling present. No tenderness.      Left ankle: No swelling.   Neurological:      General: No focal deficit present.      Mental Status: He is alert.   Psychiatric:         Thought Content: Thought content normal.        Result Review :                  Assessment and Plan   Diagnoses and all orders for this visit:    1. Essential hypertension (Primary)    2. Type 2 diabetes mellitus with diabetic neuropathy, without long-term current use of insulin (HCC)  -     gabapentin (NEURONTIN) 600 MG tablet; Take 1 tablet by mouth 3 (Three) Times a Day.  Dispense: 90 tablet; Refill: 3    3. Pain and swelling of right ankle  -     XR Ankle 3+ View Right; Future    4. Rash    1. Hypertension:  Improved. Continue amlodipine 10 mg daily.     2. Type 2 diabetes mellitus with diabetic neuropathy:  Stable on metformin 1000 mg twice a day. Increase gabapentin to  600 mg tid.  HERMILO reviewed.      3. Pain and swelling in right ankle:  X-ray today and follow-up. Use Rx Alternative pain cream for feet on the ankle.    4. Rash: Improved for now. Follow up with dermatology.       Patient was given instructions and counseling regarding his condition or for health maintenance advice. Please see specific information pulled into the AVS if appropriate.     Follow Up   Return for Next scheduled follow " up.    Jacquelin Pelaez, APRN

## 2023-01-24 ENCOUNTER — OFFICE VISIT (OUTPATIENT)
Dept: INTERNAL MEDICINE | Facility: CLINIC | Age: 78
End: 2023-01-24
Payer: MEDICARE

## 2023-01-24 ENCOUNTER — HOSPITAL ENCOUNTER (OUTPATIENT)
Dept: GENERAL RADIOLOGY | Facility: HOSPITAL | Age: 78
Discharge: HOME | End: 2023-01-24
Admitting: NURSE PRACTITIONER
Payer: MEDICARE

## 2023-01-24 VITALS
OXYGEN SATURATION: 99 % | TEMPERATURE: 97.6 F | HEART RATE: 67 BPM | WEIGHT: 193.6 LBS | HEIGHT: 70 IN | DIASTOLIC BLOOD PRESSURE: 70 MMHG | SYSTOLIC BLOOD PRESSURE: 130 MMHG | BODY MASS INDEX: 27.72 KG/M2

## 2023-01-24 DIAGNOSIS — E11.40 TYPE 2 DIABETES MELLITUS WITH DIABETIC NEUROPATHY, WITHOUT LONG-TERM CURRENT USE OF INSULIN: ICD-10-CM

## 2023-01-24 DIAGNOSIS — I10 ESSENTIAL HYPERTENSION: Primary | ICD-10-CM

## 2023-01-24 DIAGNOSIS — R21 RASH: ICD-10-CM

## 2023-01-24 DIAGNOSIS — M25.571 PAIN AND SWELLING OF RIGHT ANKLE: ICD-10-CM

## 2023-01-24 DIAGNOSIS — M25.471 PAIN AND SWELLING OF RIGHT ANKLE: ICD-10-CM

## 2023-01-24 PROCEDURE — 73610 X-RAY EXAM OF ANKLE: CPT

## 2023-01-24 PROCEDURE — 99214 OFFICE O/P EST MOD 30 MIN: CPT | Performed by: NURSE PRACTITIONER

## 2023-01-24 RX ORDER — GABAPENTIN 600 MG/1
600 TABLET ORAL 3 TIMES DAILY
Qty: 90 TABLET | Refills: 3 | Status: SHIPPED | OUTPATIENT
Start: 2023-01-24

## 2023-01-27 RX ORDER — MONTELUKAST SODIUM 10 MG/1
10 TABLET ORAL DAILY
Qty: 90 TABLET | Refills: 0 | Status: SHIPPED | OUTPATIENT
Start: 2023-01-27

## 2023-01-30 ENCOUNTER — EXTERNAL PBMM DATA (OUTPATIENT)
Dept: PHARMACY | Facility: OTHER | Age: 78
End: 2023-01-30
Payer: MEDICARE

## 2023-02-03 ENCOUNTER — CLINICAL SUPPORT (OUTPATIENT)
Dept: INTERNAL MEDICINE | Facility: CLINIC | Age: 78
End: 2023-02-03
Payer: MEDICARE

## 2023-02-03 DIAGNOSIS — E53.8 VITAMIN B 12 DEFICIENCY: ICD-10-CM

## 2023-02-03 PROCEDURE — 96372 THER/PROPH/DIAG INJ SC/IM: CPT | Performed by: NURSE PRACTITIONER

## 2023-02-03 RX ADMIN — CYANOCOBALAMIN 1000 MCG: 1000 INJECTION, SOLUTION INTRAMUSCULAR; SUBCUTANEOUS at 10:48

## 2023-02-14 RX ORDER — MELOXICAM 7.5 MG/1
7.5 TABLET ORAL DAILY
Qty: 90 TABLET | Refills: 0 | Status: SHIPPED | OUTPATIENT
Start: 2023-02-14

## 2023-02-24 DIAGNOSIS — E11.40 TYPE 2 DIABETES MELLITUS WITH DIABETIC NEUROPATHY, UNSPECIFIED WHETHER LONG TERM INSULIN USE: ICD-10-CM

## 2023-02-27 ENCOUNTER — EXTERNAL PBMM DATA (OUTPATIENT)
Dept: PHARMACY | Facility: OTHER | Age: 78
End: 2023-02-27
Payer: MEDICARE

## 2023-03-01 ENCOUNTER — CLINICAL SUPPORT (OUTPATIENT)
Dept: INTERNAL MEDICINE | Facility: CLINIC | Age: 78
End: 2023-03-01
Payer: MEDICARE

## 2023-03-01 RX ADMIN — CYANOCOBALAMIN 1000 MCG: 1000 INJECTION, SOLUTION INTRAMUSCULAR; SUBCUTANEOUS at 13:29

## 2023-03-20 ENCOUNTER — OFFICE VISIT (OUTPATIENT)
Dept: INTERNAL MEDICINE | Facility: CLINIC | Age: 78
End: 2023-03-20
Payer: MEDICARE

## 2023-03-20 VITALS
OXYGEN SATURATION: 99 % | HEART RATE: 62 BPM | RESPIRATION RATE: 18 BRPM | BODY MASS INDEX: 27.66 KG/M2 | DIASTOLIC BLOOD PRESSURE: 72 MMHG | SYSTOLIC BLOOD PRESSURE: 170 MMHG | WEIGHT: 193.2 LBS | TEMPERATURE: 98 F | HEIGHT: 70 IN

## 2023-03-20 DIAGNOSIS — J06.9 ACUTE URI: Primary | ICD-10-CM

## 2023-03-20 DIAGNOSIS — J45.20 MILD INTERMITTENT ASTHMA, UNSPECIFIED WHETHER COMPLICATED: ICD-10-CM

## 2023-03-20 DIAGNOSIS — R05.1 ACUTE COUGH: ICD-10-CM

## 2023-03-20 LAB
EXPIRATION DATE: NORMAL
FLUAV AG UPPER RESP QL IA.RAPID: NOT DETECTED
FLUBV AG UPPER RESP QL IA.RAPID: NOT DETECTED
INTERNAL CONTROL: NORMAL
Lab: NORMAL
SARS-COV-2 AG UPPER RESP QL IA.RAPID: NOT DETECTED

## 2023-03-20 PROCEDURE — 1159F MED LIST DOCD IN RCRD: CPT

## 2023-03-20 PROCEDURE — 99214 OFFICE O/P EST MOD 30 MIN: CPT

## 2023-03-20 PROCEDURE — 87428 SARSCOV & INF VIR A&B AG IA: CPT

## 2023-03-20 PROCEDURE — 3078F DIAST BP <80 MM HG: CPT

## 2023-03-20 PROCEDURE — 1160F RVW MEDS BY RX/DR IN RCRD: CPT

## 2023-03-20 PROCEDURE — 3077F SYST BP >= 140 MM HG: CPT

## 2023-03-20 RX ORDER — PREDNISONE 20 MG/1
20 TABLET ORAL DAILY
Qty: 5 TABLET | Refills: 0 | Status: SHIPPED | OUTPATIENT
Start: 2023-03-20

## 2023-03-20 RX ORDER — AZITHROMYCIN 250 MG/1
TABLET, FILM COATED ORAL
Qty: 6 TABLET | Refills: 0 | Status: SHIPPED | OUTPATIENT
Start: 2023-03-20

## 2023-03-20 NOTE — ASSESSMENT & PLAN NOTE
Asthma is worsening.  The patient is experiencing frequent daytime asthma symptoms. He is experiencing weekly nighttime asthma symptoms.  We are adjusting patient's medicines today.  Patient is using albuterol frequently.  We will start him on Trelegy for 2-week trial.  If that works for him he may continue and we can send him in a prescription.  Also low-dose steroid will be sent for patient.

## 2023-03-20 NOTE — PROGRESS NOTES
Chief Complaint  Cough (Pt states that he feels like there is 20 pounds of weight sitting on his chest. He states that he a cough with a thick chunk of yellow/brownish sputum. He also feels pressure in his face, and stopped up ears that started last week. )    History of Present Illness  SUBJECTIVE  Trenton Adames presents to [facility]  With complaints of sinus pressure, congestion, shortness of breath and chest tightness, productive cough with yellow sputum.  Denies nausea, vomiting, diarrhea, fever, chills, myalgias.   This has been going on since about Friday. He has been travelling recently  Patient states that he rinsed his sinuses this morning and there was blood.   Pt is having to use his albuterol inhaler a little more often. Patient is not taking the advair.  He is using flonase, claritin.  Blood glucose was around 128 this morning.  He he states that rarely goes above 150..        Past Medical History:   Diagnosis Date   • Asthma    • Dysphagia, oral phase    • Essential (primary) hypertension    • Foot pain, bilateral    • Hyperlipidemia, unspecified    • Neuropathy in diabetes (HCC)    • Type 2 diabetes mellitus without complication (HCC)    • Vitamin D deficiency, unspecified       Family History   Problem Relation Age of Onset   • Cancer Mother    • Heart disease Father    • Diabetes Father    • Heart disease Maternal Grandmother    • Cancer Maternal Grandfather    • Diabetes Paternal Grandmother       Past Surgical History:   Procedure Laterality Date   • CATARACT EXTRACTION, BILATERAL  1/28/2014; 1/7/2014   • COLONOSCOPY  2022   • HEMORRHOIDECTOMY  06/20/2014   • POLYPECTOMY          Current Outpatient Medications:   •  albuterol sulfate  (90 Base) MCG/ACT inhaler, Inhale 2 puffs Every 4 (Four) Hours As Needed for Wheezing., Disp: 18 g, Rfl: 5  •  amLODIPine (NORVASC) 10 MG tablet, Take 1 tablet by mouth Daily., Disp: 90 tablet, Rfl: 1  •  aspirin 81  MG EC tablet, Take 1 tablet by mouth Daily., Disp: , Rfl:   •  atorvastatin (LIPITOR) 40 MG tablet, Take 1 tablet by mouth Daily., Disp: 90 tablet, Rfl: 1  •  clobetasol (TEMOVATE) 0.05 % cream, Apply 1 application topically to the appropriate area as directed 2 (Two) Times a Day., Disp: 30 g, Rfl: 2  •  docusate sodium 100 MG capsule, Take 1 capsule by mouth 2 (Two) Times a Day., Disp: 180 capsule, Rfl: 1  •  esomeprazole (nexIUM) 20 MG capsule, Take 1 capsule by mouth Every Morning Before Breakfast., Disp: , Rfl:   •  ferrous sulfate (FeroSul) 325 (65 FE) MG tablet, Take 1 tablet by mouth 2 (Two) Times a Day With Meals., Disp: 180 tablet, Rfl: 3  •  ferrous sulfate 325 (65 FE) MG tablet, Take 1 tablet by mouth 2 (Two) Times a Day With Meals., Disp: , Rfl:   •  fluorouracil (EFUDEX) 5 % cream, , Disp: , Rfl:   •  fluticasone (FLONASE) 50 MCG/ACT nasal spray, 1 spray into the nostril(s) as directed by provider Daily., Disp: , Rfl:   •  gabapentin (NEURONTIN) 600 MG tablet, Take 1 tablet by mouth 3 (Three) Times a Day., Disp: 90 tablet, Rfl: 3  •  Krill Oil (Omega-3) 500 MG capsule, Take 1 capsule by mouth Daily., Disp: , Rfl:   •  lisinopril (PRINIVIL,ZESTRIL) 40 MG tablet, Take 1 tablet by mouth Daily., Disp: 90 tablet, Rfl: 1  •  loratadine (CLARITIN) 10 MG tablet, Take 1 tablet by mouth Daily., Disp: , Rfl:   •  Melatonin 10 MG tablet, Take 1 tablet by mouth As Needed., Disp: , Rfl:   •  meloxicam (Mobic) 7.5 MG tablet, Take 1 tablet by mouth Daily., Disp: 90 tablet, Rfl: 0  •  metFORMIN (GLUCOPHAGE) 1000 MG tablet, TAKE ONE TABLET BY MOUTH TWICE A DAY WITH MEALS, Disp: 180 tablet, Rfl: 1  •  montelukast (SINGULAIR) 10 MG tablet, Take 1 tablet by mouth Daily., Disp: 90 tablet, Rfl: 0  •  prednisoLONE acetate (PRED FORTE) 1 % ophthalmic suspension, , Disp: , Rfl:   •  vitamin D3 125 MCG (5000 UT) capsule capsule, Take 1 capsule by mouth Daily., Disp: , Rfl:   •  azithromycin (Zithromax Z-Tanner) 250 MG tablet, Take 2  "tablets by mouth on day 1, then 1 tablet daily on days 2-5, Disp: 6 tablet, Rfl: 0  •  predniSONE (DELTASONE) 20 MG tablet, Take 1 tablet by mouth Daily., Disp: 5 tablet, Rfl: 0    Current Facility-Administered Medications:   •  cyanocobalamin injection 1,000 mcg, 1,000 mcg, Intramuscular, Q28 Days, Jacquelin Pelaez APRN, 1,000 mcg at 03/01/23 1329    OBJECTIVE  Vital Signs:   /72 (BP Location: Right arm)   Pulse 62   Temp 98 °F (36.7 °C) (Temporal)   Resp 18   Ht 177.8 cm (70\")   Wt 87.6 kg (193 lb 3.2 oz)   SpO2 99%   BMI 27.72 kg/m²    Estimated body mass index is 27.72 kg/m² as calculated from the following:    Height as of this encounter: 177.8 cm (70\").    Weight as of this encounter: 87.6 kg (193 lb 3.2 oz).     Wt Readings from Last 3 Encounters:   03/20/23 87.6 kg (193 lb 3.2 oz)   01/24/23 87.8 kg (193 lb 9.6 oz)   01/12/23 84.4 kg (186 lb)     BP Readings from Last 3 Encounters:   03/20/23 170/72   01/24/23 130/70   01/12/23 153/62       Physical Exam  Vitals and nursing note reviewed.   Constitutional:       Appearance: Normal appearance.   HENT:      Head: Normocephalic.      Right Ear: A middle ear effusion is present.      Left Ear: A middle ear effusion is present.      Nose: Congestion present.      Right Sinus: Maxillary sinus tenderness present.      Left Sinus: Maxillary sinus tenderness present.   Eyes:      Extraocular Movements: Extraocular movements intact.      Conjunctiva/sclera: Conjunctivae normal.   Cardiovascular:      Rate and Rhythm: Normal rate and regular rhythm.      Heart sounds: Normal heart sounds.   Pulmonary:      Effort: Pulmonary effort is normal.      Breath sounds: Wheezing (with forced expiration) present.   Abdominal:      General: Bowel sounds are normal.      Palpations: Abdomen is soft.      Tenderness: There is no abdominal tenderness. There is no guarding.   Musculoskeletal:         General: No swelling. Normal range of motion.      Cervical back: " Normal range of motion and neck supple.   Skin:     General: Skin is warm and dry.   Neurological:      General: No focal deficit present.      Mental Status: He is alert and oriented to person, place, and time. Mental status is at baseline.   Psychiatric:         Mood and Affect: Mood normal.         Behavior: Behavior normal.         Thought Content: Thought content normal.         Judgment: Judgment normal.          Result Review        XR Ankle 3+ View Right    Result Date: 1/24/2023   Mild degenerative change and atherosclerotic disease without acute osseous abnormality.      KATLYN SOTOMAYOR MD       Electronically Signed and Approved By: KATLYN SOTOMAYOR MD on 1/24/2023 at 15:40             MRI Lumbar Spine Without Contrast    Result Date: 1/3/2023    1. Multilevel degenerative disc disease and degenerative facet change resulting in multilevel canal stenosis and neural foraminal narrowing as detailed above. 2. No acute bony abnormality lumbar spine.      BELINDA THOMPSON MD       Electronically Signed and Approved By: BELINDA THOMPSON MD on 1/03/2023 at 13:15                The above data has been reviewed by DARRELL Echevarria 03/20/2023 10:23 EDT.          Patient Care Team:  Jacquelin Pelaez APRN as PCP - General (Nurse Practitioner)           ASSESSMENT & PLAN    Diagnoses and all orders for this visit:    1. Acute URI (Primary)  Assessment & Plan:  Patient complains of sinus pressure, congestion, shortness of breath and chest tightness.  Patient also has a productive cough with yellow sputum production.  He does have a history of asthma.  He states it has been acting up lately.  Patient denies any nausea, vomiting, diarrhea, fever, chills, myalgias, shortness of breath.  Patient has not been taking Advair.  He states that he is using Flonase and Claritin.  Patient is also doing saline nasal rinses.  Patient states he did it this morning and there was blood.  Patient has had symptoms for since about  Friday.  Patient COVID and flu were negative today in the office.  We will go ahead and start patient on Z-Tanner and low-dose prednisone as his blood sugars are well managed.  Patient was instructed to call the office if his sugars above 200.  His A1c is typically pretty well controlled.  We will start patient on Trelegy to start asthma exacerbation.  Patient to report any persistent or worsening symptoms.  Continue albuterol as needed.      2. Mild intermittent asthma, unspecified whether complicated  Assessment & Plan:  Asthma is worsening.  The patient is experiencing frequent daytime asthma symptoms. He is experiencing weekly nighttime asthma symptoms.  We are adjusting patient's medicines today.  Patient is using albuterol frequently.  We will start him on Trelegy for 2-week trial.  If that works for him he may continue and we can send him in a prescription.  Also low-dose steroid will be sent for patient.      Other orders  -     predniSONE (DELTASONE) 20 MG tablet; Take 1 tablet by mouth Daily.  Dispense: 5 tablet; Refill: 0  -     azithromycin (Zithromax Z-Tanner) 250 MG tablet; Take 2 tablets by mouth on day 1, then 1 tablet daily on days 2-5  Dispense: 6 tablet; Refill: 0       Tobacco Use: Medium Risk   • Smoking Tobacco Use: Former   • Smokeless Tobacco Use: Never   • Passive Exposure: Not on file       Follow Up     Return if symptoms worsen or fail to improve.    Please note that portions of this note were completed with a voice recognition program.    Patient was given instructions and counseling regarding his condition or for health maintenance advice. Please see specific information pulled into the AVS if appropriate.   I have reviewed information obtained and documented by others and I have confirmed the accuracy of this documented note.    DARRELL Echevarria

## 2023-03-20 NOTE — ASSESSMENT & PLAN NOTE
Patient complains of sinus pressure, congestion, shortness of breath and chest tightness.  Patient also has a productive cough with yellow sputum production.  He does have a history of asthma.  He states it has been acting up lately.  Patient denies any nausea, vomiting, diarrhea, fever, chills, myalgias, shortness of breath.  Patient has not been taking Advair.  He states that he is using Flonase and Claritin.  Patient is also doing saline nasal rinses.  Patient states he did it this morning and there was blood.  Patient has had symptoms for since about Friday.  Patient COVID and flu were negative today in the office.  We will go ahead and start patient on Z-Tanner and low-dose prednisone as his blood sugars are well managed.  Patient was instructed to call the office if his sugars above 200.  His A1c is typically pretty well controlled.  We will start patient on Trelegy to start asthma exacerbation.  Patient to report any persistent or worsening symptoms.  Continue albuterol as needed.

## 2023-04-04 ENCOUNTER — CLINICAL SUPPORT (OUTPATIENT)
Dept: INTERNAL MEDICINE | Facility: CLINIC | Age: 78
End: 2023-04-04
Payer: MEDICARE

## 2023-04-04 DIAGNOSIS — E53.8 VITAMIN B 12 DEFICIENCY: ICD-10-CM

## 2023-04-04 RX ADMIN — CYANOCOBALAMIN 1000 MCG: 1000 INJECTION, SOLUTION INTRAMUSCULAR; SUBCUTANEOUS at 09:23

## 2023-04-24 RX ORDER — MONTELUKAST SODIUM 10 MG/1
TABLET ORAL
Qty: 90 TABLET | Refills: 0 | Status: SHIPPED | OUTPATIENT
Start: 2023-04-24

## 2023-04-27 ENCOUNTER — OFFICE VISIT (OUTPATIENT)
Dept: NEUROSURGERY | Facility: CLINIC | Age: 78
End: 2023-04-27
Payer: MEDICARE

## 2023-04-27 VITALS
HEIGHT: 70 IN | BODY MASS INDEX: 27.33 KG/M2 | WEIGHT: 190.9 LBS | SYSTOLIC BLOOD PRESSURE: 138 MMHG | DIASTOLIC BLOOD PRESSURE: 67 MMHG

## 2023-04-27 DIAGNOSIS — G89.29 CHRONIC MIDLINE LOW BACK PAIN WITH LEFT-SIDED SCIATICA: Primary | ICD-10-CM

## 2023-04-27 DIAGNOSIS — M48.062 SPINAL STENOSIS OF LUMBAR REGION WITH NEUROGENIC CLAUDICATION: ICD-10-CM

## 2023-04-27 DIAGNOSIS — M51.36 DDD (DEGENERATIVE DISC DISEASE), LUMBAR: ICD-10-CM

## 2023-04-27 DIAGNOSIS — M54.42 CHRONIC MIDLINE LOW BACK PAIN WITH LEFT-SIDED SCIATICA: Primary | ICD-10-CM
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
## 2023-04-28 ENCOUNTER — PATIENT ROUNDING (BHMG ONLY) (OUTPATIENT)
Dept: NEUROSURGERY | Facility: CLINIC | Age: 78
End: 2023-04-28
Payer: MEDICARE

## 2023-04-28 RX ORDER — PSEUDOEPHEDRINE HCL 30 MG
100 TABLET ORAL 2 TIMES DAILY
Qty: 180 CAPSULE | Refills: 1 | Status: SHIPPED | OUTPATIENT
Start: 2023-04-28

## 2023-05-01 ENCOUNTER — CLINICAL SUPPORT (OUTPATIENT)
Dept: INTERNAL MEDICINE | Facility: CLINIC | Age: 78
End: 2023-05-01
Payer: MEDICARE

## 2023-05-01 DIAGNOSIS — E53.8 VITAMIN B 12 DEFICIENCY: ICD-10-CM

## 2023-05-01 RX ADMIN — CYANOCOBALAMIN 1000 MCG: 1000 INJECTION, SOLUTION INTRAMUSCULAR; SUBCUTANEOUS at 12:34

## 2023-05-17 RX ORDER — MELOXICAM 7.5 MG/1
7.5 TABLET ORAL DAILY
Qty: 90 TABLET | Refills: 0 | Status: SHIPPED | OUTPATIENT
Start: 2023-05-17

## 2023-05-19 ENCOUNTER — TREATMENT (OUTPATIENT)
Dept: PHYSICAL THERAPY | Facility: CLINIC | Age: 78
End: 2023-05-19
Payer: MEDICARE

## 2023-05-19 DIAGNOSIS — M48.062 SPINAL STENOSIS OF LUMBAR REGION WITH NEUROGENIC CLAUDICATION: ICD-10-CM

## 2023-05-19 DIAGNOSIS — G89.29 CHRONIC MIDLINE LOW BACK PAIN WITH LEFT-SIDED SCIATICA: Primary | ICD-10-CM

## 2023-05-19 DIAGNOSIS — M54.42 CHRONIC MIDLINE LOW BACK PAIN WITH LEFT-SIDED SCIATICA: Primary | ICD-10-CM

## 2023-05-19 DIAGNOSIS — R26.9 GAIT DISTURBANCE: ICD-10-CM

## 2023-05-19 NOTE — PROGRESS NOTES
Physical Therapy Initial Evaluation and Plan of Care     [address] [place] [id number]    Patient: Trenton Adames   : 1945  Diagnosis/ICD-10 Code:  Chronic midline low back pain with left-sided sciatica [M54.42, G89.29]  Referring practitioner: Dallin Selby MD  Date of Initial Visit: 2023  Today's Date: 2023  Patient seen for 1 sessions           Subjective Questionnaire: Oswestry: 12 = 24% limitation      Subjective  : Pt presents for low back pain with pain/numbness in both legs. Sometimes in both legs, can be in just one or the other. His right ankle also swells. He is usually fine in the morning, then the more he does really increases his pain and swelling. He keeps up with his yard and this really bothers him. He take tylenol and gabapentin. States the doctor wants him to try therapy, but if no relief surgery may be the best option. MRI shows multi level DDD and canal stenosis along with foraminal stenosis.    Pt occupation: retired, yard work     Current Pain 5/10  Best Pain: 4/10  Worst Pain: 10/10  Quality of pain: ache, sharp, numbness in legs     Aggravating Factors: standing, walking, or sitting for too long  Easing Factors: medication, change in position     Past Medical Hx: DM    Patient Goals: Relieve pain      Objective          Postural Observations    Additional Postural Observation Details  Flattened lumbar lordosis, increased thoracic kyphosis    Palpation   Left   Muscle spasm in the erector spinae.   Tenderness of the erector spinae.     Right   Muscle spasm in the erector spinae. Tenderness of the erector spinae.     Tenderness     Left Hip   Tenderness in the PSIS.     Right Hip   Tenderness in the PSIS.     Neurological Testing     Sensation     Lumbar   Left   Intact: light touch    Right   Intact: light touch    Active Range of Motion     Lumbar   Flexion: 80 degrees   Extension: 0 degrees   Left lateral flexion: 10 degrees   Right lateral flexion: 12  degrees     Additional Active Range of Motion Details  Rotation limited 50% bilaterally (stiffness)    Strength/Myotome Testing     Left Hip   Planes of Motion   Flexion: 4  Extension: 4  Abduction: 4+  Adduction: 4+    Right Hip   Planes of Motion   Flexion: 4+  Extension: 4  Abduction: 4+  Adduction: 4+    Left Knee   Flexion: 5  Extension: 5    Right Knee   Flexion: 5  Extension: 5    Left Ankle/Foot   Dorsiflexion: 5    Right Ankle/Foot   Dorsiflexion: 5    Tests       Thoracic   Negative slump.     Additional Tests Details  Pt did not have symptoms in the LE's at time of testing for repeated movements, but does seem to benefit from flexion preference    Ambulation     Comments   Ambulates with trunk flexed forward, decreased trunk dissociation, very stiff      See Exercise, Manual, and Modality Logs for complete treatment.     Assessment & Plan     Assessment  Impairments: abnormal muscle firing, abnormal or restricted ROM, activity intolerance, impaired physical strength and pain with function  Functional Limitations: carrying objects, lifting, walking, uncomfortable because of pain, sitting and standing  Assessment details: The patient presents to physical therapy with complaints of low back pain with bilateral LE referral and neurologic symptoms such as numbness and reports swelling in the right ankle increasing throughout the day. The patient presents with associated lower extremity weakness, lumbar stiffness, and functional deficits (OSWESTRY). The patient would benefit from skilled PT intervention to address the above mentioned functional limitations.     Prognosis: good    Goals  Plan Goals: LOW BACK PROBLEMS:    1. The patient complains of low back pain.  LTG 1: 12 weeks:  The patient will report a pain rating of 3/10 or better at worst in order to improve  tolerance to activities of daily living and improve sleep quality.  STATUS:  New  STG 1a: 6 weeks:  The patient will report a pain rating of 5/10 or  better at its worst.  STATUS:  New  TREATMENT:  Therapeutic exercises, manual therapy, aquatic therapy, home exercise   instruction, and modalities as needed for pain to include:  electrical stimulation, moist heat, ice,   ultrasound, and diathermy.      2. The patient demonstrates weakness of the B hip.  LTG 2: 12 weeks:  The patient will demonstrate 5 /5 strength for B hip flexion, abduction,  and extension in order to improve hip stability.  STATUS:  New  STG 2a: 6 weeks:  The patient will demonstrate 4+ /5 strength for B hip flexion, abduction,  and extension.  STATUS:  New  TREATMENT: Therapeutic exercises, manual therapy, aquatic therapy, home exercise instruction,  and modalities as needed for pain to include:  electrical stimulation, moist heat, ice, ultrasound, and   diathermy.        3. Mobility: Walking/Moving Around Functional Limitation    LTG 3: 12 weeks:  The patient will demonstrate 1-19 % limitation by achieving a score of 1-9 on the KHUSHBU.  STATUS:  New  STG 3 a: 6 weeks:  The patient will demonstrate independent HEP.  STATUS:  New  TREATMENT:  Manual therapy, therapeutic exercise, home exercise instruction, and modalities as needed to include: moist heat, electrical stimulation, and ultrasound.         Plan  Therapy options: will be seen for skilled therapy services  Planned modality interventions: TENS, cryotherapy, thermotherapy (hydrocollator packs), traction and dry needling  Other planned modality interventions: aquatic therapy  Planned therapy interventions: manual therapy, stretching, strengthening, therapeutic activities, neuromuscular re-education, home exercise program, joint mobilization, functional ROM exercises, soft tissue mobilization, spinal/joint mobilization, flexibility and gait training  Other planned therapy interventions: aquatic therapy  Frequency: 3x week  Duration in weeks: 12  Treatment plan discussed with: patient        Visit Diagnoses:    ICD-10-CM ICD-9-CM   1. Chronic  midline low back pain with left-sided sciatica  M54.42 724.2    G89.29 724.3     338.29   2. Spinal stenosis of lumbar region with neurogenic claudication  M48.062 724.03   3. Gait disturbance  R26.9 781.2       History # of Personal Factors and/or Comorbidities: MODERATE (1-2)  Examination of Body System(s): # of elements: MODERATE (3)  Clinical Presentation: STABLE   Clinical Decision Making: LOW    Timed:         Manual Therapy:    0     mins  43011;     Therapeutic Exercise:    15     mins  67887;     Neuromuscular Dayna:    0    mins  21781;    Therapeutic Activity:     0     mins  67128;     Gait Trainin     mins  70922;     Ultrasound:     0     mins  44091;    Ionto                               0    mins   94947  Self Care                       0     mins   48117        Un-Timed:  Electrical Stimulation:    0     mins  74600 ( );  Dry Needling     0     mins self-pay  Canalith Repos    0     mins 21572  Traction     0     mins 64243  Low Eval     30     Mins  07233  Mod Eval     0     Mins  91220  High Eval                       0     Mins  18571  Re-Eval                           0    mins  78210    Timed Treatment:   15   mins   Total Treatment:     45   mins    PT SIGNATURE: Royer Yuen PT     Electronically signed 2023    [place] License: PT - [id number]     Initial Certification  Certification Period: 2023 thru 2023  I certify that the therapy services are furnished while this patient is under my care.  The services outlined above are required by this patient, and will be reviewed every 90 days.     PHYSICIAN: Dallin Selby MD   NPI: [id number]                                        DATE:     Please sign and return via fax to [phone]. Thank you, [facility].

## 2023-05-22 ENCOUNTER — LAB (OUTPATIENT)
Dept: INTERNAL MEDICINE | Facility: CLINIC | Age: 78
End: 2023-05-22
Payer: MEDICARE

## 2023-05-22 DIAGNOSIS — E55.9 VITAMIN D DEFICIENCY: ICD-10-CM

## 2023-05-22 DIAGNOSIS — E11.40 TYPE 2 DIABETES MELLITUS WITH DIABETIC NEUROPATHY, WITHOUT LONG-TERM CURRENT USE OF INSULIN: ICD-10-CM

## 2023-05-22 DIAGNOSIS — E78.5 HYPERLIPIDEMIA, UNSPECIFIED HYPERLIPIDEMIA TYPE: ICD-10-CM

## 2023-05-22 DIAGNOSIS — I10 ESSENTIAL HYPERTENSION: ICD-10-CM

## 2023-05-22 LAB
25(OH)D3 SERPL-MCNC: 66.2 NG/ML (ref 30–100)
ALBUMIN SERPL-MCNC: 4.9 G/DL (ref 3.5–5.2)
ALBUMIN/GLOB SERPL: 1.6 G/DL
ALP SERPL-CCNC: 49 U/L (ref 39–117)
ALT SERPL W P-5'-P-CCNC: 32 U/L (ref 1–41)
ANION GAP SERPL CALCULATED.3IONS-SCNC: 13.3 MMOL/L (ref 5–15)
AST SERPL-CCNC: 21 U/L (ref 1–40)
BASOPHILS # BLD AUTO: 0.07 10*3/MM3 (ref 0–0.2)
BASOPHILS NFR BLD AUTO: 0.9 % (ref 0–1.5)
BILIRUB SERPL-MCNC: 0.5 MG/DL (ref 0–1.2)
BUN SERPL-MCNC: 14 MG/DL (ref 8–23)
BUN/CREAT SERPL: 17.1 (ref 7–25)
CALCIUM SPEC-SCNC: 9.7 MG/DL (ref 8.6–10.5)
CHLORIDE SERPL-SCNC: 98 MMOL/L (ref 98–107)
CHOLEST SERPL-MCNC: 182 MG/DL (ref 0–200)
CO2 SERPL-SCNC: 26.7 MMOL/L (ref 22–29)
CREAT SERPL-MCNC: 0.82 MG/DL (ref 0.76–1.27)
DEPRECATED RDW RBC AUTO: 40.5 FL (ref 37–54)
EGFRCR SERPLBLD CKD-EPI 2021: 91.6 ML/MIN/1.73
EOSINOPHIL # BLD AUTO: 0.22 10*3/MM3 (ref 0–0.4)
EOSINOPHIL NFR BLD AUTO: 3 % (ref 0.3–6.2)
ERYTHROCYTE [DISTWIDTH] IN BLOOD BY AUTOMATED COUNT: 12.8 % (ref 12.3–15.4)
GLOBULIN UR ELPH-MCNC: 3 GM/DL
GLUCOSE SERPL-MCNC: 177 MG/DL (ref 65–99)
HBA1C MFR BLD: 7.4 % (ref 4.8–5.6)
HCT VFR BLD AUTO: 41.1 % (ref 37.5–51)
HDLC SERPL-MCNC: 47 MG/DL (ref 40–60)
HGB BLD-MCNC: 14 G/DL (ref 13–17.7)
IMM GRANULOCYTES # BLD AUTO: 0.02 10*3/MM3 (ref 0–0.05)
IMM GRANULOCYTES NFR BLD AUTO: 0.3 % (ref 0–0.5)
LDLC SERPL CALC-MCNC: 107 MG/DL (ref 0–100)
LDLC/HDLC SERPL: 2.19 {RATIO}
LYMPHOCYTES # BLD AUTO: 2.35 10*3/MM3 (ref 0.7–3.1)
LYMPHOCYTES NFR BLD AUTO: 31.8 % (ref 19.6–45.3)
MCH RBC QN AUTO: 29.9 PG (ref 26.6–33)
MCHC RBC AUTO-ENTMCNC: 34.1 G/DL (ref 31.5–35.7)
MCV RBC AUTO: 87.8 FL (ref 79–97)
MONOCYTES # BLD AUTO: 0.47 10*3/MM3 (ref 0.1–0.9)
MONOCYTES NFR BLD AUTO: 6.4 % (ref 5–12)
NEUTROPHILS NFR BLD AUTO: 4.25 10*3/MM3 (ref 1.7–7)
NEUTROPHILS NFR BLD AUTO: 57.6 % (ref 42.7–76)
NRBC BLD AUTO-RTO: 0 /100 WBC (ref 0–0.2)
PLATELET # BLD AUTO: 257 10*3/MM3 (ref 140–450)
PMV BLD AUTO: 11.3 FL (ref 6–12)
POTASSIUM SERPL-SCNC: 4.1 MMOL/L (ref 3.5–5.2)
PROT SERPL-MCNC: 7.9 G/DL (ref 6–8.5)
RBC # BLD AUTO: 4.68 10*6/MM3 (ref 4.14–5.8)
SODIUM SERPL-SCNC: 138 MMOL/L (ref 136–145)
T4 FREE SERPL-MCNC: 1.02 NG/DL (ref 0.93–1.7)
TRIGL SERPL-MCNC: 160 MG/DL (ref 0–150)
TSH SERPL DL<=0.05 MIU/L-ACNC: 1.88 UIU/ML (ref 0.27–4.2)
VLDLC SERPL-MCNC: 28 MG/DL (ref 5–40)
WBC NRBC COR # BLD: 7.38 10*3/MM3 (ref 3.4–10.8)

## 2023-05-22 PROCEDURE — 83036 HEMOGLOBIN GLYCOSYLATED A1C: CPT | Performed by: NURSE PRACTITIONER

## 2023-05-22 PROCEDURE — 80053 COMPREHEN METABOLIC PANEL: CPT | Performed by: NURSE PRACTITIONER

## 2023-05-22 PROCEDURE — 84443 ASSAY THYROID STIM HORMONE: CPT | Performed by: NURSE PRACTITIONER

## 2023-05-22 PROCEDURE — 36415 COLL VENOUS BLD VENIPUNCTURE: CPT | Performed by: NURSE PRACTITIONER

## 2023-05-22 PROCEDURE — 80061 LIPID PANEL: CPT | Performed by: NURSE PRACTITIONER

## 2023-05-22 PROCEDURE — 85025 COMPLETE CBC W/AUTO DIFF WBC: CPT | Performed by: NURSE PRACTITIONER

## 2023-05-22 PROCEDURE — 84439 ASSAY OF FREE THYROXINE: CPT | Performed by: NURSE PRACTITIONER

## 2023-05-22 PROCEDURE — 82306 VITAMIN D 25 HYDROXY: CPT | Performed by: NURSE PRACTITIONER

## 2023-05-23 DIAGNOSIS — E11.40 TYPE 2 DIABETES MELLITUS WITH DIABETIC NEUROPATHY, WITHOUT LONG-TERM CURRENT USE OF INSULIN: ICD-10-CM

## 2023-05-23 RX ORDER — GABAPENTIN 600 MG/1
600 TABLET ORAL 3 TIMES DAILY
Qty: 90 TABLET | Refills: 0 | Status: SHIPPED | OUTPATIENT
Start: 2023-05-23

## 2023-05-23 RX ORDER — GABAPENTIN 600 MG/1
600 TABLET ORAL 3 TIMES DAILY
Qty: 90 TABLET | Refills: 3 | Status: CANCELLED | OUTPATIENT
Start: 2023-05-23

## 2023-05-29 NOTE — PROGRESS NOTES
The ABCs of the Annual Wellness Visit  Subsequent Medicare Wellness Visit    Subjective    Trenton Adames is a 77 y.o. male who presents for a Subsequent Medicare Wellness Visit.    The following portions of the patient's history were reviewed and   updated as appropriate:   He  has a past medical history of Arthritis, Asthma, Dysphagia, oral phase, Essential (primary) hypertension, Foot pain, bilateral, Hyperlipidemia, unspecified, Low back pain, Neuropathy in diabetes, Peripheral neuropathy, Type 2 diabetes mellitus without complication, and Vitamin D deficiency, unspecified.  He has Diabetes mellitus, type II; Hyperlipidemia; and Hypertension on their pertinent problem list.  He  has a past surgical history that includes Hemorrhoid surgery (06/20/2014); Cataract extraction, bilateral (1/28/2014; 1/7/2014); Polypectomy; and Colonoscopy (2022).  His family history includes Cancer in his maternal grandfather and mother; Diabetes in his father and paternal grandmother; Heart disease in his father and maternal grandmother.  He  reports that he quit smoking about 28 years ago. His smoking use included cigarettes and pipe. He has a 30.00 pack-year smoking history. He has never used smokeless tobacco. He reports current alcohol use of about 8.0 standard drinks per week. He reports that he does not use drugs.  Current Outpatient Medications   Medication Sig Dispense Refill   • albuterol sulfate  (90 Base) MCG/ACT inhaler Inhale 2 puffs Every 4 (Four) Hours As Needed for Wheezing. 18 g 5   • amLODIPine (NORVASC) 10 MG tablet Take 1 tablet by mouth Daily. 90 tablet 1   • aspirin 81 MG EC tablet Take 1 tablet by mouth Daily.     • atorvastatin (LIPITOR) 40 MG tablet Take 1 tablet by mouth Daily. 90 tablet 1   • clobetasol (TEMOVATE) 0.05 % cream Apply 1 application topically to the appropriate area as directed 2 (Two) Times a Day. 30 g 2   • docusate sodium 100 MG capsule Take 1 capsule by mouth 2 (Two) Times a  Day. 180 capsule 1   • esomeprazole (nexIUM) 20 MG capsule Take 1 capsule by mouth Every Morning Before Breakfast.     • ferrous sulfate (FeroSul) 325 (65 FE) MG tablet Take 1 tablet by mouth 2 (Two) Times a Day With Meals. 180 tablet 3   • ferrous sulfate 325 (65 FE) MG tablet Take 1 tablet by mouth 2 (Two) Times a Day With Meals.     • fluorouracil (EFUDEX) 5 % cream      • fluticasone (FLONASE) 50 MCG/ACT nasal spray 1 spray into the nostril(s) as directed by provider Daily.     • Krill Oil (Omega-3) 500 MG capsule Take 1 capsule by mouth Daily.     • lisinopril (PRINIVIL,ZESTRIL) 40 MG tablet Take 1 tablet by mouth Daily. 90 tablet 1   • loratadine (CLARITIN) 10 MG tablet Take 1 tablet by mouth Daily.     • Melatonin 10 MG tablet Take 1 tablet by mouth As Needed.     • meloxicam (Mobic) 7.5 MG tablet Take 1 tablet by mouth Daily. 90 tablet 0   • metFORMIN (GLUCOPHAGE) 1000 MG tablet TAKE ONE TABLET BY MOUTH TWICE A DAY WITH MEALS 180 tablet 1   • montelukast (SINGULAIR) 10 MG tablet TAKE ONE TABLET BY MOUTH DAILY 90 tablet 0   • prednisoLONE acetate (PRED FORTE) 1 % ophthalmic suspension      • predniSONE (DELTASONE) 20 MG tablet Take 1 tablet by mouth Daily. 5 tablet 0   • vitamin D3 125 MCG (5000 UT) capsule capsule Take 1 capsule by mouth Daily.     • gabapentin (Neurontin) 800 MG tablet Take 1 tablet by mouth 3 (Three) Times a Day. 90 tablet 5   • Lancets (onetouch ultrasoft) lancets Test 2-3 times a week. 100 each 12     Current Facility-Administered Medications   Medication Dose Route Frequency Provider Last Rate Last Admin   • cyanocobalamin injection 1,000 mcg  1,000 mcg Intramuscular Q28 Days Jacquelin Pelaez APRN   1,000 mcg at 05/01/23 1234     Current Outpatient Medications on File Prior to Visit   Medication Sig   • albuterol sulfate  (90 Base) MCG/ACT inhaler Inhale 2 puffs Every 4 (Four) Hours As Needed for Wheezing.   • amLODIPine (NORVASC) 10 MG tablet Take 1 tablet by mouth Daily.   •  aspirin 81 MG EC tablet Take 1 tablet by mouth Daily.   • atorvastatin (LIPITOR) 40 MG tablet Take 1 tablet by mouth Daily.   • clobetasol (TEMOVATE) 0.05 % cream Apply 1 application topically to the appropriate area as directed 2 (Two) Times a Day.   • docusate sodium 100 MG capsule Take 1 capsule by mouth 2 (Two) Times a Day.   • esomeprazole (nexIUM) 20 MG capsule Take 1 capsule by mouth Every Morning Before Breakfast.   • ferrous sulfate (FeroSul) 325 (65 FE) MG tablet Take 1 tablet by mouth 2 (Two) Times a Day With Meals.   • ferrous sulfate 325 (65 FE) MG tablet Take 1 tablet by mouth 2 (Two) Times a Day With Meals.   • fluorouracil (EFUDEX) 5 % cream    • fluticasone (FLONASE) 50 MCG/ACT nasal spray 1 spray into the nostril(s) as directed by provider Daily.   • Krill Oil (Omega-3) 500 MG capsule Take 1 capsule by mouth Daily.   • lisinopril (PRINIVIL,ZESTRIL) 40 MG tablet Take 1 tablet by mouth Daily.   • loratadine (CLARITIN) 10 MG tablet Take 1 tablet by mouth Daily.   • Melatonin 10 MG tablet Take 1 tablet by mouth As Needed.   • meloxicam (Mobic) 7.5 MG tablet Take 1 tablet by mouth Daily.   • metFORMIN (GLUCOPHAGE) 1000 MG tablet TAKE ONE TABLET BY MOUTH TWICE A DAY WITH MEALS   • montelukast (SINGULAIR) 10 MG tablet TAKE ONE TABLET BY MOUTH DAILY   • prednisoLONE acetate (PRED FORTE) 1 % ophthalmic suspension    • predniSONE (DELTASONE) 20 MG tablet Take 1 tablet by mouth Daily.   • vitamin D3 125 MCG (5000 UT) capsule capsule Take 1 capsule by mouth Daily.   • [DISCONTINUED] gabapentin (NEURONTIN) 600 MG tablet Take 1 tablet by mouth 3 (Three) Times a Day.     Current Facility-Administered Medications on File Prior to Visit   Medication   • cyanocobalamin injection 1,000 mcg     He has No Known Allergies..    Compared to one year ago, the patient feels his physical   health is worse due to back pain worsening.    Compared to one year ago, the patient feels his mental   health is the same.    Recent  Hospitalizations:  He was not admitted to the hospital during the last year.       Current Medical Providers:  Patient Care Team:  Jacquelin Pelaez APRN as PCP - General (Nurse Practitioner)    Outpatient Medications Prior to Visit   Medication Sig Dispense Refill   • albuterol sulfate  (90 Base) MCG/ACT inhaler Inhale 2 puffs Every 4 (Four) Hours As Needed for Wheezing. 18 g 5   • amLODIPine (NORVASC) 10 MG tablet Take 1 tablet by mouth Daily. 90 tablet 1   • aspirin 81 MG EC tablet Take 1 tablet by mouth Daily.     • atorvastatin (LIPITOR) 40 MG tablet Take 1 tablet by mouth Daily. 90 tablet 1   • clobetasol (TEMOVATE) 0.05 % cream Apply 1 application topically to the appropriate area as directed 2 (Two) Times a Day. 30 g 2   • docusate sodium 100 MG capsule Take 1 capsule by mouth 2 (Two) Times a Day. 180 capsule 1   • esomeprazole (nexIUM) 20 MG capsule Take 1 capsule by mouth Every Morning Before Breakfast.     • ferrous sulfate (FeroSul) 325 (65 FE) MG tablet Take 1 tablet by mouth 2 (Two) Times a Day With Meals. 180 tablet 3   • ferrous sulfate 325 (65 FE) MG tablet Take 1 tablet by mouth 2 (Two) Times a Day With Meals.     • fluorouracil (EFUDEX) 5 % cream      • fluticasone (FLONASE) 50 MCG/ACT nasal spray 1 spray into the nostril(s) as directed by provider Daily.     • Krill Oil (Omega-3) 500 MG capsule Take 1 capsule by mouth Daily.     • lisinopril (PRINIVIL,ZESTRIL) 40 MG tablet Take 1 tablet by mouth Daily. 90 tablet 1   • loratadine (CLARITIN) 10 MG tablet Take 1 tablet by mouth Daily.     • Melatonin 10 MG tablet Take 1 tablet by mouth As Needed.     • meloxicam (Mobic) 7.5 MG tablet Take 1 tablet by mouth Daily. 90 tablet 0   • metFORMIN (GLUCOPHAGE) 1000 MG tablet TAKE ONE TABLET BY MOUTH TWICE A DAY WITH MEALS 180 tablet 1   • montelukast (SINGULAIR) 10 MG tablet TAKE ONE TABLET BY MOUTH DAILY 90 tablet 0   • prednisoLONE acetate (PRED FORTE) 1 % ophthalmic suspension      • predniSONE  (DELTASONE) 20 MG tablet Take 1 tablet by mouth Daily. 5 tablet 0   • vitamin D3 125 MCG (5000 UT) capsule capsule Take 1 capsule by mouth Daily.     • gabapentin (NEURONTIN) 600 MG tablet Take 1 tablet by mouth 3 (Three) Times a Day. 90 tablet 0   • azithromycin (Zithromax Z-Tanner) 250 MG tablet Take 2 tablets by mouth on day 1, then 1 tablet daily on days 2-5 6 tablet 0     Facility-Administered Medications Prior to Visit   Medication Dose Route Frequency Provider Last Rate Last Admin   • cyanocobalamin injection 1,000 mcg  1,000 mcg Intramuscular Q28 Days Jacquelin Pelaez APRTANYA   1,000 mcg at 05/01/23 1234       No opioid medication identified on active medication list. I have reviewed chart for other potential  high risk medication/s and harmful drug interactions in the elderly.          Aspirin is on active medication list. Aspirin use is indicated based on review of current medical condition/s. Pros and cons of this therapy have been discussed today. Benefits of this medication outweigh potential harm.  Patient has been encouraged to continue taking this medication.  .      Patient Active Problem List   Diagnosis   • Allergic rhinitis   • Anemia   • Asthma   • Benign prostatic hyperplasia   • Cervical radiculopathy   • Diabetes mellitus, type II   • Hyperlipidemia   • Hypertension   • Impotence of organic origin   • Kidney stones   • Renal calculus   • Prostate disorder   • Shortness of breath   • Vitamin D deficiency   • Vitamin B 12 deficiency   • Iron deficiency anemia   • Ophthalmic herpes zoster   • Acute URI     Advance Care Planning   Advance Care Planning     Advance Directive is not on file.  ACP discussion was held with the patient during this visit. Patient does not have an advance directive, declines further assistance.     Objective    Vitals:    05/30/23 0804   BP: 118/60   BP Location: Right arm   Patient Position: Sitting   Cuff Size: Large Adult   Pulse: 66   Temp: 97.3 °F (36.3 °C)   TempSrc:  "Temporal   SpO2: 99%   Weight: 85.6 kg (188 lb 12.8 oz)   Height: 177.8 cm (70\")     Estimated body mass index is 27.09 kg/m² as calculated from the following:    Height as of this encounter: 177.8 cm (70\").    Weight as of this encounter: 85.6 kg (188 lb 12.8 oz).    BMI is >= 25 and <30. (Overweight) The following options were offered after discussion;: nutrition counseling/recommendations      Does the patient have evidence of cognitive impairment? No    Lab Results   Component Value Date    TRIG 160 (H) 2023    HDL 47 2023     (H) 2023    VLDL 28 2023    HGBA1C 7.40 (H) 2023        HEALTH RISK ASSESSMENT    Smoking Status:  Social History     Tobacco Use   Smoking Status Former   • Packs/day: 2.00   • Years: 15.00   • Pack years: 30.00   • Types: Cigarettes, Pipe   • Quit date: 1995   • Years since quittin.4   Smokeless Tobacco Never     Alcohol Consumption:  Social History     Substance and Sexual Activity   Alcohol Use Yes   • Alcohol/week: 8.0 standard drinks   • Types: 6 Cans of beer, 2 Drinks containing 0.5 oz of alcohol per week    Comment: Occasional      Fall Risk Screen:    NASIM Fall Risk Assessment has not been completed.    Depression Screenin/25/2022     3:27 PM   PHQ-2/PHQ-9 Depression Screening   Little Interest or Pleasure in Doing Things 0-->not at all   Feeling Down, Depressed or Hopeless 0-->not at all   PHQ-9: Brief Depression Severity Measure Score 0       Health Habits and Functional and Cognitive Screenin/25/2022     3:24 PM   Functional & Cognitive Status   Do you have difficulty preparing food and eating? No   Do you have difficulty bathing yourself, getting dressed or grooming yourself? No   Do you have difficulty using the toilet? No   Do you have difficulty moving around from place to place? No   Do you have trouble with steps or getting out of a bed or a chair? No   Current Diet Well Balanced Diet   Dental Exam Up to " date   Eye Exam Up to date   Exercise (times per week) 5 times per week   Current Exercises Include Stationary Bicycling/Spin Class;Treadmill;Walking;Gardening;Yard Work   Do you need help using the phone?  No   Are you deaf or do you have serious difficulty hearing?  No   Do you need help with transportation? No   Do you need help shopping? No   Do you need help preparing meals?  No   Do you need help with housework?  No   Do you need help with laundry? No   Do you need help taking your medications? No   Do you need help managing money? No   Do you ever drive or ride in a car without wearing a seat belt? No       Age-appropriate Screening Schedule:  Refer to the list below for future screening recommendations based on patient's age, sex and/or medical conditions. Orders for these recommended tests are listed in the plan section. The patient has been provided with a written plan.    Health Maintenance   Topic Date Due   • INFLUENZA VACCINE  08/01/2023   • URINE MICROALBUMIN  10/05/2023   • HEMOGLOBIN A1C  11/22/2023   • DIABETIC FOOT EXAM  01/12/2024   • DIABETIC EYE EXAM  04/24/2024   • LIPID PANEL  05/22/2024   • ANNUAL WELLNESS VISIT  05/30/2024   • COLORECTAL CANCER SCREENING  02/26/2031   • TDAP/TD VACCINES (2 - Td or Tdap) 05/25/2032   • HEPATITIS C SCREENING  Completed   • COVID-19 Vaccine  Completed   • Pneumococcal Vaccine 65+  Completed   • AAA SCREEN (ONE-TIME)  Completed   • ZOSTER VACCINE  Completed                  CMS Preventative Services Quick Reference  Risk Factors Identified During Encounter  Immunizations Discussed/Encouraged: Influenza  The above risks/problems have been discussed with the patient.  Pertinent information has been shared with the patient in the After Visit Summary.  An After Visit Summary and PPPS were made available to the patient.    Follow Up:   Next Medicare Wellness visit to be scheduled in 1 year.       Additional E&M Note during same encounter follows:  Patient has  "multiple medical problems which are significant and separately identifiable that require additional work above and beyond the Medicare Wellness Visit.      Chief Complaint  Medicare Wellness-subsequent (Would like to discuss swelling in right leg. Needs a script for one touch test strips. )    Subjective        HPI  Trenton Adames is also being seen today for follow up of diabetes, neuropathy, hypertension and hyperlipidemia.  The patient is not having any medication side effects. Negative for chest pain, heart palpitations, dizziness, headaches and shortness of air. He is having chronic back pain and seeing Dr. Selby; trying 6 weeks of physical therapy first.   Recent labs reviewed.    Objective   Vital Signs:  /60 (BP Location: Right arm, Patient Position: Sitting, Cuff Size: Large Adult)   Pulse 66   Temp 97.3 °F (36.3 °C) (Temporal)   Ht 177.8 cm (70\")   Wt 85.6 kg (188 lb 12.8 oz)   SpO2 99%   BMI 27.09 kg/m²     Physical Exam     The following data was reviewed by: DARRELL Pardo on 05/30/2023:  CMP        10/5/2022    09:16 10/24/2022    10:29 5/22/2023    08:54   CMP   Glucose 134   118   177     BUN 17   16   14     Creatinine 0.88   0.72   0.82     EGFR 90.2   95.3   91.6     Sodium 137   136   138     Potassium 5.6   5.1   4.1     Chloride 101   98   98     Calcium 9.9   9.7   9.7     Total Protein 7.6    7.9     Albumin 4.70    4.9     Globulin 2.9    3.0     Total Bilirubin 0.6    0.5     Alkaline Phosphatase 42    49     AST (SGOT) 33    21     ALT (SGPT) 34    32     Albumin/Globulin Ratio 1.6    1.6     BUN/Creatinine Ratio 19.3   22.2   17.1     Anion Gap 12.0   12.7   13.3       CBC w/diff        10/5/2022    09:16 5/22/2023    08:54   CBC w/Diff   WBC 9.41   7.38     RBC 4.33   4.68     Hemoglobin 13.2   14.0     Hematocrit 37.4   41.1     MCV 86.4   87.8     MCH 30.5   29.9     MCHC 35.3   34.1     RDW 12.0   12.8     Platelets 236   257     Neutrophil Rel % 67.0   57.6 "     Immature Granulocyte Rel % 0.2   0.3     Lymphocyte Rel % 22.8   31.8     Monocyte Rel % 6.3   6.4     Eosinophil Rel % 2.8   3.0     Basophil Rel % 0.9   0.9       Lipid Panel        10/5/2022    09:16 5/22/2023    08:54   Lipid Panel   Total Cholesterol 152   182     Triglycerides 98   160     HDL Cholesterol 46   47     VLDL Cholesterol 18   28     LDL Cholesterol  88   107     LDL/HDL Ratio 1.88   2.19       TSH        10/5/2022    09:16 5/22/2023    08:54   TSH   TSH 1.210   1.880       A1C Last 3 Results        10/5/2022    09:16 5/22/2023    08:54   HGBA1C Last 3 Results   Hemoglobin A1C 6.60   7.40                  Assessment and Plan   Diagnoses and all orders for this visit:    1. Encounter for annual wellness exam in Medicare patient (Primary)    2. Type 2 diabetes mellitus with diabetic neuropathy, without long-term current use of insulin  -     Hemoglobin A1c; Future  -     gabapentin (Neurontin) 800 MG tablet; Take 1 tablet by mouth 3 (Three) Times a Day.  Dispense: 90 tablet; Refill: 5    3. Essential hypertension  -     Comprehensive Metabolic Panel; Future  -     T4, Free; Future  -     TSH; Future  -     Magnesium; Future    4. Hyperlipidemia, unspecified hyperlipidemia type  -     Lipid Panel; Future    5. Vitamin D deficiency  -     Vitamin D,25-Hydroxy; Future    6. Vitamin B 12 deficiency  -     Vitamin B12; Future  -     Folate; Future    7. Iron deficiency anemia, unspecified iron deficiency anemia type  -     CBC & Differential; Future  -     Iron Profile; Future  -     Ferritin; Future    Other orders  -     Lancets (onetouch ultrasoft) lancets; Test 2-3 times a week.  Dispense: 100 each; Refill: 12      Annal exam: Care gaps reviewed.    Type 2 diabetes mellitus with diabetic neuropathy:  HA1C is 7.40 and worse  on metformin 1000 mg twice a day. Improving on  gabapentin to  600 mg tid and to increase to 800 mg tid.  HERMILO reviewed.      Hypertension:  Blood pressure well controlled.  Continue amlodipine 10 mg daily and lisinopril daily.     Hyperlipidemia: Lipids stable on atorvastatin 40 mg daily.       Follow Up   Return in about 3 months (around 8/30/2023) for Recheck.  Patient was given instructions and counseling regarding his condition or for health maintenance advice. Please see specific information pulled into the AVS if appropriate.

## 2023-05-30 ENCOUNTER — OFFICE VISIT (OUTPATIENT)
Dept: INTERNAL MEDICINE | Facility: CLINIC | Age: 78
End: 2023-05-30

## 2023-05-30 VITALS
TEMPERATURE: 97.3 F | DIASTOLIC BLOOD PRESSURE: 60 MMHG | HEIGHT: 70 IN | OXYGEN SATURATION: 99 % | SYSTOLIC BLOOD PRESSURE: 118 MMHG | BODY MASS INDEX: 27.03 KG/M2 | WEIGHT: 188.8 LBS | HEART RATE: 66 BPM

## 2023-05-30 DIAGNOSIS — Z00.00 ENCOUNTER FOR ANNUAL WELLNESS EXAM IN MEDICARE PATIENT: Primary | ICD-10-CM

## 2023-05-30 DIAGNOSIS — I10 ESSENTIAL HYPERTENSION: ICD-10-CM

## 2023-05-30 DIAGNOSIS — E11.40 TYPE 2 DIABETES MELLITUS WITH DIABETIC NEUROPATHY, WITHOUT LONG-TERM CURRENT USE OF INSULIN: ICD-10-CM

## 2023-05-30 DIAGNOSIS — R01.1 MURMUR: ICD-10-CM

## 2023-05-30 DIAGNOSIS — Z82.49 FAMILY HISTORY OF HEART DISEASE: ICD-10-CM

## 2023-05-30 DIAGNOSIS — E53.8 VITAMIN B 12 DEFICIENCY: ICD-10-CM

## 2023-05-30 DIAGNOSIS — E55.9 VITAMIN D DEFICIENCY: ICD-10-CM

## 2023-05-30 DIAGNOSIS — D50.9 IRON DEFICIENCY ANEMIA, UNSPECIFIED IRON DEFICIENCY ANEMIA TYPE: ICD-10-CM

## 2023-05-30 DIAGNOSIS — E78.5 HYPERLIPIDEMIA, UNSPECIFIED HYPERLIPIDEMIA TYPE: ICD-10-CM

## 2023-05-30 RX ORDER — GABAPENTIN 800 MG/1
800 TABLET ORAL 3 TIMES DAILY
Qty: 90 TABLET | Refills: 5 | Status: SHIPPED | OUTPATIENT
Start: 2023-05-30

## 2023-05-30 RX ORDER — LANCETS
EACH MISCELLANEOUS
Qty: 100 EACH | Refills: 12 | Status: SHIPPED | OUTPATIENT
Start: 2023-05-30

## 2023-05-30 RX ADMIN — CYANOCOBALAMIN 1000 MCG: 1000 INJECTION, SOLUTION INTRAMUSCULAR; SUBCUTANEOUS at 08:38

## 2023-05-30 NOTE — PROGRESS NOTES
The ABCs of the Annual Wellness Visit  Subsequent Medicare Wellness Visit    Subjective    Trenton Adames is a 77 y.o. male who presents for a Subsequent Medicare Wellness Visit.    The following portions of the patient's history were reviewed and   updated as appropriate:   He  has a past medical history of Arthritis, Asthma, Dysphagia, oral phase, Essential (primary) hypertension, Foot pain, bilateral, Hyperlipidemia, unspecified, Low back pain, Neuropathy in diabetes, Peripheral neuropathy, Type 2 diabetes mellitus without complication, and Vitamin D deficiency, unspecified.  He has Diabetes mellitus, type II; Hyperlipidemia; and Hypertension on their pertinent problem list.  He  has a past surgical history that includes Hemorrhoid surgery (06/20/2014); Cataract extraction, bilateral (1/28/2014; 1/7/2014); Polypectomy; and Colonoscopy (2022).  His family history includes Cancer in his maternal grandfather and mother; Diabetes in his father and paternal grandmother; Heart disease in his father and maternal grandmother.  He  reports that he quit smoking about 28 years ago. His smoking use included cigarettes and pipe. He has a 30.00 pack-year smoking history. He has never used smokeless tobacco. He reports current alcohol use of about 8.0 standard drinks per week. He reports that he does not use drugs.  Current Outpatient Medications   Medication Sig Dispense Refill   • albuterol sulfate  (90 Base) MCG/ACT inhaler Inhale 2 puffs Every 4 (Four) Hours As Needed for Wheezing. 18 g 5   • amLODIPine (NORVASC) 10 MG tablet Take 1 tablet by mouth Daily. 90 tablet 1   • aspirin 81 MG EC tablet Take 1 tablet by mouth Daily.     • atorvastatin (LIPITOR) 40 MG tablet Take 1 tablet by mouth Daily. 90 tablet 1   • clobetasol (TEMOVATE) 0.05 % cream Apply 1 application topically to the appropriate area as directed 2 (Two) Times a Day. 30 g 2   • docusate sodium 100 MG capsule Take 1 capsule by mouth 2 (Two) Times a  Day. 180 capsule 1   • esomeprazole (nexIUM) 20 MG capsule Take 1 capsule by mouth Every Morning Before Breakfast.     • ferrous sulfate (FeroSul) 325 (65 FE) MG tablet Take 1 tablet by mouth 2 (Two) Times a Day With Meals. 180 tablet 3   • ferrous sulfate 325 (65 FE) MG tablet Take 1 tablet by mouth 2 (Two) Times a Day With Meals.     • fluorouracil (EFUDEX) 5 % cream      • fluticasone (FLONASE) 50 MCG/ACT nasal spray 1 spray into the nostril(s) as directed by provider Daily.     • Krill Oil (Omega-3) 500 MG capsule Take 1 capsule by mouth Daily.     • lisinopril (PRINIVIL,ZESTRIL) 40 MG tablet Take 1 tablet by mouth Daily. 90 tablet 1   • loratadine (CLARITIN) 10 MG tablet Take 1 tablet by mouth Daily.     • Melatonin 10 MG tablet Take 1 tablet by mouth As Needed.     • meloxicam (Mobic) 7.5 MG tablet Take 1 tablet by mouth Daily. 90 tablet 0   • metFORMIN (GLUCOPHAGE) 1000 MG tablet TAKE ONE TABLET BY MOUTH TWICE A DAY WITH MEALS 180 tablet 1   • montelukast (SINGULAIR) 10 MG tablet TAKE ONE TABLET BY MOUTH DAILY 90 tablet 0   • prednisoLONE acetate (PRED FORTE) 1 % ophthalmic suspension      • predniSONE (DELTASONE) 20 MG tablet Take 1 tablet by mouth Daily. 5 tablet 0   • vitamin D3 125 MCG (5000 UT) capsule capsule Take 1 capsule by mouth Daily.     • gabapentin (Neurontin) 800 MG tablet Take 1 tablet by mouth 3 (Three) Times a Day. 90 tablet 5   • Lancets (onetouch ultrasoft) lancets Test 2-3 times a week. 100 each 12     Current Facility-Administered Medications   Medication Dose Route Frequency Provider Last Rate Last Admin   • cyanocobalamin injection 1,000 mcg  1,000 mcg Intramuscular Q28 Days Jacquelin Pelaez APRN   1,000 mcg at 05/30/23 0838     Current Outpatient Medications on File Prior to Visit   Medication Sig   • albuterol sulfate  (90 Base) MCG/ACT inhaler Inhale 2 puffs Every 4 (Four) Hours As Needed for Wheezing.   • amLODIPine (NORVASC) 10 MG tablet Take 1 tablet by mouth Daily.   •  aspirin 81 MG EC tablet Take 1 tablet by mouth Daily.   • atorvastatin (LIPITOR) 40 MG tablet Take 1 tablet by mouth Daily.   • clobetasol (TEMOVATE) 0.05 % cream Apply 1 application topically to the appropriate area as directed 2 (Two) Times a Day.   • docusate sodium 100 MG capsule Take 1 capsule by mouth 2 (Two) Times a Day.   • esomeprazole (nexIUM) 20 MG capsule Take 1 capsule by mouth Every Morning Before Breakfast.   • ferrous sulfate (FeroSul) 325 (65 FE) MG tablet Take 1 tablet by mouth 2 (Two) Times a Day With Meals.   • ferrous sulfate 325 (65 FE) MG tablet Take 1 tablet by mouth 2 (Two) Times a Day With Meals.   • fluorouracil (EFUDEX) 5 % cream    • fluticasone (FLONASE) 50 MCG/ACT nasal spray 1 spray into the nostril(s) as directed by provider Daily.   • Krill Oil (Omega-3) 500 MG capsule Take 1 capsule by mouth Daily.   • lisinopril (PRINIVIL,ZESTRIL) 40 MG tablet Take 1 tablet by mouth Daily.   • loratadine (CLARITIN) 10 MG tablet Take 1 tablet by mouth Daily.   • Melatonin 10 MG tablet Take 1 tablet by mouth As Needed.   • meloxicam (Mobic) 7.5 MG tablet Take 1 tablet by mouth Daily.   • metFORMIN (GLUCOPHAGE) 1000 MG tablet TAKE ONE TABLET BY MOUTH TWICE A DAY WITH MEALS   • montelukast (SINGULAIR) 10 MG tablet TAKE ONE TABLET BY MOUTH DAILY   • prednisoLONE acetate (PRED FORTE) 1 % ophthalmic suspension    • predniSONE (DELTASONE) 20 MG tablet Take 1 tablet by mouth Daily.   • vitamin D3 125 MCG (5000 UT) capsule capsule Take 1 capsule by mouth Daily.   • [DISCONTINUED] gabapentin (NEURONTIN) 600 MG tablet Take 1 tablet by mouth 3 (Three) Times a Day.     Current Facility-Administered Medications on File Prior to Visit   Medication   • cyanocobalamin injection 1,000 mcg     He has No Known Allergies..    Compared to one year ago, the patient feels his physical   health is worse due to back pain worsening.    Compared to one year ago, the patient feels his mental   health is the same.    Recent  Hospitalizations:  He was not admitted to the hospital during the last year.       Current Medical Providers:  Patient Care Team:  Jacquelin Pelaez APRN as PCP - General (Nurse Practitioner)    Outpatient Medications Prior to Visit   Medication Sig Dispense Refill   • albuterol sulfate  (90 Base) MCG/ACT inhaler Inhale 2 puffs Every 4 (Four) Hours As Needed for Wheezing. 18 g 5   • amLODIPine (NORVASC) 10 MG tablet Take 1 tablet by mouth Daily. 90 tablet 1   • aspirin 81 MG EC tablet Take 1 tablet by mouth Daily.     • atorvastatin (LIPITOR) 40 MG tablet Take 1 tablet by mouth Daily. 90 tablet 1   • clobetasol (TEMOVATE) 0.05 % cream Apply 1 application topically to the appropriate area as directed 2 (Two) Times a Day. 30 g 2   • docusate sodium 100 MG capsule Take 1 capsule by mouth 2 (Two) Times a Day. 180 capsule 1   • esomeprazole (nexIUM) 20 MG capsule Take 1 capsule by mouth Every Morning Before Breakfast.     • ferrous sulfate (FeroSul) 325 (65 FE) MG tablet Take 1 tablet by mouth 2 (Two) Times a Day With Meals. 180 tablet 3   • ferrous sulfate 325 (65 FE) MG tablet Take 1 tablet by mouth 2 (Two) Times a Day With Meals.     • fluorouracil (EFUDEX) 5 % cream      • fluticasone (FLONASE) 50 MCG/ACT nasal spray 1 spray into the nostril(s) as directed by provider Daily.     • Krill Oil (Omega-3) 500 MG capsule Take 1 capsule by mouth Daily.     • lisinopril (PRINIVIL,ZESTRIL) 40 MG tablet Take 1 tablet by mouth Daily. 90 tablet 1   • loratadine (CLARITIN) 10 MG tablet Take 1 tablet by mouth Daily.     • Melatonin 10 MG tablet Take 1 tablet by mouth As Needed.     • meloxicam (Mobic) 7.5 MG tablet Take 1 tablet by mouth Daily. 90 tablet 0   • metFORMIN (GLUCOPHAGE) 1000 MG tablet TAKE ONE TABLET BY MOUTH TWICE A DAY WITH MEALS 180 tablet 1   • montelukast (SINGULAIR) 10 MG tablet TAKE ONE TABLET BY MOUTH DAILY 90 tablet 0   • prednisoLONE acetate (PRED FORTE) 1 % ophthalmic suspension      • predniSONE  (DELTASONE) 20 MG tablet Take 1 tablet by mouth Daily. 5 tablet 0   • vitamin D3 125 MCG (5000 UT) capsule capsule Take 1 capsule by mouth Daily.     • gabapentin (NEURONTIN) 600 MG tablet Take 1 tablet by mouth 3 (Three) Times a Day. 90 tablet 0   • azithromycin (Zithromax Z-Tanner) 250 MG tablet Take 2 tablets by mouth on day 1, then 1 tablet daily on days 2-5 6 tablet 0     Facility-Administered Medications Prior to Visit   Medication Dose Route Frequency Provider Last Rate Last Admin   • cyanocobalamin injection 1,000 mcg  1,000 mcg Intramuscular Q28 Days Jacquelin Pelaez DARRELL   1,000 mcg at 05/30/23 0838       No opioid medication identified on active medication list. I have reviewed chart for other potential  high risk medication/s and harmful drug interactions in the elderly.          Aspirin is on active medication list. Aspirin use is indicated based on review of current medical condition/s. Pros and cons of this therapy have been discussed today. Benefits of this medication outweigh potential harm.  Patient has been encouraged to continue taking this medication.  .      Patient Active Problem List   Diagnosis   • Allergic rhinitis   • Anemia   • Asthma   • Benign prostatic hyperplasia   • Cervical radiculopathy   • Diabetes mellitus, type II   • Hyperlipidemia   • Hypertension   • Impotence of organic origin   • Kidney stones   • Renal calculus   • Prostate disorder   • Shortness of breath   • Vitamin D deficiency   • Vitamin B 12 deficiency   • Iron deficiency anemia   • Ophthalmic herpes zoster   • Acute URI     Advance Care Planning   Advance Care Planning     Advance Directive is not on file.  ACP discussion was held with the patient during this visit. Patient does not have an advance directive, declines further assistance.     Objective    Vitals:    05/30/23 0804   BP: 118/60   BP Location: Right arm   Patient Position: Sitting   Cuff Size: Large Adult   Pulse: 66   Temp: 97.3 °F (36.3 °C)   TempSrc:  "Temporal   SpO2: 99%   Weight: 85.6 kg (188 lb 12.8 oz)   Height: 177.8 cm (70\")     Estimated body mass index is 27.09 kg/m² as calculated from the following:    Height as of this encounter: 177.8 cm (70\").    Weight as of this encounter: 85.6 kg (188 lb 12.8 oz).    BMI is >= 25 and <30. (Overweight) The following options were offered after discussion;: nutrition counseling/recommendations      Does the patient have evidence of cognitive impairment? No    Lab Results   Component Value Date    TRIG 160 (H) 2023    HDL 47 2023     (H) 2023    VLDL 28 2023    HGBA1C 7.40 (H) 2023        HEALTH RISK ASSESSMENT    Smoking Status:  Social History     Tobacco Use   Smoking Status Former   • Packs/day: 2.00   • Years: 15.00   • Pack years: 30.00   • Types: Cigarettes, Pipe   • Quit date: 1995   • Years since quittin.4   Smokeless Tobacco Never     Alcohol Consumption:  Social History     Substance and Sexual Activity   Alcohol Use Yes   • Alcohol/week: 8.0 standard drinks   • Types: 6 Cans of beer, 2 Drinks containing 0.5 oz of alcohol per week    Comment: Occasional      Fall Risk Screen:    NASIM Fall Risk Assessment has not been completed.    Depression Screenin/30/2023     8:54 AM   PHQ-2/PHQ-9 Depression Screening   Little Interest or Pleasure in Doing Things 0-->not at all   Feeling Down, Depressed or Hopeless 0-->not at all   Trouble Falling or Staying Asleep, or Sleeping Too Much 3-->nearly every day   Feeling Tired or Having Little Energy 1-->several days   Poor Appetite or Overeating 0-->not at all   Feeling Bad about Yourself - or that You are a Failure or Have Let Yourself or Your Family Down 0-->not at all   Trouble Concentrating on Things, Such as Reading the Newspaper or Watching Television 0-->not at all   Moving or Speaking So Slowly that Other People Could Have Noticed? Or the Opposite - Being So Fidgety 0-->not at all   Thoughts that You Would be " Better Off Dead or of Hurting Yourself in Some Way 0-->not at all   PHQ-9: Brief Depression Severity Measure Score 4   If You Checked Off Any Problems, How Difficult Have These Problems Made It For You to Do Your Work, Take Care of Things at Home, or Get Along with Other People? not difficult at all       Health Habits and Functional and Cognitive Screenin/30/2023     8:53 AM   Functional & Cognitive Status   Do you have difficulty preparing food and eating? No   Do you have difficulty bathing yourself, getting dressed or grooming yourself? No   Do you have difficulty using the toilet? No   Do you have difficulty moving around from place to place? No   Do you have trouble with steps or getting out of a bed or a chair? No   Current Diet Well Balanced Diet   Dental Exam Up to date   Eye Exam Up to date   Exercise (times per week) Other   Current Exercises Include Gardening;Yard Work;Walking   Do you need help using the phone?  No   Are you deaf or do you have serious difficulty hearing?  No   Do you need help with transportation? No   Do you need help shopping? No   Do you need help preparing meals?  No   Do you need help with housework?  No   Do you need help with laundry? No   Do you need help taking your medications? No   Do you need help managing money? No   Do you ever drive or ride in a car without wearing a seat belt? No   Have you felt unusual stress, anger or loneliness in the last month? No   Who do you live with? Spouse   If you need help, do you have trouble finding someone available to you? No   Have you been bothered in the last four weeks by sexual problems? No   Do you have difficulty concentrating, remembering or making decisions? No       Age-appropriate Screening Schedule:  Refer to the list below for future screening recommendations based on patient's age, sex and/or medical conditions. Orders for these recommended tests are listed in the plan section. The patient has been provided with a  written plan.    Health Maintenance   Topic Date Due   • INFLUENZA VACCINE  08/01/2023   • URINE MICROALBUMIN  10/05/2023   • HEMOGLOBIN A1C  11/22/2023   • DIABETIC FOOT EXAM  01/12/2024   • DIABETIC EYE EXAM  04/24/2024   • LIPID PANEL  05/22/2024   • ANNUAL WELLNESS VISIT  05/30/2024   • COLORECTAL CANCER SCREENING  02/26/2031   • TDAP/TD VACCINES (2 - Td or Tdap) 05/25/2032   • HEPATITIS C SCREENING  Completed   • COVID-19 Vaccine  Completed   • Pneumococcal Vaccine 65+  Completed   • AAA SCREEN (ONE-TIME)  Completed   • ZOSTER VACCINE  Completed                  CMS Preventative Services Quick Reference  Risk Factors Identified During Encounter  Immunizations Discussed/Encouraged: Influenza  The above risks/problems have been discussed with the patient.  Pertinent information has been shared with the patient in the After Visit Summary.  An After Visit Summary and PPPS were made available to the patient.    Follow Up:   Next Medicare Wellness visit to be scheduled in 1 year.       Additional E&M Note during same encounter follows:  Patient has multiple medical problems which are significant and separately identifiable that require additional work above and beyond the Medicare Wellness Visit.      Chief Complaint  Medicare Wellness-subsequent (Would like to discuss swelling in right leg. Needs a script for one touch test strips. Patient received b12 shot today in right arm. )    Subjective        HPI  Trenton Adames is also being seen today for follow up of diabetes, neuropathy, hypertension and hyperlipidemia.  The patient is not having any medication side effects. Negative for chest pain, heart palpitations, dizziness, headaches and shortness of air.  Positive for fatigue.  He is having chronic back pain and seeing Dr. Selby; trying 6 weeks of physical therapy first.   Recent labs reviewed.    Objective   Vital Signs:  /60 (BP Location: Right arm, Patient Position: Sitting, Cuff Size: Large Adult)  "  Pulse 66   Temp 97.3 °F (36.3 °C) (Temporal)   Ht 177.8 cm (70\")   Wt 85.6 kg (188 lb 12.8 oz)   SpO2 99%   BMI 27.09 kg/m²     Physical Exam  Vitals reviewed.   Constitutional:       General: He is not in acute distress.  HENT:      Head: Normocephalic and atraumatic.      Right Ear: Tympanic membrane and ear canal normal.      Left Ear: Tympanic membrane and ear canal normal.   Eyes:      Conjunctiva/sclera: Conjunctivae normal.   Cardiovascular:      Rate and Rhythm: Normal rate and regular rhythm.      Heart sounds: Murmur heard.      Comments: Slight murmur.    Pulmonary:      Effort: Pulmonary effort is normal.      Breath sounds: Normal breath sounds. No wheezing, rhonchi or rales.   Abdominal:      General: There is no distension.      Palpations: Abdomen is soft. There is no mass.      Tenderness: There is no abdominal tenderness.   Musculoskeletal:      Right lower leg: No edema.      Left lower leg: No edema.   Lymphadenopathy:      Cervical: No cervical adenopathy.   Skin:     General: Skin is warm and dry.      Coloration: Skin is not jaundiced or pale.   Neurological:      General: No focal deficit present.      Mental Status: He is alert.   Psychiatric:         Mood and Affect: Mood normal.         Thought Content: Thought content normal.          The following data was reviewed by: DARRELL Pardo on 05/30/2023:  CMP        10/5/2022    09:16 10/24/2022    10:29 5/22/2023    08:54   CMP   Glucose 134   118   177     BUN 17   16   14     Creatinine 0.88   0.72   0.82     EGFR 90.2   95.3   91.6     Sodium 137   136   138     Potassium 5.6   5.1   4.1     Chloride 101   98   98     Calcium 9.9   9.7   9.7     Total Protein 7.6    7.9     Albumin 4.70    4.9     Globulin 2.9    3.0     Total Bilirubin 0.6    0.5     Alkaline Phosphatase 42    49     AST (SGOT) 33    21     ALT (SGPT) 34    32     Albumin/Globulin Ratio 1.6    1.6     BUN/Creatinine Ratio 19.3   22.2   17.1     Anion Gap " 12.0   12.7   13.3       CBC w/diff        10/5/2022    09:16 5/22/2023    08:54   CBC w/Diff   WBC 9.41   7.38     RBC 4.33   4.68     Hemoglobin 13.2   14.0     Hematocrit 37.4   41.1     MCV 86.4   87.8     MCH 30.5   29.9     MCHC 35.3   34.1     RDW 12.0   12.8     Platelets 236   257     Neutrophil Rel % 67.0   57.6     Immature Granulocyte Rel % 0.2   0.3     Lymphocyte Rel % 22.8   31.8     Monocyte Rel % 6.3   6.4     Eosinophil Rel % 2.8   3.0     Basophil Rel % 0.9   0.9       Lipid Panel        10/5/2022    09:16 5/22/2023    08:54   Lipid Panel   Total Cholesterol 152   182     Triglycerides 98   160     HDL Cholesterol 46   47     VLDL Cholesterol 18   28     LDL Cholesterol  88   107     LDL/HDL Ratio 1.88   2.19       TSH        10/5/2022    09:16 5/22/2023    08:54   TSH   TSH 1.210   1.880       A1C Last 3 Results        10/5/2022    09:16 5/22/2023    08:54   HGBA1C Last 3 Results   Hemoglobin A1C 6.60   7.40                   Assessment and Plan   Diagnoses and all orders for this visit:    1. Encounter for annual wellness exam in Medicare patient (Primary)    2. Type 2 diabetes mellitus with diabetic neuropathy, without long-term current use of insulin  -     Hemoglobin A1c; Future  -     gabapentin (Neurontin) 800 MG tablet; Take 1 tablet by mouth 3 (Three) Times a Day.  Dispense: 90 tablet; Refill: 5    3. Essential hypertension  -     Comprehensive Metabolic Panel; Future  -     T4, Free; Future  -     TSH; Future  -     Magnesium; Future    4. Hyperlipidemia, unspecified hyperlipidemia type  -     Lipid Panel; Future    5. Murmur  -     Adult Transthoracic Echo Complete W/ Cont if Necessary Per Protocol; Future    6. Family history of heart disease  -     Adult Transthoracic Echo Complete W/ Cont if Necessary Per Protocol; Future    7. Vitamin D deficiency  -     Vitamin D,25-Hydroxy; Future    8. Vitamin B 12 deficiency  -     Vitamin B12; Future  -     Folate; Future    9. Iron deficiency  anemia, unspecified iron deficiency anemia type  -     CBC & Differential; Future  -     Iron Profile; Future  -     Ferritin; Future    Other orders  -     Lancets (onetouch ultrasoft) lancets; Test 2-3 times a week.  Dispense: 100 each; Refill: 12      Annal exam: Care gaps reviewed.    Type 2 diabetes mellitus with diabetic neuropathy:  HA1C is 7.40 and worse. Continue metformin 1000 mg twice a day. Check HA1C in 3 months.  Improving on  gabapentin to  600 mg tid and to increase to 800 mg tid.  HERMILO reviewed.       Hypertension:  Blood pressure well controlled. Continue amlodipine 10 mg daily and lisinopril daily.     Hyperlipidemia: Lipids stable on atorvastatin 40 mg daily.    Murmur: Slight murmur heard today.  Family history of heart disease.  Echo ordered.       Follow Up   Return in about 3 months (around 8/30/2023) for Recheck.  Patient was given instructions and counseling regarding his condition or for health maintenance advice. Please see specific information pulled into the AVS if appropriate.

## 2023-06-01 DIAGNOSIS — E11.40 TYPE 2 DIABETES MELLITUS WITH DIABETIC NEUROPATHY, WITHOUT LONG-TERM CURRENT USE OF INSULIN: Primary | ICD-10-CM

## 2023-06-02 ENCOUNTER — TREATMENT (OUTPATIENT)
Dept: PHYSICAL THERAPY | Facility: CLINIC | Age: 78
End: 2023-06-02

## 2023-06-02 DIAGNOSIS — M48.062 SPINAL STENOSIS OF LUMBAR REGION WITH NEUROGENIC CLAUDICATION: ICD-10-CM

## 2023-06-02 DIAGNOSIS — M54.42 CHRONIC MIDLINE LOW BACK PAIN WITH LEFT-SIDED SCIATICA: Primary | ICD-10-CM

## 2023-06-02 DIAGNOSIS — R26.9 GAIT DISTURBANCE: ICD-10-CM

## 2023-06-02 DIAGNOSIS — G89.29 CHRONIC MIDLINE LOW BACK PAIN WITH LEFT-SIDED SCIATICA: Primary | ICD-10-CM

## 2023-06-02 NOTE — PROGRESS NOTES
Outpatient Physical Therapy  [address] [facility], [place] [address]                            Physical Therapy Daily Treatment Note    Patient: Trenton Adames   : 1945  Diagnosis/ICD-10 Code:  Chronic midline low back pain with left-sided sciatica [M54.42, G89.29]  Referring practitioner: Dallin Selby MD  Date of Initial Visit: Type: THERAPY  Noted: 2023  Today's Date: 2023  Patient seen for 2 sessions           Subjective   Trenton Adames reports: that he did a little more with his trike 2 days ago and the back is bothering him a little more today. He is about to road trip out to [place] and then up to [place]. Does feel like stretches have been helping some.     Objective   Increased discomfort when changing positions    See Exercise, Manual, and Modality Logs for complete treatment.     Assessment/Plan  Trenton still experiencing increased low back  pain, especially after changing the oil in his Trike. Pt had some increased discomfort with changing position on table. Pt would benefit from skilled PT to address Range of Motion  and Strength deficits, pain management and any concerns with ADLs.       Progress per Plan of Care         Timed:  Manual Therapy:    15     mins  97333;  Therapeutic Exercise:    15     mins  62393;     Neuromuscular Dayna:        mins  90078;    Therapeutic Activity:          mins  40589;     Gait Training:           mins  77053;    Aquatic Therapy:          mins  05257;       Untimed:  Electrical Stimulation:         mins  53735 ( );  Mechanical Traction:         mins  69277;       Timed Treatment:   30   mins   Total Treatment:     30   mins      Electronically signed:     Sangeetha Yuen PTA  Physical Therapist Assistant  [facility] License #: [id number]

## 2023-06-05 RX ORDER — AMLODIPINE BESYLATE 10 MG/1
TABLET ORAL
Qty: 90 TABLET | Refills: 1 | Status: SHIPPED | OUTPATIENT
Start: 2023-06-05

## 2023-06-16 ENCOUNTER — TREATMENT (OUTPATIENT)
Dept: PHYSICAL THERAPY | Facility: CLINIC | Age: 78
End: 2023-06-16
Payer: MEDICARE

## 2023-06-16 DIAGNOSIS — R26.9 GAIT DISTURBANCE: ICD-10-CM

## 2023-06-16 DIAGNOSIS — G89.29 CHRONIC MIDLINE LOW BACK PAIN WITH LEFT-SIDED SCIATICA: Primary | ICD-10-CM

## 2023-06-16 DIAGNOSIS — M48.062 SPINAL STENOSIS OF LUMBAR REGION WITH NEUROGENIC CLAUDICATION: ICD-10-CM

## 2023-06-16 DIAGNOSIS — M54.42 CHRONIC MIDLINE LOW BACK PAIN WITH LEFT-SIDED SCIATICA: Primary | ICD-10-CM

## 2023-06-16 NOTE — PROGRESS NOTES
Progress Assessment  [address] [place] [id number]        Patient: Trenton Adames   : 1945  Diagnosis/ICD-10 Code:  Chronic midline low back pain with left-sided sciatica [M54.42, G89.29]  Referring practitioner: Dallin Selby MD  Date of Initial Visit: Type: THERAPY  Noted: 2023  Today's Date: 2023  Patient seen for 3 sessions      Subjective:     Subjective Questionnaire: Oswestry:  = 36%   Clinical Progress: improved  Home Program Compliance: Yes  Treatment has included: therapeutic exercise, manual therapy, and therapeutic activity    Subjective : Trenton Adames reports his low back is sore today and he is having some sciatica down the left leg. Pt reports that he did too much yesterday washing his vehicles and his pain got up to a 9/10 with some swelling in his right ankle due to being up on his feet so much. Pt reports that his back did okay on his trip and he would take time to stretch at every rest stop.     Current Pain 7/10      Objective       Active Range of Motion      Lumbar   Flexion: 90 degrees   Extension: 10 degrees   Left lateral flexion: 29 degrees   Right lateral flexion: 27 degrees     Additional Active Range of Motion Details  Rotation limited >50% bilaterally (stiffness)      Strength/Myotome Testing      Left Hip   Planes of Motion   Flexion: 4  Extension: 4+  Abduction: 4+  Adduction: 4+     Right Hip   Planes of Motion   Flexion: 4+  Extension: 4+  Abduction: 4+  Adduction: 4+     Left Knee   Flexion: 5  Extension: 5     Right Knee   Flexion: 5  Extension: 5     Left Ankle/Foot   Dorsiflexion: 5     Right Ankle/Foot   Dorsiflexion: 5            Assessment & Plan     Assessment  Impairments: abnormal muscle firing, abnormal or restricted ROM, activity intolerance, impaired physical strength and pain with function  Functional Limitations: carrying objects, lifting, walking, uncomfortable because of pain, sitting and standing  Assessment details: The  patient presents to physical therapy with complaints of low back pain with bilateral LE referral and neurologic symptoms such as numbness and reports swelling in the right ankle increasing throughout the day. The patient presents with associated lower extremity weakness, lumbar stiffness, and functional deficits (OSWESTRY). The patient has improved strength and AROM at this time. The patient would continue to benefit from skilled PT intervention to address the above mentioned functional limitations.     Prognosis: good    Goals  Plan Goals: LOW BACK PROBLEMS:    1. The patient complains of low back pain.  LTG 1: 12 weeks:  The patient will report a pain rating of 3/10 or better at worst in order to improve  tolerance to activities of daily living and improve sleep quality.  STATUS:  Not met, ongoing   STG 1a: 6 weeks:  The patient will report a pain rating of 5/10 or better at its worst.  STATUS:  Not met, ongoing   TREATMENT:  Therapeutic exercises, manual therapy, aquatic therapy, home exercise   instruction, and modalities as needed for pain to include:  electrical stimulation, moist heat, ice,   ultrasound, and diathermy.      2. The patient demonstrates weakness of the B hip.  LTG 2: 12 weeks:  The patient will demonstrate 5 /5 strength for B hip flexion, abduction,  and extension in order to improve hip stability.  STATUS:  Not met, ongoing   STG 2a: 6 weeks:  The patient will demonstrate 4+ /5 strength for B hip flexion, abduction,  and extension.  STATUS:  Not met, ongoing   TREATMENT: Therapeutic exercises, manual therapy, aquatic therapy, home exercise instruction,  and modalities as needed for pain to include:  electrical stimulation, moist heat, ice, ultrasound, and   diathermy.        3. Mobility: Walking/Moving Around Functional Limitation    LTG 3: 12 weeks:  The patient will demonstrate 1-19 % limitation by achieving a score of 1-9 on the KHUSHBU.  STATUS:  Not met, ongoing  STG 3 a: 6 weeks:  The  patient will demonstrate independent HEP.  STATUS:  Met, ongoing   TREATMENT:  Manual therapy, therapeutic exercise, home exercise instruction, and modalities as needed to include: moist heat, electrical stimulation, and ultrasound.         Plan  Therapy options: will be seen for skilled therapy services  Planned modality interventions: TENS, cryotherapy, thermotherapy (hydrocollator packs), traction and dry needling  Other planned modality interventions: aquatic therapy  Planned therapy interventions: manual therapy, stretching, strengthening, therapeutic activities, neuromuscular re-education, home exercise program, joint mobilization, functional ROM exercises, soft tissue mobilization, spinal/joint mobilization, flexibility and gait training  Other planned therapy interventions: aquatic therapy  Frequency: 3x week  Duration in weeks: 12  Treatment plan discussed with: patient      Progress toward previous goals: Not Met    See Exercise, Manual, and Modality Logs for complete treatment.         Recommendations: Continue as planned  Timeframe: 2 months  Prognosis to achieve goals: jesus Turpin   Physical Therapist Student    Electronically signed 2023    Supervised by:   GILSON Hastings License: PT - [id number]    The supervising therapist was active and present throughout the duration of the treatment session.     Based upon review of the patient's progress and continued therapy plan, it is my medical opinion that Trenton Adames should continue physical therapy treatment at [facility]  [address]  [place] [address] [phone].      Timed:         Manual Therapy:    0     mins  12610;     Therapeutic Exercise:    31     mins  60635;     Neuromuscular Dayna:    0    mins  86339;    Therapeutic Activity:     0     mins  09706;     Gait Trainin     mins  28658;     Ultrasound:     0     mins  91667;    Self Care                       0      mins   [id number]  Aquatic                          0     mins [id number]          Timed Treatment:   31   mins   Total Treatment:     31   mins      I certify that the therapy services are furnished while this patient is under my care.  The services outlined above are required by this patient, and will be reviewed every 90 days.

## 2023-07-26 RX ORDER — MONTELUKAST SODIUM 10 MG/1
10 TABLET ORAL DAILY
Qty: 90 TABLET | Refills: 0 | Status: SHIPPED | OUTPATIENT
Start: 2023-07-26

## 2023-08-09 ENCOUNTER — OFFICE VISIT (OUTPATIENT)
Dept: SLEEP MEDICINE | Facility: HOSPITAL | Age: 78
End: 2023-08-09
Payer: MEDICARE

## 2023-08-09 VITALS
SYSTOLIC BLOOD PRESSURE: 158 MMHG | HEIGHT: 70 IN | WEIGHT: 193 LBS | HEART RATE: 68 BPM | DIASTOLIC BLOOD PRESSURE: 77 MMHG | BODY MASS INDEX: 27.63 KG/M2 | OXYGEN SATURATION: 97 %

## 2023-08-09 DIAGNOSIS — G47.10 HYPERSOMNIA, UNSPECIFIED: ICD-10-CM

## 2023-08-09 DIAGNOSIS — E78.5 HYPERLIPIDEMIA, UNSPECIFIED HYPERLIPIDEMIA TYPE: ICD-10-CM

## 2023-08-09 DIAGNOSIS — R29.818 SUSPECTED SLEEP APNEA: Primary | ICD-10-CM

## 2023-08-09 DIAGNOSIS — R06.83 SNORING: ICD-10-CM

## 2023-08-09 DIAGNOSIS — E66.3 OVERWEIGHT (BMI 25.0-29.9): ICD-10-CM

## 2023-08-09 DIAGNOSIS — I27.20 PULMONARY HTN: ICD-10-CM

## 2023-08-09 DIAGNOSIS — E11.9 TYPE 2 DIABETES MELLITUS WITHOUT COMPLICATION, WITHOUT LONG-TERM CURRENT USE OF INSULIN: ICD-10-CM

## 2023-08-09 DIAGNOSIS — I10 PRIMARY HYPERTENSION: ICD-10-CM

## 2023-08-09 PROCEDURE — 3077F SYST BP >= 140 MM HG: CPT | Performed by: INTERNAL MEDICINE

## 2023-08-09 PROCEDURE — 3078F DIAST BP <80 MM HG: CPT | Performed by: INTERNAL MEDICINE

## 2023-08-09 PROCEDURE — G0463 HOSPITAL OUTPT CLINIC VISIT: HCPCS

## 2023-08-09 NOTE — PROGRESS NOTES
Sleep Consultation    Patient Name: Trenton Adames  Age/Sex: 77 y.o. male  : 1945  MRN: [id number]    Date of Encounter Visit: 2023  Encounter Provider: Naheed George MD  Referring Provider: Marc Charles MD  Place of Service: [facility]  Patient Care Team:  Jacquelin Pelaez APRN as PCP - General (Nurse Practitioner)    Subjective:     Reason for Consult: Evaluate for sleep apnea    History of Present Illness:  Trenton Adames is a 77 y.o. male is here for evaluation of HILLARY due to symptoms.    Patient complains of daytime fatigue and sleepiness with an Oxford Sleepiness Scale (ESS) of 4.  The hypersomnia is not consistent and is not all the time and it does interfere depending on the quality of the night before  Patient complains of waking up with a dry mouth with snoring and frequent awakenings.  Denies any significant witnessed apnea.  Patient has nocturia with 3 trips to the bathroom on the average per night  He did report to his cardiologist about the snoring and he was referred for HILLARY evaluation   Denies any symptoms of restless leg syndrome.   Patient denies any cataplexy, sleep paralysis or other symptoms to suggest narcolepsy.  Patient denies any parasomnias.  Denies any history of seizure disorder or recent head trauma.  Patient spends adequate amount of time in bed with no evidence of sleep restriction or improper sleep hygiene. Bedtime is 10 PM wake up time at 6 in the morning with 7 hours of reported sleep in between, sleep onset is within normal range usually wake up feeling okay  Comorbidities include: Patient has multiple comorbidity with diabetes, hypertension, arthritis, asthma/COPD    Review of Systems:   A twelve-system review was conducted and was negative except for the following: Swelling in the legs, easy bruising.        Past Medical History:  Past Medical History:   Diagnosis Date    Arthritis     Asthma     Dysphagia, oral phase      Essential (primary) hypertension     Foot pain, bilateral     Hyperlipidemia, unspecified     Low back pain     Neuropathy in diabetes     Peripheral neuropathy     Pulmonary HTN 8/9/2023    Type 2 diabetes mellitus without complication     Vitamin D deficiency, unspecified        Past Surgical History:   Procedure Laterality Date    CATARACT EXTRACTION, BILATERAL  1/28/2014; 1/7/2014    COLONOSCOPY  2022    HEMORRHOIDECTOMY  06/20/2014    POLYPECTOMY      SINUS SURGERY         Home Medications:     Current Outpatient Medications:     Accu-Chek Softclix Lancets lancets, USE ONE LANCET 2-3 TIMES DAILY AS NEEDED, Disp: 100 each, Rfl: 12    albuterol sulfate  (90 Base) MCG/ACT inhaler, Inhale 2 puffs Every 4 (Four) Hours As Needed for Wheezing., Disp: 18 g, Rfl: 5    amLODIPine (NORVASC) 10 MG tablet, TAKE ONE TABLET BY MOUTH DAILY, Disp: 90 tablet, Rfl: 1    aspirin 81 MG EC tablet, Take 1 tablet by mouth Daily., Disp: , Rfl:     atorvastatin (LIPITOR) 40 MG tablet, Take 1 tablet by mouth Daily., Disp: 90 tablet, Rfl: 1    Blood Glucose Monitoring Suppl (Accu-Chek Guide) w/Device kit, 1 each 3 (Three) Times a Day As Needed (check 2-3 times daily as needed)., Disp: 1 kit, Rfl: 0    clobetasol (TEMOVATE) 0.05 % cream, Apply 1 application topically to the appropriate area as directed 2 (Two) Times a Day., Disp: 30 g, Rfl: 2    docusate sodium 100 MG capsule, Take 1 capsule by mouth 2 (Two) Times a Day., Disp: 180 capsule, Rfl: 1    esomeprazole (nexIUM) 20 MG capsule, Take 1 capsule by mouth Every Morning Before Breakfast., Disp: , Rfl:     ferrous sulfate (FeroSul) 325 (65 FE) MG tablet, Take 1 tablet by mouth 2 (Two) Times a Day With Meals. (Patient not taking: Reported on 7/17/2023), Disp: 180 tablet, Rfl: 3    ferrous sulfate 325 (65 FE) MG tablet, Take 1 tablet by mouth 2 (Two) Times a Day With Meals. (Patient not taking: Reported on 7/17/2023), Disp: , Rfl:     fluorouracil (EFUDEX) 5 % cream, , Disp: ,  Rfl:     fluticasone (FLONASE) 50 MCG/ACT nasal spray, 1 spray into the nostril(s) as directed by provider Daily., Disp: , Rfl:     gabapentin (Neurontin) 800 MG tablet, Take 1 tablet by mouth 3 (Three) Times a Day., Disp: 90 tablet, Rfl: 5    glucose blood (Accu-Chek Guide) test strip, USE ONE TEST STRIP 2-3 TIMES DAILY AS NEEDED., Disp: 100 each, Rfl: 12    glucose blood test strip, Test 2-3 times a week, Disp: 100 each, Rfl: 1    hydroCHLOROthiazide (HYDRODIURIL) 25 MG tablet, Take 1 tablet by mouth Daily., Disp: 90 tablet, Rfl: 3    Krill Oil (Omega-3) 500 MG capsule, Take 1 capsule by mouth Daily., Disp: , Rfl:     Lancets (onetouch ultrasoft) lancets, Test 2-3 times a week., Disp: 100 each, Rfl: 12    lisinopril (PRINIVIL,ZESTRIL) 40 MG tablet, Take 1 tablet by mouth Daily., Disp: 90 tablet, Rfl: 1    loratadine (CLARITIN) 10 MG tablet, Take 1 tablet by mouth Daily., Disp: , Rfl:     Melatonin 10 MG tablet, Take 1 tablet by mouth As Needed., Disp: , Rfl:     meloxicam (Mobic) 7.5 MG tablet, Take 1 tablet by mouth Daily., Disp: 90 tablet, Rfl: 0    metFORMIN (GLUCOPHAGE) 1000 MG tablet, TAKE ONE TABLET BY MOUTH TWICE A DAY WITH MEALS, Disp: 180 tablet, Rfl: 1    montelukast (SINGULAIR) 10 MG tablet, Take 1 tablet by mouth Daily., Disp: 90 tablet, Rfl: 0    prednisoLONE acetate (PRED FORTE) 1 % ophthalmic suspension, , Disp: , Rfl:     predniSONE (DELTASONE) 20 MG tablet, Take 1 tablet by mouth Daily. (Patient not taking: Reported on 7/17/2023), Disp: 5 tablet, Rfl: 0    vitamin D3 125 MCG (5000 UT) capsule capsule, Take 1 capsule by mouth Daily., Disp: , Rfl:     Current Facility-Administered Medications:     cyanocobalamin injection 1,000 mcg, 1,000 mcg, Intramuscular, Q28 Days, Jacquelin Pelaez APRN, 1,000 mcg at 07/03/23 0955    Allergies:  No Known Allergies    Past Social History:  Social History     Socioeconomic History    Marital status:    Tobacco Use    Smoking status: Former     Packs/day:  "2.00     Years: 15.00     Pack years: 30.00     Types: Cigarettes, Pipe     Quit date: 1995     Years since quittin.6    Smokeless tobacco: Never   Vaping Use    Vaping Use: Never used   Substance and Sexual Activity    Alcohol use: Yes     Alcohol/week: 8.0 standard drinks     Types: 6 Cans of beer, 2 Drinks containing 0.5 oz of alcohol per week     Comment: Occasional     Drug use: Never    Sexual activity: Not Currently     Partners: Female       Past Family History:  Family History   Problem Relation Age of Onset    Cancer Mother     Heart disease Father     Diabetes Father     Heart disease Maternal Grandmother     Cancer Maternal Grandfather     Diabetes Paternal Grandmother         Objective:        Vital Signs:   Visit Vitals  /77   Pulse 68   Ht 177.8 cm (70\")   Wt 87.5 kg (193 lb)   SpO2 97%   BMI 27.69 kg/mý     Wt Readings from Last 3 Encounters:   23 87.5 kg (193 lb)   23 88.1 kg (194 lb 3.2 oz)   23 85.6 kg (188 lb 12.8 oz)     Neck Circumference: 15 inches    Physical Exam:   GEN:  No acute distress, alert, cooperative, well developed   EYES:   Sclerae clear. No icterus. PERRL. Normal EOM  ENT:   External ears/nose normal, no oral lesions, no thrush, mucous membranes moist, Septum midline. Mallampati IV airway. Large uvula.    NECK:  Supple, midline trachea, no JVD  LUNGS: Normal chest on inspection, CTAB, no wheezes. No rhonchi. No crackles. Respirations regular, even and unlabored.   CV:  Regular rhythm and rate. Normal S1/S2. No murmurs, gallops, or rubs noted.  ABD:  Soft, nontender and nondistended. Normal bowel sounds. No guarding  EXT:  Moves all extremities well. No cyanosis. No redness. Trace Right edema.   Skin: Dry, intact, no bleeding      Diagnostic Data:  No prior sleep study on records    Echocardiogram 2023:    Left ventricular systolic function is normal. Left ventricular ejection fraction appears to be 61 - 65%.    Left ventricular diastolic " function was normal.    No regional wall motion abnormality    Estimated right ventricular systolic pressure from tricuspid regurgitation is moderately elevated (45-55 mmHg).    Assessment and Plan:       ICD-10-CM ICD-9-CM   1. Suspected sleep apnea  R29.818 781.99   2. Pulmonary HTN  I27.20 416.8   3. Type 2 diabetes mellitus without complication, without long-term current use of insulin  E11.9 250.00   4. Primary hypertension  I10 401.9   5. Hyperlipidemia, unspecified hyperlipidemia type  E78.5 272.4   6. Overweight (BMI 25.0-29.9)  E66.3 278.02   7. Snoring  R06.83 786.09   8. Hypersomnia, unspecified  G47.10 780.54       Recommendations:        Patient was educated in depth about HILLARY and cardiovascular consequences if left untreated, including but not limited to CHF, CAD, arrhythmias, CVA, and/or HTN. Education also provided about the diagnostic tools for HILLARY, including the polysomnography and the treatment modalities, including the CPAP.     If patient has obstructive sleep apnea the recommend treatment is CPAP and will start CPAP and patient will follow up within 31-90 days after starting CPAP for compliance review.   Will address alternative treatment option if intolerant to CPAP     Adherence to the CPAP is a key factor in successful treatment of HILLARY and the patient was encouraged to contact us in case of problem with the CPAP or the mask that can limit the tolerance of the compliance with the therapy.    Patient was educated about the impact of obesity on sleep apnea and the benefit of weight loss and weight loss was recommended    Orders Placed This Encounter   Procedures    Home Sleep Study     No orders of the defined types were placed in this encounter.     Return in about 3 months (around 11/9/2023).    Naheed George MD   [facility]   08/09/23  09:38 EDT    Dictated utilizing Dragon dictation

## 2023-08-10 ENCOUNTER — CLINICAL SUPPORT (OUTPATIENT)
Dept: INTERNAL MEDICINE | Facility: CLINIC | Age: 78
End: 2023-08-10
Payer: MEDICARE

## 2023-08-10 ENCOUNTER — HOSPITAL ENCOUNTER (OUTPATIENT)
Dept: SLEEP MEDICINE | Facility: HOSPITAL | Age: 78
End: 2023-08-10
Payer: MEDICARE

## 2023-08-10 DIAGNOSIS — R06.83 SNORING: ICD-10-CM

## 2023-08-10 DIAGNOSIS — E66.3 OVERWEIGHT (BMI 25.0-29.9): ICD-10-CM

## 2023-08-10 DIAGNOSIS — I10 PRIMARY HYPERTENSION: ICD-10-CM

## 2023-08-10 DIAGNOSIS — E53.8 B12 DEFICIENCY: Primary | ICD-10-CM

## 2023-08-10 DIAGNOSIS — E78.5 HYPERLIPIDEMIA, UNSPECIFIED HYPERLIPIDEMIA TYPE: ICD-10-CM

## 2023-08-10 DIAGNOSIS — G47.10 HYPERSOMNIA, UNSPECIFIED: ICD-10-CM

## 2023-08-10 DIAGNOSIS — R29.818 SUSPECTED SLEEP APNEA: ICD-10-CM

## 2023-08-10 DIAGNOSIS — I27.20 PULMONARY HTN: ICD-10-CM

## 2023-08-10 DIAGNOSIS — E11.9 TYPE 2 DIABETES MELLITUS WITHOUT COMPLICATION, WITHOUT LONG-TERM CURRENT USE OF INSULIN: ICD-10-CM

## 2023-08-10 PROCEDURE — 95806 SLEEP STUDY UNATT&RESP EFFT: CPT

## 2023-08-10 RX ORDER — CYANOCOBALAMIN 1000 UG/ML
1000 INJECTION, SOLUTION INTRAMUSCULAR; SUBCUTANEOUS
Status: SHIPPED | OUTPATIENT
Start: 2023-08-10

## 2023-08-10 RX ADMIN — CYANOCOBALAMIN 1000 MCG: 1000 INJECTION, SOLUTION INTRAMUSCULAR; SUBCUTANEOUS at 10:41

## 2023-08-14 RX ORDER — MELOXICAM 7.5 MG/1
7.5 TABLET ORAL DAILY
Qty: 90 TABLET | Refills: 0 | Status: SHIPPED | OUTPATIENT
Start: 2023-08-14

## 2023-08-21 ENCOUNTER — LAB (OUTPATIENT)
Dept: INTERNAL MEDICINE | Facility: CLINIC | Age: 78
End: 2023-08-21
Payer: MEDICARE

## 2023-08-21 DIAGNOSIS — D50.9 IRON DEFICIENCY ANEMIA, UNSPECIFIED IRON DEFICIENCY ANEMIA TYPE: ICD-10-CM

## 2023-08-21 DIAGNOSIS — E11.40 TYPE 2 DIABETES MELLITUS WITH DIABETIC NEUROPATHY, WITHOUT LONG-TERM CURRENT USE OF INSULIN: ICD-10-CM

## 2023-08-21 DIAGNOSIS — E78.5 HYPERLIPIDEMIA, UNSPECIFIED HYPERLIPIDEMIA TYPE: ICD-10-CM

## 2023-08-21 DIAGNOSIS — E53.8 VITAMIN B 12 DEFICIENCY: ICD-10-CM

## 2023-08-21 DIAGNOSIS — I10 ESSENTIAL HYPERTENSION: ICD-10-CM

## 2023-08-21 DIAGNOSIS — E55.9 VITAMIN D DEFICIENCY: ICD-10-CM

## 2023-08-21 LAB
25(OH)D3 SERPL-MCNC: 64.8 NG/ML (ref 30–100)
ALBUMIN SERPL-MCNC: 4.5 G/DL (ref 3.5–5.2)
ALBUMIN/GLOB SERPL: 1.6 G/DL
ALP SERPL-CCNC: 43 U/L (ref 39–117)
ALT SERPL W P-5'-P-CCNC: 39 U/L (ref 1–41)
ANION GAP SERPL CALCULATED.3IONS-SCNC: 16.8 MMOL/L (ref 5–15)
AST SERPL-CCNC: 34 U/L (ref 1–40)
BASOPHILS # BLD AUTO: 0.09 10*3/MM3 (ref 0–0.2)
BASOPHILS NFR BLD AUTO: 1.2 % (ref 0–1.5)
BILIRUB SERPL-MCNC: 0.5 MG/DL (ref 0–1.2)
BUN SERPL-MCNC: 16 MG/DL (ref 8–23)
BUN/CREAT SERPL: 19 (ref 7–25)
CALCIUM SPEC-SCNC: 9.5 MG/DL (ref 8.6–10.5)
CHLORIDE SERPL-SCNC: 97 MMOL/L (ref 98–107)
CHOLEST SERPL-MCNC: 154 MG/DL (ref 0–200)
CO2 SERPL-SCNC: 24.2 MMOL/L (ref 22–29)
CREAT SERPL-MCNC: 0.84 MG/DL (ref 0.76–1.27)
DEPRECATED RDW RBC AUTO: 39.5 FL (ref 37–54)
EGFRCR SERPLBLD CKD-EPI 2021: 90.9 ML/MIN/1.73
EOSINOPHIL # BLD AUTO: 0.31 10*3/MM3 (ref 0–0.4)
EOSINOPHIL NFR BLD AUTO: 4.2 % (ref 0.3–6.2)
ERYTHROCYTE [DISTWIDTH] IN BLOOD BY AUTOMATED COUNT: 12.3 % (ref 12.3–15.4)
FERRITIN SERPL-MCNC: 272 NG/ML (ref 30–400)
FOLATE SERPL-MCNC: 11.5 NG/ML (ref 4.78–24.2)
GLOBULIN UR ELPH-MCNC: 2.9 GM/DL
GLUCOSE SERPL-MCNC: 160 MG/DL (ref 65–99)
HBA1C MFR BLD: 7.3 % (ref 4.8–5.6)
HCT VFR BLD AUTO: 39.1 % (ref 37.5–51)
HDLC SERPL-MCNC: 36 MG/DL (ref 40–60)
HGB BLD-MCNC: 13.3 G/DL (ref 13–17.7)
IMM GRANULOCYTES # BLD AUTO: 0.02 10*3/MM3 (ref 0–0.05)
IMM GRANULOCYTES NFR BLD AUTO: 0.3 % (ref 0–0.5)
IRON 24H UR-MRATE: 82 MCG/DL (ref 59–158)
IRON SATN MFR SERPL: 20 % (ref 20–50)
LDLC SERPL CALC-MCNC: 90 MG/DL (ref 0–100)
LDLC/HDLC SERPL: 2.37 {RATIO}
LYMPHOCYTES # BLD AUTO: 2.34 10*3/MM3 (ref 0.7–3.1)
LYMPHOCYTES NFR BLD AUTO: 31.6 % (ref 19.6–45.3)
MAGNESIUM SERPL-MCNC: 1.2 MG/DL (ref 1.6–2.4)
MCH RBC QN AUTO: 30 PG (ref 26.6–33)
MCHC RBC AUTO-ENTMCNC: 34 G/DL (ref 31.5–35.7)
MCV RBC AUTO: 88.1 FL (ref 79–97)
MONOCYTES # BLD AUTO: 0.54 10*3/MM3 (ref 0.1–0.9)
MONOCYTES NFR BLD AUTO: 7.3 % (ref 5–12)
NEUTROPHILS NFR BLD AUTO: 4.11 10*3/MM3 (ref 1.7–7)
NEUTROPHILS NFR BLD AUTO: 55.4 % (ref 42.7–76)
NRBC BLD AUTO-RTO: 0 /100 WBC (ref 0–0.2)
PLATELET # BLD AUTO: 252 10*3/MM3 (ref 140–450)
PMV BLD AUTO: 11.8 FL (ref 6–12)
POTASSIUM SERPL-SCNC: 4.3 MMOL/L (ref 3.5–5.2)
PROT SERPL-MCNC: 7.4 G/DL (ref 6–8.5)
RBC # BLD AUTO: 4.44 10*6/MM3 (ref 4.14–5.8)
SODIUM SERPL-SCNC: 138 MMOL/L (ref 136–145)
T4 FREE SERPL-MCNC: 0.93 NG/DL (ref 0.93–1.7)
TIBC SERPL-MCNC: 404 MCG/DL (ref 298–536)
TRANSFERRIN SERPL-MCNC: 271 MG/DL (ref 200–360)
TRIGL SERPL-MCNC: 163 MG/DL (ref 0–150)
TSH SERPL DL<=0.05 MIU/L-ACNC: 1.63 UIU/ML (ref 0.27–4.2)
VIT B12 BLD-MCNC: 736 PG/ML (ref 211–946)
VLDLC SERPL-MCNC: 28 MG/DL (ref 5–40)
WBC NRBC COR # BLD: 7.41 10*3/MM3 (ref 3.4–10.8)

## 2023-08-21 PROCEDURE — 84443 ASSAY THYROID STIM HORMONE: CPT | Performed by: NURSE PRACTITIONER

## 2023-08-21 PROCEDURE — 80053 COMPREHEN METABOLIC PANEL: CPT | Performed by: NURSE PRACTITIONER

## 2023-08-21 PROCEDURE — 83735 ASSAY OF MAGNESIUM: CPT | Performed by: NURSE PRACTITIONER

## 2023-08-21 PROCEDURE — 85025 COMPLETE CBC W/AUTO DIFF WBC: CPT | Performed by: NURSE PRACTITIONER

## 2023-08-21 PROCEDURE — 83036 HEMOGLOBIN GLYCOSYLATED A1C: CPT | Performed by: NURSE PRACTITIONER

## 2023-08-21 PROCEDURE — 84439 ASSAY OF FREE THYROXINE: CPT | Performed by: NURSE PRACTITIONER

## 2023-08-21 PROCEDURE — 84466 ASSAY OF TRANSFERRIN: CPT | Performed by: NURSE PRACTITIONER

## 2023-08-21 PROCEDURE — 82306 VITAMIN D 25 HYDROXY: CPT | Performed by: NURSE PRACTITIONER

## 2023-08-21 PROCEDURE — 80061 LIPID PANEL: CPT | Performed by: NURSE PRACTITIONER

## 2023-08-21 PROCEDURE — 82746 ASSAY OF FOLIC ACID SERUM: CPT | Performed by: NURSE PRACTITIONER

## 2023-08-21 PROCEDURE — 36415 COLL VENOUS BLD VENIPUNCTURE: CPT | Performed by: NURSE PRACTITIONER

## 2023-08-21 PROCEDURE — 83540 ASSAY OF IRON: CPT | Performed by: NURSE PRACTITIONER

## 2023-08-21 PROCEDURE — 82728 ASSAY OF FERRITIN: CPT | Performed by: NURSE PRACTITIONER

## 2023-08-21 PROCEDURE — 82607 VITAMIN B-12: CPT | Performed by: NURSE PRACTITIONER

## 2023-08-22 ENCOUNTER — HOSPITAL ENCOUNTER (OUTPATIENT)
Dept: NUCLEAR MEDICINE | Facility: HOSPITAL | Age: 78
Discharge: HOME | End: 2023-08-22
Payer: MEDICARE

## 2023-08-22 ENCOUNTER — HOSPITAL ENCOUNTER (OUTPATIENT)
Dept: CARDIOLOGY | Facility: HOSPITAL | Age: 78
Discharge: HOME | End: 2023-08-22
Payer: MEDICARE

## 2023-08-22 DIAGNOSIS — G47.33 OSA (OBSTRUCTIVE SLEEP APNEA): Primary | ICD-10-CM

## 2023-08-22 DIAGNOSIS — R09.89 BRUIT OF LEFT CAROTID ARTERY: ICD-10-CM

## 2023-08-22 DIAGNOSIS — R07.89 CHEST PAIN, ATYPICAL: ICD-10-CM

## 2023-08-22 LAB
BH CV XLRA MEAS LEFT CAROTID BULB EDV: 7.46 CM/SEC
BH CV XLRA MEAS LEFT CAROTID BULB PSV: 47.8 CM/SEC
BH CV XLRA MEAS LEFT DIST CCA EDV: 13 CM/SEC
BH CV XLRA MEAS LEFT DIST CCA PSV: 71.4 CM/SEC
BH CV XLRA MEAS LEFT DIST ICA EDV: -27.3 CM/SEC
BH CV XLRA MEAS LEFT DIST ICA PSV: -96.3 CM/SEC
BH CV XLRA MEAS LEFT ICA/CCA RATIO: 1.04
BH CV XLRA MEAS LEFT MID ICA EDV: -23.6 CM/SEC
BH CV XLRA MEAS LEFT MID ICA PSV: -83.9 CM/SEC
BH CV XLRA MEAS LEFT PROX CCA EDV: 14.3 CM/SEC
BH CV XLRA MEAS LEFT PROX CCA PSV: 86.4 CM/SEC
BH CV XLRA MEAS LEFT PROX ECA EDV: -8.7 CM/SEC
BH CV XLRA MEAS LEFT PROX ECA PSV: -80.1 CM/SEC
BH CV XLRA MEAS LEFT PROX ICA EDV: -14.3 CM/SEC
BH CV XLRA MEAS LEFT PROX ICA PSV: -74.6 CM/SEC
BH CV XLRA MEAS LEFT VERTEBRAL A EDV: -12.4 CM/SEC
BH CV XLRA MEAS LEFT VERTEBRAL A PSV: -44.1 CM/SEC
BH CV XLRA MEAS RIGHT CAROTID BULB EDV: 14.3 CM/SEC
BH CV XLRA MEAS RIGHT CAROTID BULB PSV: 64.6 CM/SEC
BH CV XLRA MEAS RIGHT DIST CCA EDV: 11.8 CM/SEC
BH CV XLRA MEAS RIGHT DIST CCA PSV: 69.6 CM/SEC
BH CV XLRA MEAS RIGHT DIST ICA EDV: -18.6 CM/SEC
BH CV XLRA MEAS RIGHT DIST ICA PSV: -60.3 CM/SEC
BH CV XLRA MEAS RIGHT ICA/CCA RATIO: 0.94
BH CV XLRA MEAS RIGHT MID ICA EDV: -17.4 CM/SEC
BH CV XLRA MEAS RIGHT MID ICA PSV: -70.2 CM/SEC
BH CV XLRA MEAS RIGHT PROX CCA EDV: 11.2 CM/SEC
BH CV XLRA MEAS RIGHT PROX CCA PSV: 67.7 CM/SEC
BH CV XLRA MEAS RIGHT PROX ECA EDV: -7.5 CM/SEC
BH CV XLRA MEAS RIGHT PROX ECA PSV: -106.2 CM/SEC
BH CV XLRA MEAS RIGHT PROX ICA EDV: -14.3 CM/SEC
BH CV XLRA MEAS RIGHT PROX ICA PSV: -65.2 CM/SEC
BH CV XLRA MEAS RIGHT VERTEBRAL A EDV: -14.3 CM/SEC
BH CV XLRA MEAS RIGHT VERTEBRAL A PSV: -47.2 CM/SEC
LEFT ARM BP: NORMAL MMHG
RIGHT ARM BP: NORMAL MMHG

## 2023-08-22 PROCEDURE — 78452 HT MUSCLE IMAGE SPECT MULT: CPT

## 2023-08-22 PROCEDURE — 0 TECHNETIUM TETROFOSMIN KIT: Performed by: INTERNAL MEDICINE

## 2023-08-22 PROCEDURE — 93880 EXTRACRANIAL BILAT STUDY: CPT

## 2023-08-22 PROCEDURE — A9502 TC99M TETROFOSMIN: HCPCS | Performed by: INTERNAL MEDICINE

## 2023-08-22 PROCEDURE — 93017 CV STRESS TEST TRACING ONLY: CPT

## 2023-08-22 PROCEDURE — 25010000002 REGADENOSON 0.4 MG/5ML SOLUTION: Performed by: INTERNAL MEDICINE

## 2023-08-22 RX ORDER — REGADENOSON 0.08 MG/ML
0.4 INJECTION, SOLUTION INTRAVENOUS
Status: COMPLETED | OUTPATIENT
Start: 2023-08-22 | End: 2023-08-22

## 2023-08-22 RX ADMIN — REGADENOSON 0.4 MG: 0.08 INJECTION, SOLUTION INTRAVENOUS at 10:24

## 2023-08-22 RX ADMIN — TETROFOSMIN 1 DOSE: 1.38 INJECTION, POWDER, LYOPHILIZED, FOR SOLUTION INTRAVENOUS at 08:50

## 2023-08-22 RX ADMIN — TETROFOSMIN 1 DOSE: 1.38 INJECTION, POWDER, LYOPHILIZED, FOR SOLUTION INTRAVENOUS at 10:24

## 2023-08-23 ENCOUNTER — TELEPHONE (OUTPATIENT)
Dept: SLEEP MEDICINE | Facility: HOSPITAL | Age: 78
End: 2023-08-23
Payer: MEDICARE

## 2023-08-24 DIAGNOSIS — E11.40 TYPE 2 DIABETES MELLITUS WITH DIABETIC NEUROPATHY, UNSPECIFIED WHETHER LONG TERM INSULIN USE: ICD-10-CM

## 2023-08-24 LAB
BH CV IMMEDIATE POST TECH DATA BLOOD PRESSURE: NORMAL MMHG
BH CV IMMEDIATE POST TECH DATA HEART RATE: 72 BPM
BH CV IMMEDIATE POST TECH DATA OXYGEN SATS: 98 %
BH CV REST NUCLEAR ISOTOPE DOSE: 9.9 MCI
BH CV SIX MINUTE RECOVERY TECH DATA BLOOD PRESSURE: NORMAL
BH CV SIX MINUTE RECOVERY TECH DATA HEART RATE: 68 BPM
BH CV SIX MINUTE RECOVERY TECH DATA OXYGEN SATURATION: 97 %
BH CV SIX MINUTE RECOVERY TECH DATA SYMPTOMS: NORMAL
BH CV STRESS BP STAGE 1: NORMAL
BH CV STRESS COMMENTS STAGE 1: NORMAL
BH CV STRESS DOSE REGADENOSON STAGE 1: 0.4
BH CV STRESS DURATION MIN STAGE 1: 0
BH CV STRESS DURATION SEC STAGE 1: 10
BH CV STRESS HR STAGE 1: 63
BH CV STRESS NUCLEAR ISOTOPE DOSE: 33.1 MCI
BH CV STRESS O2 STAGE 1: 95
BH CV STRESS PROTOCOL 1: NORMAL
BH CV STRESS RECOVERY BP: NORMAL MMHG
BH CV STRESS RECOVERY HR: 68 BPM
BH CV STRESS RECOVERY O2: 98 %
BH CV STRESS STAGE 1: 1
BH CV THREE MINUTE POST TECH DATA BLOOD PRESSURE: NORMAL MMHG
BH CV THREE MINUTE POST TECH DATA HEART RATE: 69 BPM
BH CV THREE MINUTE POST TECH DATA OXYGEN SATURATION: 98 %
LV EF NUC BP: 64 %
MAXIMAL PREDICTED HEART RATE: 145 BPM
PERCENT MAX PREDICTED HR: 49.66 %
STRESS BASELINE BP: NORMAL MMHG
STRESS BASELINE HR: 57 BPM
STRESS O2 SAT REST: 97 %
STRESS PERCENT HR: 58 %
STRESS POST O2 SAT PEAK: 95 %
STRESS POST PEAK BP: NORMAL MMHG
STRESS POST PEAK HR: 72 BPM
STRESS TARGET HR: 123 BPM

## 2023-08-25 ENCOUNTER — TELEPHONE (OUTPATIENT)
Dept: SLEEP MEDICINE | Facility: HOSPITAL | Age: 78
End: 2023-08-25
Payer: MEDICARE

## 2023-08-28 NOTE — PROGRESS NOTES
Answers submitted by the patient for this visit:  Primary Reason for Visit (Submitted on 8/23/2023)  What is the primary reason for your visit?: Physical  Chief Complaint  Diabetes (3 month follow up, labs done. The patient states that his right leg is still swelling. He says its only the right leg that does this. )  Subjective    History of Present Illness  Trenton Adames is a 77 y.o. male  presents to [facility] for follow-up for diabetes, hypertension and hyperlipidemia. Recent labs reviewed.      Specialist include: Dr. Stephens for skin issues, Dr. Selby for chronic back pain, Dr. Charles for heart murmur, left carotid bruit and chest pain and Dr. George for sleep medicine.     Past Medical History:   Diagnosis Date    Arthritis     Asthma     Dysphagia, oral phase     Essential (primary) hypertension     Foot pain, bilateral     Heart murmur 5/2023    Heart valve disease 7/2023    Hyperlipidemia, unspecified     Low back pain     Neuropathy in diabetes     Peripheral neuropathy     Pulmonary HTN 08/09/2023    Type 2 diabetes mellitus without complication     Vitamin D deficiency, unspecified         Past Surgical History:   Procedure Laterality Date    CATARACT EXTRACTION, BILATERAL  1/28/2014; 1/7/2014    COLONOSCOPY  2022    HEMORRHOIDECTOMY  06/20/2014    POLYPECTOMY      SINUS SURGERY          Allergies   Allergen Reactions    Cat Hair Extract Other (See Comments)    Grass Other (See Comments)    Pollen Extract Other (See Comments)          Current Outpatient Medications:     Accu-Chek Softclix Lancets lancets, USE ONE LANCET 2-3 TIMES DAILY AS NEEDED, Disp: 100 each, Rfl: 12    albuterol sulfate  (90 Base) MCG/ACT inhaler, Inhale 2 puffs Every 4 (Four) Hours As Needed for Wheezing., Disp: 18 g, Rfl: 5    amLODIPine (NORVASC) 10 MG tablet, TAKE ONE TABLET BY MOUTH DAILY, Disp: 90 tablet, Rfl: 1    aspirin 81 MG EC tablet, Take 1 tablet by mouth Daily., Disp:  , Rfl:     atorvastatin (LIPITOR) 40 MG tablet, Take 1 tablet by mouth Daily., Disp: 90 tablet, Rfl: 1    Blood Glucose Monitoring Suppl (Accu-Chek Guide) w/Device kit, 1 each 3 (Three) Times a Day As Needed (check 2-3 times daily as needed)., Disp: 1 kit, Rfl: 0    clobetasol (TEMOVATE) 0.05 % cream, Apply 1 application topically to the appropriate area as directed 2 (Two) Times a Day., Disp: 30 g, Rfl: 2    docusate sodium 100 MG capsule, Take 1 capsule by mouth 2 (Two) Times a Day., Disp: 180 capsule, Rfl: 1    esomeprazole (nexIUM) 20 MG capsule, Take 1 capsule by mouth Every Morning Before Breakfast., Disp: , Rfl:     fluorouracil (EFUDEX) 5 % cream, , Disp: , Rfl:     fluticasone (FLONASE) 50 MCG/ACT nasal spray, 1 spray into the nostril(s) as directed by provider Daily., Disp: , Rfl:     gabapentin (Neurontin) 800 MG tablet, Take 1 tablet by mouth 3 (Three) Times a Day., Disp: 90 tablet, Rfl: 5    glucose blood (Accu-Chek Guide) test strip, USE ONE TEST STRIP 2-3 TIMES DAILY AS NEEDED., Disp: 100 each, Rfl: 12    glucose blood test strip, Test 2-3 times a week, Disp: 100 each, Rfl: 1    hydroCHLOROthiazide (HYDRODIURIL) 25 MG tablet, Take 1 tablet by mouth Daily., Disp: 90 tablet, Rfl: 3    Krill Oil (Omega-3) 500 MG capsule, Take 1 capsule by mouth Daily., Disp: , Rfl:     Lancets (onetouch ultrasoft) lancets, Test 2-3 times a week., Disp: 100 each, Rfl: 12    lisinopril (PRINIVIL,ZESTRIL) 40 MG tablet, Take 1 tablet by mouth Daily., Disp: 90 tablet, Rfl: 1    loratadine (CLARITIN) 10 MG tablet, Take 1 tablet by mouth Daily., Disp: , Rfl:     Melatonin 10 MG tablet, Take 1 tablet by mouth As Needed., Disp: , Rfl:     meloxicam (Mobic) 7.5 MG tablet, Take 1 tablet by mouth Daily., Disp: 90 tablet, Rfl: 0    metFORMIN (GLUCOPHAGE) 1000 MG tablet, Take 1 tablet by mouth 2 (Two) Times a Day With Meals., Disp: 180 tablet, Rfl: 1    montelukast (SINGULAIR) 10 MG tablet, Take 1 tablet by mouth Daily., Disp: 90  "tablet, Rfl: 0    vitamin D3 125 MCG (5000 UT) capsule capsule, Take 1 capsule by mouth Daily., Disp: , Rfl:     Semaglutide,0.25 or 0.5MG/DOS, (Ozempic, 0.25 or 0.5 MG/DOSE,) 2 MG/3ML solution pen-injector, Inject 0.25 mg under the skin into the appropriate area as directed 1 (One) Time Per Week for 4 weeks and then increase to 0.5 mg one time a week., Disp: 3 mL, Rfl: 5    Current Facility-Administered Medications:     cyanocobalamin injection 1,000 mcg, 1,000 mcg, Intramuscular, Q28 Days, Latanya, Jacquelin, APRN, 1,000 mcg at 07/03/23 0955    cyanocobalamin injection 1,000 mcg, 1,000 mcg, Intramuscular, Q28 Days, Latanya, Jacquelin, APRN, 1,000 mcg at 08/10/23 1041    Objective   /70 (BP Location: Right arm, Patient Position: Sitting, Cuff Size: Large Adult)   Pulse 62   Temp 97.1 øF (36.2 øC) (Temporal)   Ht 177.8 cm (70\")   Wt 87.4 kg (192 lb 9.6 oz)   SpO2 97%   BMI 27.64 kg/mý    Estimated body mass index is 27.64 kg/mý as calculated from the following:    Height as of this encounter: 177.8 cm (70\").    Weight as of this encounter: 87.4 kg (192 lb 9.6 oz).   Physical Exam  Vitals reviewed.   Constitutional:       General: He is not in acute distress.  HENT:      Head: Normocephalic and atraumatic.      Right Ear: Tympanic membrane and ear canal normal.      Left Ear: Tympanic membrane and ear canal normal.   Eyes:      Conjunctiva/sclera: Conjunctivae normal.   Cardiovascular:      Rate and Rhythm: Normal rate and regular rhythm.      Heart sounds: Murmur heard.      Comments: Slight murmur.    Pulmonary:      Effort: Pulmonary effort is normal.      Breath sounds: Normal breath sounds. No wheezing, rhonchi or rales.   Abdominal:      General: There is no distension.      Palpations: Abdomen is soft. There is no mass.      Tenderness: There is no abdominal tenderness.   Musculoskeletal:      Right lower leg: No edema.      Left lower leg: No edema.   Lymphadenopathy:      Cervical: No cervical " adenopathy.   Skin:     General: Skin is warm and dry.      Coloration: Skin is not jaundiced or pale.   Neurological:      General: No focal deficit present.      Mental Status: He is alert.   Psychiatric:         Mood and Affect: Mood normal.         Thought Content: Thought content normal.      Result Review :  The following data was reviewed by: DARRELL Pardo on 08/29/2023:  CMP          10/24/2022    10:29 5/22/2023    08:54 8/21/2023    09:54   CMP   Glucose 118  177  160    BUN 16  14  16    Creatinine 0.72  0.82  0.84    EGFR 95.3  91.6  90.9    Sodium 136  138  138    Potassium 5.1  4.1  4.3    Chloride 98  98  97    Calcium 9.7  9.7  9.5    Total Protein  7.9  7.4    Albumin  4.9  4.5    Globulin  3.0  2.9    Total Bilirubin  0.5  0.5    Alkaline Phosphatase  49  43    AST (SGOT)  21  34    ALT (SGPT)  32  39    Albumin/Globulin Ratio  1.6  1.6    BUN/Creatinine Ratio 22.2  17.1  19.0    Anion Gap 12.7  13.3  16.8      CBC w/diff          10/5/2022    09:16 5/22/2023    08:54 8/21/2023    09:54   CBC w/Diff   WBC 9.41  7.38  7.41    RBC 4.33  4.68  4.44    Hemoglobin 13.2  14.0  13.3    Hematocrit 37.4  41.1  39.1    MCV 86.4  87.8  88.1    MCH 30.5  29.9  30.0    MCHC 35.3  34.1  34.0    RDW 12.0  12.8  12.3    Platelets 236  257  252    Neutrophil Rel % 67.0  57.6  55.4    Immature Granulocyte Rel % 0.2  0.3  0.3    Lymphocyte Rel % 22.8  31.8  31.6    Monocyte Rel % 6.3  6.4  7.3    Eosinophil Rel % 2.8  3.0  4.2    Basophil Rel % 0.9  0.9  1.2      Lipid Panel          10/5/2022    09:16 5/22/2023    08:54 8/21/2023    09:54   Lipid Panel   Total Cholesterol 152  182  154    Triglycerides 98  160  163    HDL Cholesterol 46  47  36    VLDL Cholesterol 18  28  28    LDL Cholesterol  88  107  90    LDL/HDL Ratio 1.88  2.19  2.37      TSH          10/5/2022    09:16 5/22/2023    08:54 8/21/2023    09:54   TSH   TSH 1.210  1.880  1.630      A1C Last 3 Results          10/5/2022    09:16 5/22/2023     08:54 8/21/2023    09:54   HGBA1C Last 3 Results   Hemoglobin A1C 6.60  7.40  7.30      Microalbumin          10/5/2022    09:30   Microalbumin   Microalbumin, Urine <1.2      Adult Transthoracic Echo Complete W/ Cont if Necessary Per Protocol (07/03/2023 10:33)             Assessment and Plan   Diagnoses and all orders for this visit:    1. Type 2 diabetes mellitus with diabetic neuropathy, unspecified whether long term insulin use (Primary)  -     Hemoglobin A1c; Future  -     Comprehensive Metabolic Panel; Future    2. Essential hypertension    3. Hyperlipidemia, unspecified hyperlipidemia type    4. Murmur    Other orders  -     Semaglutide,0.25 or 0.5MG/DOS, (Ozempic, 0.25 or 0.5 MG/DOSE,) 2 MG/3ML solution pen-injector; Inject 0.25 mg under the skin into the appropriate area as directed 1 (One) Time Per Week for 4 weeks and then increase to 0.5 mg one time a week.  Dispense: 3 mL; Refill: 5    Type 2 diabetes mellitus with diabetic neuropathy:  HA1C is 7.40 and worse. Continue metformin 1000 mg twice a day. Start Ozempic 0.25 mg weekly for 4 weeks and then increase to 0.5 mg weekly. Check HA1C in 3 months.  Improving on  gabapentin to 800 mg tid and to increase to 800 mg tid.  HERMILO reviewed.       Hypertension:  Blood pressure well-controlled. Continue amlodipine 10 mg daily, lisinopril 40 mg daily and HCTZ 25 mg daily.      Hyperlipidemia: Lipids stable on atorvastatin 40 mg daily and Omega 500 mg daily.     Murmur: Slight murmur.  Mild regurgitation on echo.  Discussed findings with patient.  Patient following with cardiology.    Patient was given instructions and counseling regarding his condition or for health maintenance advice. Please see specific information pulled into the AVS if appropriate.     Follow Up   Return in about 3 months (around 11/29/2023) for Recheck.    Dictated Utilizing Dragon Dictation.  Please note that portions of this note were completed with a voice recognition program.  Part of  this note may be an electronic transcription/translation of spoken language to printed text using the Dragon Dictation System.    Jacquelin Pelaez APRN

## 2023-08-29 ENCOUNTER — OFFICE VISIT (OUTPATIENT)
Dept: INTERNAL MEDICINE | Facility: CLINIC | Age: 78
End: 2023-08-29
Payer: MEDICARE

## 2023-08-29 ENCOUNTER — OFFICE VISIT (OUTPATIENT)
Dept: CARDIOLOGY | Facility: CLINIC | Age: 78
End: 2023-08-29
Payer: MEDICARE

## 2023-08-29 VITALS
TEMPERATURE: 97.1 F | OXYGEN SATURATION: 97 % | HEIGHT: 70 IN | HEART RATE: 62 BPM | SYSTOLIC BLOOD PRESSURE: 118 MMHG | DIASTOLIC BLOOD PRESSURE: 70 MMHG | BODY MASS INDEX: 27.57 KG/M2 | WEIGHT: 192.6 LBS

## 2023-08-29 VITALS
WEIGHT: 194.6 LBS | HEIGHT: 70 IN | SYSTOLIC BLOOD PRESSURE: 185 MMHG | DIASTOLIC BLOOD PRESSURE: 70 MMHG | HEART RATE: 58 BPM | BODY MASS INDEX: 27.86 KG/M2

## 2023-08-29 DIAGNOSIS — R01.1 MURMUR: ICD-10-CM

## 2023-08-29 DIAGNOSIS — I10 ESSENTIAL HYPERTENSION: ICD-10-CM

## 2023-08-29 DIAGNOSIS — R07.89 CHEST PAIN, ATYPICAL: Primary | ICD-10-CM

## 2023-08-29 DIAGNOSIS — E11.40 TYPE 2 DIABETES MELLITUS WITH DIABETIC NEUROPATHY, UNSPECIFIED WHETHER LONG TERM INSULIN USE: Primary | ICD-10-CM

## 2023-08-29 DIAGNOSIS — E78.2 MIXED DYSLIPIDEMIA: ICD-10-CM

## 2023-08-29 DIAGNOSIS — E78.5 HYPERLIPIDEMIA, UNSPECIFIED HYPERLIPIDEMIA TYPE: ICD-10-CM

## 2023-08-29 PROCEDURE — 99214 OFFICE O/P EST MOD 30 MIN: CPT | Performed by: NURSE PRACTITIONER

## 2023-08-29 PROCEDURE — 3077F SYST BP >= 140 MM HG: CPT

## 2023-08-29 PROCEDURE — 3074F SYST BP LT 130 MM HG: CPT | Performed by: NURSE PRACTITIONER

## 2023-08-29 PROCEDURE — 1159F MED LIST DOCD IN RCRD: CPT

## 2023-08-29 PROCEDURE — 3078F DIAST BP <80 MM HG: CPT | Performed by: NURSE PRACTITIONER

## 2023-08-29 PROCEDURE — 99214 OFFICE O/P EST MOD 30 MIN: CPT

## 2023-08-29 PROCEDURE — 3051F HG A1C>EQUAL 7.0%<8.0%: CPT | Performed by: NURSE PRACTITIONER

## 2023-08-29 PROCEDURE — 3078F DIAST BP <80 MM HG: CPT

## 2023-08-29 PROCEDURE — 1160F RVW MEDS BY RX/DR IN RCRD: CPT

## 2023-08-29 RX ORDER — FAMOTIDINE 20 MG/1
20 TABLET, FILM COATED ORAL 2 TIMES DAILY
COMMUNITY
End: 2023-08-29

## 2023-08-29 RX ORDER — SEMAGLUTIDE 0.68 MG/ML
INJECTION, SOLUTION SUBCUTANEOUS
Qty: 3 ML | Refills: 5 | Status: SHIPPED | OUTPATIENT
Start: 2023-08-29

## 2023-08-29 NOTE — H&P (VIEW-ONLY)
Chief Complaint  Hypertension, Hyperlipidemia, and Follow-up (F/U AFTER TESTING)    Subjective        History of Present Illness  Trenton Adames presents to [facility] for follow up.   Patient is a 77-year-old  male with past medical history outlined below, significant for hypertension, hyperlipidemia and diabetes who presents for follow-up on recent stress test.  He does note some occasional chest pains that occur both at rest and with exertion and only last about a minute before they resolve on their own.  He denies any shortness of breath, dizziness, lightheadedness, syncope.  He has occasional swelling of the right lower extremity that improves throughout the day.  He denies any tenderness of that extremity.  He has sciatica of the left lower extremity.    Past Medical History:   Diagnosis Date    Arthritis     Asthma     Dysphagia, oral phase     Essential (primary) hypertension     Foot pain, bilateral     Heart murmur 2023    Heart valve disease 2023    Hyperlipidemia, unspecified     Low back pain     Neuropathy in diabetes     Peripheral neuropathy     Pulmonary HTN 2023    Type 2 diabetes mellitus without complication     Vitamin D deficiency, unspecified        ALLERGY  Allergies   Allergen Reactions    Cat Hair Extract Other (See Comments)    Grass Other (See Comments)    Pollen Extract Other (See Comments)        Past Surgical History:   Procedure Laterality Date    CATARACT EXTRACTION, BILATERAL  2014; 2014    COLONOSCOPY      HEMORRHOIDECTOMY  2014    POLYPECTOMY      SINUS SURGERY          Social History     Socioeconomic History    Marital status:    Tobacco Use    Smoking status: Former     Packs/day: 2.00     Years: 15.00     Pack years: 30.00     Types: Cigarettes, Pipe     Quit date: 1995     Years since quittin.6    Smokeless tobacco: Never   Vaping Use    Vaping Use: Never used   Substance and Sexual  Activity    Alcohol use: Yes     Alcohol/week: 8.0 standard drinks     Types: 6 Cans of beer, 2 Drinks containing 0.5 oz of alcohol per week     Comment: Occasional     Drug use: Never    Sexual activity: Not Currently     Partners: Female       Family History   Problem Relation Age of Onset    Cancer Mother     Heart disease Father     Diabetes Father     Heart disease Maternal Grandmother     Cancer Maternal Grandfather     Diabetes Paternal Grandmother         Current Outpatient Medications on File Prior to Visit   Medication Sig    Accu-Chek Softclix Lancets lancets USE ONE LANCET 2-3 TIMES DAILY AS NEEDED    albuterol sulfate  (90 Base) MCG/ACT inhaler Inhale 2 puffs Every 4 (Four) Hours As Needed for Wheezing.    amLODIPine (NORVASC) 10 MG tablet TAKE ONE TABLET BY MOUTH DAILY    aspirin 81 MG EC tablet Take 1 tablet by mouth Daily.    atorvastatin (LIPITOR) 40 MG tablet Take 1 tablet by mouth Daily.    Blood Glucose Monitoring Suppl (Accu-Chek Guide) w/Device kit 1 each 3 (Three) Times a Day As Needed (check 2-3 times daily as needed).    clobetasol (TEMOVATE) 0.05 % cream Apply 1 application topically to the appropriate area as directed 2 (Two) Times a Day.    docusate sodium 100 MG capsule Take 1 capsule by mouth 2 (Two) Times a Day.    esomeprazole (nexIUM) 20 MG capsule Take 1 capsule by mouth Every Morning Before Breakfast.    fluorouracil (EFUDEX) 5 % cream     fluticasone (FLONASE) 50 MCG/ACT nasal spray 1 spray into the nostril(s) as directed by provider Daily.    gabapentin (Neurontin) 800 MG tablet Take 1 tablet by mouth 3 (Three) Times a Day.    glucose blood (Accu-Chek Guide) test strip USE ONE TEST STRIP 2-3 TIMES DAILY AS NEEDED.    glucose blood test strip Test 2-3 times a week    hydroCHLOROthiazide (HYDRODIURIL) 25 MG tablet Take 1 tablet by mouth Daily.    Krill Oil (Omega-3) 500 MG capsule Take 1 capsule by mouth Daily.    Lancets (onetouch ultrasoft) lancets Test 2-3 times a week.  "   lisinopril (PRINIVIL,ZESTRIL) 40 MG tablet Take 1 tablet by mouth Daily.    loratadine (CLARITIN) 10 MG tablet Take 1 tablet by mouth Daily.    Melatonin 10 MG tablet Take 1 tablet by mouth As Needed.    meloxicam (Mobic) 7.5 MG tablet Take 1 tablet by mouth Daily.    metFORMIN (GLUCOPHAGE) 1000 MG tablet Take 1 tablet by mouth 2 (Two) Times a Day With Meals.    montelukast (SINGULAIR) 10 MG tablet Take 1 tablet by mouth Daily.    Semaglutide,0.25 or 0.5MG/DOS, (Ozempic, 0.25 or 0.5 MG/DOSE,) 2 MG/3ML solution pen-injector Inject 0.25 mg under the skin into the appropriate area as directed 1 (One) Time Per Week for 4 weeks and then increase to 0.5 mg one time a week.    vitamin D3 125 MCG (5000 UT) capsule capsule Take 1 capsule by mouth Daily.     Current Facility-Administered Medications on File Prior to Visit   Medication    cyanocobalamin injection 1,000 mcg    cyanocobalamin injection 1,000 mcg       Objective   Vitals:    08/29/23 0908 08/29/23 0916   BP: 173/72  Comment: Pt was at PCP 30 mins prior to this appt and bp was 118/70 - DE (!) 185/70   Pulse: 61 58   Weight: 88.3 kg (194 lb 9.6 oz)    Height: 177.8 cm (70\")        Physical Exam  Constitutional:       General: He is awake. He is not in acute distress.     Appearance: Normal appearance.   HENT:      Head: Normocephalic.      Nose: Nose normal. No congestion.   Eyes:      Extraocular Movements: Extraocular movements intact.      Conjunctiva/sclera: Conjunctivae normal.      Pupils: Pupils are equal, round, and reactive to light.   Neck:      Thyroid: No thyromegaly.      Vascular: No JVD.   Cardiovascular:      Rate and Rhythm: Normal rate and regular rhythm.      Chest Wall: PMI is not displaced.      Pulses: Normal pulses.      Heart sounds: Normal heart sounds, S1 normal and S2 normal. No murmur heard.    No friction rub. No gallop. No S3 or S4 sounds.   Pulmonary:      Effort: Pulmonary effort is normal.      Breath sounds: Normal breath " sounds. No wheezing, rhonchi or rales.   Abdominal:      General: Bowel sounds are normal.      Palpations: Abdomen is soft.      Tenderness: There is no abdominal tenderness.   Musculoskeletal:      Cervical back: No tenderness.      Right lower leg: No edema.      Left lower leg: No edema.   Lymphadenopathy:      Cervical: No cervical adenopathy.   Skin:     General: Skin is warm and dry.      Capillary Refill: Capillary refill takes less than 2 seconds.      Coloration: Skin is not cyanotic.      Findings: No petechiae or rash.      Nails: There is no clubbing.   Neurological:      Mental Status: He is alert.   Psychiatric:         Mood and Affect: Mood normal.         Behavior: Behavior is cooperative.         Result Review     The following data was reviewed by DARRELL Ibrahim on 08/31/23.    No results found for: PROBNP  CMP          10/24/2022    10:29 5/22/2023    08:54 8/21/2023    09:54   CMP   Glucose 118  177  160    BUN 16  14  16    Creatinine 0.72  0.82  0.84    EGFR 95.3  91.6  90.9    Sodium 136  138  138    Potassium 5.1  4.1  4.3    Chloride 98  98  97    Calcium 9.7  9.7  9.5    Total Protein  7.9  7.4    Albumin  4.9  4.5    Globulin  3.0  2.9    Total Bilirubin  0.5  0.5    Alkaline Phosphatase  49  43    AST (SGOT)  21  34    ALT (SGPT)  32  39    Albumin/Globulin Ratio  1.6  1.6    BUN/Creatinine Ratio 22.2  17.1  19.0    Anion Gap 12.7  13.3  16.8      CBC w/diff          10/5/2022    09:16 5/22/2023    08:54 8/21/2023    09:54   CBC w/Diff   WBC 9.41  7.38  7.41    RBC 4.33  4.68  4.44    Hemoglobin 13.2  14.0  13.3    Hematocrit 37.4  41.1  39.1    MCV 86.4  87.8  88.1    MCH 30.5  29.9  30.0    MCHC 35.3  34.1  34.0    RDW 12.0  12.8  12.3    Platelets 236  257  252    Neutrophil Rel % 67.0  57.6  55.4    Immature Granulocyte Rel % 0.2  0.3  0.3    Lymphocyte Rel % 22.8  31.8  31.6    Monocyte Rel % 6.3  6.4  7.3    Eosinophil Rel % 2.8  3.0  4.2    Basophil Rel % 0.9  0.9  1.2        Lipid Panel          10/5/2022    09:16 5/22/2023    08:54 8/21/2023    09:54   Lipid Panel   Total Cholesterol 152  182  154    Triglycerides 98  160  163    HDL Cholesterol 46  47  36    VLDL Cholesterol 18  28  28    LDL Cholesterol  88  107  90    LDL/HDL Ratio 1.88  2.19  2.37        Results for orders placed during the hospital encounter of 07/03/23    Adult Transthoracic Echo Complete W/ Cont if Necessary Per Protocol    Interpretation Summary    Left ventricular systolic function is normal. Left ventricular ejection fraction appears to be 61 - 65%.    Left ventricular diastolic function was normal.    No regional wall motion abnormality    Estimated right ventricular systolic pressure from tricuspid regurgitation is moderately elevated (45-55 mmHg).    Results for orders placed during the hospital encounter of 08/22/23    Stress Test With Myocardial Perfusion One Day    Interpretation Summary  Patient received Lexiscan 0.4 mg IV infusion over 10 seconds.  Baseline ECG showed normal sinus rhythm.  At peak stress, no ischemic ST-T changes were noted.  No significant arrhythmias were noted.  Gated on SPECT images were reviewed.  Image quality is adequate.  There is a medium area of moderate perfusion defect in the distal anterior and apical segment with minimal reversibility on resting images which could represent resting myocardial ischemia versus attenuation artifact.  Correlate clinically.  The left ventricle is normal in size calculated ejection fraction of 64%.  No wall motion abnormalities were noted.  Overall, this presents a low to intermediate risk study.  Correlate these findings clinically.         Procedures    Assessment & Plan  Diagnoses and all orders for this visit:    1. Chest pain, atypical (Primary)  -     Cancel: CT Cardiac Calcium Score Without Dye; Future  -     CT Angiogram Coronary; Future    2. Essential hypertension    3. Mixed dyslipidemia    4. Murmur      1.  Recent stress test  was low to intermediate risk due to medium area of moderate perfusion defect in the distal anterior and apical segment with minimal reversibility on resting images which could represent resting myocardial ischemia versus attenuation artifact.  He does have some occasional chest pain that occurs both at rest and with exertion.  It typically resolves on its own within 1 minute.  He describes it as mild.  Given that his symptoms persist this was discussed with Dr. Charles who recommended further evaluation with left heart catheterization with possible PCI which will be scheduled for September 12.  Risks and benefits were discussed with the patient and he was agreeable to proceed.  In the meantime he was advised to notify us if his symptoms worsen or intensify.  2.  Blood pressure is elevated in the office today however he reports normal blood pressure readings at home consistently.  He reports very good control since starting hydrochlorothiazide.  This medication will be continued.  He was advised to continue to monitor his blood pressure on a daily basis and notify us if it becomes elevated.  Dietary sodium restriction is recommended.  3.  On atorvastatin.  Continue the same.  4.  Recent echocardiogram did not demonstrate any structural or valvular abnormalities however there was moderately elevated PA systolic pressure.    Follow Up   Return for November, With .    Patient was given instructions and counseling regarding his condition or for health maintenance advice. Please see specific information pulled into the AVS if appropriate.     Asuncion Galvan, DARRELL  08/31/23  09:21 EDT    Dictated Utilizing Dragon Dictation

## 2023-08-29 NOTE — PROGRESS NOTES
Chief Complaint  Hypertension, Hyperlipidemia, and Follow-up (F/U AFTER TESTING)    Subjective        History of Present Illness  Trenton Adames presents to [facility] for follow up.   Patient is a 77-year-old  male with past medical history outlined below, significant for hypertension, hyperlipidemia and diabetes who presents for follow-up on recent stress test.  He does note some occasional chest pains that occur both at rest and with exertion and only last about a minute before they resolve on their own.  He denies any shortness of breath, dizziness, lightheadedness, syncope.  He has occasional swelling of the right lower extremity that improves throughout the day.  He denies any tenderness of that extremity.  He has sciatica of the left lower extremity.    Past Medical History:   Diagnosis Date    Arthritis     Asthma     Dysphagia, oral phase     Essential (primary) hypertension     Foot pain, bilateral     Heart murmur 2023    Heart valve disease 2023    Hyperlipidemia, unspecified     Low back pain     Neuropathy in diabetes     Peripheral neuropathy     Pulmonary HTN 2023    Type 2 diabetes mellitus without complication     Vitamin D deficiency, unspecified        ALLERGY  Allergies   Allergen Reactions    Cat Hair Extract Other (See Comments)    Grass Other (See Comments)    Pollen Extract Other (See Comments)        Past Surgical History:   Procedure Laterality Date    CATARACT EXTRACTION, BILATERAL  2014; 2014    COLONOSCOPY      HEMORRHOIDECTOMY  2014    POLYPECTOMY      SINUS SURGERY          Social History     Socioeconomic History    Marital status:    Tobacco Use    Smoking status: Former     Packs/day: 2.00     Years: 15.00     Pack years: 30.00     Types: Cigarettes, Pipe     Quit date: 1995     Years since quittin.6    Smokeless tobacco: Never   Vaping Use    Vaping Use: Never used   Substance and Sexual  Activity    Alcohol use: Yes     Alcohol/week: 8.0 standard drinks     Types: 6 Cans of beer, 2 Drinks containing 0.5 oz of alcohol per week     Comment: Occasional     Drug use: Never    Sexual activity: Not Currently     Partners: Female       Family History   Problem Relation Age of Onset    Cancer Mother     Heart disease Father     Diabetes Father     Heart disease Maternal Grandmother     Cancer Maternal Grandfather     Diabetes Paternal Grandmother         Current Outpatient Medications on File Prior to Visit   Medication Sig    Accu-Chek Softclix Lancets lancets USE ONE LANCET 2-3 TIMES DAILY AS NEEDED    albuterol sulfate  (90 Base) MCG/ACT inhaler Inhale 2 puffs Every 4 (Four) Hours As Needed for Wheezing.    amLODIPine (NORVASC) 10 MG tablet TAKE ONE TABLET BY MOUTH DAILY    aspirin 81 MG EC tablet Take 1 tablet by mouth Daily.    atorvastatin (LIPITOR) 40 MG tablet Take 1 tablet by mouth Daily.    Blood Glucose Monitoring Suppl (Accu-Chek Guide) w/Device kit 1 each 3 (Three) Times a Day As Needed (check 2-3 times daily as needed).    clobetasol (TEMOVATE) 0.05 % cream Apply 1 application topically to the appropriate area as directed 2 (Two) Times a Day.    docusate sodium 100 MG capsule Take 1 capsule by mouth 2 (Two) Times a Day.    esomeprazole (nexIUM) 20 MG capsule Take 1 capsule by mouth Every Morning Before Breakfast.    fluorouracil (EFUDEX) 5 % cream     fluticasone (FLONASE) 50 MCG/ACT nasal spray 1 spray into the nostril(s) as directed by provider Daily.    gabapentin (Neurontin) 800 MG tablet Take 1 tablet by mouth 3 (Three) Times a Day.    glucose blood (Accu-Chek Guide) test strip USE ONE TEST STRIP 2-3 TIMES DAILY AS NEEDED.    glucose blood test strip Test 2-3 times a week    hydroCHLOROthiazide (HYDRODIURIL) 25 MG tablet Take 1 tablet by mouth Daily.    Krill Oil (Omega-3) 500 MG capsule Take 1 capsule by mouth Daily.    Lancets (onetouch ultrasoft) lancets Test 2-3 times a week.  "   lisinopril (PRINIVIL,ZESTRIL) 40 MG tablet Take 1 tablet by mouth Daily.    loratadine (CLARITIN) 10 MG tablet Take 1 tablet by mouth Daily.    Melatonin 10 MG tablet Take 1 tablet by mouth As Needed.    meloxicam (Mobic) 7.5 MG tablet Take 1 tablet by mouth Daily.    metFORMIN (GLUCOPHAGE) 1000 MG tablet Take 1 tablet by mouth 2 (Two) Times a Day With Meals.    montelukast (SINGULAIR) 10 MG tablet Take 1 tablet by mouth Daily.    Semaglutide,0.25 or 0.5MG/DOS, (Ozempic, 0.25 or 0.5 MG/DOSE,) 2 MG/3ML solution pen-injector Inject 0.25 mg under the skin into the appropriate area as directed 1 (One) Time Per Week for 4 weeks and then increase to 0.5 mg one time a week.    vitamin D3 125 MCG (5000 UT) capsule capsule Take 1 capsule by mouth Daily.     Current Facility-Administered Medications on File Prior to Visit   Medication    cyanocobalamin injection 1,000 mcg    cyanocobalamin injection 1,000 mcg       Objective   Vitals:    08/29/23 0908 08/29/23 0916   BP: 173/72  Comment: Pt was at PCP 30 mins prior to this appt and bp was 118/70 - DE (!) 185/70   Pulse: 61 58   Weight: 88.3 kg (194 lb 9.6 oz)    Height: 177.8 cm (70\")        Physical Exam  Constitutional:       General: He is awake. He is not in acute distress.     Appearance: Normal appearance.   HENT:      Head: Normocephalic.      Nose: Nose normal. No congestion.   Eyes:      Extraocular Movements: Extraocular movements intact.      Conjunctiva/sclera: Conjunctivae normal.      Pupils: Pupils are equal, round, and reactive to light.   Neck:      Thyroid: No thyromegaly.      Vascular: No JVD.   Cardiovascular:      Rate and Rhythm: Normal rate and regular rhythm.      Chest Wall: PMI is not displaced.      Pulses: Normal pulses.      Heart sounds: Normal heart sounds, S1 normal and S2 normal. No murmur heard.    No friction rub. No gallop. No S3 or S4 sounds.   Pulmonary:      Effort: Pulmonary effort is normal.      Breath sounds: Normal breath " sounds. No wheezing, rhonchi or rales.   Abdominal:      General: Bowel sounds are normal.      Palpations: Abdomen is soft.      Tenderness: There is no abdominal tenderness.   Musculoskeletal:      Cervical back: No tenderness.      Right lower leg: No edema.      Left lower leg: No edema.   Lymphadenopathy:      Cervical: No cervical adenopathy.   Skin:     General: Skin is warm and dry.      Capillary Refill: Capillary refill takes less than 2 seconds.      Coloration: Skin is not cyanotic.      Findings: No petechiae or rash.      Nails: There is no clubbing.   Neurological:      Mental Status: He is alert.   Psychiatric:         Mood and Affect: Mood normal.         Behavior: Behavior is cooperative.         Result Review     The following data was reviewed by DARRELL Ibrahim on 08/31/23.    No results found for: PROBNP  CMP          10/24/2022    10:29 5/22/2023    08:54 8/21/2023    09:54   CMP   Glucose 118  177  160    BUN 16  14  16    Creatinine 0.72  0.82  0.84    EGFR 95.3  91.6  90.9    Sodium 136  138  138    Potassium 5.1  4.1  4.3    Chloride 98  98  97    Calcium 9.7  9.7  9.5    Total Protein  7.9  7.4    Albumin  4.9  4.5    Globulin  3.0  2.9    Total Bilirubin  0.5  0.5    Alkaline Phosphatase  49  43    AST (SGOT)  21  34    ALT (SGPT)  32  39    Albumin/Globulin Ratio  1.6  1.6    BUN/Creatinine Ratio 22.2  17.1  19.0    Anion Gap 12.7  13.3  16.8      CBC w/diff          10/5/2022    09:16 5/22/2023    08:54 8/21/2023    09:54   CBC w/Diff   WBC 9.41  7.38  7.41    RBC 4.33  4.68  4.44    Hemoglobin 13.2  14.0  13.3    Hematocrit 37.4  41.1  39.1    MCV 86.4  87.8  88.1    MCH 30.5  29.9  30.0    MCHC 35.3  34.1  34.0    RDW 12.0  12.8  12.3    Platelets 236  257  252    Neutrophil Rel % 67.0  57.6  55.4    Immature Granulocyte Rel % 0.2  0.3  0.3    Lymphocyte Rel % 22.8  31.8  31.6    Monocyte Rel % 6.3  6.4  7.3    Eosinophil Rel % 2.8  3.0  4.2    Basophil Rel % 0.9  0.9  1.2        Lipid Panel          10/5/2022    09:16 5/22/2023    08:54 8/21/2023    09:54   Lipid Panel   Total Cholesterol 152  182  154    Triglycerides 98  160  163    HDL Cholesterol 46  47  36    VLDL Cholesterol 18  28  28    LDL Cholesterol  88  107  90    LDL/HDL Ratio 1.88  2.19  2.37        Results for orders placed during the hospital encounter of 07/03/23    Adult Transthoracic Echo Complete W/ Cont if Necessary Per Protocol    Interpretation Summary    Left ventricular systolic function is normal. Left ventricular ejection fraction appears to be 61 - 65%.    Left ventricular diastolic function was normal.    No regional wall motion abnormality    Estimated right ventricular systolic pressure from tricuspid regurgitation is moderately elevated (45-55 mmHg).    Results for orders placed during the hospital encounter of 08/22/23    Stress Test With Myocardial Perfusion One Day    Interpretation Summary  Patient received Lexiscan 0.4 mg IV infusion over 10 seconds.  Baseline ECG showed normal sinus rhythm.  At peak stress, no ischemic ST-T changes were noted.  No significant arrhythmias were noted.  Gated on SPECT images were reviewed.  Image quality is adequate.  There is a medium area of moderate perfusion defect in the distal anterior and apical segment with minimal reversibility on resting images which could represent resting myocardial ischemia versus attenuation artifact.  Correlate clinically.  The left ventricle is normal in size calculated ejection fraction of 64%.  No wall motion abnormalities were noted.  Overall, this presents a low to intermediate risk study.  Correlate these findings clinically.         Procedures    Assessment & Plan  Diagnoses and all orders for this visit:    1. Chest pain, atypical (Primary)  -     Cancel: CT Cardiac Calcium Score Without Dye; Future  -     CT Angiogram Coronary; Future    2. Essential hypertension    3. Mixed dyslipidemia    4. Murmur      1.  Recent stress test  was low to intermediate risk due to medium area of moderate perfusion defect in the distal anterior and apical segment with minimal reversibility on resting images which could represent resting myocardial ischemia versus attenuation artifact.  He does have some occasional chest pain that occurs both at rest and with exertion.  It typically resolves on its own within 1 minute.  He describes it as mild.  Given that his symptoms persist this was discussed with Dr. Charles who recommended further evaluation with left heart catheterization with possible PCI which will be scheduled for September 12.  Risks and benefits were discussed with the patient and he was agreeable to proceed.  In the meantime he was advised to notify us if his symptoms worsen or intensify.  2.  Blood pressure is elevated in the office today however he reports normal blood pressure readings at home consistently.  He reports very good control since starting hydrochlorothiazide.  This medication will be continued.  He was advised to continue to monitor his blood pressure on a daily basis and notify us if it becomes elevated.  Dietary sodium restriction is recommended.  3.  On atorvastatin.  Continue the same.  4.  Recent echocardiogram did not demonstrate any structural or valvular abnormalities however there was moderately elevated PA systolic pressure.    Follow Up   Return for November, With .    Patient was given instructions and counseling regarding his condition or for health maintenance advice. Please see specific information pulled into the AVS if appropriate.     Asuncion Galvan, DARRELL  08/31/23  09:21 EDT    Dictated Utilizing Dragon Dictation

## 2023-09-01 ENCOUNTER — OFFICE VISIT (OUTPATIENT)
Dept: INTERNAL MEDICINE | Facility: CLINIC | Age: 78
End: 2023-09-01
Payer: MEDICARE

## 2023-09-01 VITALS
OXYGEN SATURATION: 96 % | DIASTOLIC BLOOD PRESSURE: 90 MMHG | BODY MASS INDEX: 27.63 KG/M2 | WEIGHT: 193 LBS | HEIGHT: 70 IN | HEART RATE: 69 BPM | SYSTOLIC BLOOD PRESSURE: 140 MMHG | TEMPERATURE: 97.9 F

## 2023-09-01 DIAGNOSIS — S30.860A TICK BITE OF BUTTOCK, INITIAL ENCOUNTER: ICD-10-CM

## 2023-09-01 DIAGNOSIS — E53.8 B12 DEFICIENCY: Primary | ICD-10-CM
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
## 2023-09-01 RX ORDER — DOXYCYCLINE HYCLATE 100 MG/1
100 CAPSULE ORAL 2 TIMES DAILY
Qty: 20 CAPSULE | Refills: 0 | Status: SHIPPED | OUTPATIENT
Start: 2023-09-01 | End: 2023-09-11

## 2023-09-01 RX ORDER — CYANOCOBALAMIN 1000 UG/ML
1000 INJECTION, SOLUTION INTRAMUSCULAR; SUBCUTANEOUS
Status: SHIPPED | OUTPATIENT
Start: 2023-09-01

## 2023-09-01 RX ADMIN — CYANOCOBALAMIN 1000 MCG: 1000 INJECTION, SOLUTION INTRAMUSCULAR; SUBCUTANEOUS at 13:09

## 2023-09-01 NOTE — PROGRESS NOTES
Chief Complaint  Insect Bite (The patient has a tick bite on his left buttock. He states he noticed this Wednesday. He states its swollen and red, he states there are no rings or anything. )  Subjective      Insect Bite    Trenton Adames is a 77 y.o. male who presents to [facility] for an acute visit for complaint of tick bite of left buttock that he had 2 days ago and has become red and swollen. He is unsure of how long the tick was attached. Negative for rash and fever.    Past Medical History:   Diagnosis Date    Arthritis     Asthma     Dysphagia, oral phase     Essential (primary) hypertension     Foot pain, bilateral     Heart murmur 5/2023    Heart valve disease 7/2023    Hyperlipidemia, unspecified     Low back pain     Neuropathy in diabetes     Peripheral neuropathy     Pulmonary HTN 08/09/2023    Type 2 diabetes mellitus without complication     Vitamin D deficiency, unspecified         Past Surgical History:   Procedure Laterality Date    CATARACT EXTRACTION, BILATERAL  1/28/2014; 1/7/2014    COLONOSCOPY  2022    HEMORRHOIDECTOMY  06/20/2014    POLYPECTOMY      SINUS SURGERY          Allergies   Allergen Reactions    Cat Hair Extract Other (See Comments)    Grass Other (See Comments)    Pollen Extract Other (See Comments)          Current Outpatient Medications:     Accu-Chek Softclix Lancets lancets, USE ONE LANCET 2-3 TIMES DAILY AS NEEDED, Disp: 100 each, Rfl: 12    albuterol sulfate  (90 Base) MCG/ACT inhaler, Inhale 2 puffs Every 4 (Four) Hours As Needed for Wheezing., Disp: 18 g, Rfl: 5    amLODIPine (NORVASC) 10 MG tablet, TAKE ONE TABLET BY MOUTH DAILY, Disp: 90 tablet, Rfl: 1    aspirin 81 MG EC tablet, Take 1 tablet by mouth Daily., Disp: , Rfl:     atorvastatin (LIPITOR) 40 MG tablet, Take 1 tablet by mouth Daily., Disp: 90 tablet, Rfl: 1    Blood Glucose Monitoring Suppl (Accu-Chek Guide) w/Device kit, 1 each 3 (Three) Times a Day As Needed  (check 2-3 times daily as needed)., Disp: 1 kit, Rfl: 0    clobetasol (TEMOVATE) 0.05 % cream, Apply 1 application topically to the appropriate area as directed 2 (Two) Times a Day., Disp: 30 g, Rfl: 2    docusate sodium 100 MG capsule, Take 1 capsule by mouth 2 (Two) Times a Day., Disp: 180 capsule, Rfl: 1    esomeprazole (nexIUM) 20 MG capsule, Take 1 capsule by mouth Every Morning Before Breakfast., Disp: , Rfl:     fluorouracil (EFUDEX) 5 % cream, , Disp: , Rfl:     fluticasone (FLONASE) 50 MCG/ACT nasal spray, 1 spray into the nostril(s) as directed by provider Daily., Disp: , Rfl:     gabapentin (Neurontin) 800 MG tablet, Take 1 tablet by mouth 3 (Three) Times a Day., Disp: 90 tablet, Rfl: 5    glucose blood (Accu-Chek Guide) test strip, USE ONE TEST STRIP 2-3 TIMES DAILY AS NEEDED., Disp: 100 each, Rfl: 12    glucose blood test strip, Test 2-3 times a week, Disp: 100 each, Rfl: 1    hydroCHLOROthiazide (HYDRODIURIL) 25 MG tablet, Take 1 tablet by mouth Daily., Disp: 90 tablet, Rfl: 3    Krill Oil (Omega-3) 500 MG capsule, Take 1 capsule by mouth Daily., Disp: , Rfl:     Lancets (onetouch ultrasoft) lancets, Test 2-3 times a week., Disp: 100 each, Rfl: 12    lisinopril (PRINIVIL,ZESTRIL) 40 MG tablet, Take 1 tablet by mouth Daily., Disp: 90 tablet, Rfl: 1    loratadine (CLARITIN) 10 MG tablet, Take 1 tablet by mouth Daily., Disp: , Rfl:     Melatonin 10 MG tablet, Take 1 tablet by mouth As Needed., Disp: , Rfl:     meloxicam (Mobic) 7.5 MG tablet, Take 1 tablet by mouth Daily., Disp: 90 tablet, Rfl: 0    metFORMIN (GLUCOPHAGE) 1000 MG tablet, Take 1 tablet by mouth 2 (Two) Times a Day With Meals., Disp: 180 tablet, Rfl: 1    montelukast (SINGULAIR) 10 MG tablet, Take 1 tablet by mouth Daily., Disp: 90 tablet, Rfl: 0    Semaglutide,0.25 or 0.5MG/DOS, (Ozempic, 0.25 or 0.5 MG/DOSE,) 2 MG/3ML solution pen-injector, Inject 0.25 mg under the skin into the appropriate area as directed 1 (One) Time Per Week for 4  "weeks and then increase to 0.5 mg one time a week., Disp: 3 mL, Rfl: 5    vitamin D3 125 MCG (5000 UT) capsule capsule, Take 1 capsule by mouth Daily., Disp: , Rfl:     doxycycline (VIBRAMYCIN) 100 MG capsule, Take 1 capsule by mouth 2 (Two) Times a Day for 10 days., Disp: 20 capsule, Rfl: 0    Current Facility-Administered Medications:     cyanocobalamin injection 1,000 mcg, 1,000 mcg, Intramuscular, Q28 Days, Jacquelin Pelaez APRN, 1,000 mcg at 07/03/23 0955    cyanocobalamin injection 1,000 mcg, 1,000 mcg, Intramuscular, Q28 Days, Jacquelin Pelaez APRN, 1,000 mcg at 08/10/23 1041    Objective   /90 (BP Location: Right arm, Patient Position: Sitting, Cuff Size: Adult)   Pulse 69   Temp 97.9 øF (36.6 øC) (Temporal)   Ht 177.8 cm (70\")   Wt 87.5 kg (193 lb)   SpO2 96%   BMI 27.69 kg/mý    Estimated body mass index is 27.69 kg/mý as calculated from the following:    Height as of this encounter: 177.8 cm (70\").    Weight as of this encounter: 87.5 kg (193 lb).   Physical Exam  Vitals reviewed.   Constitutional:       General: He is not in acute distress.  HENT:      Head: Normocephalic and atraumatic.   Pulmonary:      Effort: Pulmonary effort is normal.   Skin:     Comments: Left buttocks with bite that is erythema, edema, tenderness and scabbing.   Neurological:      General: No focal deficit present.      Mental Status: He is alert.   Psychiatric:         Thought Content: Thought content normal.         Assessment and Plan   Diagnoses and all orders for this visit:    1. Tick bite of buttock, initial encounter (Primary)    Other orders  -     doxycycline (VIBRAMYCIN) 100 MG capsule; Take 1 capsule by mouth 2 (Two) Times a Day for 10 days.  Dispense: 20 capsule; Refill: 0      Start doxycycline 100 mg BID for 10 days.  Education on diagnosis, medication and treatment plan.  Patient was given instructions and counseling regarding his condition. Please see specific information pulled into the AVS if " appropriate.     Follow Up   If not improving or worsening the patient was instructed to follow-up.    Dictated Utilizing Dragon Dictation.  Please note that portions of this note were completed with a voice recognition program.  Part of this note may be an electronic transcription/translation of spoken language to printed text using the Dragon Dictation System.    DARRELL Pardo

## 2023-09-05 ENCOUNTER — PREP FOR SURGERY (OUTPATIENT)
Dept: OTHER | Facility: HOSPITAL | Age: 78
End: 2023-09-05
Payer: MEDICARE

## 2023-09-05 DIAGNOSIS — R07.89 CHEST PAIN, ATYPICAL: Primary | ICD-10-CM

## 2023-09-05 RX ORDER — SODIUM CHLORIDE 9 MG/ML
40 INJECTION, SOLUTION INTRAVENOUS AS NEEDED
OUTPATIENT
Start: 2023-09-05

## 2023-09-05 RX ORDER — SODIUM CHLORIDE 0.9 % (FLUSH) 0.9 %
10 SYRINGE (ML) INJECTION AS NEEDED
OUTPATIENT
Start: 2023-09-05

## 2023-09-05 RX ORDER — SODIUM CHLORIDE 0.9 % (FLUSH) 0.9 %
10 SYRINGE (ML) INJECTION EVERY 12 HOURS SCHEDULED
OUTPATIENT
Start: 2023-09-05

## 2023-09-06 ENCOUNTER — TELEPHONE (OUTPATIENT)
Dept: CARDIOLOGY | Facility: CLINIC | Age: 78
End: 2023-09-06
Payer: MEDICARE

## 2023-09-06 NOTE — TELEPHONE ENCOUNTER
Patient arrived in the clinic and had questions about regarding cardiac cath. Patient has a motorcycle ride planned for Saturday and Sunday. It has been paid for but has cardiac cath scheduled for 9/12-     Patient asking if the cath needs to be done on 9/12- he will cancel the trip, if it can wait until next Tuesday he would be grateful. Patient is ok with either way the Dr feels it needs to be handled.    Please advise

## 2023-09-07 ENCOUNTER — TELEPHONE (OUTPATIENT)
Dept: SLEEP MEDICINE | Facility: HOSPITAL | Age: 78
End: 2023-09-07
Payer: MEDICARE

## 2023-09-07 NOTE — TELEPHONE ENCOUNTER
TOMI patient. Patient verbalized appreciation of the scheduled change and will wait to hear from the scheduling office for details.

## 2023-09-14 ENCOUNTER — TELEPHONE (OUTPATIENT)
Dept: CARDIOLOGY | Facility: CLINIC | Age: 78
End: 2023-09-14
Payer: MEDICARE

## 2023-09-14 NOTE — TELEPHONE ENCOUNTER
I spoke to patient and gave an arrival time of 8:00 on 09/19/23 for [facility]. Patient was instructed to have a  for the day of the procedure and to arrive at the main entrance/registration area. Patient was educated about stent placement and informed that in the event of stent placement, the patient will be required to stay overnight for observation. Patient was instructed to hold Metformin for 24 hours prior to procedure. Patient was instructed to continue all other medications as usual. Patient was instructed to be NPO after midnight with sips of water as needed. Patient is agreeable with no other questions or concerns.

## 2023-09-14 NOTE — TELEPHONE ENCOUNTER
Caller: SAMANTHA    Relationship: SELF    Best call back number: [phone]    What is the best time to reach you: ANY    Who are you requesting to speak with (clinical staff, provider,  specific staff member): ANY    Do you know the name of the person who called:     What was the call regarding: PATIENT CALLING IN REGARDING CT ANGIO THAT IS SCHEDULED FOR 12.26.23. PATIENT WOULD LIKE TO KNOW WITH HAVING HEART CATH DONE ON 9.19.23 WOULD THE CT STILL BE NEEDED. PLEASE LET PATIENT KNOW. THANK YOU!    Is it okay if the provider responds through [employer]: NO

## 2023-09-15 RX ORDER — VIT C/B6/B5/MAGNESIUM/HERB 173 50-5-6-5MG
CAPSULE ORAL
Status: ON HOLD | COMMUNITY

## 2023-09-15 NOTE — PRE-PROCEDURE INSTRUCTIONS
PATIENT INSTRUCTED TO BE:    - NPO AFTER MIDNIGHT EXCEPT CAN HAVE SIPS OF WATER WITH HIS MEDICATION PRIOR TO PROCEDURE    -  INSTRUCTED NO LOTIONS, JEWELRY, PIERCINGS, OR DEODORANT DAY OF THE PROCEDURE    - INSTRUCTED TO TAKE THE FOLLOWING MEDICATIONS THE DAY OF SURGERY:       LAST DOSE METFORMIN 9/17/23 PM  OTHER MEDS AS DIRECTED BY CARDIOLOGIST    - INSTRUCTED PT TO FOLLOW ANY INSTRUCTIONS GIVEN BY HIS CARDIOLOGIST.    - INFORMED PT THEY ATTEMPT RADIAL APPROACH FIRST IF UNABLE TO PERFORM CATH WITH THAT APPROACH THEY WILL DO A FEMORAL APPROACH.  ALSO, INFORMED PT THEY WILL BE ON BEDREST POSTOP.  IF THE SURGEON FEELS  AN INTERVENTION OR STENTS NEEDS TO BE PLACED, HE/SHE WILL STAY OVER NIGHT FOR OBSERVATION AND MONITORING.     - INFORMED THE PATIENT HE CAN HAVE TWO VISITORS WITH HIM THE DAY OF THE PROCEDURE    - INSTRUCTED PT TO PARK IN THE PARKING GARAGE, ENTER THE HOSPITAL THRU ENTRANCE A AND  CHECK IN AT THE MAIN REGISTRATION DESK, AFTER REGISTRATION PT WILL BE TAKEN THE THE PREOP AREA FOR HIS PROCEDURE.    -ARRIVAL TIME GIVEN BY CARDIOLOGIST OFFICE, INFORMED PT IF ARRIVAL TIME CHANGES OR ADJUSTMENTS NEED TO BE MADE IN THEIR ARRIVAL TIME, HE/SHE WOULD RECEIVE A CALL.    - LABS/ EKG DONE PRIOR TO PROCEDURE      - PATIENT VERBALIZED UNDERSTANDING

## 2023-09-19 ENCOUNTER — HOSPITAL ENCOUNTER (OUTPATIENT)
Facility: HOSPITAL | Age: 78
Setting detail: HOSPITAL OUTPATIENT SURGERY
Discharge: HOME | End: 2023-09-19
Attending: INTERNAL MEDICINE | Admitting: INTERNAL MEDICINE
Payer: MEDICARE

## 2023-09-19 VITALS
HEART RATE: 61 BPM | OXYGEN SATURATION: 98 % | WEIGHT: 190 LBS | HEIGHT: 70 IN | RESPIRATION RATE: 18 BRPM | TEMPERATURE: 98.2 F | BODY MASS INDEX: 27.2 KG/M2 | SYSTOLIC BLOOD PRESSURE: 153 MMHG | DIASTOLIC BLOOD PRESSURE: 86 MMHG

## 2023-09-19 DIAGNOSIS — R07.89 CHEST PAIN, ATYPICAL: Primary | ICD-10-CM

## 2023-09-19 DIAGNOSIS — R07.89 CHEST PAIN, ATYPICAL: ICD-10-CM

## 2023-09-19 LAB
ANION GAP SERPL CALCULATED.3IONS-SCNC: 12.1 MMOL/L (ref 5–15)
BUN SERPL-MCNC: 15 MG/DL (ref 8–23)
BUN/CREAT SERPL: 18.3 (ref 7–25)
CALCIUM SPEC-SCNC: 9.6 MG/DL (ref 8.6–10.5)
CHLORIDE SERPL-SCNC: 98 MMOL/L (ref 98–107)
CO2 SERPL-SCNC: 30.9 MMOL/L (ref 22–29)
CREAT SERPL-MCNC: 0.82 MG/DL (ref 0.76–1.27)
DEPRECATED RDW RBC AUTO: 40.5 FL (ref 37–54)
EGFRCR SERPLBLD CKD-EPI 2021: 91.6 ML/MIN/1.73
ERYTHROCYTE [DISTWIDTH] IN BLOOD BY AUTOMATED COUNT: 12.3 % (ref 12.3–15.4)
GLUCOSE SERPL-MCNC: 148 MG/DL (ref 65–99)
HCT VFR BLD AUTO: 39.3 % (ref 37.5–51)
HGB BLD-MCNC: 12.9 G/DL (ref 13–17.7)
INR PPP: 0.95 (ref 0.86–1.15)
MCH RBC QN AUTO: 29.5 PG (ref 26.6–33)
MCHC RBC AUTO-ENTMCNC: 32.8 G/DL (ref 31.5–35.7)
MCV RBC AUTO: 89.9 FL (ref 79–97)
PLATELET # BLD AUTO: 233 10*3/MM3 (ref 140–450)
PMV BLD AUTO: 11.1 FL (ref 6–12)
POTASSIUM SERPL-SCNC: 4.8 MMOL/L (ref 3.5–5.2)
PROTHROMBIN TIME: 12.8 SECONDS (ref 11.8–14.9)
RBC # BLD AUTO: 4.37 10*6/MM3 (ref 4.14–5.8)
SODIUM SERPL-SCNC: 141 MMOL/L (ref 136–145)
WBC NRBC COR # BLD: 7.13 10*3/MM3 (ref 3.4–10.8)

## 2023-09-19 PROCEDURE — 93458 L HRT ARTERY/VENTRICLE ANGIO: CPT | Performed by: INTERNAL MEDICINE

## 2023-09-19 PROCEDURE — C1894 INTRO/SHEATH, NON-LASER: HCPCS | Performed by: INTERNAL MEDICINE

## 2023-09-19 PROCEDURE — 25010000002 HEPARIN (PORCINE) PER 1000 UNITS: Performed by: INTERNAL MEDICINE

## 2023-09-19 PROCEDURE — 85027 COMPLETE CBC AUTOMATED: CPT

## 2023-09-19 PROCEDURE — C1769 GUIDE WIRE: HCPCS | Performed by: INTERNAL MEDICINE

## 2023-09-19 PROCEDURE — 25010000002 MIDAZOLAM PER 1MG: Performed by: INTERNAL MEDICINE

## 2023-09-19 PROCEDURE — 99152 MOD SED SAME PHYS/QHP 5/>YRS: CPT | Performed by: INTERNAL MEDICINE

## 2023-09-19 PROCEDURE — 80048 BASIC METABOLIC PNL TOTAL CA: CPT

## 2023-09-19 PROCEDURE — 25510000001 IOPAMIDOL PER 1 ML: Performed by: INTERNAL MEDICINE

## 2023-09-19 PROCEDURE — 25010000002 FENTANYL CITRATE (PF) 50 MCG/ML SOLUTION: Performed by: INTERNAL MEDICINE

## 2023-09-19 PROCEDURE — 85610 PROTHROMBIN TIME: CPT

## 2023-09-19 RX ORDER — NITROGLYCERIN 0.4 MG/1
0.4 TABLET SUBLINGUAL
Status: DISCONTINUED | OUTPATIENT
Start: 2023-09-19 | End: 2023-09-19 | Stop reason: HOSPADM

## 2023-09-19 RX ORDER — LIDOCAINE HYDROCHLORIDE 20 MG/ML
INJECTION, SOLUTION INFILTRATION; PERINEURAL
Status: DISCONTINUED | OUTPATIENT
Start: 2023-09-19 | End: 2023-09-19 | Stop reason: HOSPADM

## 2023-09-19 RX ORDER — MIDAZOLAM HYDROCHLORIDE 2 MG/2ML
INJECTION, SOLUTION INTRAMUSCULAR; INTRAVENOUS
Status: DISCONTINUED | OUTPATIENT
Start: 2023-09-19 | End: 2023-09-19 | Stop reason: HOSPADM

## 2023-09-19 RX ORDER — SODIUM CHLORIDE 9 MG/ML
40 INJECTION, SOLUTION INTRAVENOUS AS NEEDED
Status: DISCONTINUED | OUTPATIENT
Start: 2023-09-19 | End: 2023-09-19 | Stop reason: HOSPADM

## 2023-09-19 RX ORDER — SODIUM CHLORIDE 0.9 % (FLUSH) 0.9 %
10 SYRINGE (ML) INJECTION EVERY 12 HOURS SCHEDULED
Status: DISCONTINUED | OUTPATIENT
Start: 2023-09-19 | End: 2023-09-19 | Stop reason: HOSPADM

## 2023-09-19 RX ORDER — HEPARIN SODIUM 1000 [USP'U]/ML
INJECTION, SOLUTION INTRAVENOUS; SUBCUTANEOUS
Status: DISCONTINUED | OUTPATIENT
Start: 2023-09-19 | End: 2023-09-19 | Stop reason: HOSPADM

## 2023-09-19 RX ORDER — FENTANYL CITRATE 50 UG/ML
INJECTION, SOLUTION INTRAMUSCULAR; INTRAVENOUS
Status: DISCONTINUED | OUTPATIENT
Start: 2023-09-19 | End: 2023-09-19 | Stop reason: HOSPADM

## 2023-09-19 RX ORDER — ACETAMINOPHEN 325 MG/1
650 TABLET ORAL EVERY 4 HOURS PRN
Status: DISCONTINUED | OUTPATIENT
Start: 2023-09-19 | End: 2023-09-19 | Stop reason: HOSPADM

## 2023-09-19 RX ORDER — VERAPAMIL HYDROCHLORIDE 2.5 MG/ML
INJECTION, SOLUTION INTRAVENOUS
Status: DISCONTINUED | OUTPATIENT
Start: 2023-09-19 | End: 2023-09-19 | Stop reason: HOSPADM

## 2023-09-19 RX ORDER — SODIUM CHLORIDE 0.9 % (FLUSH) 0.9 %
10 SYRINGE (ML) INJECTION AS NEEDED
Status: DISCONTINUED | OUTPATIENT
Start: 2023-09-19 | End: 2023-09-19 | Stop reason: HOSPADM

## 2023-09-19 NOTE — Clinical Note
A 6 fr sheath was  inserted with ultrasound guidance into the right radial artery. Sheath insertion not delayed. English

## 2023-09-19 NOTE — Clinical Note
The DP pulses are +1 bilaterally. The PT pulses are non-palpable bilaterally. The right radial pulse is +2.

## 2023-09-20 ENCOUNTER — TELEPHONE (OUTPATIENT)
Dept: CARDIOLOGY | Facility: CLINIC | Age: 78
End: 2023-09-20
Payer: MEDICARE

## 2023-09-25 ENCOUNTER — APPOINTMENT (OUTPATIENT)
Dept: GENERAL RADIOLOGY | Facility: HOSPITAL | Age: 78
DRG: 236 | End: 2023-09-25
Payer: MEDICARE

## 2023-09-25 ENCOUNTER — OFFICE VISIT (OUTPATIENT)
Dept: CARDIAC SURGERY | Facility: CLINIC | Age: 78
End: 2023-09-25

## 2023-09-25 ENCOUNTER — HOSPITAL ENCOUNTER (INPATIENT)
Facility: HOSPITAL | Age: 78
LOS: 5 days | Discharge: HOME | DRG: 236 | End: 2023-09-30
Attending: THORACIC SURGERY (CARDIOTHORACIC VASCULAR SURGERY) | Admitting: THORACIC SURGERY (CARDIOTHORACIC VASCULAR SURGERY)
Payer: MEDICARE

## 2023-09-25 ENCOUNTER — PREP FOR SURGERY (OUTPATIENT)
Dept: OTHER | Facility: HOSPITAL | Age: 78
End: 2023-09-25

## 2023-09-25 ENCOUNTER — APPOINTMENT (OUTPATIENT)
Dept: CARDIOLOGY | Facility: HOSPITAL | Age: 78
DRG: 236 | End: 2023-09-25
Payer: MEDICARE

## 2023-09-25 VITALS
DIASTOLIC BLOOD PRESSURE: 75 MMHG | WEIGHT: 190 LBS | HEIGHT: 70 IN | RESPIRATION RATE: 18 BRPM | HEART RATE: 60 BPM | BODY MASS INDEX: 27.2 KG/M2 | SYSTOLIC BLOOD PRESSURE: 162 MMHG | OXYGEN SATURATION: 99 % | TEMPERATURE: 97.7 F

## 2023-09-25 DIAGNOSIS — Z95.1 S/P CABG (CORONARY ARTERY BYPASS GRAFT): Primary | ICD-10-CM

## 2023-09-25 DIAGNOSIS — I25.110 CORONARY ARTERY DISEASE INVOLVING NATIVE CORONARY ARTERY OF NATIVE HEART WITH UNSTABLE ANGINA PECTORIS: Primary | ICD-10-CM

## 2023-09-25 DIAGNOSIS — I25.84 CORONARY ATHEROSCLEROSIS DUE TO CALCIFIED CORONARY LESION (CODE): ICD-10-CM

## 2023-09-25 DIAGNOSIS — E78.5 HYPERLIPIDEMIA, UNSPECIFIED HYPERLIPIDEMIA TYPE: Primary | ICD-10-CM

## 2023-09-25 DIAGNOSIS — I25.110 CORONARY ARTERY DISEASE INVOLVING NATIVE CORONARY ARTERY OF NATIVE HEART WITH UNSTABLE ANGINA PECTORIS: ICD-10-CM

## 2023-09-25 PROBLEM — I25.10 CAD (CORONARY ARTERY DISEASE): Status: ACTIVE | Noted: 2023-09-25

## 2023-09-25 LAB
ABO GROUP BLD: NORMAL
ALBUMIN SERPL-MCNC: 4.9 G/DL (ref 3.5–5.2)
ALBUMIN/GLOB SERPL: 1.6 G/DL
ALP SERPL-CCNC: 44 U/L (ref 39–117)
ALT SERPL W P-5'-P-CCNC: 35 U/L (ref 1–41)
ANION GAP SERPL CALCULATED.3IONS-SCNC: 13.2 MMOL/L (ref 5–15)
APTT PPP: 27.5 SECONDS (ref 22.7–35.4)
ARTERIAL PATENCY WRIST A: POSITIVE
AST SERPL-CCNC: 24 U/L (ref 1–40)
ATMOSPHERIC PRESS: 750.4 MMHG
BASE EXCESS BLDA CALC-SCNC: 1.1 MMOL/L (ref 0–2)
BASOPHILS # BLD AUTO: 0.1 10*3/MM3 (ref 0–0.2)
BASOPHILS NFR BLD AUTO: 1.2 % (ref 0–1.5)
BDY SITE: NORMAL
BH CV XLRA MEAS - DIST GSV CALF DIST LEFT: 0.08 CM
BH CV XLRA MEAS - DIST GSV CALF DIST RIGHT: 0.13 CM
BH CV XLRA MEAS - DIST GSV THIGH DIST LEFT: 0.2 CM
BH CV XLRA MEAS - DIST GSV THIGH DIST RIGHT: 0.32 CM
BH CV XLRA MEAS - DIST LSV CALF DIST LEFT: 0.17 CM
BH CV XLRA MEAS - DIST LSV CALF DIST RIGHT: 0.1 CM
BH CV XLRA MEAS - GSV ANKLE DIST LEFT: 0.18 CM
BH CV XLRA MEAS - GSV ANKLE DIST RIGHT: 0.13 CM
BH CV XLRA MEAS - GSV KNEE DIST LEFT: 0.14 CM
BH CV XLRA MEAS - GSV KNEE DIST RIGHT: 0.31 CM
BH CV XLRA MEAS - GSV ORIGIN DIST LEFT: 0.44 CM
BH CV XLRA MEAS - GSV ORIGIN DIST RIGHT: 0.5 CM
BH CV XLRA MEAS - MID GSV CALF LEFT: 0.07 CM
BH CV XLRA MEAS - MID GSV CALF RIGHT: 0.12 CM
BH CV XLRA MEAS - MID GSV THIGH  LEFT: 0.16 CM
BH CV XLRA MEAS - MID GSV THIGH  RIGHT: 0.36 CM
BH CV XLRA MEAS - MID LSV CALF DIST LEFT: 0.14 CM
BH CV XLRA MEAS - MID LSV CALF DIST RIGHT: 0.15 CM
BH CV XLRA MEAS - PROX GSV CALF DIST LEFT: 0.13 CM
BH CV XLRA MEAS - PROX GSV CALF DIST RIGHT: 0.24 CM
BH CV XLRA MEAS - PROX GSV THIGH  LEFT: 0.27 CM
BH CV XLRA MEAS - PROX GSV THIGH  RIGHT: 0.37 CM
BH CV XLRA MEAS - PROX LSV CALF DIST LEFT: 0.12 CM
BH CV XLRA MEAS - PROX LSV CALF DIST RIGHT: 0.18 CM
BILIRUB SERPL-MCNC: 0.5 MG/DL (ref 0–1.2)
BILIRUB UR QL STRIP: NEGATIVE
BLD GP AB SCN SERPL QL: NEGATIVE
BUN SERPL-MCNC: 14 MG/DL (ref 8–23)
BUN/CREAT SERPL: 16.7 (ref 7–25)
CALCIUM SPEC-SCNC: 9.9 MG/DL (ref 8.6–10.5)
CHLORIDE SERPL-SCNC: 96 MMOL/L (ref 98–107)
CHOLEST SERPL-MCNC: 143 MG/DL (ref 0–200)
CLARITY UR: CLEAR
CLOSE TME COLL+ADP + EPINEP PNL BLD: 83 % (ref 86–100)
CO2 BLDA-SCNC: 26.1 MMOL/L (ref 23–27)
CO2 SERPL-SCNC: 26.8 MMOL/L (ref 22–29)
COLOR UR: YELLOW
CREAT SERPL-MCNC: 0.84 MG/DL (ref 0.76–1.27)
DEPRECATED RDW RBC AUTO: 38.2 FL (ref 37–54)
EGFRCR SERPLBLD CKD-EPI 2021: 90.9 ML/MIN/1.73
EOSINOPHIL # BLD AUTO: 0.31 10*3/MM3 (ref 0–0.4)
EOSINOPHIL NFR BLD AUTO: 3.7 % (ref 0.3–6.2)
ERYTHROCYTE [DISTWIDTH] IN BLOOD BY AUTOMATED COUNT: 12 % (ref 12.3–15.4)
GLOBULIN UR ELPH-MCNC: 3 GM/DL
GLUCOSE BLDC GLUCOMTR-MCNC: 151 MG/DL (ref 70–130)
GLUCOSE BLDC GLUCOMTR-MCNC: 171 MG/DL (ref 70–130)
GLUCOSE SERPL-MCNC: 169 MG/DL (ref 65–99)
GLUCOSE UR STRIP-MCNC: NEGATIVE MG/DL
HBA1C MFR BLD: 6.8 % (ref 4.8–5.6)
HCO3 BLDA-SCNC: 24.9 MMOL/L (ref 22–28)
HCT VFR BLD AUTO: 40.6 % (ref 37.5–51)
HDLC SERPL-MCNC: 42 MG/DL (ref 40–60)
HEMODILUTION: NO
HGB BLD-MCNC: 13.9 G/DL (ref 13–17.7)
HGB UR QL STRIP.AUTO: NEGATIVE
IMM GRANULOCYTES # BLD AUTO: 0.03 10*3/MM3 (ref 0–0.05)
IMM GRANULOCYTES NFR BLD AUTO: 0.4 % (ref 0–0.5)
INR PPP: 1.01 (ref 0.9–1.1)
KETONES UR QL STRIP: NEGATIVE
LDLC SERPL CALC-MCNC: 76 MG/DL (ref 0–100)
LDLC/HDLC SERPL: 1.71 {RATIO}
LEUKOCYTE ESTERASE UR QL STRIP.AUTO: NEGATIVE
LYMPHOCYTES # BLD AUTO: 2.77 10*3/MM3 (ref 0.7–3.1)
LYMPHOCYTES NFR BLD AUTO: 33.5 % (ref 19.6–45.3)
MAGNESIUM SERPL-MCNC: 1 MG/DL (ref 1.6–2.4)
MCH RBC QN AUTO: 30.2 PG (ref 26.6–33)
MCHC RBC AUTO-ENTMCNC: 34.2 G/DL (ref 31.5–35.7)
MCV RBC AUTO: 88.1 FL (ref 79–97)
MODALITY: NORMAL
MONOCYTES # BLD AUTO: 0.42 10*3/MM3 (ref 0.1–0.9)
MONOCYTES NFR BLD AUTO: 5.1 % (ref 5–12)
NEUTROPHILS NFR BLD AUTO: 4.64 10*3/MM3 (ref 1.7–7)
NEUTROPHILS NFR BLD AUTO: 56.1 % (ref 42.7–76)
NITRITE UR QL STRIP: NEGATIVE
NRBC BLD AUTO-RTO: 0 /100 WBC (ref 0–0.2)
NT-PROBNP SERPL-MCNC: 61.2 PG/ML (ref 0–1800)
PCO2 BLDA: 36.4 MM HG (ref 35–45)
PH BLDA: 7.44 PH UNITS (ref 7.35–7.45)
PH UR STRIP.AUTO: 7 [PH] (ref 5–8)
PLATELET # BLD AUTO: 258 10*3/MM3 (ref 140–450)
PMV BLD AUTO: 10.6 FL (ref 6–12)
PO2 BLDA: 83.7 MM HG (ref 80–100)
POTASSIUM SERPL-SCNC: 4 MMOL/L (ref 3.5–5.2)
PROT SERPL-MCNC: 7.9 G/DL (ref 6–8.5)
PROT UR QL STRIP: NEGATIVE
PROTHROMBIN TIME: 13.4 SECONDS (ref 11.7–14.2)
QT INTERVAL: 408 MS
QTC INTERVAL: 411 MS
RBC # BLD AUTO: 4.61 10*6/MM3 (ref 4.14–5.8)
RH BLD: POSITIVE
SAO2 % BLDCOA: 96.7 % (ref 92–98.5)
SARS-COV-2 RNA RESP QL NAA+PROBE: NOT DETECTED
SODIUM SERPL-SCNC: 136 MMOL/L (ref 136–145)
SP GR UR STRIP: 1.01 (ref 1–1.03)
T&S EXPIRATION DATE: NORMAL
TOTAL RATE: 20 BREATHS/MINUTE
TRIGL SERPL-MCNC: 145 MG/DL (ref 0–150)
UROBILINOGEN UR QL STRIP: NORMAL
VLDLC SERPL-MCNC: 25 MG/DL (ref 5–40)
WBC NRBC COR # BLD: 8.27 10*3/MM3 (ref 3.4–10.8)

## 2023-09-25 PROCEDURE — 82803 BLOOD GASES ANY COMBINATION: CPT

## 2023-09-25 PROCEDURE — 3078F DIAST BP <80 MM HG: CPT | Performed by: THORACIC SURGERY (CARDIOTHORACIC VASCULAR SURGERY)

## 2023-09-25 PROCEDURE — 85610 PROTHROMBIN TIME: CPT | Performed by: NURSE PRACTITIONER

## 2023-09-25 PROCEDURE — 99203 OFFICE O/P NEW LOW 30 MIN: CPT | Performed by: THORACIC SURGERY (CARDIOTHORACIC VASCULAR SURGERY)

## 2023-09-25 PROCEDURE — 71046 X-RAY EXAM CHEST 2 VIEWS: CPT

## 2023-09-25 PROCEDURE — 83735 ASSAY OF MAGNESIUM: CPT | Performed by: NURSE PRACTITIONER

## 2023-09-25 PROCEDURE — 86900 BLOOD TYPING SEROLOGIC ABO: CPT | Performed by: NURSE PRACTITIONER

## 2023-09-25 PROCEDURE — 63710000001 INSULIN LISPRO (HUMAN) PER 5 UNITS: Performed by: NURSE PRACTITIONER

## 2023-09-25 PROCEDURE — 36600 WITHDRAWAL OF ARTERIAL BLOOD: CPT

## 2023-09-25 PROCEDURE — 86920 COMPATIBILITY TEST SPIN: CPT

## 2023-09-25 PROCEDURE — 80061 LIPID PANEL: CPT | Performed by: NURSE PRACTITIONER

## 2023-09-25 PROCEDURE — 86850 RBC ANTIBODY SCREEN: CPT | Performed by: NURSE PRACTITIONER

## 2023-09-25 PROCEDURE — 86901 BLOOD TYPING SEROLOGIC RH(D): CPT | Performed by: NURSE PRACTITIONER

## 2023-09-25 PROCEDURE — 85730 THROMBOPLASTIN TIME PARTIAL: CPT | Performed by: NURSE PRACTITIONER

## 2023-09-25 PROCEDURE — 1160F RVW MEDS BY RX/DR IN RCRD: CPT | Performed by: THORACIC SURGERY (CARDIOTHORACIC VASCULAR SURGERY)

## 2023-09-25 PROCEDURE — 93005 ELECTROCARDIOGRAM TRACING: CPT | Performed by: NURSE PRACTITIONER

## 2023-09-25 PROCEDURE — 85576 BLOOD PLATELET AGGREGATION: CPT | Performed by: NURSE PRACTITIONER

## 2023-09-25 PROCEDURE — 93970 EXTREMITY STUDY: CPT

## 2023-09-25 PROCEDURE — 93010 ELECTROCARDIOGRAM REPORT: CPT | Performed by: INTERNAL MEDICINE

## 2023-09-25 PROCEDURE — 81003 URINALYSIS AUTO W/O SCOPE: CPT | Performed by: NURSE PRACTITIONER

## 2023-09-25 PROCEDURE — 1159F MED LIST DOCD IN RCRD: CPT | Performed by: THORACIC SURGERY (CARDIOTHORACIC VASCULAR SURGERY)

## 2023-09-25 PROCEDURE — 82948 REAGENT STRIP/BLOOD GLUCOSE: CPT

## 2023-09-25 PROCEDURE — 3077F SYST BP >= 140 MM HG: CPT | Performed by: THORACIC SURGERY (CARDIOTHORACIC VASCULAR SURGERY)

## 2023-09-25 PROCEDURE — 80053 COMPREHEN METABOLIC PANEL: CPT | Performed by: NURSE PRACTITIONER

## 2023-09-25 PROCEDURE — 87635 SARS-COV-2 COVID-19 AMP PRB: CPT | Performed by: NURSE PRACTITIONER

## 2023-09-25 PROCEDURE — 83036 HEMOGLOBIN GLYCOSYLATED A1C: CPT | Performed by: NURSE PRACTITIONER

## 2023-09-25 PROCEDURE — 83880 ASSAY OF NATRIURETIC PEPTIDE: CPT | Performed by: NURSE PRACTITIONER

## 2023-09-25 PROCEDURE — 85025 COMPLETE CBC W/AUTO DIFF WBC: CPT | Performed by: NURSE PRACTITIONER

## 2023-09-25 RX ORDER — BISACODYL 10 MG
10 SUPPOSITORY, RECTAL RECTAL DAILY PRN
Status: DISCONTINUED | OUTPATIENT
Start: 2023-09-25 | End: 2023-09-26

## 2023-09-25 RX ORDER — MONTELUKAST SODIUM 10 MG/1
10 TABLET ORAL NIGHTLY
Status: DISCONTINUED | OUTPATIENT
Start: 2023-09-25 | End: 2023-09-26

## 2023-09-25 RX ORDER — BISACODYL 5 MG/1
5 TABLET, DELAYED RELEASE ORAL DAILY PRN
Status: DISCONTINUED | OUTPATIENT
Start: 2023-09-25 | End: 2023-09-26

## 2023-09-25 RX ORDER — CHLORHEXIDINE GLUCONATE 500 MG/1
1 CLOTH TOPICAL EVERY 12 HOURS
Status: DISCONTINUED | OUTPATIENT
Start: 2023-09-25 | End: 2023-09-26

## 2023-09-25 RX ORDER — NICOTINE POLACRILEX 4 MG
15 LOZENGE BUCCAL
Status: DISCONTINUED | OUTPATIENT
Start: 2023-09-25 | End: 2023-09-26

## 2023-09-25 RX ORDER — AMLODIPINE BESYLATE 10 MG/1
10 TABLET ORAL DAILY
Status: DISCONTINUED | OUTPATIENT
Start: 2023-09-25 | End: 2023-09-26

## 2023-09-25 RX ORDER — SODIUM CHLORIDE 0.9 % (FLUSH) 0.9 %
10 SYRINGE (ML) INJECTION AS NEEDED
Status: DISCONTINUED | OUTPATIENT
Start: 2023-09-25 | End: 2023-09-26

## 2023-09-25 RX ORDER — IBUPROFEN 600 MG/1
1 TABLET ORAL
Status: DISCONTINUED | OUTPATIENT
Start: 2023-09-25 | End: 2023-09-26

## 2023-09-25 RX ORDER — ONDANSETRON 2 MG/ML
4 INJECTION INTRAMUSCULAR; INTRAVENOUS EVERY 6 HOURS PRN
Status: DISCONTINUED | OUTPATIENT
Start: 2023-09-25 | End: 2023-09-26

## 2023-09-25 RX ORDER — ASPIRIN 81 MG/1
81 TABLET ORAL DAILY
Status: DISCONTINUED | OUTPATIENT
Start: 2023-09-25 | End: 2023-09-26

## 2023-09-25 RX ORDER — DEXTROSE MONOHYDRATE 25 G/50ML
25 INJECTION, SOLUTION INTRAVENOUS
Status: DISCONTINUED | OUTPATIENT
Start: 2023-09-25 | End: 2023-09-26

## 2023-09-25 RX ORDER — ALPRAZOLAM 0.25 MG/1
0.25 TABLET ORAL EVERY 8 HOURS PRN
Status: DISCONTINUED | OUTPATIENT
Start: 2023-09-25 | End: 2023-09-26

## 2023-09-25 RX ORDER — NITROGLYCERIN 0.4 MG/1
0.4 TABLET SUBLINGUAL
Status: DISCONTINUED | OUTPATIENT
Start: 2023-09-25 | End: 2023-09-26

## 2023-09-25 RX ORDER — SODIUM CHLORIDE 0.9 % (FLUSH) 0.9 %
10 SYRINGE (ML) INJECTION EVERY 12 HOURS SCHEDULED
Status: DISCONTINUED | OUTPATIENT
Start: 2023-09-25 | End: 2023-09-26

## 2023-09-25 RX ORDER — CEFAZOLIN SODIUM 2 G/100ML
2000 INJECTION, SOLUTION INTRAVENOUS
Status: COMPLETED | OUTPATIENT
Start: 2023-09-26 | End: 2023-09-26

## 2023-09-25 RX ORDER — AMOXICILLIN 250 MG
2 CAPSULE ORAL 2 TIMES DAILY
Status: DISCONTINUED | OUTPATIENT
Start: 2023-09-25 | End: 2023-09-26

## 2023-09-25 RX ORDER — INSULIN LISPRO 100 [IU]/ML
2-9 INJECTION, SOLUTION INTRAVENOUS; SUBCUTANEOUS
Status: DISCONTINUED | OUTPATIENT
Start: 2023-09-25 | End: 2023-09-26

## 2023-09-25 RX ORDER — ACETAMINOPHEN 325 MG/1
650 TABLET ORAL EVERY 4 HOURS PRN
Status: DISCONTINUED | OUTPATIENT
Start: 2023-09-25 | End: 2023-09-26

## 2023-09-25 RX ORDER — PANTOPRAZOLE SODIUM 40 MG/1
40 TABLET, DELAYED RELEASE ORAL
Status: DISCONTINUED | OUTPATIENT
Start: 2023-09-26 | End: 2023-09-26

## 2023-09-25 RX ORDER — POLYETHYLENE GLYCOL 3350 17 G/17G
17 POWDER, FOR SOLUTION ORAL DAILY PRN
Status: DISCONTINUED | OUTPATIENT
Start: 2023-09-25 | End: 2023-09-26

## 2023-09-25 RX ORDER — GABAPENTIN 400 MG/1
800 CAPSULE ORAL EVERY 8 HOURS SCHEDULED
Status: DISCONTINUED | OUTPATIENT
Start: 2023-09-25 | End: 2023-09-26

## 2023-09-25 RX ORDER — ATORVASTATIN CALCIUM 20 MG/1
20 TABLET, FILM COATED ORAL DAILY
Status: DISCONTINUED | OUTPATIENT
Start: 2023-09-25 | End: 2023-09-26

## 2023-09-25 RX ORDER — CHLORHEXIDINE GLUCONATE ORAL RINSE 1.2 MG/ML
15 SOLUTION DENTAL EVERY 12 HOURS SCHEDULED
Status: COMPLETED | OUTPATIENT
Start: 2023-09-25 | End: 2023-09-26

## 2023-09-25 RX ORDER — SODIUM CHLORIDE 9 MG/ML
40 INJECTION, SOLUTION INTRAVENOUS AS NEEDED
Status: DISCONTINUED | OUTPATIENT
Start: 2023-09-25 | End: 2023-09-26

## 2023-09-25 RX ADMIN — GABAPENTIN 800 MG: 400 CAPSULE ORAL at 21:00

## 2023-09-25 RX ADMIN — SENNOSIDES AND DOCUSATE SODIUM 2 TABLET: 50; 8.6 TABLET ORAL at 20:58

## 2023-09-25 RX ADMIN — MUPIROCIN 1 APPLICATION: 20 OINTMENT TOPICAL at 20:56

## 2023-09-25 RX ADMIN — Medication 10 ML: at 21:00

## 2023-09-25 RX ADMIN — GABAPENTIN 800 MG: 400 CAPSULE ORAL at 18:33

## 2023-09-25 RX ADMIN — ATORVASTATIN CALCIUM 20 MG: 20 TABLET, FILM COATED ORAL at 20:56

## 2023-09-25 RX ADMIN — CHLORHEXIDINE GLUCONATE 1 APPLICATION: 500 CLOTH TOPICAL at 21:07

## 2023-09-25 RX ADMIN — 0.12% CHLORHEXIDINE GLUCONATE 15 ML: 1.2 RINSE ORAL at 20:59

## 2023-09-25 RX ADMIN — MONTELUKAST SODIUM 10 MG: 10 TABLET, FILM COATED ORAL at 20:55

## 2023-09-25 RX ADMIN — INSULIN LISPRO 2 UNITS: 100 INJECTION, SOLUTION INTRAVENOUS; SUBCUTANEOUS at 20:56

## 2023-09-25 RX ADMIN — AMLODIPINE BESYLATE 10 MG: 10 TABLET ORAL at 20:55

## 2023-09-25 NOTE — H&P (VIEW-ONLY)
9/25/2023      Subjective:      Jacquelin Pelaez APRN    Chief Complaint: Shortness of air and fatigue    History of Present Illness:       Dear Jacquelin Clemente APRN and Colleagues,  It was nice to see Trenton Adames in consultation at your request. He is a 77 y.o. male with a history of coronary artery disease who has developed class III dyspnea and fatigue. He denies heart failure. The ECHO that I reviewed personally shows normal valve function and ejection fraction of 60%. The Cardiac Cath that I reviewed personally shows LAD 90% circumflex 70% and RCA 99%.    He has very severe coronary artery disease and I think he needs to be admitted today for surgery tomorrow.  I do not think we can wait based on his anatomy..    Patient Active Problem List   Diagnosis    Allergic rhinitis    Anemia    Asthma    Benign prostatic hyperplasia    Cervical radiculopathy    Diabetes mellitus, type II    Hyperlipidemia    Hypertension    Impotence of organic origin    Kidney stones    Renal calculus    Prostate disorder    Shortness of breath    Vitamin D deficiency    Vitamin B 12 deficiency    Iron deficiency anemia    Ophthalmic herpes zoster    Acute URI    Suspected sleep apnea    Pulmonary HTN    Overweight (BMI 25.0-29.9)    Snoring    Chest pain, atypical    Coronary artery disease involving native coronary artery of native heart with unstable angina pectoris       Past Medical History:   Diagnosis Date    Arthritis     Asthma     Atypical chest pain     Coronary artery disease involving native coronary artery of native heart with unstable angina pectoris 9/25/2023    Dysphagia, oral phase     NO CURRENT ISSUES    Essential (primary) hypertension     Foot pain, bilateral     Heart murmur 5/2023    Heart valve disease 7/2023    Hyperlipidemia, unspecified     Low back pain     Neuropathy in diabetes     Peripheral neuropathy     Pulmonary HTN 08/09/2023    Sleep apnea     USES CPAP    Type 2 diabetes mellitus  without complication     Vitamin D deficiency, unspecified        Past Surgical History:   Procedure Laterality Date    CARDIAC CATHETERIZATION N/A 9/19/2023    Procedure: Left Heart Cath with possible coronary angioplasty;  Surgeon: Marc Charles MD;  Location: [facility] CATH INVASIVE LOCATION;  Service: Cardiology;  Laterality: N/A;    CATARACT EXTRACTION, BILATERAL  1/28/2014; 1/7/2014    COLONOSCOPY  2022    HEMORRHOIDECTOMY  06/20/2014    POLYPECTOMY      SINUS SURGERY         Allergies   Allergen Reactions    Cat Hair Extract Itching    Grass Itching    Pollen Extract Itching         Current Outpatient Medications:     Accu-Chek Softclix Lancets lancets, USE ONE LANCET 2-3 TIMES DAILY AS NEEDED, Disp: 100 each, Rfl: 12    albuterol sulfate  (90 Base) MCG/ACT inhaler, Inhale 2 puffs Every 4 (Four) Hours As Needed for Wheezing., Disp: 18 g, Rfl: 5    amLODIPine (NORVASC) 10 MG tablet, TAKE ONE TABLET BY MOUTH DAILY, Disp: 90 tablet, Rfl: 1    aspirin 81 MG EC tablet, Take 1 tablet by mouth Daily., Disp: , Rfl:     atorvastatin (LIPITOR) 40 MG tablet, Take 1 tablet by mouth Daily., Disp: 90 tablet, Rfl: 1    Blood Glucose Monitoring Suppl (Accu-Chek Guide) w/Device kit, 1 each 3 (Three) Times a Day As Needed (check 2-3 times daily as needed)., Disp: 1 kit, Rfl: 0    clobetasol (TEMOVATE) 0.05 % cream, Apply 1 application topically to the appropriate area as directed 2 (Two) Times a Day., Disp: 30 g, Rfl: 2    docusate sodium 100 MG capsule, Take 1 capsule by mouth 2 (Two) Times a Day., Disp: 180 capsule, Rfl: 1    esomeprazole (nexIUM) 20 MG capsule, Take 1 capsule by mouth Every Morning Before Breakfast., Disp: , Rfl:     fluorouracil (EFUDEX) 5 % cream, , Disp: , Rfl:     fluticasone (FLONASE) 50 MCG/ACT nasal spray, 1 spray into the nostril(s) as directed by provider Daily., Disp: , Rfl:     gabapentin (Neurontin) 800 MG tablet, Take 1 tablet by mouth 3 (Three) Times a Day., Disp: 90 tablet, Rfl: 5     glucose blood (Accu-Chek Guide) test strip, USE ONE TEST STRIP 2-3 TIMES DAILY AS NEEDED., Disp: 100 each, Rfl: 12    glucose blood test strip, Test 2-3 times a week, Disp: 100 each, Rfl: 1    hydroCHLOROthiazide (HYDRODIURIL) 25 MG tablet, Take 1 tablet by mouth Daily., Disp: 90 tablet, Rfl: 3    Krill Oil (Omega-3) 500 MG capsule, Take 1 capsule by mouth Daily., Disp: , Rfl:     Lancets (onetouch ultrasoft) lancets, Test 2-3 times a week., Disp: 100 each, Rfl: 12    loratadine (CLARITIN) 10 MG tablet, Take 1 tablet by mouth Daily., Disp: , Rfl:     Melatonin 10 MG tablet, Take 1 tablet by mouth As Needed., Disp: , Rfl:     meloxicam (Mobic) 7.5 MG tablet, Take 1 tablet by mouth Daily., Disp: 90 tablet, Rfl: 0    metFORMIN (GLUCOPHAGE) 1000 MG tablet, Take 1 tablet by mouth 2 (Two) Times a Day With Meals. (Patient taking differently: Take 1 tablet by mouth 2 (Two) Times a Day With Meals. LAST DOSE 9/17/23 PM), Disp: 180 tablet, Rfl: 1    montelukast (SINGULAIR) 10 MG tablet, Take 1 tablet by mouth Daily., Disp: 90 tablet, Rfl: 0    Semaglutide,0.25 or 0.5MG/DOS, (Ozempic, 0.25 or 0.5 MG/DOSE,) 2 MG/3ML solution pen-injector, Inject 0.25 mg under the skin into the appropriate area as directed 1 (One) Time Per Week for 4 weeks and then increase to 0.5 mg one time a week. (Patient taking differently: Inject 0.25 mg under the skin into the appropriate area as directed 1 (One) Time Per Week. LAST DOSE 9/12/23), Disp: 3 mL, Rfl: 5    Turmeric 500 MG capsule, Take  by mouth., Disp: , Rfl:     vitamin D3 125 MCG (5000 UT) capsule capsule, Take 1 capsule by mouth Daily., Disp: , Rfl:     Current Facility-Administered Medications:     cyanocobalamin injection 1,000 mcg, 1,000 mcg, Intramuscular, Q28 Days, Jacquelin Pelaez APRN, 1,000 mcg at 09/01/23 1309    Social History     Socioeconomic History    Marital status:    Tobacco Use    Smoking status: Former     Packs/day: 2.00     Years: 15.00     Pack years: 30.00      Types: Cigarettes, Pipe     Quit date: 1995     Years since quittin.7    Smokeless tobacco: Never   Vaping Use    Vaping Use: Never used   Substance and Sexual Activity    Alcohol use: Yes     Alcohol/week: 8.0 standard drinks     Types: 6 Cans of beer, 2 Drinks containing 0.5 oz of alcohol per week     Comment: Occasional     Drug use: Never    Sexual activity: Defer     Partners: Female       Family History   Problem Relation Age of Onset    Cancer Mother     Heart disease Father     Diabetes Father     Heart disease Maternal Grandmother     Cancer Maternal Grandfather     Diabetes Paternal Grandmother     Malig Hyperthermia Neg Hx            Review of Systems:  Review of Systems   Constitutional:  Positive for activity change and fatigue.   HENT: Negative.     Eyes: Negative.    Respiratory: Negative.  Positive for shortness of breath.    Cardiovascular: Negative.  Negative for chest pain.   Gastrointestinal: Negative.    Endocrine: Negative.    Genitourinary: Negative.    Musculoskeletal: Negative.    Skin: Negative.    Allergic/Immunologic: Positive for environmental allergies.   Neurological: Negative.    Psychiatric/Behavioral: Negative.     Cardiovascular ROS: positive for -fatigue and shortness of air  Physical Exam:    Vital Signs:  Weight: 86.2 kg (190 lb)   Body mass index is 27.26 kg/m².  Temp: 97.7 °F (36.5 °C)   Heart Rate: 60   BP: 162/75     Vitals and nursing note reviewed.   Constitutional:       Appearance: Healthy appearance. Not in distress.   Eyes:      Pupils: Pupils are equal, round, and reactive to light.   HENT:    Mouth/Throat:      Pharynx: Oropharynx is clear.   Pulmonary:      Effort: Pulmonary effort is normal.      Breath sounds: Normal breath sounds.   Chest:      Chest wall: Not tender to palpatation.   Cardiovascular:      PMI at left midclavicular line. Normal rate. Regular rhythm. Normal S1.      Murmurs: There is no murmur.      No gallop.  No click. No rub.    Pulses:     Carotid: 4+ with bruit bilaterally.     Radial: 4+ bilaterally.     Femoral: 4+ bilaterally.     Dorsalis pedis: 4+ bilaterally.     Posterior tibial: 4+ bilaterally.  Edema:     Peripheral edema absent.   Abdominal:      General: Bowel sounds are normal.      Palpations: Abdomen is soft.   Musculoskeletal: Normal range of motion.      Cervical back: Normal range of motion. Skin:     General: Skin is warm and dry.   Neurological:      Mental Status: Alert and oriented to person, place and time.      Assessment:       Multivessel coronary disease without angina because of his diabetes but has class III dyspnea and fatigue.  He has critical coronary disease and I think we should admit him today for surgery tomorrow.  I discussed all this with the patient and family.          Recommendation/Plan:     Admit today for CABG tomorrow.  He has had a recent carotid study that was negative.  I discussed all the risks and options with the patient and family.  STS risk was calculated.            Thank you for allowing me to participate in his care.    Regards,    Frederic Groves MD

## 2023-09-25 NOTE — PROGRESS NOTES
9/25/2023      Subjective:      Jacquelin Pelaez APRN    Chief Complaint: Shortness of air and fatigue    History of Present Illness:       Dear Jacquelin Clemente APRN and Colleagues,  It was nice to see Trenton Adames in consultation at your request. He is a 77 y.o. male with a history of coronary artery disease who has developed class III dyspnea and fatigue. He denies heart failure. The ECHO that I reviewed personally shows normal valve function and ejection fraction of 60%. The Cardiac Cath that I reviewed personally shows LAD 90% circumflex 70% and RCA 99%.    He has very severe coronary artery disease and I think he needs to be admitted today for surgery tomorrow.  I do not think we can wait based on his anatomy..    Patient Active Problem List   Diagnosis    Allergic rhinitis    Anemia    Asthma    Benign prostatic hyperplasia    Cervical radiculopathy    Diabetes mellitus, type II    Hyperlipidemia    Hypertension    Impotence of organic origin    Kidney stones    Renal calculus    Prostate disorder    Shortness of breath    Vitamin D deficiency    Vitamin B 12 deficiency    Iron deficiency anemia    Ophthalmic herpes zoster    Acute URI    Suspected sleep apnea    Pulmonary HTN    Overweight (BMI 25.0-29.9)    Snoring    Chest pain, atypical    Coronary artery disease involving native coronary artery of native heart with unstable angina pectoris       Past Medical History:   Diagnosis Date    Arthritis     Asthma     Atypical chest pain     Coronary artery disease involving native coronary artery of native heart with unstable angina pectoris 9/25/2023    Dysphagia, oral phase     NO CURRENT ISSUES    Essential (primary) hypertension     Foot pain, bilateral     Heart murmur 5/2023    Heart valve disease 7/2023    Hyperlipidemia, unspecified     Low back pain     Neuropathy in diabetes     Peripheral neuropathy     Pulmonary HTN 08/09/2023    Sleep apnea     USES CPAP    Type 2 diabetes mellitus  without complication     Vitamin D deficiency, unspecified        Past Surgical History:   Procedure Laterality Date    CARDIAC CATHETERIZATION N/A 9/19/2023    Procedure: Left Heart Cath with possible coronary angioplasty;  Surgeon: Marc Charles MD;  Location: [facility] CATH INVASIVE LOCATION;  Service: Cardiology;  Laterality: N/A;    CATARACT EXTRACTION, BILATERAL  1/28/2014; 1/7/2014    COLONOSCOPY  2022    HEMORRHOIDECTOMY  06/20/2014    POLYPECTOMY      SINUS SURGERY         Allergies   Allergen Reactions    Cat Hair Extract Itching    Grass Itching    Pollen Extract Itching         Current Outpatient Medications:     Accu-Chek Softclix Lancets lancets, USE ONE LANCET 2-3 TIMES DAILY AS NEEDED, Disp: 100 each, Rfl: 12    albuterol sulfate  (90 Base) MCG/ACT inhaler, Inhale 2 puffs Every 4 (Four) Hours As Needed for Wheezing., Disp: 18 g, Rfl: 5    amLODIPine (NORVASC) 10 MG tablet, TAKE ONE TABLET BY MOUTH DAILY, Disp: 90 tablet, Rfl: 1    aspirin 81 MG EC tablet, Take 1 tablet by mouth Daily., Disp: , Rfl:     atorvastatin (LIPITOR) 40 MG tablet, Take 1 tablet by mouth Daily., Disp: 90 tablet, Rfl: 1    Blood Glucose Monitoring Suppl (Accu-Chek Guide) w/Device kit, 1 each 3 (Three) Times a Day As Needed (check 2-3 times daily as needed)., Disp: 1 kit, Rfl: 0    clobetasol (TEMOVATE) 0.05 % cream, Apply 1 application topically to the appropriate area as directed 2 (Two) Times a Day., Disp: 30 g, Rfl: 2    docusate sodium 100 MG capsule, Take 1 capsule by mouth 2 (Two) Times a Day., Disp: 180 capsule, Rfl: 1    esomeprazole (nexIUM) 20 MG capsule, Take 1 capsule by mouth Every Morning Before Breakfast., Disp: , Rfl:     fluorouracil (EFUDEX) 5 % cream, , Disp: , Rfl:     fluticasone (FLONASE) 50 MCG/ACT nasal spray, 1 spray into the nostril(s) as directed by provider Daily., Disp: , Rfl:     gabapentin (Neurontin) 800 MG tablet, Take 1 tablet by mouth 3 (Three) Times a Day., Disp: 90 tablet, Rfl: 5     glucose blood (Accu-Chek Guide) test strip, USE ONE TEST STRIP 2-3 TIMES DAILY AS NEEDED., Disp: 100 each, Rfl: 12    glucose blood test strip, Test 2-3 times a week, Disp: 100 each, Rfl: 1    hydroCHLOROthiazide (HYDRODIURIL) 25 MG tablet, Take 1 tablet by mouth Daily., Disp: 90 tablet, Rfl: 3    Krill Oil (Omega-3) 500 MG capsule, Take 1 capsule by mouth Daily., Disp: , Rfl:     Lancets (onetouch ultrasoft) lancets, Test 2-3 times a week., Disp: 100 each, Rfl: 12    loratadine (CLARITIN) 10 MG tablet, Take 1 tablet by mouth Daily., Disp: , Rfl:     Melatonin 10 MG tablet, Take 1 tablet by mouth As Needed., Disp: , Rfl:     meloxicam (Mobic) 7.5 MG tablet, Take 1 tablet by mouth Daily., Disp: 90 tablet, Rfl: 0    metFORMIN (GLUCOPHAGE) 1000 MG tablet, Take 1 tablet by mouth 2 (Two) Times a Day With Meals. (Patient taking differently: Take 1 tablet by mouth 2 (Two) Times a Day With Meals. LAST DOSE 9/17/23 PM), Disp: 180 tablet, Rfl: 1    montelukast (SINGULAIR) 10 MG tablet, Take 1 tablet by mouth Daily., Disp: 90 tablet, Rfl: 0    Semaglutide,0.25 or 0.5MG/DOS, (Ozempic, 0.25 or 0.5 MG/DOSE,) 2 MG/3ML solution pen-injector, Inject 0.25 mg under the skin into the appropriate area as directed 1 (One) Time Per Week for 4 weeks and then increase to 0.5 mg one time a week. (Patient taking differently: Inject 0.25 mg under the skin into the appropriate area as directed 1 (One) Time Per Week. LAST DOSE 9/12/23), Disp: 3 mL, Rfl: 5    Turmeric 500 MG capsule, Take  by mouth., Disp: , Rfl:     vitamin D3 125 MCG (5000 UT) capsule capsule, Take 1 capsule by mouth Daily., Disp: , Rfl:     Current Facility-Administered Medications:     cyanocobalamin injection 1,000 mcg, 1,000 mcg, Intramuscular, Q28 Days, Jacquelin Pelaez APRN, 1,000 mcg at 09/01/23 1309    Social History     Socioeconomic History    Marital status:    Tobacco Use    Smoking status: Former     Packs/day: 2.00     Years: 15.00     Pack years: 30.00      Types: Cigarettes, Pipe     Quit date: 1995     Years since quittin.7    Smokeless tobacco: Never   Vaping Use    Vaping Use: Never used   Substance and Sexual Activity    Alcohol use: Yes     Alcohol/week: 8.0 standard drinks     Types: 6 Cans of beer, 2 Drinks containing 0.5 oz of alcohol per week     Comment: Occasional     Drug use: Never    Sexual activity: Defer     Partners: Female       Family History   Problem Relation Age of Onset    Cancer Mother     Heart disease Father     Diabetes Father     Heart disease Maternal Grandmother     Cancer Maternal Grandfather     Diabetes Paternal Grandmother     Malig Hyperthermia Neg Hx            Review of Systems:  Review of Systems   Constitutional:  Positive for activity change and fatigue.   HENT: Negative.     Eyes: Negative.    Respiratory: Negative.  Positive for shortness of breath.    Cardiovascular: Negative.  Negative for chest pain.   Gastrointestinal: Negative.    Endocrine: Negative.    Genitourinary: Negative.    Musculoskeletal: Negative.    Skin: Negative.    Allergic/Immunologic: Positive for environmental allergies.   Neurological: Negative.    Psychiatric/Behavioral: Negative.     Cardiovascular ROS: positive for -fatigue and shortness of air  Physical Exam:    Vital Signs:  Weight: 86.2 kg (190 lb)   Body mass index is 27.26 kg/m².  Temp: 97.7 °F (36.5 °C)   Heart Rate: 60   BP: 162/75     Vitals and nursing note reviewed.   Constitutional:       Appearance: Healthy appearance. Not in distress.   Eyes:      Pupils: Pupils are equal, round, and reactive to light.   HENT:    Mouth/Throat:      Pharynx: Oropharynx is clear.   Pulmonary:      Effort: Pulmonary effort is normal.      Breath sounds: Normal breath sounds.   Chest:      Chest wall: Not tender to palpatation.   Cardiovascular:      PMI at left midclavicular line. Normal rate. Regular rhythm. Normal S1.      Murmurs: There is no murmur.      No gallop.  No click. No rub.    Pulses:     Carotid: 4+ with bruit bilaterally.     Radial: 4+ bilaterally.     Femoral: 4+ bilaterally.     Dorsalis pedis: 4+ bilaterally.     Posterior tibial: 4+ bilaterally.  Edema:     Peripheral edema absent.   Abdominal:      General: Bowel sounds are normal.      Palpations: Abdomen is soft.   Musculoskeletal: Normal range of motion.      Cervical back: Normal range of motion. Skin:     General: Skin is warm and dry.   Neurological:      Mental Status: Alert and oriented to person, place and time.      Assessment:       Multivessel coronary disease without angina because of his diabetes but has class III dyspnea and fatigue.  He has critical coronary disease and I think we should admit him today for surgery tomorrow.  I discussed all this with the patient and family.          Recommendation/Plan:     Admit today for CABG tomorrow.  He has had a recent carotid study that was negative.  I discussed all the risks and options with the patient and family.  STS risk was calculated.            Thank you for allowing me to participate in his care.    Regards,    Frederic Groves MD

## 2023-09-25 NOTE — LETTER
September 25, 2023       No Recipients    Patient: Trenton Adames   YOB: 1945   Date of Visit: 9/25/2023     Dear DARRELL Pardo:       Thank you for referring Trenton Adames to me for evaluation. Below are the relevant portions of my assessment and plan of care.    If you have questions, please do not hesitate to call me. I look forward to following Trenton along with you.         Sincerely,        Frederic Groves MD        CC:   No Recipients    Jr Frederic Groves MD  09/25/23 1321  Sign when Signing Visit  9/25/2023      Subjective:      Jacquelin Pelaez APRN    Chief Complaint: Shortness of air and fatigue    History of Present Illness:       Dear Jacquelin Clemente APRN and Colleagues,  It was nice to see Trenton Adames in consultation at your request. He is a 77 y.o. male with a history of coronary artery disease who has developed class III dyspnea and fatigue. He denies heart failure. The ECHO that I reviewed personally shows normal valve function and ejection fraction of 60%. The Cardiac Cath that I reviewed personally shows LAD 90% circumflex 70% and RCA 99%.    He has very severe coronary artery disease and I think he needs to be admitted today for surgery tomorrow.  I do not think we can wait based on his anatomy..    Patient Active Problem List   Diagnosis   • Allergic rhinitis   • Anemia   • Asthma   • Benign prostatic hyperplasia   • Cervical radiculopathy   • Diabetes mellitus, type II   • Hyperlipidemia   • Hypertension   • Impotence of organic origin   • Kidney stones   • Renal calculus   • Prostate disorder   • Shortness of breath   • Vitamin D deficiency   • Vitamin B 12 deficiency   • Iron deficiency anemia   • Ophthalmic herpes zoster   • Acute URI   • Suspected sleep apnea   • Pulmonary HTN   • Overweight (BMI 25.0-29.9)   • Snoring   • Chest pain, atypical   • Coronary artery disease involving native coronary artery of native heart with unstable  angina pectoris       Past Medical History:   Diagnosis Date   • Arthritis    • Asthma    • Atypical chest pain    • Coronary artery disease involving native coronary artery of native heart with unstable angina pectoris 9/25/2023   • Dysphagia, oral phase     NO CURRENT ISSUES   • Essential (primary) hypertension    • Foot pain, bilateral    • Heart murmur 5/2023   • Heart valve disease 7/2023   • Hyperlipidemia, unspecified    • Low back pain    • Neuropathy in diabetes    • Peripheral neuropathy    • Pulmonary HTN 08/09/2023   • Sleep apnea     USES CPAP   • Type 2 diabetes mellitus without complication    • Vitamin D deficiency, unspecified        Past Surgical History:   Procedure Laterality Date   • CARDIAC CATHETERIZATION N/A 9/19/2023    Procedure: Left Heart Cath with possible coronary angioplasty;  Surgeon: Marc Charles MD;  Location: [facility] CATH INVASIVE [facility];  Service: Cardiology;  Laterality: N/A;   • CATARACT EXTRACTION, BILATERAL  1/28/2014; 1/7/2014   • COLONOSCOPY  2022   • HEMORRHOIDECTOMY  06/20/2014   • POLYPECTOMY     • SINUS SURGERY         Allergies   Allergen Reactions   • Cat Hair Extract Itching   • Grass Itching   • Pollen Extract Itching         Current Outpatient Medications:   •  Accu-Chek Softclix Lancets lancets, USE ONE LANCET 2-3 TIMES DAILY AS NEEDED, Disp: 100 each, Rfl: 12  •  albuterol sulfate  (90 Base) MCG/ACT inhaler, Inhale 2 puffs Every 4 (Four) Hours As Needed for Wheezing., Disp: 18 g, Rfl: 5  •  amLODIPine (NORVASC) 10 MG tablet, TAKE ONE TABLET BY MOUTH DAILY, Disp: 90 tablet, Rfl: 1  •  aspirin 81 MG EC tablet, Take 1 tablet by mouth Daily., Disp: , Rfl:   •  atorvastatin (LIPITOR) 40 MG tablet, Take 1 tablet by mouth Daily., Disp: 90 tablet, Rfl: 1  •  Blood Glucose Monitoring Suppl (Accu-Chek Guide) w/Device kit, 1 each 3 (Three) Times a Day As Needed (check 2-3 times daily as needed)., Disp: 1 kit, Rfl: 0  •  clobetasol (TEMOVATE) 0.05 % cream, Apply  1 application topically to the appropriate area as directed 2 (Two) Times a Day., Disp: 30 g, Rfl: 2  •  docusate sodium 100 MG capsule, Take 1 capsule by mouth 2 (Two) Times a Day., Disp: 180 capsule, Rfl: 1  •  esomeprazole (nexIUM) 20 MG capsule, Take 1 capsule by mouth Every Morning Before Breakfast., Disp: , Rfl:   •  fluorouracil (EFUDEX) 5 % cream, , Disp: , Rfl:   •  fluticasone (FLONASE) 50 MCG/ACT nasal spray, 1 spray into the nostril(s) as directed by provider Daily., Disp: , Rfl:   •  gabapentin (Neurontin) 800 MG tablet, Take 1 tablet by mouth 3 (Three) Times a Day., Disp: 90 tablet, Rfl: 5  •  glucose blood (Accu-Chek Guide) test strip, USE ONE TEST STRIP 2-3 TIMES DAILY AS NEEDED., Disp: 100 each, Rfl: 12  •  glucose blood test strip, Test 2-3 times a week, Disp: 100 each, Rfl: 1  •  hydroCHLOROthiazide (HYDRODIURIL) 25 MG tablet, Take 1 tablet by mouth Daily., Disp: 90 tablet, Rfl: 3  •  Krill Oil (Omega-3) 500 MG capsule, Take 1 capsule by mouth Daily., Disp: , Rfl:   •  Lancets (onetouch ultrasoft) lancets, Test 2-3 times a week., Disp: 100 each, Rfl: 12  •  loratadine (CLARITIN) 10 MG tablet, Take 1 tablet by mouth Daily., Disp: , Rfl:   •  Melatonin 10 MG tablet, Take 1 tablet by mouth As Needed., Disp: , Rfl:   •  meloxicam (Mobic) 7.5 MG tablet, Take 1 tablet by mouth Daily., Disp: 90 tablet, Rfl: 0  •  metFORMIN (GLUCOPHAGE) 1000 MG tablet, Take 1 tablet by mouth 2 (Two) Times a Day With Meals. (Patient taking differently: Take 1 tablet by mouth 2 (Two) Times a Day With Meals. LAST DOSE 9/17/23 PM), Disp: 180 tablet, Rfl: 1  •  montelukast (SINGULAIR) 10 MG tablet, Take 1 tablet by mouth Daily., Disp: 90 tablet, Rfl: 0  •  Semaglutide,0.25 or 0.5MG/DOS, (Ozempic, 0.25 or 0.5 MG/DOSE,) 2 MG/3ML solution pen-injector, Inject 0.25 mg under the skin into the appropriate area as directed 1 (One) Time Per Week for 4 weeks and then increase to 0.5 mg one time a week. (Patient taking differently:  Inject 0.25 mg under the skin into the appropriate area as directed 1 (One) Time Per Week. LAST DOSE 23), Disp: 3 mL, Rfl: 5  •  Turmeric 500 MG capsule, Take  by mouth., Disp: , Rfl:   •  vitamin D3 125 MCG (5000 UT) capsule capsule, Take 1 capsule by mouth Daily., Disp: , Rfl:     Current Facility-Administered Medications:   •  cyanocobalamin injection 1,000 mcg, 1,000 mcg, Intramuscular, Q28 Days, Jacquelin Pelaez APRN, 1,000 mcg at 23 1309    Social History     Socioeconomic History   • Marital status:    Tobacco Use   • Smoking status: Former     Packs/day: 2.00     Years: 15.00     Pack years: 30.00     Types: Cigarettes, Pipe     Quit date: 1995     Years since quittin.7   • Smokeless tobacco: Never   Vaping Use   • Vaping Use: Never used   Substance and Sexual Activity   • Alcohol use: Yes     Alcohol/week: 8.0 standard drinks     Types: 6 Cans of beer, 2 Drinks containing 0.5 oz of alcohol per week     Comment: Occasional    • Drug use: Never   • Sexual activity: Defer     Partners: Female       Family History   Problem Relation Age of Onset   • Cancer Mother    • Heart disease Father    • Diabetes Father    • Heart disease Maternal Grandmother    • Cancer Maternal Grandfather    • Diabetes Paternal Grandmother    • Malig Hyperthermia Neg Hx            Review of Systems:  Review of Systems   Constitutional:  Positive for activity change and fatigue.   HENT: Negative.     Eyes: Negative.    Respiratory: Negative.  Positive for shortness of breath.    Cardiovascular: Negative.  Negative for chest pain.   Gastrointestinal: Negative.    Endocrine: Negative.    Genitourinary: Negative.    Musculoskeletal: Negative.    Skin: Negative.    Allergic/Immunologic: Positive for environmental allergies.   Neurological: Negative.    Psychiatric/Behavioral: Negative.     Cardiovascular ROS: positive for -fatigue and shortness of air  Physical Exam:    Vital Signs:  Weight: 86.2 kg (190 lb)    Body mass index is 27.26 kg/m².  Temp: 97.7 °F (36.5 °C)   Heart Rate: 60   BP: 162/75     Vitals and nursing note reviewed.   Constitutional:       Appearance: Healthy appearance. Not in distress.   Eyes:      Pupils: Pupils are equal, round, and reactive to light.   HENT:    Mouth/Throat:      Pharynx: Oropharynx is clear.   Pulmonary:      Effort: Pulmonary effort is normal.      Breath sounds: Normal breath sounds.   Chest:      Chest wall: Not tender to palpatation.   Cardiovascular:      PMI at left midclavicular line. Normal rate. Regular rhythm. Normal S1.      Murmurs: There is no murmur.      No gallop.  No click. No rub.   Pulses:     Carotid: 4+ with bruit bilaterally.     Radial: 4+ bilaterally.     Femoral: 4+ bilaterally.     Dorsalis pedis: 4+ bilaterally.     Posterior tibial: 4+ bilaterally.  Edema:     Peripheral edema absent.   Abdominal:      General: Bowel sounds are normal.      Palpations: Abdomen is soft.   Musculoskeletal: Normal range of motion.      Cervical back: Normal range of motion. Skin:     General: Skin is warm and dry.   Neurological:      Mental Status: Alert and oriented to person, place and time.      Assessment:       Multivessel coronary disease without angina because of his diabetes but has class III dyspnea and fatigue.  He has critical coronary disease and I think we should admit him today for surgery tomorrow.  I discussed all this with the patient and family.          Recommendation/Plan:     Admit today for CABG tomorrow.  He has had a recent carotid study that was negative.  I discussed all the risks and options with the patient and family.  STS risk was calculated.            Thank you for allowing me to participate in his care.    Regards,    Frederic Groves MD

## 2023-09-26 ENCOUNTER — ANCILLARY PROCEDURE (OUTPATIENT)
Dept: PERIOP | Facility: HOSPITAL | Age: 78
DRG: 236 | End: 2023-09-26
Payer: MEDICARE

## 2023-09-26 ENCOUNTER — ANESTHESIA (OUTPATIENT)
Dept: PERIOP | Facility: HOSPITAL | Age: 78
DRG: 236 | End: 2023-09-26
Payer: MEDICARE

## 2023-09-26 ENCOUNTER — APPOINTMENT (OUTPATIENT)
Dept: RESPIRATORY THERAPY | Facility: HOSPITAL | Age: 78
DRG: 236 | End: 2023-09-26
Payer: MEDICARE

## 2023-09-26 ENCOUNTER — APPOINTMENT (OUTPATIENT)
Dept: GENERAL RADIOLOGY | Facility: HOSPITAL | Age: 78
DRG: 236 | End: 2023-09-26
Payer: MEDICARE

## 2023-09-26 ENCOUNTER — ANESTHESIA EVENT (OUTPATIENT)
Dept: PERIOP | Facility: HOSPITAL | Age: 78
DRG: 236 | End: 2023-09-26
Payer: MEDICARE

## 2023-09-26 ENCOUNTER — PATIENT ROUNDING (BHMG ONLY) (OUTPATIENT)
Dept: CARDIAC SURGERY | Facility: CLINIC | Age: 78
End: 2023-09-26
Payer: MEDICARE

## 2023-09-26 LAB
ABO GROUP BLD: NORMAL
ACT BLD: 131 SECONDS (ref 82–152)
ACT BLD: 143 SECONDS (ref 82–152)
ALBUMIN SERPL-MCNC: 4.5 G/DL (ref 3.5–5.2)
ALBUMIN SERPL-MCNC: 4.7 G/DL (ref 3.5–5.2)
ANION GAP SERPL CALCULATED.3IONS-SCNC: 13 MMOL/L (ref 5–15)
ANION GAP SERPL CALCULATED.3IONS-SCNC: 13.2 MMOL/L (ref 5–15)
ANION GAP SERPL CALCULATED.3IONS-SCNC: 16 MMOL/L (ref 5–15)
APTT PPP: 28.2 SECONDS (ref 22.7–35.4)
ARTERIAL PATENCY WRIST A: ABNORMAL
ARTERIAL PATENCY WRIST A: ABNORMAL
ATMOSPHERIC PRESS: 749.8 MMHG
ATMOSPHERIC PRESS: 752.7 MMHG
BASE EXCESS BLDA CALC-SCNC: -0.4 MMOL/L (ref 0–2)
BASE EXCESS BLDA CALC-SCNC: -5.6 MMOL/L (ref 0–2)
BASE EXCESS BLDA CALC-SCNC: 0 MMOL/L (ref -5–5)
BASE EXCESS BLDA CALC-SCNC: 2 MMOL/L (ref -5–5)
BASE EXCESS BLDA CALC-SCNC: 3 MMOL/L (ref -5–5)
BASE EXCESS BLDA CALC-SCNC: 4 MMOL/L (ref -5–5)
BASE EXCESS BLDA CALC-SCNC: 4 MMOL/L (ref -5–5)
BASOPHILS # BLD AUTO: 0.03 10*3/MM3 (ref 0–0.2)
BASOPHILS NFR BLD AUTO: 0.2 % (ref 0–1.5)
BDY SITE: ABNORMAL
BDY SITE: ABNORMAL
BUN SERPL-MCNC: 14 MG/DL (ref 8–23)
BUN SERPL-MCNC: 15 MG/DL (ref 8–23)
BUN SERPL-MCNC: 15 MG/DL (ref 8–23)
BUN/CREAT SERPL: 15.1 (ref 7–25)
BUN/CREAT SERPL: 15.6 (ref 7–25)
BUN/CREAT SERPL: 20.3 (ref 7–25)
CA-I BLD-MCNC: 4.7 MG/DL (ref 4.6–5.4)
CA-I BLDA-SCNC: ABNORMAL MMOL/L
CA-I SERPL ISE-MCNC: 1.18 MMOL/L (ref 1.15–1.35)
CALCIUM SPEC-SCNC: 8.5 MG/DL (ref 8.6–10.5)
CALCIUM SPEC-SCNC: 9.2 MG/DL (ref 8.6–10.5)
CALCIUM SPEC-SCNC: 9.9 MG/DL (ref 8.6–10.5)
CHLORIDE SERPL-SCNC: 102 MMOL/L (ref 98–107)
CHLORIDE SERPL-SCNC: 105 MMOL/L (ref 98–107)
CHLORIDE SERPL-SCNC: 97 MMOL/L (ref 98–107)
CO2 BLDA-SCNC: 21.1 MMOL/L (ref 23–27)
CO2 BLDA-SCNC: 23.9 MMOL/L (ref 23–27)
CO2 BLDA-SCNC: 27 MMOL/L (ref 24–29)
CO2 BLDA-SCNC: 29 MMOL/L (ref 24–29)
CO2 BLDA-SCNC: 30 MMOL/L (ref 24–29)
CO2 BLDA-SCNC: 30 MMOL/L (ref 24–29)
CO2 BLDA-SCNC: 31 MMOL/L (ref 24–29)
CO2 SERPL-SCNC: 19.8 MMOL/L (ref 22–29)
CO2 SERPL-SCNC: 21 MMOL/L (ref 22–29)
CO2 SERPL-SCNC: 28 MMOL/L (ref 22–29)
CREAT SERPL-MCNC: 0.74 MG/DL (ref 0.76–1.27)
CREAT SERPL-MCNC: 0.93 MG/DL (ref 0.76–1.27)
CREAT SERPL-MCNC: 0.96 MG/DL (ref 0.76–1.27)
DEPRECATED RDW RBC AUTO: 40.1 FL (ref 37–54)
DEPRECATED RDW RBC AUTO: 40.8 FL (ref 37–54)
EGFRCR SERPLBLD CKD-EPI 2021: 82.4 ML/MIN/1.73
EGFRCR SERPLBLD CKD-EPI 2021: 85.6 ML/MIN/1.73
EGFRCR SERPLBLD CKD-EPI 2021: 94.5 ML/MIN/1.73
EOSINOPHIL # BLD AUTO: 0.07 10*3/MM3 (ref 0–0.4)
EOSINOPHIL NFR BLD AUTO: 0.5 % (ref 0.3–6.2)
ERYTHROCYTE [DISTWIDTH] IN BLOOD BY AUTOMATED COUNT: 12.4 % (ref 12.3–15.4)
ERYTHROCYTE [DISTWIDTH] IN BLOOD BY AUTOMATED COUNT: 12.6 % (ref 12.3–15.4)
FIBRINOGEN PPP-MCNC: 217 MG/DL (ref 219–464)
GLUCOSE BLDC GLUCOMTR-MCNC: 114 MG/DL (ref 70–130)
GLUCOSE BLDC GLUCOMTR-MCNC: 119 MG/DL (ref 70–130)
GLUCOSE BLDC GLUCOMTR-MCNC: 120 MG/DL (ref 70–130)
GLUCOSE BLDC GLUCOMTR-MCNC: 123 MG/DL (ref 70–130)
GLUCOSE BLDC GLUCOMTR-MCNC: 126 MG/DL (ref 70–130)
GLUCOSE BLDC GLUCOMTR-MCNC: 147 MG/DL (ref 70–130)
GLUCOSE BLDC GLUCOMTR-MCNC: 158 MG/DL (ref 70–130)
GLUCOSE BLDC GLUCOMTR-MCNC: 158 MG/DL (ref 70–130)
GLUCOSE BLDC GLUCOMTR-MCNC: 159 MG/DL (ref 70–130)
GLUCOSE BLDC GLUCOMTR-MCNC: 163 MG/DL (ref 70–130)
GLUCOSE BLDC GLUCOMTR-MCNC: 168 MG/DL (ref 70–130)
GLUCOSE BLDC GLUCOMTR-MCNC: 168 MG/DL (ref 70–130)
GLUCOSE BLDC GLUCOMTR-MCNC: 171 MG/DL (ref 70–130)
GLUCOSE BLDC GLUCOMTR-MCNC: 177 MG/DL (ref 70–130)
GLUCOSE BLDC GLUCOMTR-MCNC: 179 MG/DL (ref 70–130)
GLUCOSE BLDC GLUCOMTR-MCNC: 198 MG/DL (ref 70–130)
GLUCOSE BLDC GLUCOMTR-MCNC: 210 MG/DL (ref 70–130)
GLUCOSE SERPL-MCNC: 111 MG/DL (ref 65–99)
GLUCOSE SERPL-MCNC: 142 MG/DL (ref 65–99)
GLUCOSE SERPL-MCNC: 162 MG/DL (ref 65–99)
HCO3 BLDA-SCNC: 19.9 MMOL/L (ref 22–28)
HCO3 BLDA-SCNC: 22.9 MMOL/L (ref 22–28)
HCO3 BLDA-SCNC: 25.9 MMOL/L (ref 22–26)
HCO3 BLDA-SCNC: 27.3 MMOL/L (ref 22–26)
HCO3 BLDA-SCNC: 28.6 MMOL/L (ref 22–26)
HCO3 BLDA-SCNC: 28.9 MMOL/L (ref 22–26)
HCO3 BLDA-SCNC: 29.2 MMOL/L (ref 22–26)
HCT VFR BLD AUTO: 30.7 % (ref 37.5–51)
HCT VFR BLD AUTO: 33.6 % (ref 37.5–51)
HCT VFR BLDA CALC: 26 % (ref 38–51)
HCT VFR BLDA CALC: 28 % (ref 38–51)
HCT VFR BLDA CALC: 29 % (ref 38–51)
HCT VFR BLDA CALC: 29 % (ref 38–51)
HCT VFR BLDA CALC: 30 % (ref 38–51)
HEMODILUTION: NO
HEMODILUTION: NO
HGB BLD-MCNC: 10.4 G/DL (ref 13–17.7)
HGB BLD-MCNC: 11.5 G/DL (ref 13–17.7)
HGB BLDA-MCNC: 10.2 G/DL (ref 12–17)
HGB BLDA-MCNC: 8.8 G/DL (ref 12–17)
HGB BLDA-MCNC: 9.5 G/DL (ref 12–17)
HGB BLDA-MCNC: 9.9 G/DL (ref 12–17)
HGB BLDA-MCNC: 9.9 G/DL (ref 12–17)
IMM GRANULOCYTES # BLD AUTO: 0.05 10*3/MM3 (ref 0–0.05)
IMM GRANULOCYTES NFR BLD AUTO: 0.4 % (ref 0–0.5)
INHALED O2 CONCENTRATION: 100 %
INHALED O2 CONCENTRATION: 40 %
INR PPP: 1.22 (ref 0.9–1.1)
LYMPHOCYTES # BLD AUTO: 1.21 10*3/MM3 (ref 0.7–3.1)
LYMPHOCYTES NFR BLD AUTO: 8.6 % (ref 19.6–45.3)
MAGNESIUM SERPL-MCNC: 1.7 MG/DL (ref 1.6–2.4)
MAGNESIUM SERPL-MCNC: 4 MG/DL (ref 1.6–2.4)
MCH RBC QN AUTO: 30.5 PG (ref 26.6–33)
MCH RBC QN AUTO: 30.5 PG (ref 26.6–33)
MCHC RBC AUTO-ENTMCNC: 33.9 G/DL (ref 31.5–35.7)
MCHC RBC AUTO-ENTMCNC: 34.2 G/DL (ref 31.5–35.7)
MCV RBC AUTO: 89.1 FL (ref 79–97)
MCV RBC AUTO: 90 FL (ref 79–97)
MODALITY: ABNORMAL
MODALITY: ABNORMAL
MONOCYTES # BLD AUTO: 0.61 10*3/MM3 (ref 0.1–0.9)
MONOCYTES NFR BLD AUTO: 4.3 % (ref 5–12)
NEUTROPHILS NFR BLD AUTO: 12.06 10*3/MM3 (ref 1.7–7)
NEUTROPHILS NFR BLD AUTO: 86 % (ref 42.7–76)
NRBC BLD AUTO-RTO: 0 /100 WBC (ref 0–0.2)
O2 A-A PPRESDIFF RESPIRATORY: 0.6 MMHG
O2 A-A PPRESDIFF RESPIRATORY: 0.7 MMHG
PCO2 BLDA: 32.6 MM HG (ref 35–45)
PCO2 BLDA: 38.5 MM HG (ref 35–45)
PCO2 BLDA: 47 MM HG (ref 35–45)
PCO2 BLDA: 48.2 MM HG (ref 35–45)
PCO2 BLDA: 49.2 MM HG (ref 35–45)
PCO2 BLDA: 49.9 MM HG (ref 35–45)
PCO2 BLDA: 50 MM HG (ref 35–45)
PEEP RESPIRATORY: 5 CM[H2O]
PEEP RESPIRATORY: 8 CM[H2O]
PH BLDA: 7.32 PH UNITS (ref 7.35–7.45)
PH BLDA: 7.38 PH UNITS (ref 7.35–7.6)
PH BLDA: 7.39 PH UNITS (ref 7.35–7.6)
PH BLDA: 7.4 PH UNITS (ref 7.35–7.6)
PH BLDA: 7.4 PH UNITS (ref 7.35–7.6)
PH BLDA: 7.41 PH UNITS (ref 7.35–7.6)
PH BLDA: 7.46 PH UNITS (ref 7.35–7.45)
PHOSPHATE SERPL-MCNC: 2.5 MG/DL (ref 2.5–4.5)
PHOSPHATE SERPL-MCNC: 4.3 MG/DL (ref 2.5–4.5)
PLATELET # BLD AUTO: 142 10*3/MM3 (ref 140–450)
PLATELET # BLD AUTO: 152 10*3/MM3 (ref 140–450)
PMV BLD AUTO: 10.3 FL (ref 6–12)
PMV BLD AUTO: 10.5 FL (ref 6–12)
PO2 BLDA: 172.7 MM HG (ref 80–100)
PO2 BLDA: 446 MMHG (ref 80–105)
PO2 BLDA: 45 MMHG (ref 80–105)
PO2 BLDA: 450.3 MM HG (ref 80–100)
PO2 BLDA: 476 MMHG (ref 80–105)
PO2 BLDA: 481 MMHG (ref 80–105)
PO2 BLDA: 497 MMHG (ref 80–105)
POTASSIUM BLDA-SCNC: 3.6 MMOL/L (ref 3.5–4.9)
POTASSIUM BLDA-SCNC: 4 MMOL/L (ref 3.5–4.9)
POTASSIUM BLDA-SCNC: 4.1 MMOL/L (ref 3.5–4.9)
POTASSIUM BLDA-SCNC: 4.1 MMOL/L (ref 3.5–4.9)
POTASSIUM BLDA-SCNC: 4.3 MMOL/L (ref 3.5–4.9)
POTASSIUM SERPL-SCNC: 3.5 MMOL/L (ref 3.5–5.2)
POTASSIUM SERPL-SCNC: 3.9 MMOL/L (ref 3.5–5.2)
POTASSIUM SERPL-SCNC: 4.7 MMOL/L (ref 3.5–5.2)
PROTHROMBIN TIME: 15.5 SECONDS (ref 11.7–14.2)
PSV: 8 CMH2O
QT INTERVAL: 368 MS
QT INTERVAL: 433 MS
QTC INTERVAL: 407 MS
QTC INTERVAL: 417 MS
RBC # BLD AUTO: 3.41 10*6/MM3 (ref 4.14–5.8)
RBC # BLD AUTO: 3.77 10*6/MM3 (ref 4.14–5.8)
RH BLD: POSITIVE
SAO2 % BLDA: 100 % (ref 95–98)
SAO2 % BLDA: 78 % (ref 95–98)
SAO2 % BLDCOA: 100 % (ref 92–98.5)
SAO2 % BLDCOA: 99.4 % (ref 92–98.5)
SET MECH RESP RATE: 18
SODIUM SERPL-SCNC: 138 MMOL/L (ref 136–145)
SODIUM SERPL-SCNC: 138 MMOL/L (ref 136–145)
SODIUM SERPL-SCNC: 139 MMOL/L (ref 136–145)
TOTAL RATE: 14 BREATHS/MINUTE
TOTAL RATE: 18 BREATHS/MINUTE
VENTILATOR MODE: ABNORMAL
VENTILATOR MODE: AC
VT ON VENT VENT: 700 ML
WBC NRBC COR # BLD: 14.03 10*3/MM3 (ref 3.4–10.8)
WBC NRBC COR # BLD: 14.87 10*3/MM3 (ref 3.4–10.8)

## 2023-09-26 PROCEDURE — 25010000002 MAGNESIUM SULFATE PER 500 MG OF MAGNESIUM: Performed by: ANESTHESIOLOGY

## 2023-09-26 PROCEDURE — C1713 ANCHOR/SCREW BN/BN,TIS/BN: HCPCS | Performed by: THORACIC SURGERY (CARDIOTHORACIC VASCULAR SURGERY)

## 2023-09-26 PROCEDURE — 82330 ASSAY OF CALCIUM: CPT | Performed by: NURSE PRACTITIONER

## 2023-09-26 PROCEDURE — 25010000002 FENTANYL CITRATE (PF) 250 MCG/5ML SOLUTION: Performed by: ANESTHESIOLOGY

## 2023-09-26 PROCEDURE — 25010000002 PHENYLEPHRINE 10 MG/ML SOLUTION 5 ML VIAL: Performed by: ANESTHESIOLOGY

## 2023-09-26 PROCEDURE — B24BZZ4 ULTRASONOGRAPHY OF HEART WITH AORTA, TRANSESOPHAGEAL: ICD-10-PCS | Performed by: ANESTHESIOLOGY

## 2023-09-26 PROCEDURE — 5A1221Z PERFORMANCE OF CARDIAC OUTPUT, CONTINUOUS: ICD-10-PCS | Performed by: THORACIC SURGERY (CARDIOTHORACIC VASCULAR SURGERY)

## 2023-09-26 PROCEDURE — 33508 ENDOSCOPIC VEIN HARVEST: CPT | Performed by: THORACIC SURGERY (CARDIOTHORACIC VASCULAR SURGERY)

## 2023-09-26 PROCEDURE — 82947 ASSAY GLUCOSE BLOOD QUANT: CPT

## 2023-09-26 PROCEDURE — 94060 EVALUATION OF WHEEZING: CPT

## 2023-09-26 PROCEDURE — 85018 HEMOGLOBIN: CPT

## 2023-09-26 PROCEDURE — 25010000002 ACETAMINOPHEN 10 MG/ML SOLUTION: Performed by: NURSE PRACTITIONER

## 2023-09-26 PROCEDURE — 80048 BASIC METABOLIC PNL TOTAL CA: CPT | Performed by: NURSE PRACTITIONER

## 2023-09-26 PROCEDURE — 71045 X-RAY EXAM CHEST 1 VIEW: CPT

## 2023-09-26 PROCEDURE — 85027 COMPLETE CBC AUTOMATED: CPT | Performed by: NURSE PRACTITIONER

## 2023-09-26 PROCEDURE — 82803 BLOOD GASES ANY COMBINATION: CPT

## 2023-09-26 PROCEDURE — 33523 CABG ART-VEIN SIX OR MORE: CPT | Performed by: PHYSICIAN ASSISTANT

## 2023-09-26 PROCEDURE — 25010000002 PAPAVERINE PER 60 MG: Performed by: THORACIC SURGERY (CARDIOTHORACIC VASCULAR SURGERY)

## 2023-09-26 PROCEDURE — 021309W BYPASS CORONARY ARTERY, FOUR OR MORE ARTERIES FROM AORTA WITH AUTOLOGOUS VENOUS TISSUE, OPEN APPROACH: ICD-10-PCS | Performed by: THORACIC SURGERY (CARDIOTHORACIC VASCULAR SURGERY)

## 2023-09-26 PROCEDURE — 33533 CABG ARTERIAL SINGLE: CPT | Performed by: PHYSICIAN ASSISTANT

## 2023-09-26 PROCEDURE — 93010 ELECTROCARDIOGRAM REPORT: CPT | Performed by: INTERNAL MEDICINE

## 2023-09-26 PROCEDURE — 85014 HEMATOCRIT: CPT

## 2023-09-26 PROCEDURE — 83735 ASSAY OF MAGNESIUM: CPT | Performed by: NURSE PRACTITIONER

## 2023-09-26 PROCEDURE — 25010000002 MIDAZOLAM PER 1 MG: Performed by: ANESTHESIOLOGY

## 2023-09-26 PROCEDURE — 93005 ELECTROCARDIOGRAM TRACING: CPT | Performed by: NURSE PRACTITIONER

## 2023-09-26 PROCEDURE — 25010000002 CEFAZOLIN IN DEXTROSE 2-4 GM/100ML-% SOLUTION: Performed by: NURSE PRACTITIONER

## 2023-09-26 PROCEDURE — 93318 ECHO TRANSESOPHAGEAL INTRAOP: CPT

## 2023-09-26 PROCEDURE — 25010000002 PROPOFOL 10 MG/ML EMULSION: Performed by: ANESTHESIOLOGY

## 2023-09-26 PROCEDURE — 25010000002 HEPARIN (PORCINE) PER 1000 UNITS: Performed by: ANESTHESIOLOGY

## 2023-09-26 PROCEDURE — 33533 CABG ARTERIAL SINGLE: CPT | Performed by: THORACIC SURGERY (CARDIOTHORACIC VASCULAR SURGERY)

## 2023-09-26 PROCEDURE — 85025 COMPLETE CBC W/AUTO DIFF WBC: CPT | Performed by: NURSE PRACTITIONER

## 2023-09-26 PROCEDURE — C1729 CATH, DRAINAGE: HCPCS | Performed by: THORACIC SURGERY (CARDIOTHORACIC VASCULAR SURGERY)

## 2023-09-26 PROCEDURE — 25010000002 ALBUMIN HUMAN 5% PER 50 ML: Performed by: NURSE PRACTITIONER

## 2023-09-26 PROCEDURE — 82948 REAGENT STRIP/BLOOD GLUCOSE: CPT

## 2023-09-26 PROCEDURE — 86900 BLOOD TYPING SEROLOGIC ABO: CPT

## 2023-09-26 PROCEDURE — 94002 VENT MGMT INPAT INIT DAY: CPT

## 2023-09-26 PROCEDURE — 93005 ELECTROCARDIOGRAM TRACING: CPT

## 2023-09-26 PROCEDURE — 0 MEPERIDINE PER 100 MG: Performed by: NURSE PRACTITIONER

## 2023-09-26 PROCEDURE — 94799 UNLISTED PULMONARY SVC/PX: CPT

## 2023-09-26 PROCEDURE — 25010000002 AMIODARONE IN DEXTROSE 5% 150-4.21 MG/100ML-% SOLUTION

## 2023-09-26 PROCEDURE — 85610 PROTHROMBIN TIME: CPT | Performed by: NURSE PRACTITIONER

## 2023-09-26 PROCEDURE — 25010000002 MORPHINE PER 10 MG: Performed by: NURSE PRACTITIONER

## 2023-09-26 PROCEDURE — 0 POTASSIUM CHLORIDE PER 2 MEQ: Performed by: THORACIC SURGERY (CARDIOTHORACIC VASCULAR SURGERY)

## 2023-09-26 PROCEDURE — 63710000001 INSULIN REGULAR HUMAN PER 5 UNITS: Performed by: ANESTHESIOLOGY

## 2023-09-26 PROCEDURE — 25010000002 CEFAZOLIN IN DEXTROSE 2000 MG/ 100 ML SOLUTION: Performed by: NURSE PRACTITIONER

## 2023-09-26 PROCEDURE — 85347 COAGULATION TIME ACTIVATED: CPT

## 2023-09-26 PROCEDURE — 25010000002 HEPARIN (PORCINE) PER 1000 UNITS

## 2023-09-26 PROCEDURE — 33523 CABG ART-VEIN SIX OR MORE: CPT | Performed by: THORACIC SURGERY (CARDIOTHORACIC VASCULAR SURGERY)

## 2023-09-26 PROCEDURE — 0 PROTAMINE SULFATE PER 10 MG: Performed by: THORACIC SURGERY (CARDIOTHORACIC VASCULAR SURGERY)

## 2023-09-26 PROCEDURE — P9041 ALBUMIN (HUMAN),5%, 50ML: HCPCS | Performed by: NURSE PRACTITIONER

## 2023-09-26 PROCEDURE — C1751 CATH, INF, PER/CENT/MIDLINE: HCPCS | Performed by: ANESTHESIOLOGY

## 2023-09-26 PROCEDURE — 25010000002 AMIODARONE IN DEXTROSE 5% 360-4.14 MG/200ML-% SOLUTION

## 2023-09-26 PROCEDURE — 25010000002 HEPARIN (PORCINE) PER 1000 UNITS: Performed by: THORACIC SURGERY (CARDIOTHORACIC VASCULAR SURGERY)

## 2023-09-26 PROCEDURE — 85384 FIBRINOGEN ACTIVITY: CPT | Performed by: NURSE PRACTITIONER

## 2023-09-26 PROCEDURE — P9047 ALBUMIN (HUMAN), 25%, 50ML: HCPCS

## 2023-09-26 PROCEDURE — 25010000002 MAGNESIUM SULFATE IN D5W 1G/100ML (PREMIX) 1-5 GM/100ML-% SOLUTION: Performed by: NURSE PRACTITIONER

## 2023-09-26 PROCEDURE — 25010000002 METOCLOPRAMIDE PER 10 MG: Performed by: NURSE PRACTITIONER

## 2023-09-26 PROCEDURE — 33508 ENDOSCOPIC VEIN HARVEST: CPT | Performed by: PHYSICIAN ASSISTANT

## 2023-09-26 PROCEDURE — 85730 THROMBOPLASTIN TIME PARTIAL: CPT | Performed by: NURSE PRACTITIONER

## 2023-09-26 PROCEDURE — 86901 BLOOD TYPING SEROLOGIC RH(D): CPT

## 2023-09-26 PROCEDURE — 25010000002 ALBUMIN HUMAN 25% PER 50 ML

## 2023-09-26 PROCEDURE — 06BP0ZZ EXCISION OF RIGHT SAPHENOUS VEIN, OPEN APPROACH: ICD-10-PCS | Performed by: THORACIC SURGERY (CARDIOTHORACIC VASCULAR SURGERY)

## 2023-09-26 PROCEDURE — 25010000002 VANCOMYCIN 1 G RECONSTITUTED SOLUTION

## 2023-09-26 PROCEDURE — 02100Z9 BYPASS CORONARY ARTERY, ONE ARTERY FROM LEFT INTERNAL MAMMARY, OPEN APPROACH: ICD-10-PCS | Performed by: THORACIC SURGERY (CARDIOTHORACIC VASCULAR SURGERY)

## 2023-09-26 PROCEDURE — 80069 RENAL FUNCTION PANEL: CPT | Performed by: NURSE PRACTITIONER

## 2023-09-26 PROCEDURE — 25010000002 ONDANSETRON PER 1 MG: Performed by: NURSE PRACTITIONER

## 2023-09-26 PROCEDURE — A4648 IMPLANTABLE TISSUE MARKER: HCPCS | Performed by: THORACIC SURGERY (CARDIOTHORACIC VASCULAR SURGERY)

## 2023-09-26 DEVICE — SS SUTURE, 4 PER SLEEVE
Type: IMPLANTABLE DEVICE | Site: STERNUM | Status: FUNCTIONAL
Brand: MYO/WIRE II

## 2023-09-26 DEVICE — SS SUTURE, 3 PER SLEEVE
Type: IMPLANTABLE DEVICE | Site: STERNUM | Status: FUNCTIONAL
Brand: MYO/WIRE II

## 2023-09-26 DEVICE — ABSORBABLE HEMOSTAT (OXIDIZED REGENERATED CELLULOSE, U.S.P.)
Type: IMPLANTABLE DEVICE | Site: CHEST | Status: FUNCTIONAL
Brand: SURGICEL

## 2023-09-26 DEVICE — LIGACLIP MCA MULTIPLE CLIP APPLIERS, 20 MEDIUM CLIPS
Type: IMPLANTABLE DEVICE | Site: CHEST | Status: FUNCTIONAL
Brand: LIGACLIP

## 2023-09-26 RX ORDER — NITROGLYCERIN 0.4 MG/1
0.4 TABLET SUBLINGUAL
Status: DISCONTINUED | OUTPATIENT
Start: 2023-09-26 | End: 2023-09-30 | Stop reason: HOSPADM

## 2023-09-26 RX ORDER — ONDANSETRON 2 MG/ML
4 INJECTION INTRAMUSCULAR; INTRAVENOUS EVERY 6 HOURS PRN
Status: DISCONTINUED | OUTPATIENT
Start: 2023-09-26 | End: 2023-09-30 | Stop reason: HOSPADM

## 2023-09-26 RX ORDER — ACETAMINOPHEN 650 MG/1
650 SUPPOSITORY RECTAL EVERY 4 HOURS PRN
Status: DISCONTINUED | OUTPATIENT
Start: 2023-09-27 | End: 2023-09-30 | Stop reason: HOSPADM

## 2023-09-26 RX ORDER — ACETAMINOPHEN 650 MG/1
650 SUPPOSITORY RECTAL EVERY 4 HOURS
Status: ACTIVE | OUTPATIENT
Start: 2023-09-26 | End: 2023-09-27

## 2023-09-26 RX ORDER — POTASSIUM CHLORIDE 29.8 MG/ML
20 INJECTION INTRAVENOUS
Status: COMPLETED | OUTPATIENT
Start: 2023-09-26 | End: 2023-09-26

## 2023-09-26 RX ORDER — SODIUM CHLORIDE 9 MG/ML
30 INJECTION, SOLUTION INTRAVENOUS CONTINUOUS
Status: DISCONTINUED | OUTPATIENT
Start: 2023-09-26 | End: 2023-09-30 | Stop reason: HOSPADM

## 2023-09-26 RX ORDER — ACETAMINOPHEN 325 MG/1
650 TABLET ORAL EVERY 4 HOURS
Status: ACTIVE | OUTPATIENT
Start: 2023-09-26 | End: 2023-09-27

## 2023-09-26 RX ORDER — LIDOCAINE HYDROCHLORIDE 20 MG/ML
INJECTION, SOLUTION INFILTRATION; PERINEURAL AS NEEDED
Status: DISCONTINUED | OUTPATIENT
Start: 2023-09-26 | End: 2023-09-26 | Stop reason: SURG

## 2023-09-26 RX ORDER — MAGNESIUM SULFATE HEPTAHYDRATE 500 MG/ML
INJECTION, SOLUTION INTRAMUSCULAR; INTRAVENOUS AS NEEDED
Status: DISCONTINUED | OUTPATIENT
Start: 2023-09-26 | End: 2023-09-26 | Stop reason: SURG

## 2023-09-26 RX ORDER — ACETAMINOPHEN 10 MG/ML
1000 INJECTION, SOLUTION INTRAVENOUS EVERY 8 HOURS
Status: COMPLETED | OUTPATIENT
Start: 2023-09-26 | End: 2023-09-27

## 2023-09-26 RX ORDER — PHENYLEPHRINE HCL IN 0.9% NACL 0.5 MG/5ML
.2-2 SYRINGE (ML) INTRAVENOUS CONTINUOUS PRN
Status: DISCONTINUED | OUTPATIENT
Start: 2023-09-26 | End: 2023-09-28

## 2023-09-26 RX ORDER — ACETAMINOPHEN 325 MG/1
650 TABLET ORAL EVERY 4 HOURS PRN
Status: DISCONTINUED | OUTPATIENT
Start: 2023-09-27 | End: 2023-09-30 | Stop reason: HOSPADM

## 2023-09-26 RX ORDER — PROPOFOL 10 MG/ML
VIAL (ML) INTRAVENOUS AS NEEDED
Status: DISCONTINUED | OUTPATIENT
Start: 2023-09-26 | End: 2023-09-26 | Stop reason: SURG

## 2023-09-26 RX ORDER — DEXMEDETOMIDINE HYDROCHLORIDE 4 UG/ML
.2-1.5 INJECTION, SOLUTION INTRAVENOUS
Status: DISCONTINUED | OUTPATIENT
Start: 2023-09-26 | End: 2023-09-27

## 2023-09-26 RX ORDER — HEPARIN SODIUM 5000 [USP'U]/ML
INJECTION, SOLUTION INTRAVENOUS; SUBCUTANEOUS AS NEEDED
Status: DISCONTINUED | OUTPATIENT
Start: 2023-09-26 | End: 2023-09-26 | Stop reason: HOSPADM

## 2023-09-26 RX ORDER — MORPHINE SULFATE 2 MG/ML
1 INJECTION, SOLUTION INTRAMUSCULAR; INTRAVENOUS EVERY 4 HOURS PRN
Status: DISCONTINUED | OUTPATIENT
Start: 2023-09-26 | End: 2023-09-30 | Stop reason: HOSPADM

## 2023-09-26 RX ORDER — HEPARIN SODIUM 1000 [USP'U]/ML
INJECTION, SOLUTION INTRAVENOUS; SUBCUTANEOUS AS NEEDED
Status: DISCONTINUED | OUTPATIENT
Start: 2023-09-26 | End: 2023-09-26 | Stop reason: SURG

## 2023-09-26 RX ORDER — AMOXICILLIN 250 MG
2 CAPSULE ORAL NIGHTLY
Status: DISCONTINUED | OUTPATIENT
Start: 2023-09-27 | End: 2023-09-30 | Stop reason: HOSPADM

## 2023-09-26 RX ORDER — DOPAMINE HYDROCHLORIDE 160 MG/100ML
2-20 INJECTION, SOLUTION INTRAVENOUS CONTINUOUS PRN
Status: DISCONTINUED | OUTPATIENT
Start: 2023-09-26 | End: 2023-09-28

## 2023-09-26 RX ORDER — MIDAZOLAM HYDROCHLORIDE 1 MG/ML
2 INJECTION INTRAMUSCULAR; INTRAVENOUS
Status: DISCONTINUED | OUTPATIENT
Start: 2023-09-26 | End: 2023-09-28

## 2023-09-26 RX ORDER — CHLORHEXIDINE GLUCONATE ORAL RINSE 1.2 MG/ML
15 SOLUTION DENTAL EVERY 12 HOURS
Status: DISCONTINUED | OUTPATIENT
Start: 2023-09-26 | End: 2023-09-30 | Stop reason: HOSPADM

## 2023-09-26 RX ORDER — BISACODYL 5 MG/1
10 TABLET, DELAYED RELEASE ORAL DAILY PRN
Status: DISCONTINUED | OUTPATIENT
Start: 2023-09-26 | End: 2023-09-30 | Stop reason: HOSPADM

## 2023-09-26 RX ORDER — MILRINONE LACTATE 0.2 MG/ML
.25-.375 INJECTION, SOLUTION INTRAVENOUS CONTINUOUS PRN
Status: DISCONTINUED | OUTPATIENT
Start: 2023-09-26 | End: 2023-09-28

## 2023-09-26 RX ORDER — AMINOCAPROIC ACID 250 MG/ML
INJECTION, SOLUTION INTRAVENOUS AS NEEDED
Status: DISCONTINUED | OUTPATIENT
Start: 2023-09-26 | End: 2023-09-26 | Stop reason: SURG

## 2023-09-26 RX ORDER — HYDROCODONE BITARTRATE AND ACETAMINOPHEN 5; 325 MG/1; MG/1
2 TABLET ORAL EVERY 4 HOURS PRN
Status: DISCONTINUED | OUTPATIENT
Start: 2023-09-26 | End: 2023-09-30 | Stop reason: HOSPADM

## 2023-09-26 RX ORDER — ASPIRIN 81 MG/1
81 TABLET ORAL DAILY
Status: DISCONTINUED | OUTPATIENT
Start: 2023-09-27 | End: 2023-09-30 | Stop reason: HOSPADM

## 2023-09-26 RX ORDER — DEXTROSE MONOHYDRATE 25 G/50ML
10-50 INJECTION, SOLUTION INTRAVENOUS
Status: DISCONTINUED | OUTPATIENT
Start: 2023-09-26 | End: 2023-09-27

## 2023-09-26 RX ORDER — SODIUM CHLORIDE 9 MG/ML
INJECTION, SOLUTION INTRAVENOUS CONTINUOUS PRN
Status: DISCONTINUED | OUTPATIENT
Start: 2023-09-26 | End: 2023-09-26 | Stop reason: SURG

## 2023-09-26 RX ORDER — IBUPROFEN 600 MG/1
1 TABLET ORAL
Status: DISCONTINUED | OUTPATIENT
Start: 2023-09-26 | End: 2023-09-27

## 2023-09-26 RX ORDER — MEPERIDINE HYDROCHLORIDE 25 MG/ML
25 INJECTION INTRAMUSCULAR; INTRAVENOUS; SUBCUTANEOUS EVERY 4 HOURS PRN
Status: DISPENSED | OUTPATIENT
Start: 2023-09-26 | End: 2023-09-27

## 2023-09-26 RX ORDER — ENOXAPARIN SODIUM 100 MG/ML
40 INJECTION SUBCUTANEOUS DAILY
Status: DISCONTINUED | OUTPATIENT
Start: 2023-09-27 | End: 2023-09-30 | Stop reason: HOSPADM

## 2023-09-26 RX ORDER — METOCLOPRAMIDE HYDROCHLORIDE 5 MG/ML
10 INJECTION INTRAMUSCULAR; INTRAVENOUS EVERY 6 HOURS
Status: DISPENSED | OUTPATIENT
Start: 2023-09-26 | End: 2023-09-27

## 2023-09-26 RX ORDER — ALBUTEROL SULFATE 2.5 MG/3ML
2.5 SOLUTION RESPIRATORY (INHALATION) ONCE AS NEEDED
Status: COMPLETED | OUTPATIENT
Start: 2023-09-26 | End: 2023-09-26

## 2023-09-26 RX ORDER — NICOTINE POLACRILEX 4 MG
15 LOZENGE BUCCAL
Status: DISCONTINUED | OUTPATIENT
Start: 2023-09-26 | End: 2023-09-27

## 2023-09-26 RX ORDER — ALBUMIN, HUMAN INJ 5% 5 %
1500 SOLUTION INTRAVENOUS AS NEEDED
Status: DISPENSED | OUTPATIENT
Start: 2023-09-26 | End: 2023-09-27

## 2023-09-26 RX ORDER — NOREPINEPHRINE BITARTRATE 1 MG/ML
INJECTION, SOLUTION INTRAVENOUS CONTINUOUS PRN
Status: DISCONTINUED | OUTPATIENT
Start: 2023-09-26 | End: 2023-09-26

## 2023-09-26 RX ORDER — ACETAMINOPHEN 160 MG/5ML
650 SOLUTION ORAL EVERY 4 HOURS PRN
Status: DISCONTINUED | OUTPATIENT
Start: 2023-09-27 | End: 2023-09-30 | Stop reason: HOSPADM

## 2023-09-26 RX ORDER — PANTOPRAZOLE SODIUM 40 MG/1
40 TABLET, DELAYED RELEASE ORAL EVERY MORNING
Status: DISCONTINUED | OUTPATIENT
Start: 2023-09-27 | End: 2023-09-30 | Stop reason: HOSPADM

## 2023-09-26 RX ORDER — PANTOPRAZOLE SODIUM 40 MG/10ML
40 INJECTION, POWDER, LYOPHILIZED, FOR SOLUTION INTRAVENOUS ONCE
Status: COMPLETED | OUTPATIENT
Start: 2023-09-26 | End: 2023-09-26

## 2023-09-26 RX ORDER — NALOXONE HCL 0.4 MG/ML
0.4 VIAL (ML) INJECTION
Status: DISCONTINUED | OUTPATIENT
Start: 2023-09-26 | End: 2023-09-30 | Stop reason: HOSPADM

## 2023-09-26 RX ORDER — ALPRAZOLAM 0.25 MG/1
0.25 TABLET ORAL EVERY 8 HOURS PRN
Status: DISCONTINUED | OUTPATIENT
Start: 2023-09-26 | End: 2023-09-30 | Stop reason: HOSPADM

## 2023-09-26 RX ORDER — PROTAMINE SULFATE 10 MG/ML
INJECTION, SOLUTION INTRAVENOUS AS NEEDED
Status: DISCONTINUED | OUTPATIENT
Start: 2023-09-26 | End: 2023-09-26 | Stop reason: HOSPADM

## 2023-09-26 RX ORDER — MAGNESIUM SULFATE 1 G/100ML
1 INJECTION INTRAVENOUS EVERY 8 HOURS
Status: COMPLETED | OUTPATIENT
Start: 2023-09-26 | End: 2023-09-27

## 2023-09-26 RX ORDER — PHENYLEPHRINE HCL IN 0.9% NACL 0.5 MG/5ML
SYRINGE (ML) INTRAVENOUS AS NEEDED
Status: DISCONTINUED | OUTPATIENT
Start: 2023-09-26 | End: 2023-09-26 | Stop reason: SURG

## 2023-09-26 RX ORDER — CYCLOBENZAPRINE HCL 10 MG
10 TABLET ORAL EVERY 8 HOURS PRN
Status: DISCONTINUED | OUTPATIENT
Start: 2023-09-27 | End: 2023-09-30 | Stop reason: HOSPADM

## 2023-09-26 RX ORDER — CEFAZOLIN SODIUM 2 G/100ML
2 INJECTION, SOLUTION INTRAVENOUS EVERY 8 HOURS
Status: COMPLETED | OUTPATIENT
Start: 2023-09-26 | End: 2023-09-28

## 2023-09-26 RX ORDER — ROCURONIUM BROMIDE 10 MG/ML
INJECTION, SOLUTION INTRAVENOUS AS NEEDED
Status: DISCONTINUED | OUTPATIENT
Start: 2023-09-26 | End: 2023-09-26 | Stop reason: SURG

## 2023-09-26 RX ORDER — MORPHINE SULFATE 2 MG/ML
4 INJECTION, SOLUTION INTRAMUSCULAR; INTRAVENOUS
Status: DISCONTINUED | OUTPATIENT
Start: 2023-09-26 | End: 2023-09-28

## 2023-09-26 RX ORDER — FENTANYL CITRATE 50 UG/ML
INJECTION, SOLUTION INTRAMUSCULAR; INTRAVENOUS AS NEEDED
Status: DISCONTINUED | OUTPATIENT
Start: 2023-09-26 | End: 2023-09-26 | Stop reason: SURG

## 2023-09-26 RX ORDER — MIDAZOLAM HYDROCHLORIDE 1 MG/ML
INJECTION INTRAMUSCULAR; INTRAVENOUS AS NEEDED
Status: DISCONTINUED | OUTPATIENT
Start: 2023-09-26 | End: 2023-09-26 | Stop reason: SURG

## 2023-09-26 RX ORDER — ALUMINA, MAGNESIA, AND SIMETHICONE 2400; 2400; 240 MG/30ML; MG/30ML; MG/30ML
15 SUSPENSION ORAL EVERY 6 HOURS PRN
Status: DISCONTINUED | OUTPATIENT
Start: 2023-09-26 | End: 2023-09-30 | Stop reason: HOSPADM

## 2023-09-26 RX ORDER — OXYCODONE HYDROCHLORIDE 5 MG/1
10 TABLET ORAL EVERY 4 HOURS PRN
Status: DISCONTINUED | OUTPATIENT
Start: 2023-09-26 | End: 2023-09-30 | Stop reason: HOSPADM

## 2023-09-26 RX ORDER — NITROGLYCERIN 20 MG/100ML
5-200 INJECTION INTRAVENOUS
Status: DISCONTINUED | OUTPATIENT
Start: 2023-09-26 | End: 2023-09-27

## 2023-09-26 RX ORDER — POLYETHYLENE GLYCOL 3350 17 G/17G
17 POWDER, FOR SOLUTION ORAL DAILY PRN
Status: DISCONTINUED | OUTPATIENT
Start: 2023-09-26 | End: 2023-09-30 | Stop reason: HOSPADM

## 2023-09-26 RX ORDER — ACETAMINOPHEN 160 MG/5ML
650 SOLUTION ORAL EVERY 4 HOURS
Status: ACTIVE | OUTPATIENT
Start: 2023-09-26 | End: 2023-09-27

## 2023-09-26 RX ORDER — BISACODYL 10 MG
10 SUPPOSITORY, RECTAL RECTAL DAILY PRN
Status: DISCONTINUED | OUTPATIENT
Start: 2023-09-27 | End: 2023-09-30 | Stop reason: HOSPADM

## 2023-09-26 RX ORDER — NICARDIPINE HYDROCHLORIDE 2.5 MG/ML
INJECTION INTRAVENOUS CONTINUOUS PRN
Status: DISCONTINUED | OUTPATIENT
Start: 2023-09-26 | End: 2023-09-26 | Stop reason: SURG

## 2023-09-26 RX ORDER — ATORVASTATIN CALCIUM 20 MG/1
40 TABLET, FILM COATED ORAL NIGHTLY
Status: DISCONTINUED | OUTPATIENT
Start: 2023-09-26 | End: 2023-09-30 | Stop reason: HOSPADM

## 2023-09-26 RX ORDER — NOREPINEPHRINE BITARTRATE 0.03 MG/ML
.02-.2 INJECTION, SOLUTION INTRAVENOUS CONTINUOUS PRN
Status: DISCONTINUED | OUTPATIENT
Start: 2023-09-26 | End: 2023-09-28

## 2023-09-26 RX ORDER — PAPAVERINE HYDROCHLORIDE 30 MG/ML
INJECTION INTRAMUSCULAR; INTRAVENOUS AS NEEDED
Status: DISCONTINUED | OUTPATIENT
Start: 2023-09-26 | End: 2023-09-26 | Stop reason: HOSPADM

## 2023-09-26 RX ADMIN — MAGNESIUM SULFATE IN DEXTROSE 1 G: 10 INJECTION, SOLUTION INTRAVENOUS at 19:09

## 2023-09-26 RX ADMIN — PROPOFOL 50 MG: 10 INJECTION, EMULSION INTRAVENOUS at 06:44

## 2023-09-26 RX ADMIN — AMIODARONE HYDROCHLORIDE 150 MG: 1.5 INJECTION, SOLUTION INTRAVENOUS at 16:30

## 2023-09-26 RX ADMIN — SODIUM CHLORIDE: 900 INJECTION, SOLUTION INTRAVENOUS at 06:32

## 2023-09-26 RX ADMIN — ALBUMIN (HUMAN) 250 ML: 12.5 INJECTION, SOLUTION INTRAVENOUS at 16:46

## 2023-09-26 RX ADMIN — AMIODARONE HYDROCHLORIDE 1 MG/MIN: 1.8 INJECTION, SOLUTION INTRAVENOUS at 16:45

## 2023-09-26 RX ADMIN — MAGNESIUM SULFATE HEPTAHYDRATE 2 G: 500 INJECTION, SOLUTION INTRAMUSCULAR; INTRAVENOUS at 10:28

## 2023-09-26 RX ADMIN — AMIODARONE HYDROCHLORIDE 0.5 MG/MIN: 1.8 INJECTION, SOLUTION INTRAVENOUS at 21:38

## 2023-09-26 RX ADMIN — AMINOCAPROIC ACID 10 G: 250 INJECTION, SOLUTION INTRAVENOUS at 08:33

## 2023-09-26 RX ADMIN — CEFAZOLIN SODIUM 2 G: 2 INJECTION, SOLUTION INTRAVENOUS at 17:59

## 2023-09-26 RX ADMIN — PANTOPRAZOLE SODIUM 40 MG: 40 INJECTION, POWDER, FOR SOLUTION INTRAVENOUS at 14:18

## 2023-09-26 RX ADMIN — FENTANYL CITRATE 200 MCG: 50 INJECTION INTRAMUSCULAR; INTRAVENOUS at 06:44

## 2023-09-26 RX ADMIN — METOCLOPRAMIDE 10 MG: 5 INJECTION, SOLUTION INTRAMUSCULAR; INTRAVENOUS at 19:31

## 2023-09-26 RX ADMIN — ALBUMIN (HUMAN) 250 ML: 12.5 INJECTION, SOLUTION INTRAVENOUS at 15:59

## 2023-09-26 RX ADMIN — METOPROLOL TARTRATE 12.5 MG: 25 TABLET, FILM COATED ORAL at 05:56

## 2023-09-26 RX ADMIN — Medication 0.02 MCG/KG/MIN: at 16:20

## 2023-09-26 RX ADMIN — 0.12% CHLORHEXIDINE GLUCONATE 15 ML: 1.2 RINSE ORAL at 06:00

## 2023-09-26 RX ADMIN — HEPARIN SODIUM 25000 UNITS: 1000 INJECTION, SOLUTION INTRAVENOUS; SUBCUTANEOUS at 08:29

## 2023-09-26 RX ADMIN — ROCURONIUM BROMIDE 20 MG: 10 INJECTION, SOLUTION INTRAVENOUS at 10:34

## 2023-09-26 RX ADMIN — MUPIROCIN 1 APPLICATION: 20 OINTMENT TOPICAL at 05:56

## 2023-09-26 RX ADMIN — PROPOFOL 50 MG: 10 INJECTION, EMULSION INTRAVENOUS at 06:46

## 2023-09-26 RX ADMIN — ALBUMIN (HUMAN) 250 ML: 12.5 INJECTION, SOLUTION INTRAVENOUS at 14:51

## 2023-09-26 RX ADMIN — CYCLOBENZAPRINE 10 MG: 10 TABLET, FILM COATED ORAL at 22:03

## 2023-09-26 RX ADMIN — ALBUTEROL SULFATE 2.5 MG: 2.5 SOLUTION RESPIRATORY (INHALATION) at 05:25

## 2023-09-26 RX ADMIN — FENTANYL CITRATE 100 MCG: 50 INJECTION INTRAMUSCULAR; INTRAVENOUS at 10:43

## 2023-09-26 RX ADMIN — CEFAZOLIN SODIUM 2 G: 2 INJECTION, SOLUTION INTRAVENOUS at 10:46

## 2023-09-26 RX ADMIN — POTASSIUM CHLORIDE 20 MEQ: 29.8 INJECTION, SOLUTION INTRAVENOUS at 13:45

## 2023-09-26 RX ADMIN — MUPIROCIN 1 APPLICATION: 20 OINTMENT TOPICAL at 20:21

## 2023-09-26 RX ADMIN — ONDANSETRON 4 MG: 2 INJECTION INTRAMUSCULAR; INTRAVENOUS at 18:18

## 2023-09-26 RX ADMIN — OXYCODONE HYDROCHLORIDE 10 MG: 5 TABLET ORAL at 22:03

## 2023-09-26 RX ADMIN — MEPERIDINE HYDROCHLORIDE 25 MG: 25 INJECTION INTRAMUSCULAR; INTRAVENOUS; SUBCUTANEOUS at 15:55

## 2023-09-26 RX ADMIN — AMINOCAPROIC ACID 10 G: 250 INJECTION, SOLUTION INTRAVENOUS at 10:44

## 2023-09-26 RX ADMIN — ROCURONIUM BROMIDE 20 MG: 10 INJECTION, SOLUTION INTRAVENOUS at 08:42

## 2023-09-26 RX ADMIN — 0.12% CHLORHEXIDINE GLUCONATE 15 ML: 1.2 RINSE ORAL at 20:21

## 2023-09-26 RX ADMIN — Medication 100 MCG: at 07:05

## 2023-09-26 RX ADMIN — MIDAZOLAM 2 MG: 1 INJECTION INTRAMUSCULAR; INTRAVENOUS at 06:35

## 2023-09-26 RX ADMIN — NICARDIPINE HYDROCHLORIDE 2.5 MG/HR: 25 INJECTION, SOLUTION INTRAVENOUS at 10:43

## 2023-09-26 RX ADMIN — LIDOCAINE HYDROCHLORIDE 80 MG: 20 INJECTION, SOLUTION INFILTRATION; PERINEURAL at 06:44

## 2023-09-26 RX ADMIN — ROCURONIUM BROMIDE 80 MG: 10 INJECTION, SOLUTION INTRAVENOUS at 06:44

## 2023-09-26 RX ADMIN — DEXMEDETOMIDINE HYDROCHLORIDE 0.3 MCG/KG/HR: 4 INJECTION, SOLUTION INTRAVENOUS at 14:44

## 2023-09-26 RX ADMIN — Medication 100 MCG: at 07:08

## 2023-09-26 RX ADMIN — PHENYLEPHRINE HYDROCHLORIDE 0.3 MCG/KG/MIN: 10 INJECTION, SOLUTION INTRAVENOUS at 06:55

## 2023-09-26 RX ADMIN — SODIUM CHLORIDE 30 ML/HR: 9 INJECTION, SOLUTION INTRAVENOUS at 12:28

## 2023-09-26 RX ADMIN — ACETAMINOPHEN 1000 MG: 10 INJECTION, SOLUTION INTRAVENOUS at 12:35

## 2023-09-26 RX ADMIN — FENTANYL CITRATE 100 MCG: 50 INJECTION INTRAMUSCULAR; INTRAVENOUS at 10:46

## 2023-09-26 RX ADMIN — SODIUM CHLORIDE 2 UNITS/HR: 9 INJECTION, SOLUTION INTRAVENOUS at 07:55

## 2023-09-26 RX ADMIN — POTASSIUM CHLORIDE 20 MEQ: 29.8 INJECTION, SOLUTION INTRAVENOUS at 14:47

## 2023-09-26 RX ADMIN — CEFAZOLIN SODIUM 2 G: 2 INJECTION, SOLUTION INTRAVENOUS at 06:58

## 2023-09-26 RX ADMIN — ALBUMIN (HUMAN) 1500 ML: 12.5 INJECTION, SOLUTION INTRAVENOUS at 13:30

## 2023-09-26 RX ADMIN — ACETAMINOPHEN 1000 MG: 10 INJECTION, SOLUTION INTRAVENOUS at 20:21

## 2023-09-26 RX ADMIN — PROPOFOL 25 MCG/KG/MIN: 10 INJECTION, EMULSION INTRAVENOUS at 06:58

## 2023-09-26 RX ADMIN — FENTANYL CITRATE 100 MCG: 50 INJECTION INTRAMUSCULAR; INTRAVENOUS at 10:35

## 2023-09-26 RX ADMIN — ROCURONIUM BROMIDE 30 MG: 10 INJECTION, SOLUTION INTRAVENOUS at 08:58

## 2023-09-26 RX ADMIN — MORPHINE SULFATE 4 MG: 2 INJECTION, SOLUTION INTRAMUSCULAR; INTRAVENOUS at 15:36

## 2023-09-26 RX ADMIN — ATORVASTATIN CALCIUM 40 MG: 20 TABLET, FILM COATED ORAL at 20:21

## 2023-09-26 RX ADMIN — SODIUM CHLORIDE 0.5 MCG/KG/HR: 9 INJECTION, SOLUTION INTRAVENOUS at 06:58

## 2023-09-26 RX ADMIN — OXYCODONE HYDROCHLORIDE 10 MG: 5 TABLET ORAL at 17:04

## 2023-09-26 NOTE — OP NOTE
[facility], [place], operated by [facility] CARDIAC SURGERY OP NOTE    Preop Diagnosis: Severe diffuse coronary artery disease.  Diabetes.    Postop Diagnosis: Same    Indications: This patient had severe diffuse coronary artery disease not amenable to stenting.  Operation was advisable to prolong life and relieve symptoms.The  calculated STS Risk score was discussed with the patient and family. All risks and alternatives were discussed with the patient and family.  Counseling was done regarding abuse of tobacco, alcohol and drugs as needed.  A discussion about advanced directive was done with the patient. They understand and wish to proceed.    Procedure: CABG x7.  Skeletonized LIMA to LAD.  Vein graft acute marginal branch and then PDA T graft mammary to left ventricular branch.  Vein graft to ramus intermedius and OM 3.  Vein graft to OM 2.  Temporary cardiopulmonary bypass.  Antegrade and retrograde cold blood cardioplegia with warm reperfusion.  Neurologic monitoring.  Transesophageal echo.  Endoscopic vein harvest of the right greater saphenous vein.    Surgeon: Frederic Groves MD    Assistant: Assistant: Laurel Qureshi PA was responsible for performing the following activities: Cardiac Surgery First assist, Endoscopic Vein [place] if needed for CABG,  surgical wound closure and their skilled assistance was necessary for the success of this case.     Anesthesia: GET    Findings : Body mass index is 26.16 kg/m².  The vein was superficial and little bit thin and had some varicosities that we were able to work around but it was satisfactory.  It was 1 portion 3.5 mm and the other was 4.5.  The JASPER was a nice 2.5 mm vessel with excellent blood flow.  He had diffuse coronary artery disease.  The RV marginal branch was 1.5 mm.  The PDA was 2 mm.  The left ventricular branch was 1.5 mm.  The ramus intermedius was 1.5 mm OM1 was 2 mm but had distal plaque.  OM 3 was 1.5 mm.  The LAD was 2 mm and good  quality.    Operative Procedure: A primary median sternotomy was made while the right greater saphenous vein was harvested with the endoscope.  The internal mammary artery was dissected off the left chest wall with a harmonic scalpel and was skeletonized.  Cardiopulmonary bypass was then established for 109 minutes drifting to 34 °C at appropriate flow rates.  The aorta was crossclamped for 92 minutes and we gave 800 cc of antegrade cold blood cardioplegia and then 1 L of retrograde cardioplegia and repeated doses every 10 to 15 minutes to good effect.  3 veins were anastomosed the ascending aorta with 6-0 Prolene and marked with washers.  First vein was sewn side-to-side to the acute marginal branch and then to the PDA with 7-0 Prolene.  A piece of the JASPER was then sewn to this vein graft and sewn distally to the left ventricular branch with 7-0 Prolene.  The next vein was sewn to OM 2 with 7-0 Prolene.  The last vein was sewn side-to-side to the ramus and then to OM 3 with 7-0 Prolene.  We did this because these 2 branches were rather small.  The LIMA was then sewn to the LAD with 7-0 Prolene.  A warm dose of retrograde cardioplegia was given and then with strong suction on the aortic needle vent the cross-clamp was released.  The patient was rewarmed and cardiopulmonary bypass was weaned and discontinued.  The hemodynamic state was good.  Decannulation was effected and the usual devices were placed.  Hemostasis was obtained.  4 chest tubes were inserted and we applied vancomycin enriched platelet rich plasma.  The sternum was closed with stainless steel wires.  The fascia, soft tissues and skin were closed as usual.  The sponge, needle and instrument counts noted to be correct.  All the grafts lay nicely and all the anastomoses were hemostatic.    Complications: None    Tubes: 4    Epicardial Wires: 3    Blood Loss: Minimal.756 ml Cell Saver    Bypass Time: 109 min    Aortic cross-clamp time: 92 min    Specimen:  None    Condition: Good      Patient Care Team:  Jacquelin Pelaez APRN as PCP - General (Nurse Practitioner)        Frederic Groves MD  9/26/2023  11:37 EDT

## 2023-09-26 NOTE — ANESTHESIA PROCEDURE NOTES
Diagnostic IntraOp Coy    Procedure Performed: Diagnostic IntraOp Coy       Start Time:  9/26/2023 7:52 AM       End Time:   9/26/2023 7:52 AM    Preanesthesia Checklist:  Patient identified, IV assessed, risks and benefits discussed, monitors and equipment assessed, procedure being performed at surgeon's request and anesthesia consent obtained.    General Procedure Information  Diagnostic Indications for Echo:  assessment of ascending aorta, assessment of surgical repair, defect repair evaluation and hemodynamic monitoring  Physician Requesting Echo: Jr Frederic Groves MD  ICD Code for Medical Necessity:  Non Rheumatic MR  Location performed:  OR  Intubated  Bite block placed  Heart visualized  Probe Insertion:  Easy  Probe Type:  Multiplane  Modalities:  2D only, color flow mapping, continuous wave Doppler and pulse wave Doppler    Echocardiographic and Doppler Measurements    Ventricles    Right Ventricle:  Cavity size normal.  Hypertrophy not present.  Thrombus not present.  Global function normal.    Left Ventricle:  Cavity size normal.  Diastolic dimension 3.6 cm.  Thrombus not present.  Global Function normal.  Ejection Fraction 55%.          Valves    Aortic Valve:  Annulus normal.  Stenosis not present.  Regurgitation absent.  Leaflets calcified and thickened.      Mitral Valve:  Annulus normal.  Regurgitation trace.  Leaflets normal.  Leaflet motions normal.      Tricuspid Valve:  Annulus normal.  Stenosis not present.  Regurgitation mild.  Leaflets normal.    Pulmonic Valve:  Regurgitation mild.        Aorta    Ascending Aorta:  Size normal.  Dissection not present.  Plaque thickness less than 3 mm.  Mobile plaque not present.    Aortic Arch:  Size normal.  Dissection not present.  Plaque thickness less than 3 mm.  Mobile plaque not present.    Descending Aorta:  Size normal.  Dissection not present.  Plaque thickness less than 3 mm.  Mobile plaque not present.        Atria    Right Atrium:  Size  normal.  Spontaneous echo contrast not present.  Thrombus not present.  Tumor not present.  Device present.    Left Atrium:  Size normal.  Spontaneous echo contrast not present.  Thrombus not present.  Tumor not present.  Device not present.    Left atrial appendage normal.      Septa        Ventricular Septum:  Intra-ventricular septum morphology normal.      Diastolic Function Measurements:  Diastolic Dysfunction Grade= III  E=  ms  A=  ms  E/A Ratio=  2.2  DT=  ms  S/D=   IVRT=    Other Findings  Pericardium:  normal  Pleural Effusion:  none  Pulmonary Arteries:  normal  Pulmonary Venous Flow:  blunted (decreased) systolic flow    Anesthesia Information  Performed Personally  Anesthesiologist:  Marissa Curtis MD      Echocardiogram Comments:       Post CPB  LVEF 60%  MR/TR are mild  No aortic dissection post decannulation

## 2023-09-26 NOTE — ANESTHESIA PROCEDURE NOTES
Central Line      Patient reassessed immediately prior to procedure    Patient location during procedure: OR  Start time: 9/26/2023 6:49 AM  Stop Time:9/26/2023 6:54 AM  Indications: central pressure monitoring, vascular access and MD/Surgeon request  Staff  Anesthesiologist: Marissa Curtis MD  Preanesthetic Checklist  Completed: patient identified, IV checked, site marked, risks and benefits discussed, surgical consent, monitors and equipment checked, pre-op evaluation and timeout performed  Central Line Prep  Sterile Tech:cap, gloves, gown, mask and sterile barriers  Prep: chloraprep  Patient monitoring: blood pressure monitoring, EKG and continuous pulse oximetry  Central Line Procedure  Laterality:right  Location:internal jugular  Catheter Type:Cordis (MAC Introducer)  Catheter Size:9 Fr  Guidance:ultrasound guided  PROCEDURE NOTE/ULTRASOUND INTERPRETATION.  Using ultrasound guidance the potential vascular sites for insertion of the catheter were visualized to determine the patency of the vessel to be used for vascular access.  After selecting the appropriate site for insertion, the needle was visualized under ultrasound being inserted into the internal jugular vein, followed by ultrasound confirmation of wire and catheter placement. There were no abnormalities seen on ultrasound; an image was taken; and the patient tolerated the procedure with no complications. Images: still images obtained, printed/placed on chart (image in RUKHSANA exam)  Assessment  Post procedure:biopatch applied, line sutured, occlusive dressing applied and secured with tape  Assessement:blood return through all ports, free fluid flow, chest x-ray ordered and Zach Test  Complications:no  Patient Tolerance:patient tolerated the procedure well with no apparent complications

## 2023-09-26 NOTE — ANESTHESIA PROCEDURE NOTES
Airway  Urgency: elective    Date/Time: 9/26/2023 6:46 AM  Airway not difficult    General Information and Staff    Patient location during procedure: OR  Anesthesiologist: Marissa Curtis MD    Indications and Patient Condition  Indications for airway management: airway protection    Preoxygenated: yes  MILS maintained throughout  Mask difficulty assessment: 2 - vent by mask + OA or adjuvant +/- NMBA    Final Airway Details  Final airway type: endotracheal airway      Successful airway: ETT  Cuffed: yes   Successful intubation technique: direct laryngoscopy  Endotracheal tube insertion site: oral  Blade: Ronan  Blade size: 4  ETT size (mm): 8.0  Cormack-Lehane Classification: grade I - full view of glottis  Placement verified by: chest auscultation and capnometry   Measured from: teeth  ETT/EBT  to teeth (cm): 24  Number of attempts at approach: 1  Assessment: lips, teeth, and gum same as pre-op and atraumatic intubation

## 2023-09-26 NOTE — ANESTHESIA PREPROCEDURE EVALUATION
Anesthesia Evaluation     NPO Solid Status: > 8 hours  NPO Liquid Status: > 2 hours           Airway   Mallampati: II  TM distance: >3 FB  Neck ROM: limited  Dental - normal exam     Pulmonary    (+) asthma,shortness of breath, recent URI, sleep apnea on CPAP  Cardiovascular   Exercise tolerance: poor (<4 METS)    ECG reviewed  Patient on routine beta blocker and Beta blocker given within 24 hours of surgery  Rhythm: regular  Rate: normal    (+) hypertension 2 medications or greater, CAD, angina, hyperlipidemia    ROS comment:  Left ventricular systolic function is normal. Left ventricular ejection fraction appears to be 61 - 65%.  ·  Left ventricular diastolic function was normal.  ·  No regional wall motion abnormality  ·  Estimated right ventricular systolic pressure from tricuspid regurgitation is moderately elevated (45-55 mmHg).         Neuro/Psych  (+) numbness  GI/Hepatic/Renal/Endo    (+) renal disease, diabetes mellitus type 2    Musculoskeletal     Abdominal    Substance History      OB/GYN          Other   arthritis,                     Anesthesia Plan    ASA 4     general, Roya, CVL and PAC     (RUKHSANA)  intravenous induction   Postoperative Plan: Expected vent after surgery  Anesthetic plan, risks, benefits, and alternatives have been provided, discussed and informed consent has been obtained with: patient.    Use of blood products discussed with patient .      CODE STATUS:    Level Of Support Discussed With: Patient  Code Status (Patient has no pulse and is not breathing): CPR (Attempt to Resuscitate)  Medical Interventions (Patient has pulse or is breathing): Full Support

## 2023-09-26 NOTE — ANESTHESIA PROCEDURE NOTES
Arterial Line      Patient reassessed immediately prior to procedure    Start time: 9/26/2023 6:37 AM  Stop Time:9/26/2023 6:42 AM       Line placed for hemodynamic monitoring and ABGs/Labs/ISTAT.  Performed By   Anesthesiologist: Marissa Curtis MD   Preanesthetic Checklist  Completed: patient identified, IV checked, site marked, risks and benefits discussed, surgical consent, monitors and equipment checked, pre-op evaluation and timeout performed  Arterial Line Prep    Sterile Tech: gloves, mask and cap  Prep: ChloraPrep  Patient monitoring: blood pressure monitoring, continuous pulse oximetry and EKG  Arterial Line Procedure   Laterality:left  Location:  radial artery  Catheter size: 20 G   Guidance: palpation technique  Number of attempts: 1  Successful placement: yes   Post Assessment   Dressing Type: secured with tape and occlusive dressing applied.   Complications no  Circ/Move/Sens Assessment: normal and unchanged.   Patient Tolerance: patient tolerated the procedure well with no apparent complications

## 2023-09-26 NOTE — PLAN OF CARE
Problem: Adult Inpatient Plan of Care  Goal: Plan of Care Review  Outcome: Ongoing, Progressing  Flowsheets (Taken 9/26/2023 0428)  Progress: no change  Plan of Care Reviewed With: patient  Outcome Evaluation:   VSS   A&Ox4. Pt remains SR on the monitor and room air during the night. Pt NPO since midnight except sips with meds anticipating CABG procedure this am   1st case. Pt CHG shower given night prior and this am   along with meds ordered   see Mar. Pt w/o c/o pain during the night. Plan of care is ongoing. Pt expresses no additional needs at this time. Pt turned over to surgery this am.  Goal: Patient-Specific Goal (Individualized)  Outcome: Ongoing, Progressing  Goal: Absence of Hospital-Acquired Illness or Injury  Outcome: Ongoing, Progressing  Intervention: Identify and Manage Fall Risk  Description: Perform standard risk assessment on admission using a validated tool or comprehensive approach appropriate to the patient; reassess fall risk frequently, with change in status or transfer to another level of care.  Communicate fall injury risk to interprofessional healthcare team.  Determine need for increased observation, equipment and environmental modification, such as low bed, signage and supportive, nonskid footwear.  Adjust safety measures to individual developmental age, stage and identified risk factors.  Reinforce the importance of safety and physical activity with patient and family.  Perform regular intentional rounding to assess need for position change, pain assessment and personal needs, including assistance with toileting.  Recent Flowsheet Documentation  Taken 9/26/2023 0400 by Ninoska Palacios, RN  Safety Promotion/Fall Prevention:   safety round/check completed   room organization consistent   nonskid shoes/slippers when out of bed   clutter free environment maintained  Taken 9/26/2023 0200 by Ninoska Palacios, RN  Safety Promotion/Fall Prevention:   safety round/check completed   room  organization consistent   nonskid shoes/slippers when out of bed   clutter free environment maintained  Taken 9/26/2023 0000 by Ninoska Palacios RN  Safety Promotion/Fall Prevention:   safety round/check completed   room organization consistent   nonskid shoes/slippers when out of bed   clutter free environment maintained  Taken 9/25/2023 2200 by Ninoska Palacios RN  Safety Promotion/Fall Prevention:   safety round/check completed   room organization consistent   nonskid shoes/slippers when out of bed   clutter free environment maintained  Taken 9/25/2023 2000 by Ninoska Palacios RN  Safety Promotion/Fall Prevention:   activity supervised   safety round/check completed   room organization consistent   nonskid shoes/slippers when out of bed   clutter free environment maintained  Intervention: Prevent Skin Injury  Description: Perform a screening for skin injury risk, such as pressure or moisture associated skin damage on admission and at regular intervals throughout hospital stay.  Keep all areas of skin (especially folds) clean and dry.  Maintain adequate skin hydration.  Relieve and redistribute pressure and protect bony prominences; implement measures based on patient-specific risk factors.  Match turning and repositioning schedule to clinical condition.  Encourage weight shift frequently; assist with reposition if unable to complete independently.  Float heels off bed; avoid pressure on the Achilles tendon.  Keep skin free from extended contact with medical devices.  Encourage functional activity and mobility, as early as tolerated.  Use aids (e.g., slide boards, mechanical lift) during transfer.  Recent Flowsheet Documentation  Taken 9/26/2023 0400 by Ninoska Palacios, RN  Body Position:   position changed independently   supine   weight shifting  Taken 9/26/2023 0200 by Ninoska Palacios RN  Body Position:   position changed independently   supine   weight shifting  Taken 9/26/2023 0000 by Ninoska Palacios  RN  Body Position:   position changed independently   supine  Taken 9/25/2023 2200 by Ninoska Palacios RN  Body Position:   position changed independently   supine  Taken 9/25/2023 2000 by Ninoska Palacios RN  Body Position:   position changed independently   supine  Intervention: Prevent and Manage VTE (Venous Thromboembolism) Risk  Description: Assess for VTE (venous thromboembolism) risk.  Encourage and assist with early ambulation.  Initiate and maintain compression or other therapy, as indicated, based on identified risk in accordance with organizational protocol and provider order.  Encourage both active and passive leg exercises while in bed, if unable to ambulate.  Recent Flowsheet Documentation  Taken 9/26/2023 0400 by Ninoska Palacios RN  Activity Management: up ad naheed  Taken 9/26/2023 0200 by Ninoska Palacios RN  Activity Management: up ad naheed  Taken 9/26/2023 0000 by Ninoska Palacios RN  Activity Management: up ad naheed  Taken 9/25/2023 2200 by Ninoska Palacios RN  Activity Management: up ad anheed  Taken 9/25/2023 2000 by Ninoska Palacios RN  Activity Management: up ad naheed  Range of Motion: active ROM (range of motion) encouraged  Intervention: Prevent Infection  Description: Maintain skin and mucous membrane integrity; promote hand, oral and pulmonary hygiene.  Optimize fluid balance, nutrition, sleep and glycemic control to maximize infection resistance.  Identify potential sources of infection early to prevent or mitigate progression of infection (e.g., wound, lines, devices).  Evaluate ongoing need for invasive devices; remove promptly when no longer indicated.  Recent Flowsheet Documentation  Taken 9/26/2023 0400 by Ninoska Palacios RN  Infection Prevention:   single patient room provided   rest/sleep promoted   hand hygiene promoted   environmental surveillance performed  Taken 9/26/2023 0200 by Ninoska Palacios RN  Infection Prevention:   single patient room provided   rest/sleep promoted    hand hygiene promoted   environmental surveillance performed  Taken 9/26/2023 0000 by Ninoska Palacios RN  Infection Prevention:   single patient room provided   rest/sleep promoted   hand hygiene promoted   environmental surveillance performed  Taken 9/25/2023 2200 by Ninoska Palacios RN  Infection Prevention:   single patient room provided   rest/sleep promoted   hand hygiene promoted   environmental surveillance performed  Taken 9/25/2023 2000 by Ninoska Palacios RN  Infection Prevention:   single patient room provided   rest/sleep promoted   hand hygiene promoted   environmental surveillance performed  Goal: Optimal Comfort and Wellbeing  Outcome: Ongoing, Progressing  Intervention: Provide Person-Centered Care  Description: Use a family-focused approach to care.  Develop trust and rapport by proactively providing information, encouraging questions, addressing concerns and offering reassurance.  Acknowledge emotional response to hospitalization.  Recognize and utilize personal coping strategies.  Honor spiritual and cultural preferences.  Recent Flowsheet Documentation  Taken 9/26/2023 0400 by Ninoska Palacios RN  Trust Relationship/Rapport:   care explained   choices provided  Taken 9/26/2023 0000 by Ninoska Palacios RN  Trust Relationship/Rapport:   care explained   choices provided  Taken 9/25/2023 2000 by Ninoska Palacios RN  Trust Relationship/Rapport:   care explained   choices provided  Goal: Readiness for Transition of Care  Outcome: Ongoing, Progressing     Problem: Hypertension Comorbidity  Goal: Blood Pressure in Desired Range  Outcome: Ongoing, Progressing  Intervention: Maintain Blood Pressure Management  Description: Evaluate adherence to home antihypertensive regimen (e.g., exercise and activity, diet modification, medication).  Provide scheduled antihypertensive medication; consider administration time and effects (e.g., avoid giving diuretic prior to bedtime).  Monitor response to  antihypertensive medication therapy (e.g., blood pressure, electrolyte levels, medication effects).  Minimize risk of orthostatic hypotension; encourage caution with position changes, particularly if elderly.  Recent Flowsheet Documentation  Taken 9/26/2023 0400 by Ninoska Palacios RN  Medication Review/Management: medications reviewed  Taken 9/26/2023 0200 by Ninoska Palacios RN  Medication Review/Management: medications reviewed  Taken 9/26/2023 0000 by Ninoska Palacios RN  Medication Review/Management: medications reviewed  Taken 9/25/2023 2200 by Ninoska Palacios RN  Medication Review/Management: medications reviewed  Taken 9/25/2023 2000 by Ninoska Palacios RN  Syncope Management: position changed slowly  Medication Review/Management: medications reviewed   Goal Outcome Evaluation:  Plan of Care Reviewed With: patient        Progress: no change  Outcome Evaluation: VSS; A&Ox4. Pt remains SR on the monitor and room air during the night. Pt NPO since midnight except sips with meds anticipating CABG procedure this am; 1st case. Pt CHG shower given night prior and this am; along with meds ordered; see Mar. Pt w/o c/o pain during the night. Plan of care is ongoing. Pt expresses no additional needs at this time; pt safety maintained. Pt turned over to surgery this am.

## 2023-09-26 NOTE — PROGRESS NOTES
A My Chart message has been sent to the patient for [place] [facility] [place]

## 2023-09-27 ENCOUNTER — APPOINTMENT (OUTPATIENT)
Dept: GENERAL RADIOLOGY | Facility: HOSPITAL | Age: 78
DRG: 236 | End: 2023-09-27
Payer: MEDICARE

## 2023-09-27 LAB
ACT BLD: 365 SECONDS (ref 82–152)
ACT BLD: 401 SECONDS (ref 82–152)
ACT BLD: 450 SECONDS (ref 82–152)
ACT BLD: 504 SECONDS (ref 82–152)
ACT BLD: 510 SECONDS (ref 82–152)
ALBUMIN SERPL-MCNC: 4.6 G/DL (ref 3.5–5.2)
ANION GAP SERPL CALCULATED.3IONS-SCNC: 13.5 MMOL/L (ref 5–15)
BASE EXCESS BLDA CALC-SCNC: 1 MMOL/L (ref -5–5)
BASE EXCESS BLDA CALC-SCNC: 3 MMOL/L (ref -5–5)
BASOPHILS # BLD AUTO: 0.03 10*3/MM3 (ref 0–0.2)
BASOPHILS NFR BLD AUTO: 0.3 % (ref 0–1.5)
BUN SERPL-MCNC: 14 MG/DL (ref 8–23)
BUN/CREAT SERPL: 15.1 (ref 7–25)
CA-I BLD-MCNC: 4.3 MG/DL (ref 4.6–5.4)
CA-I BLDA-SCNC: ABNORMAL MMOL/L
CA-I BLDA-SCNC: ABNORMAL MMOL/L
CA-I SERPL ISE-MCNC: 1.08 MMOL/L (ref 1.15–1.35)
CALCIUM SPEC-SCNC: 8.7 MG/DL (ref 8.6–10.5)
CHLORIDE SERPL-SCNC: 104 MMOL/L (ref 98–107)
CO2 BLDA-SCNC: 27 MMOL/L (ref 24–29)
CO2 BLDA-SCNC: 29 MMOL/L (ref 24–29)
CO2 SERPL-SCNC: 22.5 MMOL/L (ref 22–29)
CREAT SERPL-MCNC: 0.93 MG/DL (ref 0.76–1.27)
DEPRECATED RDW RBC AUTO: 39.6 FL (ref 37–54)
EGFRCR SERPLBLD CKD-EPI 2021: 85.6 ML/MIN/1.73
EOSINOPHIL # BLD AUTO: 0 10*3/MM3 (ref 0–0.4)
EOSINOPHIL NFR BLD AUTO: 0 % (ref 0.3–6.2)
ERYTHROCYTE [DISTWIDTH] IN BLOOD BY AUTOMATED COUNT: 12.2 % (ref 12.3–15.4)
GLUCOSE BLDC GLUCOMTR-MCNC: 114 MG/DL (ref 70–130)
GLUCOSE BLDC GLUCOMTR-MCNC: 114 MG/DL (ref 70–130)
GLUCOSE BLDC GLUCOMTR-MCNC: 115 MG/DL (ref 70–130)
GLUCOSE BLDC GLUCOMTR-MCNC: 120 MG/DL (ref 70–130)
GLUCOSE BLDC GLUCOMTR-MCNC: 129 MG/DL (ref 70–130)
GLUCOSE BLDC GLUCOMTR-MCNC: 160 MG/DL (ref 70–130)
GLUCOSE BLDC GLUCOMTR-MCNC: 199 MG/DL (ref 70–130)
GLUCOSE BLDC GLUCOMTR-MCNC: 205 MG/DL (ref 70–130)
GLUCOSE BLDC GLUCOMTR-MCNC: 207 MG/DL (ref 70–130)
GLUCOSE BLDC GLUCOMTR-MCNC: 219 MG/DL (ref 70–130)
GLUCOSE SERPL-MCNC: 131 MG/DL (ref 65–99)
HCO3 BLDA-SCNC: 26.1 MMOL/L (ref 22–26)
HCO3 BLDA-SCNC: 27.4 MMOL/L (ref 22–26)
HCT VFR BLD AUTO: 29.3 % (ref 37.5–51)
HCT VFR BLDA CALC: 27 % (ref 38–51)
HCT VFR BLDA CALC: 35 % (ref 38–51)
HGB BLD-MCNC: 10.1 G/DL (ref 13–17.7)
HGB BLDA-MCNC: 11.9 G/DL (ref 12–17)
HGB BLDA-MCNC: 9.2 G/DL (ref 12–17)
INR PPP: 1.09 (ref 0.9–1.1)
LYMPHOCYTES # BLD AUTO: 1.36 10*3/MM3 (ref 0.7–3.1)
LYMPHOCYTES NFR BLD AUTO: 12.7 % (ref 19.6–45.3)
MAGNESIUM SERPL-MCNC: 1.9 MG/DL (ref 1.6–2.4)
MCH RBC QN AUTO: 30.5 PG (ref 26.6–33)
MCHC RBC AUTO-ENTMCNC: 34.5 G/DL (ref 31.5–35.7)
MCV RBC AUTO: 88.5 FL (ref 79–97)
MONOCYTES # BLD AUTO: 0.84 10*3/MM3 (ref 0.1–0.9)
MONOCYTES NFR BLD AUTO: 7.8 % (ref 5–12)
NEUTROPHILS NFR BLD AUTO: 78.8 % (ref 42.7–76)
NEUTROPHILS NFR BLD AUTO: 8.44 10*3/MM3 (ref 1.7–7)
PCO2 BLDA: 42.9 MM HG (ref 35–45)
PCO2 BLDA: 43.6 MM HG (ref 35–45)
PH BLDA: 7.39 PH UNITS (ref 7.35–7.6)
PH BLDA: 7.41 PH UNITS (ref 7.35–7.6)
PHOSPHATE SERPL-MCNC: 4.7 MG/DL (ref 2.5–4.5)
PLATELET # BLD AUTO: 144 10*3/MM3 (ref 140–450)
PMV BLD AUTO: 10.9 FL (ref 6–12)
PO2 BLDA: 476 MMHG (ref 80–105)
PO2 BLDA: 516 MMHG (ref 80–105)
POTASSIUM BLDA-SCNC: 3.3 MMOL/L (ref 3.5–4.9)
POTASSIUM BLDA-SCNC: 3.8 MMOL/L (ref 3.5–4.9)
POTASSIUM SERPL-SCNC: 3.5 MMOL/L (ref 3.5–5.2)
POTASSIUM SERPL-SCNC: 3.8 MMOL/L (ref 3.5–5.2)
POTASSIUM SERPL-SCNC: 3.9 MMOL/L (ref 3.5–5.2)
PROTHROMBIN TIME: 14.3 SECONDS (ref 11.7–14.2)
QT INTERVAL: 388 MS
QTC INTERVAL: 378 MS
RBC # BLD AUTO: 3.31 10*6/MM3 (ref 4.14–5.8)
SAO2 % BLDA: 100 % (ref 95–98)
SAO2 % BLDA: 100 % (ref 95–98)
SODIUM SERPL-SCNC: 140 MMOL/L (ref 136–145)
WBC NRBC COR # BLD: 10.71 10*3/MM3 (ref 3.4–10.8)

## 2023-09-27 PROCEDURE — 80069 RENAL FUNCTION PANEL: CPT | Performed by: NURSE PRACTITIONER

## 2023-09-27 PROCEDURE — 25010000002 CEFAZOLIN IN DEXTROSE 2000 MG/ 100 ML SOLUTION: Performed by: NURSE PRACTITIONER

## 2023-09-27 PROCEDURE — 82948 REAGENT STRIP/BLOOD GLUCOSE: CPT

## 2023-09-27 PROCEDURE — 93010 ELECTROCARDIOGRAM REPORT: CPT | Performed by: INTERNAL MEDICINE

## 2023-09-27 PROCEDURE — 83735 ASSAY OF MAGNESIUM: CPT | Performed by: NURSE PRACTITIONER

## 2023-09-27 PROCEDURE — 63710000001 INSULIN LISPRO (HUMAN) PER 5 UNITS: Performed by: NURSE PRACTITIONER

## 2023-09-27 PROCEDURE — 82330 ASSAY OF CALCIUM: CPT | Performed by: NURSE PRACTITIONER

## 2023-09-27 PROCEDURE — 0 POTASSIUM CHLORIDE PER 2 MEQ: Performed by: THORACIC SURGERY (CARDIOTHORACIC VASCULAR SURGERY)

## 2023-09-27 PROCEDURE — 25010000002 CEFAZOLIN IN DEXTROSE 2-4 GM/100ML-% SOLUTION: Performed by: NURSE PRACTITIONER

## 2023-09-27 PROCEDURE — 97162 PT EVAL MOD COMPLEX 30 MIN: CPT

## 2023-09-27 PROCEDURE — 94761 N-INVAS EAR/PLS OXIMETRY MLT: CPT

## 2023-09-27 PROCEDURE — 63710000001 INSULIN GLARGINE PER 5 UNITS: Performed by: NURSE PRACTITIONER

## 2023-09-27 PROCEDURE — 25010000002 METOCLOPRAMIDE PER 10 MG: Performed by: NURSE PRACTITIONER

## 2023-09-27 PROCEDURE — 25010000002 ACETAMINOPHEN 10 MG/ML SOLUTION: Performed by: NURSE PRACTITIONER

## 2023-09-27 PROCEDURE — 71045 X-RAY EXAM CHEST 1 VIEW: CPT

## 2023-09-27 PROCEDURE — 25010000002 MAGNESIUM SULFATE IN D5W 1G/100ML (PREMIX) 1-5 GM/100ML-% SOLUTION: Performed by: NURSE PRACTITIONER

## 2023-09-27 PROCEDURE — 25010000002 ENOXAPARIN PER 10 MG: Performed by: NURSE PRACTITIONER

## 2023-09-27 PROCEDURE — 84132 ASSAY OF SERUM POTASSIUM: CPT | Performed by: THORACIC SURGERY (CARDIOTHORACIC VASCULAR SURGERY)

## 2023-09-27 PROCEDURE — 94799 UNLISTED PULMONARY SVC/PX: CPT

## 2023-09-27 PROCEDURE — 85025 COMPLETE CBC W/AUTO DIFF WBC: CPT | Performed by: NURSE PRACTITIONER

## 2023-09-27 PROCEDURE — 97110 THERAPEUTIC EXERCISES: CPT

## 2023-09-27 PROCEDURE — 93005 ELECTROCARDIOGRAM TRACING: CPT

## 2023-09-27 PROCEDURE — 85610 PROTHROMBIN TIME: CPT | Performed by: NURSE PRACTITIONER

## 2023-09-27 RX ORDER — NICOTINE POLACRILEX 4 MG
15 LOZENGE BUCCAL
Status: DISCONTINUED | OUTPATIENT
Start: 2023-09-27 | End: 2023-09-30 | Stop reason: HOSPADM

## 2023-09-27 RX ORDER — DEXTROSE MONOHYDRATE 25 G/50ML
25 INJECTION, SOLUTION INTRAVENOUS
Status: DISCONTINUED | OUTPATIENT
Start: 2023-09-27 | End: 2023-09-30 | Stop reason: HOSPADM

## 2023-09-27 RX ORDER — POTASSIUM CHLORIDE 29.8 MG/ML
20 INJECTION INTRAVENOUS ONCE
Status: COMPLETED | OUTPATIENT
Start: 2023-09-27 | End: 2023-09-27

## 2023-09-27 RX ORDER — POTASSIUM CHLORIDE 750 MG/1
20 TABLET, FILM COATED, EXTENDED RELEASE ORAL ONCE
Status: COMPLETED | OUTPATIENT
Start: 2023-09-27 | End: 2023-09-27

## 2023-09-27 RX ORDER — GUAIFENESIN 600 MG/1
1200 TABLET, EXTENDED RELEASE ORAL EVERY 12 HOURS SCHEDULED
Status: DISCONTINUED | OUTPATIENT
Start: 2023-09-27 | End: 2023-09-30 | Stop reason: HOSPADM

## 2023-09-27 RX ORDER — GABAPENTIN 100 MG/1
100 CAPSULE ORAL EVERY 12 HOURS SCHEDULED
Status: DISCONTINUED | OUTPATIENT
Start: 2023-09-27 | End: 2023-09-28

## 2023-09-27 RX ORDER — IBUPROFEN 600 MG/1
1 TABLET ORAL
Status: DISCONTINUED | OUTPATIENT
Start: 2023-09-27 | End: 2023-09-30 | Stop reason: HOSPADM

## 2023-09-27 RX ORDER — INSULIN LISPRO 100 [IU]/ML
2-9 INJECTION, SOLUTION INTRAVENOUS; SUBCUTANEOUS
Status: DISCONTINUED | OUTPATIENT
Start: 2023-09-27 | End: 2023-09-30 | Stop reason: HOSPADM

## 2023-09-27 RX ADMIN — INSULIN LISPRO 4 UNITS: 100 INJECTION, SOLUTION INTRAVENOUS; SUBCUTANEOUS at 13:51

## 2023-09-27 RX ADMIN — INSULIN GLARGINE 10 UNITS: 100 INJECTION, SOLUTION SUBCUTANEOUS at 09:08

## 2023-09-27 RX ADMIN — ASPIRIN 81 MG: 81 TABLET, COATED ORAL at 08:09

## 2023-09-27 RX ADMIN — SENNOSIDES AND DOCUSATE SODIUM 2 TABLET: 50; 8.6 TABLET ORAL at 20:55

## 2023-09-27 RX ADMIN — POTASSIUM CHLORIDE 20 MEQ: 750 TABLET, EXTENDED RELEASE ORAL at 22:42

## 2023-09-27 RX ADMIN — INSULIN LISPRO 4 UNITS: 100 INJECTION, SOLUTION INTRAVENOUS; SUBCUTANEOUS at 18:39

## 2023-09-27 RX ADMIN — MUPIROCIN 1 APPLICATION: 20 OINTMENT TOPICAL at 20:54

## 2023-09-27 RX ADMIN — HYDROCODONE BITARTRATE AND ACETAMINOPHEN 2 TABLET: 5; 325 TABLET ORAL at 14:00

## 2023-09-27 RX ADMIN — METOCLOPRAMIDE 10 MG: 5 INJECTION, SOLUTION INTRAMUSCULAR; INTRAVENOUS at 02:38

## 2023-09-27 RX ADMIN — OXYCODONE HYDROCHLORIDE 10 MG: 5 TABLET ORAL at 22:42

## 2023-09-27 RX ADMIN — CYCLOBENZAPRINE 10 MG: 10 TABLET, FILM COATED ORAL at 07:44

## 2023-09-27 RX ADMIN — HYDROCODONE BITARTRATE AND ACETAMINOPHEN 2 TABLET: 5; 325 TABLET ORAL at 18:39

## 2023-09-27 RX ADMIN — ACETAMINOPHEN 650 MG: 325 TABLET, FILM COATED ORAL at 22:23

## 2023-09-27 RX ADMIN — CYCLOBENZAPRINE 10 MG: 10 TABLET, FILM COATED ORAL at 16:24

## 2023-09-27 RX ADMIN — METOPROLOL TARTRATE 25 MG: 25 TABLET, FILM COATED ORAL at 20:55

## 2023-09-27 RX ADMIN — 0.12% CHLORHEXIDINE GLUCONATE 15 ML: 1.2 RINSE ORAL at 08:09

## 2023-09-27 RX ADMIN — POTASSIUM CHLORIDE 20 MEQ: 29.8 INJECTION, SOLUTION INTRAVENOUS at 06:14

## 2023-09-27 RX ADMIN — 0.12% CHLORHEXIDINE GLUCONATE 15 ML: 1.2 RINSE ORAL at 20:54

## 2023-09-27 RX ADMIN — MUPIROCIN 1 APPLICATION: 20 OINTMENT TOPICAL at 08:09

## 2023-09-27 RX ADMIN — MAGNESIUM SULFATE IN DEXTROSE 1 G: 10 INJECTION, SOLUTION INTRAVENOUS at 10:00

## 2023-09-27 RX ADMIN — POTASSIUM CHLORIDE 20 MEQ: 750 TABLET, EXTENDED RELEASE ORAL at 16:02

## 2023-09-27 RX ADMIN — INSULIN LISPRO 4 UNITS: 100 INJECTION, SOLUTION INTRAVENOUS; SUBCUTANEOUS at 20:54

## 2023-09-27 RX ADMIN — ATORVASTATIN CALCIUM 40 MG: 20 TABLET, FILM COATED ORAL at 20:55

## 2023-09-27 RX ADMIN — OXYCODONE HYDROCHLORIDE 10 MG: 5 TABLET ORAL at 09:59

## 2023-09-27 RX ADMIN — PANTOPRAZOLE SODIUM 40 MG: 40 TABLET, DELAYED RELEASE ORAL at 06:10

## 2023-09-27 RX ADMIN — GUAIFENESIN 1200 MG: 600 TABLET, EXTENDED RELEASE ORAL at 20:54

## 2023-09-27 RX ADMIN — GABAPENTIN 100 MG: 100 CAPSULE ORAL at 20:55

## 2023-09-27 RX ADMIN — CEFAZOLIN SODIUM 2 G: 2 INJECTION, SOLUTION INTRAVENOUS at 09:08

## 2023-09-27 RX ADMIN — CEFAZOLIN SODIUM 2 G: 2 INJECTION, SOLUTION INTRAVENOUS at 18:39

## 2023-09-27 RX ADMIN — ACETAMINOPHEN 1000 MG: 10 INJECTION, SOLUTION INTRAVENOUS at 04:09

## 2023-09-27 RX ADMIN — OXYCODONE HYDROCHLORIDE 10 MG: 5 TABLET ORAL at 02:39

## 2023-09-27 RX ADMIN — OXYCODONE HYDROCHLORIDE 10 MG: 5 TABLET ORAL at 06:11

## 2023-09-27 RX ADMIN — CEFAZOLIN SODIUM 2 G: 2 INJECTION, SOLUTION INTRAVENOUS at 02:39

## 2023-09-27 RX ADMIN — POTASSIUM CHLORIDE 20 MEQ: 29.8 INJECTION, SOLUTION INTRAVENOUS at 05:04

## 2023-09-27 RX ADMIN — ENOXAPARIN SODIUM 40 MG: 100 INJECTION SUBCUTANEOUS at 18:39

## 2023-09-27 RX ADMIN — GABAPENTIN 100 MG: 100 CAPSULE ORAL at 09:08

## 2023-09-27 RX ADMIN — GUAIFENESIN 1200 MG: 600 TABLET, EXTENDED RELEASE ORAL at 08:10

## 2023-09-27 RX ADMIN — MAGNESIUM SULFATE IN DEXTROSE 1 G: 10 INJECTION, SOLUTION INTRAVENOUS at 02:47

## 2023-09-27 NOTE — PLAN OF CARE
Goal Outcome Evaluation:  Plan of Care Reviewed With: patient           Outcome Evaluation: Pt seen for PT richa this am. He is POD#1 CABG x 7 and presents w expected post op pain, weakness, decreased activity tolerance, and overall decreased functional mobility. At baseline, pt lives at home w his wife and reports independence w mobility and ADLs. Currently, pt is up in chair upon entry to room. He was able to tolerate cardiac exercises w assistance. Pt able to stand w min/HHA x2. He then ambulated approx 30 ft w min A x 2. Pt very tense w cues to relax shoulders. Pt plans home upon DC w assist of family and HHPT. HE will continue to benefit from skilled PT to maximize safety, strength, and independence w mobility.      Anticipated Discharge Disposition (PT): home with assist, home with home health

## 2023-09-27 NOTE — PROGRESS NOTES
LOS: 2 days   Patient Care Team:  Jacquelin Pelaez APRN as PCP - General (Nurse Practitioner)    Chief Complaint: post op    Subjective      Vital Signs  Temp:  [96.6 °F (35.9 °C)-101.1 °F (38.4 °C)] 98.2 °F (36.8 °C)  Heart Rate:  [66-98] 84  Resp:  [12-15] 14  BP: ()/(50-69) 124/66  FiO2 (%):  [39 %-100 %] 40 %  Body mass index is 27.2 kg/m².    Intake/Output Summary (Last 24 hours) at 9/27/2023 0701  Last data filed at 9/27/2023 0614  Gross per 24 hour   Intake 4796 ml   Output 4260 ml   Net 536 ml     No intake/output data recorded.    Chest tube drainage last 8 hours 40/60        09/25/23  1439 09/26/23  0545 09/27/23  0600   Weight: 86.7 kg (191 lb 1.6 oz) 82.7 kg (182 lb 4.8 oz) 86 kg (189 lb 9.5 oz)         Objective:  Vital signs: (most recent): Blood pressure 121/64, pulse 84, temperature 98.2 °F (36.8 °C), temperature source Oral, resp. rate 14, weight 86 kg (189 lb 9.5 oz), SpO2 99 %.              Results Review:        WBC WBC   Date Value Ref Range Status   09/27/2023 10.71 3.40 - 10.80 10*3/mm3 Final   09/26/2023 14.87 (H) 3.40 - 10.80 10*3/mm3 Final   09/26/2023 14.03 (H) 3.40 - 10.80 10*3/mm3 Final   09/25/2023 8.27 3.40 - 10.80 10*3/mm3 Final      HGB Hemoglobin   Date Value Ref Range Status   09/27/2023 10.1 (L) 13.0 - 17.7 g/dL Final   09/26/2023 10.4 (L) 13.0 - 17.7 g/dL Final   09/26/2023 11.5 (L) 13.0 - 17.7 g/dL Final   09/26/2023 9.9 (L) 12.0 - 17.0 g/dL Final   09/26/2023 10.2 (L) 12.0 - 17.0 g/dL Final   09/26/2023 9.9 (L) 12.0 - 17.0 g/dL Final   09/26/2023 9.5 (L) 12.0 - 17.0 g/dL Final   09/26/2023 8.8 (L) 12.0 - 17.0 g/dL Final   09/25/2023 13.9 13.0 - 17.7 g/dL Final      HCT Hematocrit   Date Value Ref Range Status   09/27/2023 29.3 (L) 37.5 - 51.0 % Final   09/26/2023 30.7 (L) 37.5 - 51.0 % Final   09/26/2023 33.6 (L) 37.5 - 51.0 % Final   09/26/2023 29 (L) 38 - 51 % Final   09/26/2023 30 (L) 38 - 51 % Final   09/26/2023 29 (L) 38 - 51 % Final   09/26/2023 28 (L) 38 - 51 %  Final   09/26/2023 26 (L) 38 - 51 % Final   09/25/2023 40.6 37.5 - 51.0 % Final      Platelets Platelets   Date Value Ref Range Status   09/27/2023 144 140 - 450 10*3/mm3 Final   09/26/2023 142 140 - 450 10*3/mm3 Final   09/26/2023 152 140 - 450 10*3/mm3 Final   09/25/2023 258 140 - 450 10*3/mm3 Final        PT/INR:    Protime   Date Value Ref Range Status   09/27/2023 14.3 (H) 11.7 - 14.2 Seconds Final   09/26/2023 15.5 (H) 11.7 - 14.2 Seconds Final   09/25/2023 13.4 11.7 - 14.2 Seconds Final   /  INR   Date Value Ref Range Status   09/27/2023 1.09 0.90 - 1.10 Final   09/26/2023 1.22 (H) 0.90 - 1.10 Final   09/25/2023 1.01 0.90 - 1.10 Final       Sodium Sodium   Date Value Ref Range Status   09/27/2023 140 136 - 145 mmol/L Final   09/26/2023 138 136 - 145 mmol/L Final   09/26/2023 139 136 - 145 mmol/L Final   09/26/2023 138 136 - 145 mmol/L Final   09/25/2023 136 136 - 145 mmol/L Final      Potassium Potassium   Date Value Ref Range Status   09/27/2023 3.5 3.5 - 5.2 mmol/L Final   09/26/2023 4.7 3.5 - 5.2 mmol/L Final   09/26/2023 3.5 3.5 - 5.2 mmol/L Final     Comment:     Slight hemolysis detected by analyzer. Results may be affected.   09/26/2023 3.9 3.5 - 5.2 mmol/L Final   09/25/2023 4.0 3.5 - 5.2 mmol/L Final      Chloride Chloride   Date Value Ref Range Status   09/27/2023 104 98 - 107 mmol/L Final   09/26/2023 105 98 - 107 mmol/L Final   09/26/2023 102 98 - 107 mmol/L Final   09/26/2023 97 (L) 98 - 107 mmol/L Final   09/25/2023 96 (L) 98 - 107 mmol/L Final      Bicarbonate CO2   Date Value Ref Range Status   09/27/2023 22.5 22.0 - 29.0 mmol/L Final   09/26/2023 19.8 (L) 22.0 - 29.0 mmol/L Final   09/26/2023 21.0 (L) 22.0 - 29.0 mmol/L Final   09/26/2023 28.0 22.0 - 29.0 mmol/L Final   09/25/2023 26.8 22.0 - 29.0 mmol/L Final      BUN BUN   Date Value Ref Range Status   09/27/2023 14 8 - 23 mg/dL Final   09/26/2023 15 8 - 23 mg/dL Final   09/26/2023 14 8 - 23 mg/dL Final   09/26/2023 15 8 - 23 mg/dL Final    09/25/2023 14 8 - 23 mg/dL Final      Creatinine Creatinine   Date Value Ref Range Status   09/27/2023 0.93 0.76 - 1.27 mg/dL Final   09/26/2023 0.96 0.76 - 1.27 mg/dL Final   09/26/2023 0.93 0.76 - 1.27 mg/dL Final   09/26/2023 0.74 (L) 0.76 - 1.27 mg/dL Final   09/25/2023 0.84 0.76 - 1.27 mg/dL Final      Calcium Calcium   Date Value Ref Range Status   09/27/2023 8.7 8.6 - 10.5 mg/dL Final   09/26/2023 8.5 (L) 8.6 - 10.5 mg/dL Final   09/26/2023 9.2 8.6 - 10.5 mg/dL Final   09/26/2023 9.9 8.6 - 10.5 mg/dL Final   09/25/2023 9.9 8.6 - 10.5 mg/dL Final      Magnesium Magnesium   Date Value Ref Range Status   09/27/2023 1.9 1.6 - 2.4 mg/dL Final   09/26/2023 1.7 1.6 - 2.4 mg/dL Final   09/26/2023 4.0 (H) 1.6 - 2.4 mg/dL Final   09/25/2023 1.0 (L) 1.6 - 2.4 mg/dL Final          aspirin, 81 mg, Oral, Daily  atorvastatin, 40 mg, Oral, Nightly  ceFAZolin, 2 g, Intravenous, Q8H  chlorhexidine, 15 mL, Mouth/Throat, Q12H  enoxaparin, 40 mg, Subcutaneous, Daily  magnesium sulfate, 1 g, Intravenous, Q8H  metoclopramide, 10 mg, Intravenous, Q6H  metoprolol tartrate, 12.5 mg, Oral, Q12H  mupirocin, , Each Nare, BID  pantoprazole, 40 mg, Oral, QAM  potassium chloride, 20 mEq, Intravenous, Once  senna-docusate sodium, 2 tablet, Oral, Nightly      amiodarone, 0.5 mg/min, Last Rate: 0.5 mg/min (09/26/23 2157)  clevidipine, 2-32 mg/hr  dexmedetomidine, 0.2-1.5 mcg/kg/hr, Last Rate: Stopped (09/26/23 1605)  DOPamine, 2-20 mcg/kg/min  EPINEPHrine, 0.02-0.1 mcg/kg/min  insulin, 0-100 Units/hr, Last Rate: 2.1 Units/hr (09/27/23 0600)  milrinone, 0.25-0.375 mcg/kg/min  niCARdipine, 5-15 mg/hr, Last Rate: Stopped (09/26/23 1330)  nitroglycerin, 5-200 mcg/min  norepinephrine, 0.02-0.2 mcg/kg/min, Last Rate: Stopped (09/27/23 0530)  phenylephrine, 0.2-2 mcg/kg/min  propofol, 5-50 mcg/kg/min, Last Rate: Stopped (09/26/23 1315)  sodium chloride, 30 mL/hr, Last Rate: 30 mL/hr (09/26/23 1228)              CAD (coronary artery disease)     Coronary artery disease involving native coronary artery of native heart with unstable angina pectoris      Assessment & Plan    -Severe coronary artery disease --s/p CABGx7 [facility]/RSVG-- [place]- POD#1  -Diabetes type 2  -hypertension  -hyperlipidemia   -post op anemia- expected acute blood loss     Looks good this morning  Up in the chair  2L NC-- wean as able   Brief episode of atrial fibrillation post operatively-- converted with IV amiodarone   He is sinus vibha rate 50s-- will discontinue amiodarone   Encourage pulmonary toilet-- add mucinex and flutter valve   Mobilize  Increase activity  Transfer to stepdown    Hilda Kilpatrick, APRN  09/27/23  07:01 EDT

## 2023-09-27 NOTE — THERAPY EVALUATION
Patient Name: Trenton Adames  : 1945    MRN: [id number]                              Today's Date: 2023       Admit Date: 2023    Visit Dx:     ICD-10-CM ICD-9-CM   1. S/P CABG (coronary artery bypass graft)  Z95.1 V45.81     Patient Active Problem List   Diagnosis    Allergic rhinitis    Anemia    Asthma    Benign prostatic hyperplasia    Cervical radiculopathy    Diabetes mellitus, type II    Hyperlipidemia    Hypertension    Impotence of organic origin    Kidney stones    Renal calculus    Prostate disorder    Shortness of breath    Vitamin D deficiency    Vitamin B 12 deficiency    Iron deficiency anemia    Ophthalmic herpes zoster    Acute URI    Suspected sleep apnea    Pulmonary HTN    Overweight (BMI 25.0-29.9)    Snoring    Chest pain, atypical    Coronary artery disease involving native coronary artery of native heart with unstable angina pectoris    CAD (coronary artery disease)     Past Medical History:   Diagnosis Date    Arthritis     Asthma     Atypical chest pain     Coronary artery disease involving native coronary artery of native heart with unstable angina pectoris 2023    Dysphagia, oral phase     NO CURRENT ISSUES    Essential (primary) hypertension     Foot pain, bilateral     Heart murmur 2023    Heart valve disease 2023    Hyperlipidemia, unspecified     Low back pain     Neuropathy in diabetes     Peripheral neuropathy     Pulmonary HTN 2023    Sleep apnea     USES CPAP    Type 2 diabetes mellitus without complication     Vitamin D deficiency, unspecified      Past Surgical History:   Procedure Laterality Date    CARDIAC CATHETERIZATION N/A 2023    Procedure: Left Heart Cath with possible coronary angioplasty;  Surgeon: Marc Charles MD;  Location: [facility] CATH INVASIVE LOCATION;  Service: Cardiology;  Laterality: N/A;    CATARACT EXTRACTION, BILATERAL  2014; 2014    COLONOSCOPY      CORONARY ARTERY BYPASS GRAFT N/A 2023     Procedure: RUKHSANA STERNOTOMY CORONARY ARTERY BYPASS GRAFT TIMES 7 USING LEFT INTERNAL MAMMARY ARTERY AND RIGHT GREATER SAPHENOUS VEIN GRAFT PER ENDOSCOPIC VEIN HARVESTING AND PRP;  Surgeon: Jr Frederic Groves MD;  Location: [facility];  Service: Cardiothoracic;  Laterality: N/A;    HEMORRHOIDECTOMY  06/20/2014    POLYPECTOMY      SINUS SURGERY        General Information       Row Name 09/27/23 0927          Physical Therapy Time and Intention    Document Type evaluation  -EJ     Mode of Treatment physical therapy  -EJ       Row Name 09/27/23 0927          General Information    Patient Profile Reviewed yes  -EJ     Prior Level of Function independent:;all household mobility;community mobility;ADL's  -EJ     Existing Precautions/Restrictions fall;cardiac;sternal  -EJ     Barriers to Rehab none identified  -EJ       Row Name 09/27/23 0927          Living Environment    People in Home spouse  -EJ       Row Name 09/27/23 0927          Home Main Entrance    Number of Stairs, Main Entrance one  -EJ       Row Name 09/27/23 0927          Stairs Within Home, Primary    Number of Stairs, Within Home, Primary none  -EJ       Row Name 09/27/23 0927          Cognition    Orientation Status (Cognition) oriented x 4  -EJ       Row Name 09/27/23 0927          Safety Issues, Functional Mobility    Impairments Affecting Function (Mobility) strength;endurance/activity tolerance;pain  -EJ               User Key  (r) = Recorded By, (t) = Taken By, (c) = Cosigned By      Initials Name Provider Type    EJ Traci Menard, PT Physical Therapist                   Mobility       Row Name 09/27/23 0928          Bed Mobility    Comment, (Bed Mobility) up in chair  -EJ       Row Name 09/27/23 0928          Sit-Stand Transfer    Sit-Stand [place] (Transfers) verbal cues;minimum assist (75% patient effort);2 person assist;1 person to manage equipment  -EJ     Assistive Device (Sit-Stand Transfers) --  HHA x 2  -EJ       Row Name 09/27/23  0928          Gait/Stairs (Locomotion)    [place] Level (Gait) verbal cues;nonverbal cues (demo/gesture);minimum assist (75% patient effort);2 person assist;1 person to manage equipment  -EJ     Assistive Device (Gait) --  HHA x 2  -EJ     Distance in Feet (Gait) 30  -EJ     Deviations/Abnormal Patterns (Gait) antalgic;maria teresa decreased;weight shifting decreased;stride length decreased;gait speed decreased  -EJ     Bilateral Gait Deviations forward flexed posture;heel strike decreased  -EJ     Comment, (Gait/Stairs) slow pace, small steps, cues to relax shoulders  -EJ               User Key  (r) = Recorded By, (t) = Taken By, (c) = Cosigned By      Initials Name Provider Type    EJ Traci Menard, PT Physical Therapist                   Obj/Interventions       [facility] Name 09/27/23 0930          Range of Motion Comprehensive    General Range of Motion no range of motion deficits identified  -EJ       Row Name 09/27/23 0930          Strength Comprehensive (MMT)    Comment, General Manual Muscle Testing (MMT) Assessment post op weakness  -EJ       Row Name 09/27/23 0930          Motor Skills    Therapeutic Exercise --  cardiac protocol x 5 reps  -EJ               User Key  (r) = Recorded By, (t) = Taken By, (c) = Cosigned By      Initials Name Provider Type    EJ Traci Menard, PT Physical Therapist                   Goals/Plan       Row Name 09/27/23 0934          Problem Specific Goal 1 (PT)    Problem Specific Goal 1 (PT) Cardiac Level 3  -EJ     Time Frame (Problem Specific Goal 1, PT) 1 week  -EJ       [facility] Name 09/27/23 0934          Therapy Assessment/Plan (PT)    Planned Therapy Interventions (PT) balance training;bed mobility training;gait training;home exercise program;stretching;strengthening;stair training;ROM (range of motion);patient/family education;transfer training  -EJ               User Key  (r) = Recorded By, (t) = Taken By, (c) = Cosigned By      Initials Name Provider Type    EJ  Traci Menard, PT Physical Therapist                   Clinical Impression       Row Name 09/27/23 0930          Pain    Pretreatment Pain Rating 4/10  -EJ     Pain Location incisional  -EJ     Pain Location - chest;neck  -EJ     Pain Intervention(s) Repositioned;Ambulation/increased activity  -EJ       Row Name 09/27/23 0930          Plan of Care Review    Plan of Care Reviewed With patient  -EJ     Outcome Evaluation Pt seen for PT eval this am. He is POD#1 CABG x 7 and presents w expected post op pain, weakness, decreased activity tolerance, and overall decreased functional mobility. At baseline, pt lives at home w his wife and reports independence w mobility and ADLs. Currently, pt is up in chair upon entry to room. He was able to tolerate cardiac exercises w assistance. Pt able to stand w min/HHA x2. He then ambulated approx 30 ft w min A x 2. Pt very tense w cues to relax shoulders. Pt plans home upon DC w assist of family and HHPT. HE will continue to benefit from skilled PT to maximize safety, strength, and independence w mobility.  -EJ       Row Name 09/27/23 0930          Therapy Assessment/Plan (PT)    Rehab Potential (PT) good, to achieve stated therapy goals  -EJ     Criteria for Skilled Interventions Met (PT) yes  -EJ     Therapy Frequency (PT) 6 times/wk  -EJ       Row Name 09/27/23 0930          Vital Signs    Pre Systolic BP Rehab 115  -EJ     Pre Treatment Diastolic BP 65  -EJ     Post Systolic BP Rehab 132  -EJ     Post Treatment Diastolic BP 66  -EJ     Pretreatment Heart Rate (beats/min) 74  -EJ     Posttreatment Heart Rate (beats/min) 74  -EJ     Pre SpO2 (%) 95  -EJ     O2 Delivery Pre Treatment supplemental O2  -EJ     O2 Delivery Intra Treatment room air  -EJ     Post SpO2 (%) 99  -EJ     O2 Delivery Post Treatment supplemental O2  -EJ     Pre Patient Position Sitting  -EJ     Intra Patient Position Standing  -EJ     Post Patient Position Sitting  -EJ       Row Name 09/27/23 0930           Positioning and Restraints    Pre-Treatment Position sitting in chair/recliner  -EJ     Post Treatment Position chair  -EJ     In Chair notified nsg;reclined;call light within reach;encouraged to call for assist;with nsg  -EJ               User Key  (r) = Recorded By, (t) = Taken By, (c) = Cosigned By      Initials Name Provider Type    Traci Cesar, PT Physical Therapist                   Outcome Measures       Row Name 09/27/23 0934          How much help from another person do you currently need...    Turning from your back to your side while in flat bed without using bedrails? 3  -EJ     Moving from lying on back to sitting on the side of a flat bed without bedrails? 2  -EJ     Moving to and from a bed to a chair (including a wheelchair)? 2  -EJ     Standing up from a chair using your arms (e.g., wheelchair, bedside chair)? 2  -EJ     Climbing 3-5 steps with a railing? 2  -EJ     To walk in hospital room? 2  -EJ     AM-PAC 6 Clicks Score (PT) 13  -EJ     Highest level of mobility 4 --> Transferred to chair/commode  -EJ               User Key  (r) = Recorded By, (t) = Taken By, (c) = Cosigned By      Initials Name Provider Type    Traci Cesar, PT Physical Therapist                                 Physical Therapy Education       Title: PT OT SLP Therapies (Done)       Topic: Physical Therapy (Done)       Point: Mobility training (Done)       Learning Progress Summary             Patient Acceptance, E,TB,D, VU,NR by MADDISON at 9/27/2023 0935                         Point: Home exercise program (Done)       Learning Progress Summary             Patient Acceptance, E,TB,D, VU,NR by MADDISON at 9/27/2023 0935                         Point: Body mechanics (Done)       Learning Progress Summary             Patient Acceptance, E,TB,D, VU,NR by  at 9/27/2023 0935                         Point: Precautions (Done)       Learning Progress Summary             Patient Acceptance, E,TB,D, VU,NR by  at 9/27/2023  0935                                         User Key       Initials Effective Dates Name Provider Type Discipline     06/16/21 -  Traci Menard, PT Physical Therapist PT                  PT Recommendation and Plan  Planned Therapy Interventions (PT): balance training, bed mobility training, gait training, home exercise program, stretching, strengthening, stair training, ROM (range of motion), patient/family education, transfer training  Plan of Care Reviewed With: patient  Outcome Evaluation: Pt seen for PT eval this am. He is POD#1 CABG x 7 and presents w expected post op pain, weakness, decreased activity tolerance, and overall decreased functional mobility. At baseline, pt lives at home w his wife and reports independence w mobility and ADLs. Currently, pt is up in chair upon entry to room. He was able to tolerate cardiac exercises w assistance. Pt able to stand w min/HHA x2. He then ambulated approx 30 ft w min A x 2. Pt very tense w cues to relax shoulders. Pt plans home upon DC w assist of family and HHPT. HE will continue to benefit from skilled PT to maximize safety, strength, and independence w mobility.     Time Calculation:         PT Charges       Row Name 09/27/23 0935             Time Calculation    Start Time 0910  -EJ      Stop Time 0927  -EJ      Time Calculation (min) 17 min  -EJ      PT Received On 09/27/23  -EJ      PT - Next Appointment 09/28/23  -EJ      PT Goal Re-Cert Due Date 10/04/23  -         Time Calculation- PT    Total Timed Code Minutes- PT 12 minute(s)  -EJ                User Key  (r) = Recorded By, (t) = Taken By, (c) = Cosigned By      Initials Name Provider Type     Traci Menard, PT Physical Therapist                  Therapy Charges for Today       Code Description Service Date Service Provider Modifiers Qty    97348933077 HC PT EVAL MOD COMPLEXITY 3 9/27/2023 Traci Menard, PT GP 1    39458044817 HC PT THER PROC EA 15 MIN 9/27/2023 Traci Menard, PT  GP 1    [id number]  PT THER SUPP EA 15 MIN 9/27/2023 Traci Menard, PT GP 1            PT G-Codes  AM-PAC 6 Clicks Score (PT): 13  PT Discharge Summary  Anticipated Discharge Disposition (PT): home with assist, home with home health    Traci Menard, PT  9/27/2023

## 2023-09-27 NOTE — ANESTHESIA POSTPROCEDURE EVALUATION
Patient: Trenton Adames    Procedure Summary       Date: 09/26/23 Room / Location: [place] [address] / [facility] CARDIOVASCULAR OPERATING ROOM    Anesthesia Start: 0632 Anesthesia Stop: 1216    Procedure: RUKHSANA STERNOTOMY CORONARY ARTERY BYPASS GRAFT TIMES 7 USING LEFT INTERNAL MAMMARY ARTERY AND RIGHT GREATER SAPHENOUS VEIN GRAFT PER ENDOSCOPIC VEIN HARVESTING AND PRP (Chest) Diagnosis:       Coronary artery disease involving native coronary artery of native heart with unstable angina pectoris      Coronary atherosclerosis due to calcified coronary lesion (CODE)      (Coronary artery disease involving native coronary artery of native heart with unstable angina pectoris [I25.110])      (Coronary atherosclerosis due to calcified coronary lesion (CODE) [I25.84])    Surgeons: Jr Frederic Groves MD Provider: Marissa Curtis MD    Anesthesia Type: general, [place], CVL, PAC ASA Status: 4            Anesthesia Type: general, [place], CVL, PAC    Vitals  Vitals Value Taken Time   /64 09/27/23 1223   Temp 37.7 °C (99.9 °F) 09/27/23 1223   Pulse 78 09/27/23 1421   Resp 16 09/27/23 1223   SpO2 96 % 09/27/23 1421   Vitals shown include unvalidated device data.        Post Anesthesia Care and Evaluation    Patient location during evaluation: bedside  Patient participation: complete - patient participated  Level of consciousness: awake  Pain management: adequate    Airway patency: patent  Anesthetic complications: No anesthetic complications    Cardiovascular status: acceptable  Respiratory status: acceptable  Hydration status: acceptable    Comments: /64 (BP Location: Left arm, Patient Position: Lying)   Pulse 73   Temp 37.7 °C (99.9 °F) (Oral)   Resp 16   Wt 86 kg (189 lb 9.5 oz)   SpO2 94%   BMI 27.20 kg/m²

## 2023-09-28 ENCOUNTER — APPOINTMENT (OUTPATIENT)
Dept: GENERAL RADIOLOGY | Facility: HOSPITAL | Age: 78
DRG: 236 | End: 2023-09-28
Payer: MEDICARE

## 2023-09-28 LAB
ANION GAP SERPL CALCULATED.3IONS-SCNC: 10 MMOL/L (ref 5–15)
BUN SERPL-MCNC: 11 MG/DL (ref 8–23)
BUN/CREAT SERPL: 13.1 (ref 7–25)
CALCIUM SPEC-SCNC: 8.3 MG/DL (ref 8.6–10.5)
CHLORIDE SERPL-SCNC: 100 MMOL/L (ref 98–107)
CO2 SERPL-SCNC: 23 MMOL/L (ref 22–29)
CREAT SERPL-MCNC: 0.84 MG/DL (ref 0.76–1.27)
DEPRECATED RDW RBC AUTO: 39.7 FL (ref 37–54)
EGFRCR SERPLBLD CKD-EPI 2021: 90.9 ML/MIN/1.73
ERYTHROCYTE [DISTWIDTH] IN BLOOD BY AUTOMATED COUNT: 12 % (ref 12.3–15.4)
GLUCOSE BLDC GLUCOMTR-MCNC: 156 MG/DL (ref 70–130)
GLUCOSE BLDC GLUCOMTR-MCNC: 171 MG/DL (ref 70–130)
GLUCOSE BLDC GLUCOMTR-MCNC: 172 MG/DL (ref 70–130)
GLUCOSE BLDC GLUCOMTR-MCNC: 215 MG/DL (ref 70–130)
GLUCOSE SERPL-MCNC: 171 MG/DL (ref 65–99)
HCT VFR BLD AUTO: 28 % (ref 37.5–51)
HGB BLD-MCNC: 9.5 G/DL (ref 13–17.7)
MCH RBC QN AUTO: 30.9 PG (ref 26.6–33)
MCHC RBC AUTO-ENTMCNC: 33.9 G/DL (ref 31.5–35.7)
MCV RBC AUTO: 91.2 FL (ref 79–97)
PLATELET # BLD AUTO: 129 10*3/MM3 (ref 140–450)
PMV BLD AUTO: 11.7 FL (ref 6–12)
POTASSIUM SERPL-SCNC: 4.3 MMOL/L (ref 3.5–5.2)
RBC # BLD AUTO: 3.07 10*6/MM3 (ref 4.14–5.8)
SODIUM SERPL-SCNC: 133 MMOL/L (ref 136–145)
WBC NRBC COR # BLD: 14 10*3/MM3 (ref 3.4–10.8)

## 2023-09-28 PROCEDURE — 63710000001 INSULIN LISPRO (HUMAN) PER 5 UNITS: Performed by: NURSE PRACTITIONER

## 2023-09-28 PROCEDURE — 71045 X-RAY EXAM CHEST 1 VIEW: CPT

## 2023-09-28 PROCEDURE — 63710000001 INSULIN GLARGINE PER 5 UNITS: Performed by: NURSE PRACTITIONER

## 2023-09-28 PROCEDURE — 25010000002 CALCIUM GLUCONATE-NACL 1-0.675 GM/50ML-% SOLUTION: Performed by: NURSE PRACTITIONER

## 2023-09-28 PROCEDURE — 25010000002 ENOXAPARIN PER 10 MG: Performed by: NURSE PRACTITIONER

## 2023-09-28 PROCEDURE — 82948 REAGENT STRIP/BLOOD GLUCOSE: CPT

## 2023-09-28 PROCEDURE — 25010000002 ONDANSETRON PER 1 MG: Performed by: NURSE PRACTITIONER

## 2023-09-28 PROCEDURE — 94799 UNLISTED PULMONARY SVC/PX: CPT

## 2023-09-28 PROCEDURE — 25010000002 FUROSEMIDE PER 20 MG: Performed by: NURSE PRACTITIONER

## 2023-09-28 PROCEDURE — 25010000002 CEFAZOLIN IN DEXTROSE 2-4 GM/100ML-% SOLUTION: Performed by: NURSE PRACTITIONER

## 2023-09-28 PROCEDURE — 97110 THERAPEUTIC EXERCISES: CPT

## 2023-09-28 PROCEDURE — 80048 BASIC METABOLIC PNL TOTAL CA: CPT | Performed by: NURSE PRACTITIONER

## 2023-09-28 PROCEDURE — 85027 COMPLETE CBC AUTOMATED: CPT | Performed by: NURSE PRACTITIONER

## 2023-09-28 RX ORDER — SCOLOPAMINE TRANSDERMAL SYSTEM 1 MG/1
1 PATCH, EXTENDED RELEASE TRANSDERMAL
Status: DISCONTINUED | OUTPATIENT
Start: 2023-09-28 | End: 2023-09-30 | Stop reason: HOSPADM

## 2023-09-28 RX ORDER — CALCIUM GLUCONATE 20 MG/ML
1000 INJECTION, SOLUTION INTRAVENOUS ONCE
Status: COMPLETED | OUTPATIENT
Start: 2023-09-28 | End: 2023-09-28

## 2023-09-28 RX ORDER — GABAPENTIN 100 MG/1
200 CAPSULE ORAL EVERY 12 HOURS SCHEDULED
Status: DISCONTINUED | OUTPATIENT
Start: 2023-09-28 | End: 2023-09-30 | Stop reason: HOSPADM

## 2023-09-28 RX ORDER — GABAPENTIN 100 MG/1
100 CAPSULE ORAL ONCE
Status: COMPLETED | OUTPATIENT
Start: 2023-09-28 | End: 2023-09-28

## 2023-09-28 RX ORDER — FUROSEMIDE 10 MG/ML
40 INJECTION INTRAMUSCULAR; INTRAVENOUS ONCE
Status: COMPLETED | OUTPATIENT
Start: 2023-09-28 | End: 2023-09-28

## 2023-09-28 RX ORDER — POTASSIUM CHLORIDE 750 MG/1
20 TABLET, FILM COATED, EXTENDED RELEASE ORAL ONCE
Status: COMPLETED | OUTPATIENT
Start: 2023-09-28 | End: 2023-09-28

## 2023-09-28 RX ADMIN — ATORVASTATIN CALCIUM 40 MG: 20 TABLET, FILM COATED ORAL at 21:16

## 2023-09-28 RX ADMIN — INSULIN LISPRO 4 UNITS: 100 INJECTION, SOLUTION INTRAVENOUS; SUBCUTANEOUS at 21:16

## 2023-09-28 RX ADMIN — CEFAZOLIN SODIUM 2 G: 2 INJECTION, SOLUTION INTRAVENOUS at 01:45

## 2023-09-28 RX ADMIN — ONDANSETRON 4 MG: 2 INJECTION INTRAMUSCULAR; INTRAVENOUS at 08:10

## 2023-09-28 RX ADMIN — SCOPALAMINE 1 PATCH: 1 PATCH, EXTENDED RELEASE TRANSDERMAL at 12:54

## 2023-09-28 RX ADMIN — ASPIRIN 81 MG: 81 TABLET, COATED ORAL at 08:00

## 2023-09-28 RX ADMIN — GABAPENTIN 100 MG: 100 CAPSULE ORAL at 08:00

## 2023-09-28 RX ADMIN — OXYCODONE HYDROCHLORIDE 10 MG: 5 TABLET ORAL at 21:16

## 2023-09-28 RX ADMIN — 0.12% CHLORHEXIDINE GLUCONATE 15 ML: 1.2 RINSE ORAL at 08:02

## 2023-09-28 RX ADMIN — GUAIFENESIN 1200 MG: 600 TABLET, EXTENDED RELEASE ORAL at 08:00

## 2023-09-28 RX ADMIN — METOPROLOL TARTRATE 25 MG: 25 TABLET, FILM COATED ORAL at 21:17

## 2023-09-28 RX ADMIN — ENOXAPARIN SODIUM 40 MG: 100 INJECTION SUBCUTANEOUS at 17:53

## 2023-09-28 RX ADMIN — MUPIROCIN 1 APPLICATION: 20 OINTMENT TOPICAL at 21:16

## 2023-09-28 RX ADMIN — 0.12% CHLORHEXIDINE GLUCONATE 15 ML: 1.2 RINSE ORAL at 21:15

## 2023-09-28 RX ADMIN — SENNOSIDES AND DOCUSATE SODIUM 2 TABLET: 50; 8.6 TABLET ORAL at 21:16

## 2023-09-28 RX ADMIN — GABAPENTIN 200 MG: 100 CAPSULE ORAL at 21:16

## 2023-09-28 RX ADMIN — PANTOPRAZOLE SODIUM 40 MG: 40 TABLET, DELAYED RELEASE ORAL at 06:10

## 2023-09-28 RX ADMIN — GABAPENTIN 100 MG: 100 CAPSULE ORAL at 09:30

## 2023-09-28 RX ADMIN — POTASSIUM CHLORIDE 20 MEQ: 750 TABLET, EXTENDED RELEASE ORAL at 09:32

## 2023-09-28 RX ADMIN — CALCIUM GLUCONATE 1000 MG: 20 INJECTION, SOLUTION INTRAVENOUS at 09:32

## 2023-09-28 RX ADMIN — INSULIN LISPRO 2 UNITS: 100 INJECTION, SOLUTION INTRAVENOUS; SUBCUTANEOUS at 12:07

## 2023-09-28 RX ADMIN — MUPIROCIN 1 APPLICATION: 20 OINTMENT TOPICAL at 08:01

## 2023-09-28 RX ADMIN — INSULIN LISPRO 2 UNITS: 100 INJECTION, SOLUTION INTRAVENOUS; SUBCUTANEOUS at 06:58

## 2023-09-28 RX ADMIN — GUAIFENESIN 1200 MG: 600 TABLET, EXTENDED RELEASE ORAL at 21:16

## 2023-09-28 RX ADMIN — INSULIN GLARGINE 10 UNITS: 100 INJECTION, SOLUTION SUBCUTANEOUS at 08:01

## 2023-09-28 RX ADMIN — OXYCODONE HYDROCHLORIDE 10 MG: 5 TABLET ORAL at 02:36

## 2023-09-28 RX ADMIN — METOPROLOL TARTRATE 25 MG: 25 TABLET, FILM COATED ORAL at 09:31

## 2023-09-28 RX ADMIN — INSULIN LISPRO 2 UNITS: 100 INJECTION, SOLUTION INTRAVENOUS; SUBCUTANEOUS at 16:58

## 2023-09-28 RX ADMIN — OXYCODONE HYDROCHLORIDE 10 MG: 5 TABLET ORAL at 08:00

## 2023-09-28 RX ADMIN — FUROSEMIDE 40 MG: 10 INJECTION, SOLUTION INTRAMUSCULAR; INTRAVENOUS at 09:32

## 2023-09-28 NOTE — PLAN OF CARE
Goal Outcome Evaluation:  Plan of Care Reviewed With: patient        Progress: no change  Outcome Evaluation: VSS, Norco for pain with good relief. Chest tubes to suction; Epicardial wires attached to pacmaker, SR on monitor.

## 2023-09-28 NOTE — PLAN OF CARE
Goal Outcome Evaluation:           Progress: improving  Outcome Evaluation: VSS. Patient's pain was uncontrolled at the start of the shift. Medicated when able with oxy and there is better relief. Patient is having slight temps. Will monitor labs and keep patient comfortable. Chest tubes have little output. Patient is being paced about 50% of the time with no complaints. WCTM         Problem: Adult Inpatient Plan of Care  Goal: Plan of Care Review  Outcome: Ongoing, Progressing  Flowsheets  Taken 9/28/2023 0058 by Royer Johnson RN  Progress: improving  Outcome Evaluation: VSS. Patient's pain was uncontrolled at the start of the shift. Medicated when able with oxy and there is better relief. Patient is having slight temps. Will monitor labs and keep patient comfortable. Chest tubes have little output. Patient is being paced about 50% of the time with no complaints. WCTM  Taken 9/27/2023 2010 by Charissa Rodriguez RN  Plan of Care Reviewed With: patient  Goal: Patient-Specific Goal (Individualized)  Outcome: Ongoing, Progressing  Goal: Absence of Hospital-Acquired Illness or Injury  Outcome: Ongoing, Progressing  Intervention: Identify and Manage Fall Risk  Recent Flowsheet Documentation  Taken 9/28/2023 0000 by Royer Johnson RN  Safety Promotion/Fall Prevention:   activity supervised   fall prevention program maintained   safety round/check completed  Taken 9/27/2023 2200 by Royer Johnson RN  Safety Promotion/Fall Prevention:   activity supervised   fall prevention program maintained   safety round/check completed  Taken 9/27/2023 2000 by Royer Johnson RN  Safety Promotion/Fall Prevention:   activity supervised   fall prevention program maintained   safety round/check completed  Intervention: Prevent Skin Injury  Recent Flowsheet Documentation  Taken 9/28/2023 0000 by Royer Johnson RN  Body Position:   supine, legs elevated   tilted   left  Taken 9/27/2023 2200 by Royer Johnson RN  Body  Position:   supine, legs elevated   tilted   right  Taken 9/27/2023 2000 by Royer Johnson RN  Body Position: sitting up in bed  Intervention: Prevent Infection  Recent Flowsheet Documentation  Taken 9/28/2023 0000 by Royer Johnson RN  Infection Prevention: rest/sleep promoted  Taken 9/27/2023 2200 by Royer Johnson RN  Infection Prevention: rest/sleep promoted  Taken 9/27/2023 2000 by Royer Johnson RN  Infection Prevention: rest/sleep promoted  Goal: Optimal Comfort and Wellbeing  Outcome: Ongoing, Progressing  Intervention: Monitor Pain and Promote Comfort  Recent Flowsheet Documentation  Taken 9/27/2023 2000 by Royer Johnson RN  Pain Management Interventions:   pillow support provided   position adjusted   see MAR  Intervention: Provide Person-Centered Care  Recent Flowsheet Documentation  Taken 9/28/2023 0000 by Royer Johnson RN  Trust Relationship/Rapport:   questions answered   empathic listening provided   emotional support provided   choices provided   thoughts/feelings acknowledged  Taken 9/27/2023 2000 by Royer Johnson RN  Trust Relationship/Rapport:   care explained   choices provided   emotional support provided   empathic listening provided   questions answered   questions encouraged   reassurance provided   thoughts/feelings acknowledged  Goal: Readiness for Transition of Care  Outcome: Ongoing, Progressing     Problem: Hypertension Comorbidity  Goal: Blood Pressure in Desired Range  Outcome: Ongoing, Progressing  Intervention: Maintain Blood Pressure Management  Recent Flowsheet Documentation  Taken 9/28/2023 0000 by Royer Johnson RN  Medication Review/Management: medications reviewed  Taken 9/27/2023 2200 by Royer Johnson RN  Medication Review/Management: medications reviewed  Taken 9/27/2023 2000 by Royer Johnson RN  Medication Review/Management: medications reviewed     Problem: Skin Injury Risk Increased  Goal: Skin Health and Integrity  Outcome:  Ongoing, Progressing  Intervention: Optimize Skin Protection  Recent Flowsheet Documentation  Taken 9/28/2023 0000 by Royer Johnson RN  Head of Bed (HOB) Positioning: HOB at 30-45 degrees  Taken 9/27/2023 2200 by Royer Johnson RN  Head of Bed (HOB) Positioning: HOB at 30-45 degrees  Taken 9/27/2023 2000 by Royer Johnson RN  Head of Bed (HOB) Positioning: HOB at 60 degrees     Problem: Fall Injury Risk  Goal: Absence of Fall and Fall-Related Injury  Outcome: Ongoing, Progressing  Intervention: Identify and Manage Contributors  Recent Flowsheet Documentation  Taken 9/28/2023 0000 by Royer Johnson RN  Medication Review/Management: medications reviewed  Taken 9/27/2023 2200 by Royer Johnson RN  Medication Review/Management: medications reviewed  Taken 9/27/2023 2000 by Royer Johnson RN  Medication Review/Management: medications reviewed  Intervention: Promote Injury-Free Environment  Recent Flowsheet Documentation  Taken 9/28/2023 0000 by Royer Johnson RN  Safety Promotion/Fall Prevention:   activity supervised   fall prevention program maintained   safety round/check completed  Taken 9/27/2023 2200 by Royer Johnson RN  Safety Promotion/Fall Prevention:   activity supervised   fall prevention program maintained   safety round/check completed  Taken 9/27/2023 2000 by Royer Johnson RN  Safety Promotion/Fall Prevention:   activity supervised   fall prevention program maintained   safety round/check completed     Problem: Activity Intolerance (Cardiovascular Surgery)  Goal: Improved Activity Tolerance  Outcome: Ongoing, Progressing     Problem: Adjustment to Surgery (Cardiovascular Surgery)  Goal: Optimal Coping with Heart Surgery  Outcome: Ongoing, Progressing     Problem: Bleeding (Cardiovascular Surgery)  Goal: Bleeding (Cardiovascular Surgery)  Outcome: Ongoing, Progressing     Problem: Bowel Motility Impaired (Cardiovascular Surgery)  Goal: Effective Bowel Elimination  (Cardiovascular Surgery)  Outcome: Ongoing, Progressing     Problem: Cardiac Function Impaired (Cardiovascular Surgery)  Goal: Effective Cardiac Function  Outcome: Ongoing, Progressing     Problem: Cerebral Tissue Perfusion (Cardiovascular Surgery)  Goal: Optimal Cerebral Tissue Perfusion (Cardiovascular Surgery)  Outcome: Ongoing, Progressing  Intervention: Protect and Optimize Cerebral Perfusion  Recent Flowsheet Documentation  Taken 9/28/2023 0000 by Royer Johnson RN  Head of Bed (HOB) Positioning: HOB at 30-45 degrees  Taken 9/27/2023 2200 by Royer Johnson RN  Head of Bed (HOB) Positioning: HOB at 30-45 degrees  Taken 9/27/2023 2000 by Royer Johnson RN  Head of Bed (HOB) Positioning: HOB at 60 degrees     Problem: Fluid and Electrolyte Imbalance (Cardiovascular Surgery)  Goal: Fluid and Electrolyte Balance (Cardiovascular Surgery)  Outcome: Ongoing, Progressing     Problem: Glycemic Control Impaired (Cardiovascular Surgery)  Goal: Blood Glucose Level Within Targeted Range (Cardiovascular Surgery)  Outcome: Ongoing, Progressing     Problem: Infection (Cardiovascular Surgery)  Goal: Absence of Infection Signs and Symptoms  Outcome: Ongoing, Progressing  Intervention: Prevent or Manage Infection  Recent Flowsheet Documentation  Taken 9/28/2023 0000 by Royer Johnson RN  Infection Prevention: rest/sleep promoted  Taken 9/27/2023 2200 by Royer Johnson RN  Infection Prevention: rest/sleep promoted  Taken 9/27/2023 2000 by Royer Johnson RN  Infection Prevention: rest/sleep promoted     Problem: Ongoing Anesthesia Effects (Cardiovascular Surgery)  Goal: Anesthesia/Sedation Recovery  Outcome: Ongoing, Progressing  Intervention: Optimize Anesthesia Recovery  Recent Flowsheet Documentation  Taken 9/28/2023 0000 by Royer Johnson RN  Safety Promotion/Fall Prevention:   activity supervised   fall prevention program maintained   safety round/check completed  Taken 9/27/2023 2200 by Elizabeth  Royer, RN  Safety Promotion/Fall Prevention:   activity supervised   fall prevention program maintained   safety round/check completed  Taken 9/27/2023 2000 by Royer Johnson RN  Safety Promotion/Fall Prevention:   activity supervised   fall prevention program maintained   safety round/check completed     Problem: Pain (Cardiovascular Surgery)  Goal: Acceptable Pain Control  Outcome: Ongoing, Progressing  Intervention: Prevent or Manage Pain  Recent Flowsheet Documentation  Taken 9/27/2023 2000 by Royer Johnson RN  Pain Management Interventions:   pillow support provided   position adjusted   see MAR     Problem: Postoperative Nausea and Vomiting (Cardiovascular Surgery)  Goal: Nausea and Vomiting Relief (Cardiovascular Surgery)  Outcome: Ongoing, Progressing     Problem: Postoperative Urinary Retention (Cardiovascular Surgery)  Goal: Effective Urinary Elimination (Cardiovascular Surgery)  Outcome: Ongoing, Progressing     Problem: Respiratory Compromise (Cardiovascular Surgery)  Goal: Effective Oxygenation and Ventilation (Cardiovascular Surgery)  Outcome: Ongoing, Progressing

## 2023-09-28 NOTE — CASE MANAGEMENT/SOCIAL WORK
Discharge Planning Assessment  [facility]     Patient Name: Trenton Adames  MRN: [id number]  Today's Date: 9/28/2023    Admit Date: 9/25/2023    Plan: Home w/ HH (referral pending)   Discharge Needs Assessment       Row Name 09/28/23 1656       Living Environment    People in Home spouse    Name(s) of People in Home Nancy Adames, spouse    Current Living Arrangements home    Potentially Unsafe Housing Conditions none    Primary Care Provided by self    Provides Primary Care For pet(s)  1 DOG    Family Caregiver if Needed child(noris), adult;spouse    Family Caregiver Names Spouse/Nancy; daughter/April    Quality of Family Relationships helpful;involved;supportive    Able to Return to Prior Arrangements yes       Resource/Environmental Concerns    Resource/Environmental Concerns none    Transportation Concerns none       Food Insecurity    Within the past 12 months, you worried that your food would run out before you got the money to buy more. Never true    Within the past 12 months, the food you bought just didn't last and you didn't have money to get more. Never true       Transition Planning    Patient/Family Anticipates Transition to home with family    Patient/Family Anticipated Services at Transition home health care    Transportation Anticipated family or friend will provide       Discharge Needs Assessment    Equipment Currently Used at Home cpap;glucometer;bp cuff;scales;nebulizer    Concerns to be Addressed discharge planning    Anticipated Changes Related to Illness none    Equipment Needed After Discharge none    Discharge Facility/Level of Care Needs home with home health    Provided Post Acute Provider List? Yes    Post Acute Provider List Home Health    Delivered To Patient    Method of Delivery In person    Patient's Choice of Community Agency(s) Pt has no preference of  agency.                   Discharge Plan       Row Name 09/28/23 1659       Plan    Plan Home w/ HH (referral pending)     Plan Comments CCP spoke with Pt at bedside.  CCP role explained and discharge planning discussed.  Face sheet verified.  Pt stated he is IADL's, retired and drives.  Pt lives with spouse/Nancy Adamse ([phone]), in a single-story home with one entrance stair steps.  Pt daughter, April is in state from [place] to assist at d/c.  Pt reports PCP is, Jacquelin Pelaez.  [facility] [place]  Pt denies use of past home health or going to a sub-acute rehab.  Pt has the following DME- CPAP ([employer]), glucometer, BP cuff monitor, scale and nebulizer.  Pt plans to return home at discharge with assist of spouse and daughter.  Pt given home health choices.  Pt has no preference of HH agency as long as they are in network with his insurance.  Referral sent to [facility]  (declined).  HH referrals pending.  CCP will continue to follow…….Ava GRAHAM /.                  Continued Care and Services - Admitted Since 9/25/2023       Home Medical Care       Service Provider Request Status Selected Services Address Phone Fax Patient Preferred    [facility] Pending - Request Sent N/A [address], [address], [place] [address] [phone] --    [facility] Declined  Patient Insurance Not Accepted N/A [address] [address] [phone] --                  Expected Discharge Date and Time       Expected Discharge Date Expected Discharge Time    Oct 3, 2023            Demographic Summary       Row Name 09/28/23 1655       General Information    Admission Type inpatient    Arrived From home    Required Notices Provided Important Message from Medicare    Referral Source admission list;physician    Reason for Consult discharge planning    Preferred Language English       Contact Information    Permission Granted to Share Info With family/designee                   Functional  Status       Row Name 09/28/23 1655       Functional Status    Usual Activity Tolerance good    Current Activity Tolerance moderate       Assessment of Health Literacy    Health Literacy Moderate       Functional Status, IADL    Medications independent    Meal Preparation independent    Housekeeping independent    Laundry independent    Shopping independent       Mental Status    General Appearance WDL WDL       Mental Status Summary    Recent Changes in Mental Status/Cognitive Functioning no changes       Employment/    Employment Status retired                   Psychosocial    No documentation.                  Abuse/Neglect    No documentation.                  Legal    No documentation.                  Substance Abuse    No documentation.                  Patient Forms    No documentation.                     Ava Farooq RN

## 2023-09-28 NOTE — CONSULTS
Met with patient and his family, discussed benefits of cardiac rehab. Provided phase II information along with the contact information for cardiac rehab at [facility]. Requested for referral to be sent.    Explained if receiving home health would not be able to attend cardiac rehab until finished with home health.

## 2023-09-28 NOTE — PLAN OF CARE
Goal Outcome Evaluation:  Plan of Care Reviewed With: patient           Outcome Evaluation: Pt seen for P T this am. He is doing well w minimal complaints at this time. Pt progressing well w activity today. He was up in chair upon entry to room. Pt able to stand w min A x 2. He increased ambulation distance today, ambulating approx 100 ft w HHA/min A. He does exhibit slow pace, but is steady. No LOB noted. Pt back in chair at end of session. Will continue to progress activity as tolerated.      Anticipated Discharge Disposition (PT): home with assist, home with home health

## 2023-09-28 NOTE — DISCHARGE PLACEMENT REQUEST
"Trenton Yusuf (77 y.o. Male)       Date of Birth   1945    Social Security Number       Address   [address] [place], [place] [address]    Home Phone   [phone]    MRN   [id number]       [facility]    Marital Status                               Admission Date   9/25/23    Admission Type   Urgent    Admitting Provider   Jr Frederic Groves MD    Attending Provider   Jr Frederic Groves MD    Department, Room/Bed   [facility] CARDIOVASC UNIT, 2231/1       Discharge Date       Discharge Disposition       Discharge Destination                                 Attending Provider: Jr Frederic Groves MD    Allergies: Cat Hair Extract, Grass, Pollen Extract, Tape    Isolation: None   Infection: None   Code Status: CPR    Ht: 177.8 cm (70\")   Wt: 86 kg (189 lb 8 oz)    Admission Cmt: None   Principal Problem: CAD (coronary artery disease) [I25.10]                   Active Insurance as of 9/25/2023       Primary Coverage       Payor Plan Insurance Group Employer/Plan Group    HUMANA MEDICARE REPLACEMENT HUMANA MEDICARE REPLACEMENT [id number]       Payor Plan Address Payor Plan Phone Number Payor Plan Fax Number Effective Dates    [address] [address] [phone]  1/1/2018 - None Entered    [facility] [address]         Subscriber Name Subscriber Birth Date Member ID       TRENTON YUSUF 1945 [id number]                     Emergency Contacts        (Rel.) Home Phone Work Phone Mobile Phone    Nancy Yusuf (Spouse) [phone] -- [phone]                "

## 2023-09-28 NOTE — THERAPY TREATMENT NOTE
Patient Name: Trenton Adames  : 1945    MRN: [id number]                              Today's Date: 2023       Admit Date: 2023    Visit Dx:     ICD-10-CM ICD-9-CM   1. S/P CABG (coronary artery bypass graft)  Z95.1 V45.81     Patient Active Problem List   Diagnosis    Allergic rhinitis    Anemia    Asthma    Benign prostatic hyperplasia    Cervical radiculopathy    Diabetes mellitus, type II    Hyperlipidemia    Hypertension    Impotence of organic origin    Kidney stones    Renal calculus    Prostate disorder    Shortness of breath    Vitamin D deficiency    Vitamin B 12 deficiency    Iron deficiency anemia    Ophthalmic herpes zoster    Acute URI    Suspected sleep apnea    Pulmonary HTN    Overweight (BMI 25.0-29.9)    Snoring    Chest pain, atypical    Coronary artery disease involving native coronary artery of native heart with unstable angina pectoris    CAD (coronary artery disease)     Past Medical History:   Diagnosis Date    Arthritis     Asthma     Atypical chest pain     Coronary artery disease involving native coronary artery of native heart with unstable angina pectoris 2023    Dysphagia, oral phase     NO CURRENT ISSUES    Essential (primary) hypertension     Foot pain, bilateral     Heart murmur 2023    Heart valve disease 2023    Hyperlipidemia, unspecified     Low back pain     Neuropathy in diabetes     Peripheral neuropathy     Pulmonary HTN 2023    Sleep apnea     USES CPAP    Type 2 diabetes mellitus without complication     Vitamin D deficiency, unspecified      Past Surgical History:   Procedure Laterality Date    CARDIAC CATHETERIZATION N/A 2023    Procedure: Left Heart Cath with possible coronary angioplasty;  Surgeon: Marc Charles MD;  Location: [facility] CATH INVASIVE LOCATION;  Service: Cardiology;  Laterality: N/A;    CATARACT EXTRACTION, BILATERAL  2014; 2014    COLONOSCOPY      CORONARY ARTERY BYPASS GRAFT N/A 2023     Procedure: RUKHSANA STERNOTOMY CORONARY ARTERY BYPASS GRAFT TIMES 7 USING LEFT INTERNAL MAMMARY ARTERY AND RIGHT GREATER SAPHENOUS VEIN GRAFT PER ENDOSCOPIC VEIN HARVESTING AND PRP;  Surgeon: Jr Frederic Groves MD;  Location: [facility];  Service: Cardiothoracic;  Laterality: N/A;    HEMORRHOIDECTOMY  06/20/2014    POLYPECTOMY      SINUS SURGERY        General Information       Row Name 09/28/23 1134          Physical Therapy Time and Intention    Document Type therapy note (daily note)  -EJ     Mode of Treatment physical therapy  -EJ       Row Name 09/28/23 1134          General Information    Existing Precautions/Restrictions fall;cardiac;sternal  -EJ               User Key  (r) = Recorded By, (t) = Taken By, (c) = Cosigned By      Initials Name Provider Type    EJ Traci Menard, PT Physical Therapist                   Mobility       Row Name 09/28/23 1134          Bed Mobility    Comment, (Bed Mobility) up in chair  -EJ       Row Name 09/28/23 1134          Sit-Stand Transfer    Sit-Stand Maries (Transfers) verbal cues;nonverbal cues (demo/gesture);minimum assist (75% patient effort);2 person assist  -EJ       Row Name 09/28/23 1134          Gait/Stairs (Locomotion)    Maries Level (Gait) verbal cues;nonverbal cues (demo/gesture);minimum assist (75% patient effort);2 person assist;1 person to manage equipment  -EJ     Distance in Feet (Gait) 100  -EJ     Deviations/Abnormal Patterns (Gait) stride length decreased;maria teresa decreased  -EJ     Bilateral Gait Deviations forward flexed posture;heel strike decreased  -EJ     Comment, (Gait/Stairs) slow pace, no LOB noted, cues to relax shoulders  -EJ               User Key  (r) = Recorded By, (t) = Taken By, (c) = Cosigned By      Initials Name Provider Type    EJ Traci Menard, PT Physical Therapist                   Obj/Interventions    No documentation.                  Goals/Plan    No documentation.                  Clinical Impression        Row Name 09/28/23 1135          Pain    Pretreatment Pain Rating 1/10  -EJ     Pain Location - chest;neck  -EJ       Row Name 09/28/23 1135          Plan of Care Review    Plan of Care Reviewed With patient  -EJ     Outcome Evaluation Pt seen for P T this am. He is doing well w minimal complaints at this time. Pt progressing well w activity today. He was up in chair upon entry to room. Pt able to stand w min A x 2. He increased ambulation distance today, ambulating approx 100 ft w HHA/min A. He does exhibit slow pace, but is steady. No LOB noted. Pt back in chair at end of session. Will continue to progress activity as tolerated.  -EJ       Row Name 09/28/23 1135          Vital Signs    Post SpO2 (%) 99  -EJ     O2 Delivery Post Treatment room air  -EJ     Pre Patient Position Sitting  -EJ     Intra Patient Position Standing  -EJ     Post Patient Position Sitting  -EJ       Row Name 09/28/23 1135          Positioning and Restraints    Pre-Treatment Position sitting in chair/recliner  -EJ     Post Treatment Position chair  -EJ     In Chair notified nsg;reclined;call light within reach;encouraged to call for assist;exit alarm on;with family/caregiver  -EJ               User Key  (r) = Recorded By, (t) = Taken By, (c) = Cosigned By      Initials Name Provider Type    Traci Cesar, PT Physical Therapist                   Outcome Measures       Row Name 09/28/23 1141 09/28/23 0800       How much help from another person do you currently need...    Turning from your back to your side while in flat bed without using bedrails? 3  -EJ 3  -SF    Moving from lying on back to sitting on the side of a flat bed without bedrails? 3  -EJ 3  -SF    Moving to and from a bed to a chair (including a wheelchair)? 3  -EJ 3  -SF    Standing up from a chair using your arms (e.g., wheelchair, bedside chair)? 3  -EJ 3  -SF    Climbing 3-5 steps with a railing? 2  -EJ 2  -SF    To walk in hospital room? 3  -EJ 2  -SF    AM-PAC 6  Clicks Score (PT) 17  - 16  -SF    Highest level of mobility 5 --> Static standing  - 5 --> Static standing  -SF              User Key  (r) = Recorded By, (t) = Taken By, (c) = Cosigned By      Initials Name Provider Type    EJ Traci Menard, PT Physical Therapist    Sherry Hall, RN Registered Nurse                                 Physical Therapy Education       Title: PT OT SLP Therapies (Done)       Topic: Physical Therapy (Done)       Point: Mobility training (Done)       Learning Progress Summary             Patient Acceptance, E,TB,D, VU,NR by  at 9/27/2023 0935                         Point: Home exercise program (Done)       Learning Progress Summary             Patient Acceptance, E,TB,D, VU,NR by  at 9/27/2023 0935                         Point: Body mechanics (Done)       Learning Progress Summary             Patient Acceptance, E,TB,D, VU,NR by  at 9/27/2023 0935                         Point: Precautions (Done)       Learning Progress Summary             Patient Acceptance, E,TB,D, VU,NR by  at 9/27/2023 0935                                         User Key       Initials Effective Dates Name Provider Type [facility] 06/16/21 -  Traci Menard, PT Physical Therapist PT                  PT Recommendation and Plan  Planned Therapy Interventions (PT): balance training, bed mobility training, gait training, home exercise program, stretching, strengthening, stair training, ROM (range of motion), patient/family education, transfer training  Plan of Care Reviewed With: patient  Outcome Evaluation: Pt seen for P T this am. He is doing well w minimal complaints at this time. Pt progressing well w activity today. He was up in chair upon entry to room. Pt able to stand w min A x 2. He increased ambulation distance today, ambulating approx 100 ft w HHA/min A. He does exhibit slow pace, but is steady. No LOB noted. Pt back in chair at end of session. Will continue to progress activity  as tolerated.     Time Calculation:         PT Charges       Row Name 09/28/23 1141             Time Calculation    Start Time 1050  -EJ      Stop Time 1108  -EJ      Time Calculation (min) 18 min  -EJ      PT Received On 09/28/23  -EJ      PT - Next Appointment 09/29/23  -EJ                User Key  (r) = Recorded By, (t) = Taken By, (c) = Cosigned By      Initials Name Provider Type    EJ Traci Menard, PT Physical Therapist                  Therapy Charges for Today       Code Description Service Date Service Provider Modifiers Qty    15046407950 HC PT EVAL MOD COMPLEXITY 3 9/27/2023 Traci Menard, PT GP 1    49997156160 HC PT THER PROC EA 15 MIN 9/27/2023 Traci Menard, PT GP 1    15827266488 HC PT THER SUPP EA 15 MIN 9/27/2023 Traci Menard, PT GP 1    00215045482 HC PT THER PROC EA 15 MIN 9/28/2023 Traci Menard, PT GP 1    30060022084  PT THER SUPP EA 15 MIN 9/28/2023 Traci Menard, PT GP 1            PT G-Codes  AM-PAC 6 Clicks Score (PT): 17  PT Discharge Summary  Anticipated Discharge Disposition (PT): home with assist, home with home health    Traci Menard, PT  9/28/2023

## 2023-09-28 NOTE — PROGRESS NOTES
LOS: 3 days   Patient Care Team:  Jacquelin Pelaez APRN as PCP - General (Nurse Practitioner)    Chief Complaint: post op    Subjective:  Symptoms:  He reports chest pain.  No shortness of breath or cough.    Diet:  Adequate intake.  No nausea or vomiting.    Activity level: Impaired due to weakness.    Pain:  He complains of pain that is moderate.  Pain is partially controlled.        Vital Signs  Temp:  [97.5 °F (36.4 °C)-100.4 °F (38 °C)] 97.9 °F (36.6 °C)  Heart Rate:  [73-84] 84  Resp:  [16-18] 18  BP: (115-148)/(62-74) 130/70  Body mass index is 27.19 kg/m².    Intake/Output Summary (Last 24 hours) at 9/28/2023 0734  Last data filed at 9/28/2023 0600  Gross per 24 hour   Intake 1534 ml   Output 1505 ml   Net 29 ml     No intake/output data recorded.    Chest tube drainage last 8 hours: 120/80        09/26/23  0545 09/27/23  0600 09/28/23  0612   Weight: 82.7 kg (182 lb 4.8 oz) 86 kg (189 lb 9.5 oz) 86 kg (189 lb 8 oz)         Objective:  General Appearance:  Comfortable and in no acute distress.    Vital signs: (most recent): Blood pressure 113/84, pulse 73, temperature 98.2 °F (36.8 °C), temperature source Oral, resp. rate 18, weight 86 kg (189 lb 8 oz), SpO2 94 %.  Vital signs are normal.    Output: Producing urine and no stool output.    Lungs:  Normal effort and normal respiratory rate.  There are decreased breath sounds.    Heart: Normal rate.  Regular rhythm.  (SR on tele monitor)  Abdomen: Abdomen is soft.  Bowel sounds are normal.     Extremities: There is no dependent edema.    Pulses: Distal pulses are intact.    Neurological: Patient is alert and oriented to person, place and time.    Skin:  Warm and dry.  (Sternal dressing clean, dry, and intact)        Results Review:        WBC WBC   Date Value Ref Range Status   09/28/2023 14.00 (H) 3.40 - 10.80 10*3/mm3 Final   09/27/2023 10.71 3.40 - 10.80 10*3/mm3 Final   09/26/2023 14.87 (H) 3.40 - 10.80 10*3/mm3 Final   09/26/2023 14.03 (H) 3.40 - 10.80  10*3/mm3 Final   09/25/2023 8.27 3.40 - 10.80 10*3/mm3 Final      HGB Hemoglobin   Date Value Ref Range Status   09/28/2023 9.5 (L) 13.0 - 17.7 g/dL Final   09/27/2023 10.1 (L) 13.0 - 17.7 g/dL Final   09/26/2023 10.4 (L) 13.0 - 17.7 g/dL Final   09/26/2023 11.5 (L) 13.0 - 17.7 g/dL Final   09/26/2023 9.2 (L) 12.0 - 17.0 g/dL Final   09/26/2023 9.9 (L) 12.0 - 17.0 g/dL Final   09/26/2023 10.2 (L) 12.0 - 17.0 g/dL Final   09/26/2023 9.9 (L) 12.0 - 17.0 g/dL Final   09/26/2023 9.5 (L) 12.0 - 17.0 g/dL Final   09/26/2023 8.8 (L) 12.0 - 17.0 g/dL Final   09/26/2023 11.9 (L) 12.0 - 17.0 g/dL Final   09/25/2023 13.9 13.0 - 17.7 g/dL Final      HCT Hematocrit   Date Value Ref Range Status   09/28/2023 28.0 (L) 37.5 - 51.0 % Final   09/27/2023 29.3 (L) 37.5 - 51.0 % Final   09/26/2023 30.7 (L) 37.5 - 51.0 % Final   09/26/2023 33.6 (L) 37.5 - 51.0 % Final   09/26/2023 27 (L) 38 - 51 % Final   09/26/2023 29 (L) 38 - 51 % Final   09/26/2023 30 (L) 38 - 51 % Final   09/26/2023 29 (L) 38 - 51 % Final   09/26/2023 28 (L) 38 - 51 % Final   09/26/2023 26 (L) 38 - 51 % Final   09/26/2023 35 (L) 38 - 51 % Final   09/25/2023 40.6 37.5 - 51.0 % Final      Platelets Platelets   Date Value Ref Range Status   09/28/2023 129 (L) 140 - 450 10*3/mm3 Final   09/27/2023 144 140 - 450 10*3/mm3 Final   09/26/2023 142 140 - 450 10*3/mm3 Final   09/26/2023 152 140 - 450 10*3/mm3 Final   09/25/2023 258 140 - 450 10*3/mm3 Final        PT/INR:    Protime   Date Value Ref Range Status   09/27/2023 14.3 (H) 11.7 - 14.2 Seconds Final   09/26/2023 15.5 (H) 11.7 - 14.2 Seconds Final   09/25/2023 13.4 11.7 - 14.2 Seconds Final   /  INR   Date Value Ref Range Status   09/27/2023 1.09 0.90 - 1.10 Final   09/26/2023 1.22 (H) 0.90 - 1.10 Final   09/25/2023 1.01 0.90 - 1.10 Final       Sodium Sodium   Date Value Ref Range Status   09/28/2023 133 (L) 136 - 145 mmol/L Final   09/27/2023 140 136 - 145 mmol/L Final   09/26/2023 138 136 - 145 mmol/L Final    09/26/2023 139 136 - 145 mmol/L Final   09/26/2023 138 136 - 145 mmol/L Final   09/25/2023 136 136 - 145 mmol/L Final      Potassium Potassium   Date Value Ref Range Status   09/28/2023 4.3 3.5 - 5.2 mmol/L Final     Comment:     Slight hemolysis detected by analyzer. Results may be affected.   09/27/2023 3.8 3.5 - 5.2 mmol/L Final   09/27/2023 3.9 3.5 - 5.2 mmol/L Final   09/27/2023 3.5 3.5 - 5.2 mmol/L Final   09/26/2023 4.7 3.5 - 5.2 mmol/L Final   09/26/2023 3.5 3.5 - 5.2 mmol/L Final     Comment:     Slight hemolysis detected by analyzer. Results may be affected.   09/26/2023 3.9 3.5 - 5.2 mmol/L Final   09/25/2023 4.0 3.5 - 5.2 mmol/L Final      Chloride Chloride   Date Value Ref Range Status   09/28/2023 100 98 - 107 mmol/L Final   09/27/2023 104 98 - 107 mmol/L Final   09/26/2023 105 98 - 107 mmol/L Final   09/26/2023 102 98 - 107 mmol/L Final   09/26/2023 97 (L) 98 - 107 mmol/L Final   09/25/2023 96 (L) 98 - 107 mmol/L Final      Bicarbonate CO2   Date Value Ref Range Status   09/28/2023 23.0 22.0 - 29.0 mmol/L Final   09/27/2023 22.5 22.0 - 29.0 mmol/L Final   09/26/2023 19.8 (L) 22.0 - 29.0 mmol/L Final   09/26/2023 21.0 (L) 22.0 - 29.0 mmol/L Final   09/26/2023 28.0 22.0 - 29.0 mmol/L Final   09/25/2023 26.8 22.0 - 29.0 mmol/L Final      BUN BUN   Date Value Ref Range Status   09/28/2023 11 8 - 23 mg/dL Final   09/27/2023 14 8 - 23 mg/dL Final   09/26/2023 15 8 - 23 mg/dL Final   09/26/2023 14 8 - 23 mg/dL Final   09/26/2023 15 8 - 23 mg/dL Final   09/25/2023 14 8 - 23 mg/dL Final      Creatinine Creatinine   Date Value Ref Range Status   09/28/2023 0.84 0.76 - 1.27 mg/dL Final   09/27/2023 0.93 0.76 - 1.27 mg/dL Final   09/26/2023 0.96 0.76 - 1.27 mg/dL Final   09/26/2023 0.93 0.76 - 1.27 mg/dL Final   09/26/2023 0.74 (L) 0.76 - 1.27 mg/dL Final   09/25/2023 0.84 0.76 - 1.27 mg/dL Final      Calcium Calcium   Date Value Ref Range Status   09/28/2023 8.3 (L) 8.6 - 10.5 mg/dL Final   09/27/2023 8.7 8.6  - 10.5 mg/dL Final   09/26/2023 8.5 (L) 8.6 - 10.5 mg/dL Final   09/26/2023 9.2 8.6 - 10.5 mg/dL Final   09/26/2023 9.9 8.6 - 10.5 mg/dL Final   09/25/2023 9.9 8.6 - 10.5 mg/dL Final      Magnesium Magnesium   Date Value Ref Range Status   09/27/2023 1.9 1.6 - 2.4 mg/dL Final   09/26/2023 1.7 1.6 - 2.4 mg/dL Final   09/26/2023 4.0 (H) 1.6 - 2.4 mg/dL Final   09/25/2023 1.0 (L) 1.6 - 2.4 mg/dL Final          aspirin, 81 mg, Oral, Daily  atorvastatin, 40 mg, Oral, Nightly  chlorhexidine, 15 mL, Mouth/Throat, Q12H  enoxaparin, 40 mg, Subcutaneous, Daily  gabapentin, 100 mg, Oral, Q12H  guaiFENesin, 1,200 mg, Oral, Q12H  insulin glargine, 10 Units, Subcutaneous, Daily  insulin lispro, 2-9 Units, Subcutaneous, 4x Daily AC & at Bedtime  metoprolol tartrate, 25 mg, Oral, Q12H  mupirocin, , Each Nare, BID  pantoprazole, 40 mg, Oral, QAM  senna-docusate sodium, 2 tablet, Oral, Nightly      sodium chloride, 30 mL/hr, Last Rate: 30 mL/hr (09/26/23 1228)      Assessment & Plan  -Severe coronary artery disease --s/p CABGx7 LIMA/RSVG-- [place]- POD#2  -DM II--A1c 6.8  -hypertension  -hyperlipidemia--statin therapy  -post op anemia- expected acute blood loss   -leukocytosis--reactive     Looks good this morning, up in the chair. Pain was an issue overnight. Will increase gabapentin. Hopefully pain will improve with chest tube removal  2L NC-- wean as able   Remains in SR with rates in the 70s  Mobilize/encourage pulmonary toilet  Gentle IV diuresis   Remove chest tubes, but leave kavitha tube to bulb suction  Discontinue central line and billingsley catheter  Continue routine care    Zulma Rubalcava, DARRELL  09/28/23  07:34 EDT

## 2023-09-29 ENCOUNTER — APPOINTMENT (OUTPATIENT)
Dept: GENERAL RADIOLOGY | Facility: HOSPITAL | Age: 78
DRG: 236 | End: 2023-09-29
Payer: MEDICARE

## 2023-09-29 LAB
ANION GAP SERPL CALCULATED.3IONS-SCNC: 9.9 MMOL/L (ref 5–15)
BH BB BLOOD EXPIRATION DATE: NORMAL
BH BB BLOOD EXPIRATION DATE: NORMAL
BH BB BLOOD TYPE BARCODE: 6200
BH BB BLOOD TYPE BARCODE: 6200
BH BB DISPENSE STATUS: NORMAL
BH BB DISPENSE STATUS: NORMAL
BH BB PRODUCT CODE: NORMAL
BH BB PRODUCT CODE: NORMAL
BH BB UNIT NUMBER: NORMAL
BH BB UNIT NUMBER: NORMAL
BUN SERPL-MCNC: 14 MG/DL (ref 8–23)
BUN/CREAT SERPL: 18.7 (ref 7–25)
CALCIUM SPEC-SCNC: 8.3 MG/DL (ref 8.6–10.5)
CHLORIDE SERPL-SCNC: 103 MMOL/L (ref 98–107)
CO2 SERPL-SCNC: 21.1 MMOL/L (ref 22–29)
CREAT SERPL-MCNC: 0.75 MG/DL (ref 0.76–1.27)
CROSSMATCH INTERPRETATION: NORMAL
CROSSMATCH INTERPRETATION: NORMAL
DEPRECATED RDW RBC AUTO: 37 FL (ref 37–54)
EGFRCR SERPLBLD CKD-EPI 2021: 94.1 ML/MIN/1.73
ERYTHROCYTE [DISTWIDTH] IN BLOOD BY AUTOMATED COUNT: 11.6 % (ref 12.3–15.4)
GLUCOSE BLDC GLUCOMTR-MCNC: 154 MG/DL (ref 70–130)
GLUCOSE BLDC GLUCOMTR-MCNC: 178 MG/DL (ref 70–130)
GLUCOSE BLDC GLUCOMTR-MCNC: 191 MG/DL (ref 70–130)
GLUCOSE BLDC GLUCOMTR-MCNC: 221 MG/DL (ref 70–130)
GLUCOSE SERPL-MCNC: 146 MG/DL (ref 65–99)
HCT VFR BLD AUTO: 27.5 % (ref 37.5–51)
HGB BLD-MCNC: 9.2 G/DL (ref 13–17.7)
MCH RBC QN AUTO: 29.6 PG (ref 26.6–33)
MCHC RBC AUTO-ENTMCNC: 33.5 G/DL (ref 31.5–35.7)
MCV RBC AUTO: 88.4 FL (ref 79–97)
PLATELET # BLD AUTO: 160 10*3/MM3 (ref 140–450)
PMV BLD AUTO: 11.3 FL (ref 6–12)
POTASSIUM SERPL-SCNC: 4.1 MMOL/L (ref 3.5–5.2)
RBC # BLD AUTO: 3.11 10*6/MM3 (ref 4.14–5.8)
SODIUM SERPL-SCNC: 134 MMOL/L (ref 136–145)
UNIT  ABO: NORMAL
UNIT  ABO: NORMAL
UNIT  RH: NORMAL
UNIT  RH: NORMAL
WBC NRBC COR # BLD: 13.4 10*3/MM3 (ref 3.4–10.8)

## 2023-09-29 PROCEDURE — 94762 N-INVAS EAR/PLS OXIMTRY CONT: CPT

## 2023-09-29 PROCEDURE — 71046 X-RAY EXAM CHEST 2 VIEWS: CPT

## 2023-09-29 PROCEDURE — 63710000001 INSULIN GLARGINE PER 5 UNITS: Performed by: NURSE PRACTITIONER

## 2023-09-29 PROCEDURE — 97110 THERAPEUTIC EXERCISES: CPT

## 2023-09-29 PROCEDURE — 25010000002 ENOXAPARIN PER 10 MG: Performed by: NURSE PRACTITIONER

## 2023-09-29 PROCEDURE — 80048 BASIC METABOLIC PNL TOTAL CA: CPT | Performed by: NURSE PRACTITIONER

## 2023-09-29 PROCEDURE — 85027 COMPLETE CBC AUTOMATED: CPT | Performed by: NURSE PRACTITIONER

## 2023-09-29 PROCEDURE — 82948 REAGENT STRIP/BLOOD GLUCOSE: CPT

## 2023-09-29 PROCEDURE — 94799 UNLISTED PULMONARY SVC/PX: CPT

## 2023-09-29 PROCEDURE — 63710000001 INSULIN LISPRO (HUMAN) PER 5 UNITS: Performed by: NURSE PRACTITIONER

## 2023-09-29 RX ORDER — POTASSIUM CHLORIDE 750 MG/1
20 TABLET, FILM COATED, EXTENDED RELEASE ORAL DAILY
Status: DISCONTINUED | OUTPATIENT
Start: 2023-09-29 | End: 2023-09-30 | Stop reason: HOSPADM

## 2023-09-29 RX ORDER — FUROSEMIDE 40 MG/1
40 TABLET ORAL DAILY
Status: DISCONTINUED | OUTPATIENT
Start: 2023-09-29 | End: 2023-09-30 | Stop reason: HOSPADM

## 2023-09-29 RX ADMIN — METOPROLOL TARTRATE 25 MG: 25 TABLET, FILM COATED ORAL at 09:15

## 2023-09-29 RX ADMIN — ENOXAPARIN SODIUM 40 MG: 100 INJECTION SUBCUTANEOUS at 18:08

## 2023-09-29 RX ADMIN — ASPIRIN 81 MG: 81 TABLET, COATED ORAL at 09:14

## 2023-09-29 RX ADMIN — BISACODYL 10 MG: 5 TABLET, COATED ORAL at 21:51

## 2023-09-29 RX ADMIN — HYDROCODONE BITARTRATE AND ACETAMINOPHEN 2 TABLET: 5; 325 TABLET ORAL at 11:45

## 2023-09-29 RX ADMIN — ATORVASTATIN CALCIUM 40 MG: 20 TABLET, FILM COATED ORAL at 21:44

## 2023-09-29 RX ADMIN — GABAPENTIN 200 MG: 100 CAPSULE ORAL at 09:14

## 2023-09-29 RX ADMIN — HYDROCODONE BITARTRATE AND ACETAMINOPHEN 2 TABLET: 5; 325 TABLET ORAL at 04:52

## 2023-09-29 RX ADMIN — 0.12% CHLORHEXIDINE GLUCONATE 15 ML: 1.2 RINSE ORAL at 21:45

## 2023-09-29 RX ADMIN — SENNOSIDES AND DOCUSATE SODIUM 2 TABLET: 50; 8.6 TABLET ORAL at 21:44

## 2023-09-29 RX ADMIN — FUROSEMIDE 40 MG: 40 TABLET ORAL at 09:15

## 2023-09-29 RX ADMIN — GUAIFENESIN 1200 MG: 600 TABLET, EXTENDED RELEASE ORAL at 21:46

## 2023-09-29 RX ADMIN — INSULIN LISPRO 2 UNITS: 100 INJECTION, SOLUTION INTRAVENOUS; SUBCUTANEOUS at 16:58

## 2023-09-29 RX ADMIN — GUAIFENESIN 1200 MG: 600 TABLET, EXTENDED RELEASE ORAL at 09:14

## 2023-09-29 RX ADMIN — INSULIN LISPRO 4 UNITS: 100 INJECTION, SOLUTION INTRAVENOUS; SUBCUTANEOUS at 11:45

## 2023-09-29 RX ADMIN — INSULIN LISPRO 2 UNITS: 100 INJECTION, SOLUTION INTRAVENOUS; SUBCUTANEOUS at 21:46

## 2023-09-29 RX ADMIN — INSULIN GLARGINE 10 UNITS: 100 INJECTION, SOLUTION SUBCUTANEOUS at 09:12

## 2023-09-29 RX ADMIN — HYDROCODONE BITARTRATE AND ACETAMINOPHEN 2 TABLET: 5; 325 TABLET ORAL at 16:35

## 2023-09-29 RX ADMIN — GABAPENTIN 200 MG: 100 CAPSULE ORAL at 21:45

## 2023-09-29 RX ADMIN — METOPROLOL TARTRATE 25 MG: 25 TABLET, FILM COATED ORAL at 21:45

## 2023-09-29 RX ADMIN — POTASSIUM CHLORIDE 20 MEQ: 750 TABLET, EXTENDED RELEASE ORAL at 09:14

## 2023-09-29 RX ADMIN — MUPIROCIN 1 APPLICATION: 20 OINTMENT TOPICAL at 21:45

## 2023-09-29 RX ADMIN — HYDROCODONE BITARTRATE AND ACETAMINOPHEN 2 TABLET: 5; 325 TABLET ORAL at 21:45

## 2023-09-29 RX ADMIN — PANTOPRAZOLE SODIUM 40 MG: 40 TABLET, DELAYED RELEASE ORAL at 06:23

## 2023-09-29 NOTE — PLAN OF CARE
Goal Outcome Evaluation:           Progress: improving  Outcome Evaluation: Patient shows promising improvement today. Chest tubes removed along with IJ. Clifford drain to TIMOTEO remains. Pacer wires remain in place and pacer is sensing. Pain is better controlled today and patient states feeling much better. Still receiving prn medication. Higher dose provided tonight to help with sleep but otherwise patient requesting lower level pain meds. Will promote rest tonight and continue to monitor.         Problem: Adult Inpatient Plan of Care  Goal: Plan of Care Review  Outcome: Ongoing, Progressing  Flowsheets  Taken 9/28/2023 2327 by Royer Johnson RN  Progress: improving  Outcome Evaluation: Patient shows promising improvement today. Chest tubes removed along with IJ. Clifford drain to TIMOTEO remains. Pacer wires remain in place and pacer is sensing. Pain is better controlled today and patient states feeling much better. Still receiving prn medication. Higher dose provided tonight to help with sleep but otherwise patient requesting lower level pain meds. Will promote rest tonight and continue to monitor.  Taken 9/28/2023 1135 by Traci Menard PT  Plan of Care Reviewed With: patient  Goal: Patient-Specific Goal (Individualized)  Outcome: Ongoing, Progressing  Goal: Absence of Hospital-Acquired Illness or Injury  Outcome: Ongoing, Progressing  Intervention: Identify and Manage Fall Risk  Recent Flowsheet Documentation  Taken 9/28/2023 2200 by Royer Johnson RN  Safety Promotion/Fall Prevention:   activity supervised   fall prevention program maintained   safety round/check completed  Taken 9/28/2023 2000 by Royer Johnson RN  Safety Promotion/Fall Prevention:   activity supervised   fall prevention program maintained   safety round/check completed  Intervention: Prevent Skin Injury  Recent Flowsheet Documentation  Taken 9/28/2023 2200 by Royer Johnson, RN  Body Position:   supine, legs elevated   tilted    left  Taken 9/28/2023 2000 by Royer Johnson RN  Body Position: supine, legs elevated  Intervention: Prevent and Manage VTE (Venous Thromboembolism) Risk  Recent Flowsheet Documentation  Taken 9/28/2023 2000 by Royer Johnson RN  Activity Management: back to bed  Intervention: Prevent Infection  Recent Flowsheet Documentation  Taken 9/28/2023 2200 by Royer Johnson RN  Infection Prevention: rest/sleep promoted  Goal: Optimal Comfort and Wellbeing  Outcome: Ongoing, Progressing  Intervention: Monitor Pain and Promote Comfort  Recent Flowsheet Documentation  Taken 9/28/2023 2000 by Royer Johnson RN  Pain Management Interventions:   pillow support provided   position adjusted   see MAR  Intervention: Provide Person-Centered Care  Recent Flowsheet Documentation  Taken 9/28/2023 2000 by Royer Johnson RN  Trust Relationship/Rapport:   care explained   choices provided   emotional support provided   empathic listening provided   questions answered   questions encouraged   reassurance provided   thoughts/feelings acknowledged  Goal: Readiness for Transition of Care  Outcome: Ongoing, Progressing     Problem: Hypertension Comorbidity  Goal: Blood Pressure in Desired Range  Outcome: Ongoing, Progressing  Intervention: Maintain Blood Pressure Management  Recent Flowsheet Documentation  Taken 9/28/2023 2200 by Royer Johnson RN  Medication Review/Management: medications reviewed  Taken 9/28/2023 2000 by Royer Johnson RN  Medication Review/Management: medications reviewed     Problem: Skin Injury Risk Increased  Goal: Skin Health and Integrity  Outcome: Ongoing, Progressing  Intervention: Optimize Skin Protection  Recent Flowsheet Documentation  Taken 9/28/2023 2200 by Royer Johnson RN  Head of Bed (HOB) Positioning: HOB at 30-45 degrees  Taken 9/28/2023 2000 by Royer Johnson RN  Head of Bed (HOB) Positioning: HOB at 30-45 degrees     Problem: Fall Injury Risk  Goal: Absence of Fall and  Fall-Related Injury  Outcome: Ongoing, Progressing  Intervention: Identify and Manage Contributors  Recent Flowsheet Documentation  Taken 9/28/2023 2200 by Royer Johnson RN  Medication Review/Management: medications reviewed  Taken 9/28/2023 2000 by Royer Johnson RN  Medication Review/Management: medications reviewed  Intervention: Promote Injury-Free Environment  Recent Flowsheet Documentation  Taken 9/28/2023 2200 by Royer Johnson RN  Safety Promotion/Fall Prevention:   activity supervised   fall prevention program maintained   safety round/check completed  Taken 9/28/2023 2000 by Royer Johnson RN  Safety Promotion/Fall Prevention:   activity supervised   fall prevention program maintained   safety round/check completed     Problem: Activity Intolerance (Cardiovascular Surgery)  Goal: Improved Activity Tolerance  Outcome: Ongoing, Progressing     Problem: Adjustment to Surgery (Cardiovascular Surgery)  Goal: Optimal Coping with Heart Surgery  Outcome: Ongoing, Progressing     Problem: Bleeding (Cardiovascular Surgery)  Goal: Bleeding (Cardiovascular Surgery)  Outcome: Ongoing, Progressing     Problem: Bowel Motility Impaired (Cardiovascular Surgery)  Goal: Effective Bowel Elimination (Cardiovascular Surgery)  Outcome: Ongoing, Progressing     Problem: Cardiac Function Impaired (Cardiovascular Surgery)  Goal: Effective Cardiac Function  Outcome: Ongoing, Progressing     Problem: Cerebral Tissue Perfusion (Cardiovascular Surgery)  Goal: Optimal Cerebral Tissue Perfusion (Cardiovascular Surgery)  Outcome: Ongoing, Progressing  Intervention: Protect and Optimize Cerebral Perfusion  Recent Flowsheet Documentation  Taken 9/28/2023 2200 by Royer Johnson RN  Head of Bed (HOB) Positioning: HOB at 30-45 degrees  Taken 9/28/2023 2000 by Royer Johnson RN  Head of Bed (HOB) Positioning: HOB at 30-45 degrees     Problem: Fluid and Electrolyte Imbalance (Cardiovascular Surgery)  Goal: Fluid and  Electrolyte Balance (Cardiovascular Surgery)  Outcome: Ongoing, Progressing     Problem: Glycemic Control Impaired (Cardiovascular Surgery)  Goal: Blood Glucose Level Within Targeted Range (Cardiovascular Surgery)  Outcome: Ongoing, Progressing     Problem: Infection (Cardiovascular Surgery)  Goal: Absence of Infection Signs and Symptoms  Outcome: Ongoing, Progressing  Intervention: Prevent or Manage Infection  Recent Flowsheet Documentation  Taken 9/28/2023 2200 by Royer Johnson RN  Infection Prevention: rest/sleep promoted     Problem: Ongoing Anesthesia Effects (Cardiovascular Surgery)  Goal: Anesthesia/Sedation Recovery  Outcome: Ongoing, Progressing  Intervention: Optimize Anesthesia Recovery  Recent Flowsheet Documentation  Taken 9/28/2023 2200 by Royer Johnson RN  Safety Promotion/Fall Prevention:   activity supervised   fall prevention program maintained   safety round/check completed  Taken 9/28/2023 2000 by Royer Johnson RN  Safety Promotion/Fall Prevention:   activity supervised   fall prevention program maintained   safety round/check completed     Problem: Pain (Cardiovascular Surgery)  Goal: Acceptable Pain Control  Outcome: Ongoing, Progressing  Intervention: Prevent or Manage Pain  Recent Flowsheet Documentation  Taken 9/28/2023 2000 by Royer Johnson RN  Pain Management Interventions:   pillow support provided   position adjusted   see MAR     Problem: Postoperative Nausea and Vomiting (Cardiovascular Surgery)  Goal: Nausea and Vomiting Relief (Cardiovascular Surgery)  Outcome: Ongoing, Progressing     Problem: Postoperative Urinary Retention (Cardiovascular Surgery)  Goal: Effective Urinary Elimination (Cardiovascular Surgery)  Outcome: Ongoing, Progressing     Problem: Respiratory Compromise (Cardiovascular Surgery)  Goal: Effective Oxygenation and Ventilation (Cardiovascular Surgery)  Outcome: Ongoing, Progressing  Intervention: Promote Airway Secretion Clearance  Recent  Flowsheet Documentation  Taken 9/28/2023 2000 by Royer Johnson, RN  Cough And Deep Breathing: done independently per patient

## 2023-09-29 NOTE — PLAN OF CARE
Goal Outcome Evaluation:              Outcome Evaluation: Improved activity tolerance during PT today.  Able to ambulate 130' slow shuffling steps, no UE support, few cues for posture.  Recommend ambulating in [place] 3x/day.  Vitals stable on room air.  Anticipate DC to home with assist as needed.      Anticipated Discharge Disposition (PT): home with assist, home with home health

## 2023-09-29 NOTE — THERAPY TREATMENT NOTE
Patient Name: Trenton Adames  : 1945    MRN: [id number]                              Today's Date: 2023       Admit Date: 2023    Visit Dx:     ICD-10-CM ICD-9-CM   1. S/P CABG (coronary artery bypass graft)  Z95.1 V45.81     Patient Active Problem List   Diagnosis    Allergic rhinitis    Anemia    Asthma    Benign prostatic hyperplasia    Cervical radiculopathy    Diabetes mellitus, type II    Hyperlipidemia    Hypertension    Impotence of organic origin    Kidney stones    Renal calculus    Prostate disorder    Shortness of breath    Vitamin D deficiency    Vitamin B 12 deficiency    Iron deficiency anemia    Ophthalmic herpes zoster    Acute URI    Suspected sleep apnea    Pulmonary HTN    Overweight (BMI 25.0-29.9)    Snoring    Chest pain, atypical    Coronary artery disease involving native coronary artery of native heart with unstable angina pectoris    CAD (coronary artery disease)     Past Medical History:   Diagnosis Date    Arthritis     Asthma     Atypical chest pain     Coronary artery disease involving native coronary artery of native heart with unstable angina pectoris 2023    Dysphagia, oral phase     NO CURRENT ISSUES    Essential (primary) hypertension     Foot pain, bilateral     Heart murmur 2023    Heart valve disease 2023    Hyperlipidemia, unspecified     Low back pain     Neuropathy in diabetes     Peripheral neuropathy     Pulmonary HTN 2023    Sleep apnea     USES CPAP    Type 2 diabetes mellitus without complication     Vitamin D deficiency, unspecified      Past Surgical History:   Procedure Laterality Date    CARDIAC CATHETERIZATION N/A 2023    Procedure: Left Heart Cath with possible coronary angioplasty;  Surgeon: Marc Charles MD;  Location: [facility] CATH INVASIVE LOCATION;  Service: Cardiology;  Laterality: N/A;    CATARACT EXTRACTION, BILATERAL  2014; 2014    COLONOSCOPY      CORONARY ARTERY BYPASS GRAFT N/A 2023     Procedure: RUKHSANA STERNOTOMY CORONARY ARTERY BYPASS GRAFT TIMES 7 USING LEFT INTERNAL MAMMARY ARTERY AND RIGHT GREATER SAPHENOUS VEIN GRAFT PER ENDOSCOPIC VEIN HARVESTING AND PRP;  Surgeon: Jr Frederci Groves MD;  Location: [facility];  Service: Cardiothoracic;  Laterality: N/A;    HEMORRHOIDECTOMY  06/20/2014    POLYPECTOMY      SINUS SURGERY        General Information       Row Name 09/29/23 1327          Physical Therapy Time and Intention    Document Type therapy note (daily note)  -AR     Mode of Treatment physical therapy  -AR       Row Name 09/29/23 1327          General Information    Patient Profile Reviewed yes  -AR     Existing Precautions/Restrictions fall;cardiac;sternal  -AR       Row Name 09/29/23 1327          Cognition    Orientation Status (Cognition) oriented x 3  -AR               User Key  (r) = Recorded By, (t) = Taken By, (c) = Cosigned By      Initials Name Provider Type    AR Juliann Dennis, PT Physical Therapist                   Mobility       Row Name 09/29/23 1327          Bed Mobility    Bed Mobility supine-sit;sit-supine  -AR     Supine-Sit [place] (Bed Mobility) standby assist  -AR     Sit-Supine [place] (Bed Mobility) standby assist  -AR     Assistive Device (Bed Mobility) head of bed elevated  -AR     Comment, (Bed Mobility) bed elevates at home  -AR       Row Name 09/29/23 1327          Sit-Stand Transfer    Sit-Stand Crockett (Transfers) contact guard  -AR       Row Name 09/29/23 1327          Gait/Stairs (Locomotion)    Crockett Level (Gait) contact guard  -AR     Distance in Feet (Gait) 130' slow shuffling steps, no UE support, few cues for posture  -AR     Deviations/Abnormal Patterns (Gait) stride length decreased;maria teresa decreased  -AR     Bilateral Gait Deviations forward flexed posture;heel strike decreased  -AR               User Key  (r) = Recorded By, (t) = Taken By, (c) = Cosigned By      Initials Name Provider Type    Juliann Kingsley, PT  Physical Therapist                   Obj/Interventions       Row Name 09/29/23 1328          Balance    Balance Assessment standing dynamic balance  -AR     Dynamic Standing Balance contact guard  -AR               User Key  (r) = Recorded By, (t) = Taken By, (c) = Cosigned By      Initials Name Provider Type    Juliann Kingsley, PT Physical Therapist                   Goals/Plan    No documentation.                  Clinical Impression       Row Name 09/29/23 1328          Pain    Pretreatment Pain Rating 2/10  -AR     Posttreatment Pain Rating 2/10  -AR     Pain Location incisional  -AR     Pain Location - chest  -AR     Pain Intervention(s) Medication (See MAR);Repositioned  -AR       Row Name 09/29/23 1328          Plan of Care Review    Outcome Evaluation Improved activity tolerance during PT today.  Able to ambulate 130' slow shuffling steps, no UE support, few cues for posture.  Recommend ambulating in [place] 3x/day.  Vitals stable on room air.  Anticipate DC to home with assist as needed.  -AR       Row Name 09/29/23 1328          Therapy Assessment/Plan (PT)    Rehab Potential (PT) good, to achieve stated therapy goals  -AR     Therapy Frequency (PT) 6 times/wk  -AR       Row Name 09/29/23 1328          Vital Signs    Pre SpO2 (%) 96  -AR     O2 Delivery Pre Treatment room air  -AR     Intra SpO2 (%) 95  -AR     O2 Delivery Intra Treatment room air  -AR     Post SpO2 (%) 97  -AR     O2 Delivery Post Treatment room air  -AR       Row Name 09/29/23 1328          Positioning and Restraints    Pre-Treatment Position in bed  -AR     Post Treatment Position bed  RN reqeust to get back in bed  -AR     In Bed notified nsg;supine;call light within reach;encouraged to call for assist;exit alarm on;with family/caregiver  -AR               User Key  (r) = Recorded By, (t) = Taken By, (c) = Cosigned By      Initials Name Provider Type    Juliann Kingsley, PT Physical Therapist                   Outcome Measures        Row Name 09/29/23 1332 09/29/23 0858       How much help from another person do you currently need...    Turning from your back to your side while in flat bed without using bedrails? 3  -AR 3  -SF    Moving from lying on back to sitting on the side of a flat bed without bedrails? 3  -AR 3  -SF    Moving to and from a bed to a chair (including a wheelchair)? 3  -AR 3  -SF    Standing up from a chair using your arms (e.g., wheelchair, bedside chair)? 3  -AR 3  -SF    Climbing 3-5 steps with a railing? 3  -AR 2  -SF    To walk in hospital room? 3  -AR 3  -SF    AM-PAC 6 Clicks Score (PT) 18  -AR 17  -SF    Highest level of mobility 6 --> Walked 10 steps or more  -AR 5 --> Static standing  -SF      Row Name 09/29/23 1332          Functional Assessment    Outcome Measure Options AM-PAC 6 Clicks Basic Mobility (PT)  -AR               User Key  (r) = Recorded By, (t) = Taken By, (c) = Cosigned By      Initials Name Provider Type    Juliann Kingsley, PT Physical Therapist    SF Sherry Claros, RN Registered Nurse                                 Physical Therapy Education       Title: PT OT SLP Therapies (Done)       Topic: Physical Therapy (Done)       Point: Mobility training (Done)       Learning Progress Summary             Patient Acceptance, E, VU by AR at 9/29/2023 1333    Acceptance, E,TB,D, VU,NR by  at 9/27/2023 0935                         Point: Home exercise program (Done)       Learning Progress Summary             Patient Acceptance, E, VU by AR at 9/29/2023 1333    Acceptance, E,TB,D, VU,NR by  at 9/27/2023 0935                         Point: Body mechanics (Done)       Learning Progress Summary             Patient Acceptance, E, VU by AR at 9/29/2023 1333    Acceptance, E,TB,D, VU,NR by  at 9/27/2023 0935                         Point: Precautions (Done)       Learning Progress Summary             Patient Acceptance, E, VU by AR at 9/29/2023 1333    Acceptance, E,TB,D, VU,NR by  at 9/27/2023  0935                                         User Key       Initials Effective Dates Name Provider Type Discipline    EJ 06/16/21 -  Traci Menard PT Physical Therapist PT    AR 06/16/21 -  Juliann Dennis PT Physical Therapist PT                  PT Recommendation and Plan     Outcome Evaluation: Improved activity tolerance during PT today.  Able to ambulate 130' slow shuffling steps, no UE support, few cues for posture.  Recommend ambulating in [place] 3x/day.  Vitals stable on room air.  Anticipate DC to home with assist as needed.     Time Calculation:         PT Charges       Row Name 09/29/23 1326             Time Calculation    Start Time 1039  -AR      Stop Time 1059  -AR      Time Calculation (min) 20 min  -AR      PT Received On 09/29/23  -AR      PT - Next Appointment 09/30/23  -AR                User Key  (r) = Recorded By, (t) = Taken By, (c) = Cosigned By      Initials Name Provider Type    AR Juliann Dennis, PT Physical Therapist                  Therapy Charges for Today       Code Description Service Date Service Provider Modifiers Qty    69683708148 HC PT THER PROC EA 15 MIN 9/29/2023 Juliann Dennis, PT GP 1            PT G-Codes  Outcome Measure Options: AM-PAC 6 Clicks Basic Mobility (PT)  AM-PAC 6 Clicks Score (PT): 18  PT Discharge Summary  Anticipated Discharge Disposition (PT): home with assist, home with home health    Juliann Dennis PT  9/29/2023

## 2023-09-29 NOTE — CASE MANAGEMENT/SOCIAL WORK
Continued Stay Note  [facility]     Patient Name: Trenton Adames  MRN: [id number]  Today's Date: 9/29/2023    Admit Date: 9/25/2023    Plan: Home w/ [facility]   Discharge Plan       Row Name 09/29/23 1628       Plan    Plan Home w/ [facility]    Plan Comments Pt plans to d/c home with assistance of his spouse/Nancy.  [facility] with [facility] accepted ([phone]).  CCP following.............Ava GRAHAM/RYAN CM                   Discharge Codes    No documentation.                 Expected Discharge Date and Time       Expected Discharge Date Expected Discharge Time    Oct 3, 2023               Ava Farooq RN

## 2023-09-29 NOTE — DISCHARGE SUMMARY
Date of Admission: 9/25/2023  Date of Discharge:  9/30/2023    Discharge Diagnosis:   -Severe coronary artery disease --s/p CABGx7 LIMA/RSVG-- [place]  -DM II--A1c 6.8  -hypertension  -hyperlipidemia--statin therapy  -post op anemia- expected acute blood loss   -leukocytosis--reactive     Presenting Problem/History of Present Illness:  CAD (coronary artery disease) [I25.10]     Hospital Course:  Patient is a 77 y.o. male presented with for scheduled cardiac surgery on 9/26 he underwent  CABG x7. Skeletonized LIMA to LAD. Vein graft acute marginal branch and then PDA T graft mammary to left ventricular branch. Vein graft to ramus intermedius and OM 3. Vein graft to OM 2. Temporary cardiopulmonary bypass. Antegrade and retrograde cold blood cardioplegia with warm reperfusion. Neurologic monitoring. Transesophageal echo. Endoscopic vein harvest of the right greater saphenous vein. By Dr. Groves. Post operatively he did well.  Weaned from ventilator on the day of surgery.  Tolerated medication therapy ASA/BB/statin.  Weaned from supplemental oxygen and passed overnight oximetry on 9/29/23.  All lines tubes and wires removed without issue.  Adequate PO intake.  Urinating and defecating normally.  On post operative day 4 he was deemed ready for discharge home with home health.  Follow up appointment as below. Patient was provided with appropriate discharge education. He was instructed to call office with any questions or concerns.      Procedures Performed  Procedure(s):  RUKHSANA STERNOTOMY CORONARY ARTERY BYPASS GRAFT TIMES 7 USING LEFT INTERNAL MAMMARY ARTERY AND RIGHT GREATER SAPHENOUS VEIN GRAFT PER ENDOSCOPIC VEIN HARVESTING AND PRP       Consults:   Consults       No orders found from 8/27/2023 to 9/26/2023.            Pertinent Test Results:    Lab Results   Component Value Date    WBC 10.15 09/30/2023    HGB 9.2 (L) 09/30/2023    HCT 27.1 (L) 09/30/2023    MCV 89.7 09/30/2023     09/30/2023      Lab Results    Component Value Date    GLUCOSE 156 (H) 09/30/2023    CALCIUM 8.3 (L) 09/30/2023     09/30/2023    K 3.9 09/30/2023    CO2 24.2 09/30/2023     09/30/2023    BUN 20 09/30/2023    CREATININE 0.92 09/30/2023    EGFRIFNONA 84 11/17/2021    BCR 21.7 09/30/2023    ANIONGAP 8.8 09/30/2023     Lab Results   Component Value Date    INR 1.09 09/27/2023    PROTIME 14.3 (H) 09/27/2023         Condition on Discharge:  good    Vital Signs  Temp:  [97.7 °F (36.5 °C)-99.5 °F (37.5 °C)] 99.5 °F (37.5 °C)  Heart Rate:  [63-75] 72  Resp:  [18] 18  BP: (108-137)/(60-82) 137/72      Discharge Disposition  Home or Self Care    Discharge Medications     Discharge Medications        New Medications        Instructions Start Date   HYDROcodone-acetaminophen 5-325 MG per tablet  Commonly known as: NORCO   1 tablet, Oral, Every 4 Hours PRN      metoprolol tartrate 25 MG tablet  Commonly known as: LOPRESSOR   25 mg, Oral, Every 12 Hours Scheduled      potassium chloride 20 MEQ CR tablet  Commonly known as: K-DUR,KLOR-CON   20 mEq, Oral, Daily             Changes to Medications        Instructions Start Date   atorvastatin 40 MG tablet  Commonly known as: LIPITOR  What changed: how much to take   40 mg, Oral, Daily      metFORMIN 1000 MG tablet  Commonly known as: GLUCOPHAGE  What changed: additional instructions   1,000 mg, Oral, 2 Times Daily With Meals      Ozempic (0.25 or 0.5 MG/DOSE) 2 MG/3ML solution pen-injector  Generic drug: Semaglutide(0.25 or 0.5MG/DOS)  What changed:   how much to take  how to take this  when to take this  additional instructions   Inject 0.25 mg under the skin into the appropriate area as directed 1 (One) Time Per Week for 4 weeks and then increase to 0.5 mg one time a week.             Continue These Medications        Instructions Start Date   Accu-Chek Guide w/Device kit   1 each, Does not apply, 3 Times Daily PRN      albuterol sulfate  (90 Base) MCG/ACT inhaler  Commonly known as:  PROVENTIL HFA;VENTOLIN HFA;PROAIR HFA   2 puffs, Inhalation, Every 4 Hours PRN      amLODIPine 10 MG tablet  Commonly known as: NORVASC   TAKE ONE TABLET BY MOUTH DAILY      aspirin 81 MG EC tablet   1 tablet, Oral, Daily      clobetasol 0.05 % cream  Commonly known as: TEMOVATE   1 application , Topical, 2 Times Daily      docusate sodium 100 MG capsule   100 mg, Oral, 2 Times Daily      esomeprazole 20 MG capsule  Commonly known as: nexIUM   20 mg, Oral, Every Morning Before Breakfast      fluorouracil 5 % cream  Commonly known as: EFUDEX   No dose, route, or frequency recorded.      fluticasone 50 MCG/ACT nasal spray  Commonly known as: FLONASE   1 spray, Nasal, Daily      gabapentin 800 MG tablet  Commonly known as: Neurontin   800 mg, Oral, 3 Times Daily      glucose blood test strip   Test 2-3 times a week      Accu-Chek Guide test strip  Generic drug: glucose blood   USE ONE TEST STRIP 2-3 TIMES DAILY AS NEEDED.      hydroCHLOROthiazide 25 MG tablet  Commonly known as: HYDRODIURIL   25 mg, Oral, Daily      loratadine 10 MG tablet  Commonly known as: CLARITIN   1 tablet, Oral, 2 Times Daily      Melatonin 10 MG tablet   10 mg, Oral, As Needed      montelukast 10 MG tablet  Commonly known as: SINGULAIR   10 mg, Oral, Daily      Omega-3 500 MG capsule   1 capsule, Oral, Daily      onetouch ultrasoft lancets   Test 2-3 times a week.      Accu-Chek Softclix Lancets lancets   USE ONE LANCET 2-3 TIMES DAILY AS NEEDED      Turmeric 500 MG capsule   Oral      vitamin D3 125 MCG (5000 UT) capsule capsule   5,000 Units, Oral, Daily             Stop These Medications      meloxicam 7.5 MG tablet  Commonly known as: Mobic              Discharge Diet:     Activity at Discharge: 1. No driving for 2 weeks and off narcotic pain medications.  2. Shower daily. Clean incisions with warm water and antibacterial soap only. Do not put any lotion or ointments on incisions.  3. Ambulate for 10 minutes at least 3 times a day.  4. No  heavy lifting > 10lbs until seen in office.   5. Take all medications as prescribed.       Follow-up Appointments  Future Appointments   Date Time Provider Department Center   10/25/2023 11:00 AM Sangeetha Heard APRN [facility] CTS [facility]   11/14/2023  8:45 AM Naheed George MD [facility] SLE[facility]   11/16/2023  9:45 AM Marc Charles MD [facility]   12/1/2023  8:15 AM Jacquelin Pelaez APRN [facility]   1/12/2024  8:30 AM Sonny Parish DPM [facility]     Additional Instructions for the Follow-ups that You Need to Schedule       Ambulatory Referral to Cardiac Rehab   As directed      Discharge Follow-up with PCP   As directed       Currently Documented PCP:    Jacquelin Pelaez APRN    PCP Phone Number:    [phone]     Follow Up Details: Follow Up With PCP Within 7 Days if Not Able to Return to [facility] for Appointment Within 7 Days        Discharge Follow-up with Specialty: Cardiology; 1 Month   As directed      Specialty: Cardiology   Follow Up: 1 Month        Discharge Follow-up with Specialty: Cardiothoracic Surgery   As directed      Follow Up Details: Follow Up in 2-4 Weeks   Specialty: Cardiothoracic Surgery                   AMPARO Nunez  09/30/23  09:43 EDT

## 2023-09-29 NOTE — PROGRESS NOTES
LOS: 4 days   Patient Care Team:  Jacquelin Pelaez APRN as PCP - General (Nurse Practitioner)    Chief Complaint: post op    Subjective:  Symptoms:  No shortness of breath, cough or chest pain.    Diet:  Adequate intake.  No nausea or vomiting.    Activity level: Impaired due to weakness.    Pain:  He complains of pain that is moderate.  Pain is partially controlled.      Feeling much better today, nausea has improved.     Vital Signs  Temp:  [98.2 °F (36.8 °C)-99.8 °F (37.7 °C)] 98.2 °F (36.8 °C)  Heart Rate:  [68-79] 72  Resp:  [17-18] 17  BP: (113-152)/(60-84) 129/60  Body mass index is 26.92 kg/m².    Intake/Output Summary (Last 24 hours) at 9/29/2023 0724  Last data filed at 9/29/2023 0500  Gross per 24 hour   Intake 160 ml   Output 1648 ml   Net -1488 ml       No intake/output data recorded.    Chest tube drainage last 8 hours: 18        09/27/23  0600 09/28/23  0612 09/29/23  0658   Weight: 86 kg (189 lb 9.5 oz) 86 kg (189 lb 8 oz) 85.1 kg (187 lb 9.6 oz)         Objective:  General Appearance:  Comfortable and in no acute distress.    Vital signs: (most recent): Blood pressure 121/59, pulse 66, temperature 98 °F (36.7 °C), temperature source Oral, resp. rate 18, weight 85.1 kg (187 lb 9.6 oz), SpO2 94 %.  Vital signs are normal.    Output: Producing urine and no stool output.    Lungs:  Normal effort and normal respiratory rate.  There are decreased breath sounds.    Heart: Normal rate.  Regular rhythm.  (SR on tele monitor)  Abdomen: Abdomen is soft.  Bowel sounds are normal.     Extremities: There is no dependent edema.    Pulses: Distal pulses are intact.    Neurological: Patient is alert and oriented to person, place and time.    Skin:  Warm and dry.  (Sternal dressing clean, dry, and intact)        Results Review:        WBC WBC   Date Value Ref Range Status   09/29/2023 13.40 (H) 3.40 - 10.80 10*3/mm3 Final   09/28/2023 14.00 (H) 3.40 - 10.80 10*3/mm3 Final   09/27/2023 10.71 3.40 - 10.80 10*3/mm3  Final   09/26/2023 14.87 (H) 3.40 - 10.80 10*3/mm3 Final   09/26/2023 14.03 (H) 3.40 - 10.80 10*3/mm3 Final      HGB Hemoglobin   Date Value Ref Range Status   09/29/2023 9.2 (L) 13.0 - 17.7 g/dL Final   09/28/2023 9.5 (L) 13.0 - 17.7 g/dL Final   09/27/2023 10.1 (L) 13.0 - 17.7 g/dL Final   09/26/2023 10.4 (L) 13.0 - 17.7 g/dL Final   09/26/2023 11.5 (L) 13.0 - 17.7 g/dL Final   09/26/2023 9.2 (L) 12.0 - 17.0 g/dL Final   09/26/2023 9.9 (L) 12.0 - 17.0 g/dL Final   09/26/2023 10.2 (L) 12.0 - 17.0 g/dL Final   09/26/2023 9.9 (L) 12.0 - 17.0 g/dL Final   09/26/2023 9.5 (L) 12.0 - 17.0 g/dL Final   09/26/2023 8.8 (L) 12.0 - 17.0 g/dL Final      HCT Hematocrit   Date Value Ref Range Status   09/29/2023 27.5 (L) 37.5 - 51.0 % Final   09/28/2023 28.0 (L) 37.5 - 51.0 % Final   09/27/2023 29.3 (L) 37.5 - 51.0 % Final   09/26/2023 30.7 (L) 37.5 - 51.0 % Final   09/26/2023 33.6 (L) 37.5 - 51.0 % Final   09/26/2023 27 (L) 38 - 51 % Final   09/26/2023 29 (L) 38 - 51 % Final   09/26/2023 30 (L) 38 - 51 % Final   09/26/2023 29 (L) 38 - 51 % Final   09/26/2023 28 (L) 38 - 51 % Final   09/26/2023 26 (L) 38 - 51 % Final      Platelets Platelets   Date Value Ref Range Status   09/29/2023 160 140 - 450 10*3/mm3 Final   09/28/2023 129 (L) 140 - 450 10*3/mm3 Final   09/27/2023 144 140 - 450 10*3/mm3 Final   09/26/2023 142 140 - 450 10*3/mm3 Final   09/26/2023 152 140 - 450 10*3/mm3 Final        PT/INR:    Protime   Date Value Ref Range Status   09/27/2023 14.3 (H) 11.7 - 14.2 Seconds Final   09/26/2023 15.5 (H) 11.7 - 14.2 Seconds Final   /  INR   Date Value Ref Range Status   09/27/2023 1.09 0.90 - 1.10 Final   09/26/2023 1.22 (H) 0.90 - 1.10 Final       Sodium Sodium   Date Value Ref Range Status   09/29/2023 134 (L) 136 - 145 mmol/L Final   09/28/2023 133 (L) 136 - 145 mmol/L Final   09/27/2023 140 136 - 145 mmol/L Final   09/26/2023 138 136 - 145 mmol/L Final   09/26/2023 139 136 - 145 mmol/L Final      Potassium Potassium   Date  Value Ref Range Status   09/29/2023 4.1 3.5 - 5.2 mmol/L Final     Comment:     Slight hemolysis detected by analyzer. Results may be affected.   09/28/2023 4.3 3.5 - 5.2 mmol/L Final     Comment:     Slight hemolysis detected by analyzer. Results may be affected.   09/27/2023 3.8 3.5 - 5.2 mmol/L Final   09/27/2023 3.9 3.5 - 5.2 mmol/L Final   09/27/2023 3.5 3.5 - 5.2 mmol/L Final   09/26/2023 4.7 3.5 - 5.2 mmol/L Final   09/26/2023 3.5 3.5 - 5.2 mmol/L Final     Comment:     Slight hemolysis detected by analyzer. Results may be affected.      Chloride Chloride   Date Value Ref Range Status   09/29/2023 103 98 - 107 mmol/L Final   09/28/2023 100 98 - 107 mmol/L Final   09/27/2023 104 98 - 107 mmol/L Final   09/26/2023 105 98 - 107 mmol/L Final   09/26/2023 102 98 - 107 mmol/L Final      Bicarbonate CO2   Date Value Ref Range Status   09/29/2023 21.1 (L) 22.0 - 29.0 mmol/L Final   09/28/2023 23.0 22.0 - 29.0 mmol/L Final   09/27/2023 22.5 22.0 - 29.0 mmol/L Final   09/26/2023 19.8 (L) 22.0 - 29.0 mmol/L Final   09/26/2023 21.0 (L) 22.0 - 29.0 mmol/L Final      BUN BUN   Date Value Ref Range Status   09/29/2023 14 8 - 23 mg/dL Final   09/28/2023 11 8 - 23 mg/dL Final   09/27/2023 14 8 - 23 mg/dL Final   09/26/2023 15 8 - 23 mg/dL Final   09/26/2023 14 8 - 23 mg/dL Final      Creatinine Creatinine   Date Value Ref Range Status   09/29/2023 0.75 (L) 0.76 - 1.27 mg/dL Final   09/28/2023 0.84 0.76 - 1.27 mg/dL Final   09/27/2023 0.93 0.76 - 1.27 mg/dL Final   09/26/2023 0.96 0.76 - 1.27 mg/dL Final   09/26/2023 0.93 0.76 - 1.27 mg/dL Final      Calcium Calcium   Date Value Ref Range Status   09/29/2023 8.3 (L) 8.6 - 10.5 mg/dL Final   09/28/2023 8.3 (L) 8.6 - 10.5 mg/dL Final   09/27/2023 8.7 8.6 - 10.5 mg/dL Final   09/26/2023 8.5 (L) 8.6 - 10.5 mg/dL Final   09/26/2023 9.2 8.6 - 10.5 mg/dL Final      Magnesium Magnesium   Date Value Ref Range Status   09/27/2023 1.9 1.6 - 2.4 mg/dL Final   09/26/2023 1.7 1.6 - 2.4  mg/dL Final   09/26/2023 4.0 (H) 1.6 - 2.4 mg/dL Final          aspirin, 81 mg, Oral, Daily  atorvastatin, 40 mg, Oral, Nightly  chlorhexidine, 15 mL, Mouth/Throat, Q12H  enoxaparin, 40 mg, Subcutaneous, Daily  gabapentin, 200 mg, Oral, Q12H  guaiFENesin, 1,200 mg, Oral, Q12H  insulin glargine, 10 Units, Subcutaneous, Daily  insulin lispro, 2-9 Units, Subcutaneous, 4x Daily AC & at Bedtime  metoprolol tartrate, 25 mg, Oral, Q12H  mupirocin, , Each Nare, BID  pantoprazole, 40 mg, Oral, QAM  Scopolamine, 1 patch, Transdermal, Q72H  senna-docusate sodium, 2 tablet, Oral, Nightly      sodium chloride, 30 mL/hr, Last Rate: 30 mL/hr (09/26/23 1228)      Assessment & Plan  -Severe coronary artery disease --s/p CABGx7 LIMA/RSVG-- [place]- POD#3  -DM II--A1c 6.8  -hypertension  -hyperlipidemia--statin therapy  -post op anemia- expected acute blood loss   -leukocytosis--reactive     Looks good this morning, up in the chair. Feeling much better since his chest tubes were removed  Weaned to RA and tolerating. Will check overnight oximetry   Remains in SR with rates in the 70s, tolerating beta blocker  Mobilize/encourage pulmonary toilet  Transition to oral diuretics  Discontinue AV wires. Will leave remaining chest tube another day  Possible discharge home with home health tomorrow pending progress  Continue routine care    DARRELL Lopez  09/29/23  07:24 EDT

## 2023-09-30 ENCOUNTER — READMISSION MANAGEMENT (OUTPATIENT)
Dept: CALL CENTER | Facility: HOSPITAL | Age: 78
End: 2023-09-30
Payer: MEDICARE

## 2023-09-30 VITALS
DIASTOLIC BLOOD PRESSURE: 76 MMHG | BODY MASS INDEX: 26.86 KG/M2 | RESPIRATION RATE: 18 BRPM | HEART RATE: 70 BPM | TEMPERATURE: 98.8 F | SYSTOLIC BLOOD PRESSURE: 141 MMHG | WEIGHT: 187.2 LBS | OXYGEN SATURATION: 97 %

## 2023-09-30 LAB
ANION GAP SERPL CALCULATED.3IONS-SCNC: 8.8 MMOL/L (ref 5–15)
BUN SERPL-MCNC: 20 MG/DL (ref 8–23)
BUN/CREAT SERPL: 21.7 (ref 7–25)
CALCIUM SPEC-SCNC: 8.3 MG/DL (ref 8.6–10.5)
CHLORIDE SERPL-SCNC: 103 MMOL/L (ref 98–107)
CO2 SERPL-SCNC: 24.2 MMOL/L (ref 22–29)
CREAT SERPL-MCNC: 0.92 MG/DL (ref 0.76–1.27)
DEPRECATED RDW RBC AUTO: 39 FL (ref 37–54)
EGFRCR SERPLBLD CKD-EPI 2021: 86.7 ML/MIN/1.73
ERYTHROCYTE [DISTWIDTH] IN BLOOD BY AUTOMATED COUNT: 12 % (ref 12.3–15.4)
GLUCOSE BLDC GLUCOMTR-MCNC: 173 MG/DL (ref 70–130)
GLUCOSE BLDC GLUCOMTR-MCNC: 246 MG/DL (ref 70–130)
GLUCOSE SERPL-MCNC: 156 MG/DL (ref 65–99)
HCT VFR BLD AUTO: 27.1 % (ref 37.5–51)
HGB BLD-MCNC: 9.2 G/DL (ref 13–17.7)
MCH RBC QN AUTO: 30.5 PG (ref 26.6–33)
MCHC RBC AUTO-ENTMCNC: 33.9 G/DL (ref 31.5–35.7)
MCV RBC AUTO: 89.7 FL (ref 79–97)
PLATELET # BLD AUTO: 187 10*3/MM3 (ref 140–450)
PMV BLD AUTO: 10.7 FL (ref 6–12)
POTASSIUM SERPL-SCNC: 3.9 MMOL/L (ref 3.5–5.2)
RBC # BLD AUTO: 3.02 10*6/MM3 (ref 4.14–5.8)
SODIUM SERPL-SCNC: 136 MMOL/L (ref 136–145)
WBC NRBC COR # BLD: 10.15 10*3/MM3 (ref 3.4–10.8)

## 2023-09-30 PROCEDURE — 80048 BASIC METABOLIC PNL TOTAL CA: CPT | Performed by: NURSE PRACTITIONER

## 2023-09-30 PROCEDURE — 63710000001 INSULIN GLARGINE PER 5 UNITS: Performed by: NURSE PRACTITIONER

## 2023-09-30 PROCEDURE — 63710000001 INSULIN LISPRO (HUMAN) PER 5 UNITS: Performed by: NURSE PRACTITIONER

## 2023-09-30 PROCEDURE — 85027 COMPLETE CBC AUTOMATED: CPT | Performed by: NURSE PRACTITIONER

## 2023-09-30 PROCEDURE — 82948 REAGENT STRIP/BLOOD GLUCOSE: CPT

## 2023-09-30 RX ORDER — HYDROCODONE BITARTRATE AND ACETAMINOPHEN 5; 325 MG/1; MG/1
1 TABLET ORAL EVERY 4 HOURS PRN
Qty: 42 TABLET | Refills: 0 | Status: SHIPPED | OUTPATIENT
Start: 2023-09-30 | End: 2023-10-07

## 2023-09-30 RX ORDER — POTASSIUM CHLORIDE 20 MEQ/1
20 TABLET, EXTENDED RELEASE ORAL DAILY
Qty: 60 TABLET | Refills: 5 | Status: SHIPPED | OUTPATIENT
Start: 2023-09-30

## 2023-09-30 RX ADMIN — BISACODYL 10 MG: 10 SUPPOSITORY RECTAL at 13:23

## 2023-09-30 RX ADMIN — GABAPENTIN 200 MG: 100 CAPSULE ORAL at 09:55

## 2023-09-30 RX ADMIN — HYDROCODONE BITARTRATE AND ACETAMINOPHEN 2 TABLET: 5; 325 TABLET ORAL at 13:11

## 2023-09-30 RX ADMIN — METOPROLOL TARTRATE 25 MG: 25 TABLET, FILM COATED ORAL at 09:55

## 2023-09-30 RX ADMIN — GUAIFENESIN 1200 MG: 600 TABLET, EXTENDED RELEASE ORAL at 09:55

## 2023-09-30 RX ADMIN — INSULIN GLARGINE 10 UNITS: 100 INJECTION, SOLUTION SUBCUTANEOUS at 09:56

## 2023-09-30 RX ADMIN — POTASSIUM CHLORIDE 20 MEQ: 750 TABLET, EXTENDED RELEASE ORAL at 09:56

## 2023-09-30 RX ADMIN — ASPIRIN 81 MG: 81 TABLET, COATED ORAL at 09:55

## 2023-09-30 RX ADMIN — PANTOPRAZOLE SODIUM 40 MG: 40 TABLET, DELAYED RELEASE ORAL at 06:58

## 2023-09-30 RX ADMIN — INSULIN LISPRO 4 UNITS: 100 INJECTION, SOLUTION INTRAVENOUS; SUBCUTANEOUS at 12:52

## 2023-09-30 RX ADMIN — MUPIROCIN 1 APPLICATION: 20 OINTMENT TOPICAL at 09:55

## 2023-09-30 RX ADMIN — INSULIN LISPRO 2 UNITS: 100 INJECTION, SOLUTION INTRAVENOUS; SUBCUTANEOUS at 06:58

## 2023-09-30 NOTE — OUTREACH NOTE
Prep Survey      Flowsheet Row Responses   [facility] facility patient discharged from? [place]   Is LACE score < 7 ? No   Eligibility [facility]   Date of Admission 09/25/23   Date of Discharge 09/30/23   Discharge Disposition Home-Health Care Sv   Discharge diagnosis CAD (coronary artery disease)   Does the patient have one of the following disease processes/diagnoses(primary or secondary)? Cardiothoracic surgery   Does the patient have Home health ordered? Yes   What is the Home health agency?  [facility]   Is there a DME ordered? No   Medication alerts for this patient see AVS   General alerts for this patient RUKHSANA STERNOTOMY CORONARY ARTERY BYPASS GRAFT TIMES 7 USING LEFT INTERNAL MAMMARY ARTERY AND RIGHT GREATER SAPHENOUS VEIN GRAFT PER ENDOSCOPIC VEIN HARVESTING AND PRP   Prep survey completed? Yes            Brenda KILLIAN - Registered Nurse

## 2023-10-01 ENCOUNTER — TRANSITIONAL CARE MANAGEMENT TELEPHONE ENCOUNTER (OUTPATIENT)
Dept: CALL CENTER | Facility: HOSPITAL | Age: 78
End: 2023-10-01
Payer: MEDICARE

## 2023-10-01 NOTE — OUTREACH NOTE
Call Center TCM Note      Flowsheet Row Responses   [facility] patient discharged from? [place]   Does the patient have one of the following disease processes/diagnoses(primary or secondary)? Cardiothoracic surgery   TCM attempt successful? Yes   Call start time 1037   Call end time 1107   General alerts for this patient RUKHSANA STERNOTOMY CORONARY ARTERY BYPASS GRAFT TIMES 7 USING LEFT INTERNAL MAMMARY ARTERY AND RIGHT GREATER SAPHENOUS VEIN GRAFT PER ENDOSCOPIC VEIN HARVESTING AND PRP   List who call center can speak with pt   Meds reviewed with patient/caregiver? Yes   Is the patient having any side effects they believe may be caused by any medication additions or changes? No   Does the patient have all medications related to this admission filled (includes all antibiotics, pain medications, cardiac medications, etc.) Yes   Is the patient taking all medications as directed (includes completed medication regime)? Yes   Comments Hospital f/u scheduled for October 3, 2023 @ 11:15 am.   Does the patient have an appointment with their PCP within 7-14 days of discharge? No   Nursing Interventions Assisted patient with making appointment per protocol   Has home health visited the patient within 72 hours of discharge? Call prior to 72 hours   Psychosocial issues? No   Did the patient receive a copy of their discharge instructions? Yes   Nursing interventions Reviewed instructions with patient   What is the patient's perception of their health status since discharge? Improving   Nursing interventions Nurse provided patient education   Is the patient/caregiver able to teach back normal signs of recovery? Nausea and lack of appetite, Constipation, Pain or discomfort at incisional site   Nursing interventions Reassured on normal signs of recovery   Is the patient /caregiver able to teach back basic post-op care? Continue use of incentive spirometry at least 1 week post discharge, Drive as instructed by MD in discharge  instructions, Shower daily, No tub bath, swimming, or hot tub until instructed by MD, If the steri-strips are falling off, it is okay to remove them. (If applicable), Lifting as instructed by MD in discharge instructions   Is the patient/caregiver able to teach back signs and symptoms of incisional infection? Increased redness, swelling or pain at the incisonal site, Increased drainage or bleeding, Incisional warmth, Pus or odor from incision, Fever   Is the patient/caregiver able to teach back steps to recovery at home? Set small, achievable goals for return to baseline health, Rest and rebuild strength, gradually increase activity   Is the patient/caregiver able to teach back the hierarchy of who to call/visit for symptoms/problems? PCP, Specialist, Home health nurse, Urgent Care, ED, 911 Yes   TCM call completed? Yes   Wrap up additional comments Pt reports improving. Pt has all medications Incisions healing well.   Call end time 1107   Would this patient benefit from a Referral to [facility] Social Work? No   Is the patient interested in additional calls from an ambulatory ? No            Gogo Thomson RN    10/1/2023, 11:08 EDT

## 2023-10-02 NOTE — CASE MANAGEMENT/SOCIAL WORK
Case Management Discharge Note      Final Note: Pt discharged home with [facility]AN CM    Provided Post Acute Provider List?: Yes  Post Acute Provider List: Home Health  Delivered To: Patient  Method of Delivery: In person    Selected Continued Care - Discharged on 9/30/2023 Admission date: 9/25/2023 - Discharge disposition: Home or Self Care      Destination    No services have been selected for the patient.                Durable Medical Equipment    No services have been selected for the patient.                Dialysis/Infusion    No services have been selected for the patient.                Home Medical Care Coordination complete.      Service Provider Selected Services Address Phone Fax Patient Preferred    [facility] [address] [place] [address], [address] [address] [phone] --              Therapy    No services have been selected for the patient.                Community Resources    No services have been selected for the patient.                Community & DME    No services have been selected for the patient.                         Final Discharge Disposition Code: 06 - home with home health care

## 2023-10-02 NOTE — PROGRESS NOTES
Transitional Care Follow Up Visit  Subjective     Trenton Adames is a 77 y.o. male who presents with his daughter for a transitional care management visit.    Within 48 business hours after discharge our office contacted him via telephone to coordinate his care and needs.      I reviewed and discussed the details of that call along with the discharge summary, hospital problems, inpatient lab results, inpatient diagnostic studies, and consultation reports with Trenton.     Current outpatient and discharge medications have been reconciled for the patient.  Reviewed by: DARRELL Pardo          9/30/2023     5:06 PM   Date of TCM Phone Call   [facility]   Date of Admission 9/25/2023   Date of Discharge 9/30/2023   Discharge Disposition Home-Health Care [facility]     Risk for Readmission (LACE) Score: 10 (9/30/2023  6:00 AM)      History of Present Illness   Course During Hospital Stay:  He had cardiac surgery on 9/26 for CABG x 7 by Dr. Groves. Post operatively he did well.  Weaned from ventilator on the day of surgery.  Tolerated medication therapy ASA/BB/statin.  Weaned from supplemental oxygen and passed overnight oximetry on 9/29/23.  All lines tubes and wires removed without issue.  Adequate PO intake.  On post operative day 4 he was deemed ready for discharge home with home health.  Patient developed a rash from the adhesive tape and is having itching in the areas where that was located.  He was told not to use any topicals.      The following portions of the patient's history were reviewed and updated as appropriate: He  has a past medical history of Arthritis, Asthma, Atypical chest pain, Coronary artery disease involving native coronary artery of native heart with unstable angina pectoris (9/25/2023), Dysphagia, oral phase, Essential (primary) hypertension, Foot pain, bilateral, Heart murmur (5/2023), Heart valve disease (7/2023), Hyperlipidemia, unspecified, Low back pain, Neuropathy in  diabetes, Peripheral neuropathy, Pulmonary HTN (08/09/2023), Sleep apnea, Type 2 diabetes mellitus without complication, and Vitamin D deficiency, unspecified..    Review of Systems   Constitutional:  Positive for fever.   Respiratory:  Positive for cough. Negative for chest tightness, shortness of breath and wheezing.    Cardiovascular:  Positive for leg swelling. Negative for palpitations.   Gastrointestinal:  Positive for constipation and nausea. Negative for abdominal pain and vomiting.   Genitourinary:  Negative for difficulty urinating.   Skin:  Positive for rash. Negative for pallor.   All other systems reviewed and are negative.    Objective   Physical Exam  Vitals reviewed.   Constitutional:       General: He is not in acute distress.  HENT:      Head: Normocephalic and atraumatic.   Cardiovascular:      Rate and Rhythm: Normal rate and regular rhythm.   Pulmonary:      Effort: Pulmonary effort is normal.      Breath sounds: Normal breath sounds. No wheezing, rhonchi or rales.   Musculoskeletal:      Right lower leg: Edema present.      Left lower leg: Edema present.      Comments: Mild edema worse on the right due to vein grafting.  Compression stockings in place.   Lymphadenopathy:      Cervical: No cervical adenopathy.   Skin:     General: Skin is warm and dry.      Findings: Rash present.      Comments: Incision to sternum, chest tube sites and right lower extremity intact.  Erythematous papular rash to chest area.   Neurological:      General: No focal deficit present.      Mental Status: He is alert.   Psychiatric:         Thought Content: Thought content normal.       Assessment & Plan   Problems Addressed this Visit          Cardiac and Vasculature    Hypertension     Other Visit Diagnoses       Hospital discharge follow-up    -  Primary    Doing well posthospitalization.  Home health monitoring.  Call surgeon if fever greater than 102 F.    Constipation, unspecified constipation type         Improving.  Continue stool softeners while taking pain medication.  If needed use 1-2 doses of milk of magnesia or fleets enema.    Nausea        Zofran 4 mg every 8 hours as needed nausea.          Diagnoses         Codes Comments    Hospital discharge follow-up    -  Primary ICD-10-CM: Z09  ICD-9-CM: V67.59 Doing well posthospitalization.  Home health monitoring.  Call surgeon if fever greater than 102 F.    Constipation, unspecified constipation type     ICD-10-CM: K59.00  ICD-9-CM: 564.00 Improving.  Continue stool softeners while taking pain medication.  If needed use 1-2 doses of milk of magnesia or fleets enema.    Nausea     ICD-10-CM: R11.0  ICD-9-CM: 787.02 Zofran 4 mg every 8 hours as needed nausea.    Primary hypertension     ICD-10-CM: I10  ICD-9-CM: 401.9 Blood pressure well controlled today.  Home health monitoring.  Continue current treatment plan.

## 2023-10-03 ENCOUNTER — OFFICE VISIT (OUTPATIENT)
Dept: INTERNAL MEDICINE | Facility: CLINIC | Age: 78
End: 2023-10-03
Payer: MEDICARE

## 2023-10-03 VITALS
HEIGHT: 70 IN | OXYGEN SATURATION: 99 % | TEMPERATURE: 97.3 F | DIASTOLIC BLOOD PRESSURE: 60 MMHG | HEART RATE: 72 BPM | WEIGHT: 190 LBS | SYSTOLIC BLOOD PRESSURE: 140 MMHG | BODY MASS INDEX: 27.2 KG/M2

## 2023-10-03 DIAGNOSIS — Z09 HOSPITAL DISCHARGE FOLLOW-UP: Primary | ICD-10-CM

## 2023-10-03 DIAGNOSIS — R11.0 NAUSEA: ICD-10-CM

## 2023-10-03 DIAGNOSIS — I10 PRIMARY HYPERTENSION: Chronic | ICD-10-CM

## 2023-10-03 DIAGNOSIS — K59.00 CONSTIPATION, UNSPECIFIED CONSTIPATION TYPE: ICD-10-CM

## 2023-10-03 RX ORDER — ONDANSETRON 4 MG/1
4 TABLET, ORALLY DISINTEGRATING ORAL EVERY 8 HOURS PRN
Qty: 30 TABLET | Refills: 1 | Status: SHIPPED | OUTPATIENT
Start: 2023-10-03

## 2023-10-03 RX ORDER — ROSUVASTATIN CALCIUM 20 MG/1
20 TABLET, COATED ORAL NIGHTLY
Qty: 90 TABLET | Refills: 1 | Status: SHIPPED | OUTPATIENT
Start: 2023-10-03

## 2023-10-03 RX ORDER — HYDROXYZINE HYDROCHLORIDE 10 MG/1
10 TABLET, FILM COATED ORAL 3 TIMES DAILY PRN
Qty: 30 TABLET | Refills: 1 | Status: SHIPPED | OUTPATIENT
Start: 2023-10-03

## 2023-10-03 RX ADMIN — CYANOCOBALAMIN 1000 MCG: 1000 INJECTION, SOLUTION INTRAMUSCULAR; SUBCUTANEOUS at 11:56

## 2023-10-09 ENCOUNTER — TELEPHONE (OUTPATIENT)
Dept: INTERNAL MEDICINE | Facility: CLINIC | Age: 78
End: 2023-10-09
Payer: MEDICARE

## 2023-10-09 NOTE — TELEPHONE ENCOUNTER
[facility] nurse called to let you know that he has done all of the OTC measures that you suggested and he still has not had a bowel movement since Wednesday.  Abdomen is very and hard and distended.  Please advise...  [phone] Maribel

## 2023-10-10 ENCOUNTER — READMISSION MANAGEMENT (OUTPATIENT)
Dept: CALL CENTER | Facility: HOSPITAL | Age: 78
End: 2023-10-10
Payer: MEDICARE

## 2023-10-10 NOTE — OUTREACH NOTE
CT Surgery Week 2 Survey      Flowsheet Row Responses   [facility] patient discharged from? [facility]   Does the patient have one of the following disease processes/diagnoses(primary or secondary)? Cardiothoracic surgery   Week 2 attempt successful? Yes   Call start time 1219   Call end time 1231   General alerts for this patient RUKHSANA STERNOTOMY CORONARY ARTERY BYPASS GRAFT TIMES 7 USING LEFT INTERNAL MAMMARY ARTERY AND RIGHT GREATER SAPHENOUS VEIN GRAFT PER ENDOSCOPIC VEIN HARVESTING AND PRP   Discharge diagnosis CAD (coronary artery disease)   Person spoke with today (if not patient) and relationship patient   Meds reviewed with patient/caregiver? Yes   Is the patient having any side effects they believe may be caused by any medication additions or changes? No   Does the patient have all medications related to this admission filled (includes all antibiotics, pain medications, cardiac medications, etc.) Yes   Is the patient taking all medications as directed (includes completed medication regime)? Yes   Comments regarding appointments CT surgeon f/u appt on 10/25/23. Pulm f/u appt on 11/14/23.   Does the patient have a primary care provider?  Yes   Does the patient have an appointment scheduled with their C/T surgeon? Yes   Comments regarding PCP saw PCP for f/u on 10/3/23.   Has the patient kept scheduled appointments due by today? Yes   What is the Home health agency?  [facility]   Has home health visited the patient within 72 hours of discharge? Yes   Home health comments pt states nursing has been out twice. PT came yesterday and will be coming again this week.   Psychosocial issues? No   Comments Pt states  he is doing better every day. Pain is less every day. he continues to wear compression socks and is up walking. Pt states swelling is less in his right leg now. Pt states his incisions were getting too dry and red from the antibacterial soap, he switched to Dove and it has improved. no s/s  of infection reported. right leg throbs occasionally, he takes Tylenol for pain now. pt reports he had constipation issues, reached out to PCP and was told to take Mag Citrate. he has had a BM now.   Did the patient receive a copy of their discharge instructions? Yes   Nursing interventions Reviewed instructions with patient   What is the patient's perception of their health status since discharge? Improving   Nursing interventions Nurse provided patient education   Is the patient/caregiver able to teach back normal signs of recovery? Pain or discomfort at incisional site, Constipation   Nursing interventions Reassured on normal signs of recovery   Is the patient /caregiver able to teach back basic post-op care? Practice cough and deep breath every 4 hours while awake, Drive as instructed by MD in discharge instructions, Shower daily, No tub bath, swimming, or hot tub until instructed by MD, Use a clean wash cloth and antibacterial bar or liquid soap to clean incisions, If the steri-strips are falling off, it is okay to remove them. (If applicable), Lifting as instructed by MD in discharge instructions   Is the patient/caregiver able to teach back signs and symptoms of incisional infection? Increased redness, swelling or pain at the incisonal site, Increased drainage or bleeding, Incisional warmth, Pus or odor from incision, Fever   Is the patient/caregiver able to teach back steps to recovery at home? Set small, achievable goals for return to baseline health, Rest and rebuild strength, gradually increase activity, Eat a well-balance diet   Is the patient /caregiver able to teach back the importance of cardiac rehab? Yes   Nursing interventions Provided education on importance of cardiac rehab   If the patient is a current smoker, are they able to teach back resources for cessation? Not a smoker   Is the patient/caregiver able to teach back the hierarchy of who to call/visit for symptoms/problems? PCP, Specialist,  Home health nurse, Urgent Care, ED, 911 Yes   Week 2 call completed? Yes   Graduated Yes   Is the patient interested in additional calls from an ambulatory ? No   Would this patient benefit from a Referral to [facility] Social Work? No   Wrap up additional comments pt is doing well. no conerns or needs at this time.   Call end time 1231            Brenda KILLIAN - Registered Nurse

## 2023-10-12 ENCOUNTER — OUTSIDE FACILITY SERVICE (OUTPATIENT)
Dept: CARDIAC SURGERY | Facility: CLINIC | Age: 78
End: 2023-10-12
Payer: MEDICARE

## 2023-10-23 RX ORDER — MONTELUKAST SODIUM 10 MG/1
10 TABLET ORAL DAILY
Qty: 90 TABLET | Refills: 0 | Status: SHIPPED | OUTPATIENT
Start: 2023-10-23

## 2023-10-25 ENCOUNTER — OFFICE VISIT (OUTPATIENT)
Dept: CARDIAC SURGERY | Facility: CLINIC | Age: 78
End: 2023-10-25
Payer: MEDICARE

## 2023-10-25 VITALS
SYSTOLIC BLOOD PRESSURE: 150 MMHG | DIASTOLIC BLOOD PRESSURE: 85 MMHG | BODY MASS INDEX: 25.34 KG/M2 | HEIGHT: 70 IN | WEIGHT: 177 LBS | HEART RATE: 67 BPM | OXYGEN SATURATION: 100 % | RESPIRATION RATE: 20 BRPM | TEMPERATURE: 97.8 F

## 2023-10-25 DIAGNOSIS — Z95.1 S/P CABG (CORONARY ARTERY BYPASS GRAFT): Primary | ICD-10-CM

## 2023-10-25 PROCEDURE — 1160F RVW MEDS BY RX/DR IN RCRD: CPT

## 2023-10-25 PROCEDURE — 1159F MED LIST DOCD IN RCRD: CPT

## 2023-10-25 PROCEDURE — 99024 POSTOP FOLLOW-UP VISIT: CPT

## 2023-10-25 PROCEDURE — 3077F SYST BP >= 140 MM HG: CPT

## 2023-10-25 PROCEDURE — 3079F DIAST BP 80-89 MM HG: CPT

## 2023-10-25 RX ORDER — FUROSEMIDE 20 MG/1
20 TABLET ORAL DAILY
Qty: 7 TABLET | Refills: 0 | Status: SHIPPED | OUTPATIENT
Start: 2023-10-25 | End: 2023-11-01

## 2023-10-26 NOTE — PROGRESS NOTES
"CARDIOVASCULAR SURGERY FOLLOW-UP PROGRESS NOTE  Chief Complaint: Postop    HPI:   Dear Jacquelin Clemente APRN and colleagues:    It was nice to see Trenton Adames in follow up for weeks after surgery.  As you know, he is a 78 y.o. male with a history of diabetes mellitus type 2, hypertension, and hyperlipidemia. On 9/26 he underwent CABG x7 with Dr. Groves. He did well postoperatively and continues to do well. He comes in today complaining of nothing.  His activity level has been good.  From a surgical standpoint, the sternal incision is well approximated without erythema, edema, or drainage.  The sternum is stable to palpation, and the patient denies any popping or clicking with deep inspiration or coughing.      Physical Exam:         /85 (BP Location: Left arm, Patient Position: Sitting, Cuff Size: Adult)   Pulse 67   Temp 97.8 °F (36.6 °C)   Resp 20   Ht 177.8 cm (70\")   Wt 80.3 kg (177 lb)   SpO2 100%   BMI 25.40 kg/m²   Heart:  regular rate and rhythm, S1, S2 normal, no murmur, click, rub or gallop  Lungs:  clear to auscultation bilaterally  Extremities:  2+ lower extremity edema bilaterally  Incision(s):  mid chest healing well, no significant drainage, no dehiscence, no significant erythema, right leg healing well, no significant drainage, no dehiscence, no significant erythema    Assessment/Plan:     S/P CABG. Overall, he is doing well.  Blood pressure is elevated today however he states at home it is usually in the 120 systolic.  He does have 1-2 Lasix bilateral lower extremity edema.  He takes HCTZ and potassium at home.  I am going to give him a 7-day course of Lasix to help with the swelling.  I have instructed him he should monitor his weight daily and call his cardiologist if weight increases greater than 2 pounds overnight or 5 pounds in a week.  Incisions are little scabby so I have given him Betadine to apply to the site twice daily after washing with antibacterial soap and " water.  Otherwise he is doing well and I will make any changes.  Sternal precautions reviewed as well as signs and symptoms of sternal wound infection.  He can follow-up with us as needed.    No significant post-op complications    No heavy lifting > 10 pounds for 2 more weeks  Keep incisions clean and dry  OK to drive if not taking narcotic pain medicine  OK to begin cardiac rehab  Follow-up as scheduled with cardiology  Follow-up as scheduled with PCP  Follow-up with CT surgery prn    Continue lifting restriction of 10 lbs until 6 weeks and 50 lbs until 12 weeks from the date of surgery, no excessive jarring motions or twisting motions until 12 weeks from the date of surgery    Return to clinic if any signs or symptoms of infection or sternal instability develop     Thank you for allowing me to participate in the care of your patient.      DARRELL Patino

## 2023-10-30 RX ORDER — FUROSEMIDE 20 MG/1
20 TABLET ORAL DAILY
Qty: 7 TABLET | Refills: 0 | OUTPATIENT
Start: 2023-10-30 | End: 2023-11-06

## 2023-11-02 ENCOUNTER — CLINICAL SUPPORT (OUTPATIENT)
Dept: INTERNAL MEDICINE | Facility: CLINIC | Age: 78
End: 2023-11-02
Payer: MEDICARE

## 2023-11-02 DIAGNOSIS — E53.8 VITAMIN B 12 DEFICIENCY: Primary | ICD-10-CM

## 2023-11-02 RX ADMIN — CYANOCOBALAMIN 1000 MCG: 1000 INJECTION, SOLUTION INTRAMUSCULAR; SUBCUTANEOUS at 12:17

## 2023-11-06 ENCOUNTER — OFFICE VISIT (OUTPATIENT)
Dept: INTERNAL MEDICINE | Facility: CLINIC | Age: 78
End: 2023-11-06
Payer: MEDICARE

## 2023-11-06 VITALS
HEART RATE: 75 BPM | DIASTOLIC BLOOD PRESSURE: 69 MMHG | SYSTOLIC BLOOD PRESSURE: 126 MMHG | TEMPERATURE: 98.7 F | OXYGEN SATURATION: 95 % | WEIGHT: 177 LBS | BODY MASS INDEX: 25.34 KG/M2 | HEIGHT: 70 IN

## 2023-11-06 DIAGNOSIS — R09.89 CHEST CONGESTION: ICD-10-CM

## 2023-11-06 DIAGNOSIS — R05.1 ACUTE COUGH: ICD-10-CM

## 2023-11-06 DIAGNOSIS — J45.20 MILD INTERMITTENT ASTHMA WITHOUT COMPLICATION: ICD-10-CM

## 2023-11-06 DIAGNOSIS — U07.1 COVID-19 VIRUS INFECTION: Primary | ICD-10-CM

## 2023-11-06 DIAGNOSIS — R50.9 FEVER, UNSPECIFIED FEVER CAUSE: ICD-10-CM

## 2023-11-06 LAB
EXPIRATION DATE: ABNORMAL
FLUAV AG UPPER RESP QL IA.RAPID: NOT DETECTED
FLUBV AG UPPER RESP QL IA.RAPID: NOT DETECTED
INTERNAL CONTROL: ABNORMAL
Lab: ABNORMAL
SARS-COV-2 AG UPPER RESP QL IA.RAPID: DETECTED

## 2023-11-06 RX ORDER — PSEUDOEPHEDRINE HCL 30 MG
100 TABLET ORAL 2 TIMES DAILY
Qty: 180 CAPSULE | Refills: 1 | Status: SHIPPED | OUTPATIENT
Start: 2023-11-06

## 2023-11-06 RX ORDER — AZITHROMYCIN 250 MG/1
TABLET, FILM COATED ORAL
Qty: 6 TABLET | Refills: 0 | Status: SHIPPED | OUTPATIENT
Start: 2023-11-06

## 2023-11-06 RX ORDER — AZITHROMYCIN 250 MG/1
TABLET, FILM COATED ORAL
Qty: 6 TABLET | Refills: 0 | Status: SHIPPED | OUTPATIENT
Start: 2023-11-06 | End: 2023-11-06 | Stop reason: SDUPTHER

## 2023-11-06 RX ORDER — METHYLPREDNISOLONE 4 MG/1
TABLET ORAL
Qty: 21 TABLET | Refills: 0 | Status: SHIPPED | OUTPATIENT
Start: 2023-11-06 | End: 2023-11-12

## 2023-11-06 NOTE — PATIENT INSTRUCTIONS
Start Paxlovid SE discussed-can get rebound covid infection, dry mouth, myalgias  Stop crestor for one week  Take Amlodipine 5 mg daily for one week  Will also give zpak, medrol dose ramón  Follow CDC rec-mask/isolate for 5 days then cont to mask for 5 more days  If sx of confusion, fever, SOA, worsening os sx occur -go to ER  O2 sats =95 % and good-here at clinic  Keep appt with Cardio Dr Charles next week and appt with PCP DARRELL Pardo

## 2023-11-06 NOTE — ASSESSMENT & PLAN NOTE
Was with friend 5 days ago who tested +for covid yesterday- Here at the clinic he was positive for COVID-negative for flu a and flu B    Plan-  Start Paxlovid SE discussed can get rebound covid infection , dry mouth, myalgias  Stop crestor for one week  Take Amlodipine 5 mg daily for one week  Will also give zpak, medrol dose ramón because of h/o asthma-prophylactic  Follow CDC rec-mask/isolate for 5 days then cont to mask for 5 more days  If sx of confusion, fever, SOA, worsening os sx occur -go to ER  O2 sats =95 % and good-here at clinic

## 2023-11-06 NOTE — PROGRESS NOTES
CHIEF COMPLAINT  Trenton Adames presents to [facility] for follow-up of URI (Accompanied with fever, cough, headache; symptoms begin Saturday).    HPI  78-year-old male with complaint of [id number],  cough -dry occ -yellow phlegm-felt weak then-headache for the past 2 days.  Here at the clinic he was positive for COVID-negative for flu a and flu B    Was with friend 5 days ago who tested +for covid yesterday    Seen at [facility] 10/25/23--lasix given for LE swelling-no other changes-wt 177lbs then also     PMH- diabetes mellitus type 2, hypertension, and hyperlipidemia, and asthma (last attack summer 2023). On 9/26/23  he underwent CABG x7 with Dr. Groves. Quit cig use 1995-    Current Outpatient Medications:     Accu-Chek Softclix Lancets lancets, USE ONE LANCET 2-3 TIMES DAILY AS NEEDED, Disp: 100 each, Rfl: 12    albuterol sulfate  (90 Base) MCG/ACT inhaler, Inhale 2 puffs Every 4 (Four) Hours As Needed for Wheezing., Disp: 18 g, Rfl: 5    amLODIPine (NORVASC) 10 MG tablet, TAKE ONE TABLET BY MOUTH DAILY, Disp: 90 tablet, Rfl: 1    aspirin 81 MG EC tablet, Take 1 tablet by mouth Daily., Disp: , Rfl:     azithromycin (Zithromax Z-Tanner) 250 MG tablet, Take 2 tablets by mouth on day 1, then 1 tablet daily on days 2-5, Disp: 6 tablet, Rfl: 0    Blood Glucose Monitoring Suppl (Accu-Chek Guide) w/Device kit, 1 each 3 (Three) Times a Day As Needed (check 2-3 times daily as needed)., Disp: 1 kit, Rfl: 0    clobetasol (TEMOVATE) 0.05 % cream, Apply 1 application topically to the appropriate area as directed 2 (Two) Times a Day., Disp: 30 g, Rfl: 2    docusate sodium 100 MG capsule, Take 1 capsule by mouth 2 (Two) Times a Day., Disp: 180 capsule, Rfl: 1    esomeprazole (nexIUM) 20 MG capsule, Take 1 capsule by mouth Every Morning Before Breakfast., Disp: , Rfl:     fluorouracil (EFUDEX) 5 % cream, , Disp: , Rfl:     fluticasone (FLONASE) 50 MCG/ACT nasal spray, 1 spray into  the nostril(s) as directed by provider Daily., Disp: , Rfl:     gabapentin (Neurontin) 800 MG tablet, Take 1 tablet by mouth 3 (Three) Times a Day., Disp: 90 tablet, Rfl: 5    glucose blood (Accu-Chek Guide) test strip, USE ONE TEST STRIP 2-3 TIMES DAILY AS NEEDED., Disp: 100 each, Rfl: 12    glucose blood test strip, Test 2-3 times a week, Disp: 100 each, Rfl: 1    hydroCHLOROthiazide (HYDRODIURIL) 25 MG tablet, Take 1 tablet by mouth Daily., Disp: 90 tablet, Rfl: 3    hydrOXYzine (ATARAX) 10 MG tablet, Take 1 tablet by mouth 3 (Three) Times a Day As Needed for Itching., Disp: 30 tablet, Rfl: 1    Krill Oil (Omega-3) 500 MG capsule, Take 1 capsule by mouth Daily., Disp: , Rfl:     Lancets (onetouch ultrasoft) lancets, Test 2-3 times a week., Disp: 100 each, Rfl: 12    loratadine (CLARITIN) 10 MG tablet, Take 1 tablet by mouth 2 (Two) Times a Day., Disp: , Rfl:     Melatonin 10 MG tablet, Take 1 tablet by mouth As Needed., Disp: , Rfl:     metFORMIN (GLUCOPHAGE) 1000 MG tablet, Take 1 tablet by mouth 2 (Two) Times a Day With Meals., Disp: 180 tablet, Rfl: 1    metoprolol tartrate (LOPRESSOR) 25 MG tablet, Take 1 tablet by mouth Every 12 (Twelve) Hours., Disp: 60 tablet, Rfl: 5    montelukast (SINGULAIR) 10 MG tablet, TAKE 1 TABLET BY MOUTH DAILY, Disp: 90 tablet, Rfl: 0    ondansetron ODT (ZOFRAN-ODT) 4 MG disintegrating tablet, Place 1 tablet on the tongue Every 8 (Eight) Hours As Needed for Nausea or Vomiting., Disp: 30 tablet, Rfl: 1    potassium chloride (K-DUR,KLOR-CON) 20 MEQ CR tablet, Take 1 tablet by mouth Daily., Disp: 60 tablet, Rfl: 5    rosuvastatin (Crestor) 20 MG tablet, Take 1 tablet by mouth Every Night., Disp: 90 tablet, Rfl: 1    Semaglutide,0.25 or 0.5MG/DOS, (Ozempic, 0.25 or 0.5 MG/DOSE,) 2 MG/3ML solution pen-injector, Inject 0.25 mg under the skin into the appropriate area as directed 1 (One) Time Per Week for 4 weeks and then increase to 0.5 mg one time a week. (Patient taking differently:  "Inject 0.25 mg under the skin into the appropriate area as directed 1 (One) Time Per Week. LAST DOSE 9/12/23), Disp: 3 mL, Rfl: 5    Turmeric 500 MG capsule, Take  by mouth., Disp: , Rfl:     vitamin D3 125 MCG (5000 UT) capsule capsule, Take 1 capsule by mouth Daily., Disp: , Rfl:     furosemide (LASIX) 20 MG tablet, Take 1 tablet by mouth Daily for 7 days., Disp: 7 tablet, Rfl: 0    methylPREDNISolone (MEDROL) 4 MG dose pack, Take 6 tablets by mouth Daily for 1 day, THEN 5 tablets Daily for 1 day, THEN 4 tablets Daily for 1 day, THEN 3 tablets Daily for 1 day, THEN 2 tablets Daily for 1 day, THEN 1 tablet Daily for 1 day. Take as directed on package instructions., Disp: 21 tablet, Rfl: 0    Nirmatrelvir&Ritonavir 300/100 (PAXLOVID) 20 x 150 MG & 10 x 100MG tablet therapy pack tablet, Take 3 tablets by mouth 2 (Two) Times a Day., Disp: 30 tablet, Rfl: 0    Current Facility-Administered Medications:     cyanocobalamin injection 1,000 mcg, 1,000 mcg, Intramuscular, Q28 Days, Jacquelin Pelaez APRN, 1,000 mcg at 11/02/23 1217   [facility] reviewed.      OBJECTIVE  Vital Signs  Vitals:    11/06/23 0945   BP: 126/69   BP Location: Right arm   Patient Position: Sitting   Cuff Size: Adult   Pulse: 75   Temp: 98.7 °F (37.1 °C)   TempSrc: Oral   SpO2: 95%   Weight: 80.3 kg (177 lb)   Height: 177.8 cm (70\")      Body mass index is 25.4 kg/m².    Physical Exam  Vitals and nursing note reviewed.   Constitutional:       Appearance: Normal appearance.   HENT:      Head: Normocephalic and atraumatic.      Right Ear: Tympanic membrane normal.      Left Ear: Tympanic membrane normal.      Nose: Nose normal.   Eyes:      Extraocular Movements: Extraocular movements intact.      Pupils: Pupils are equal, round, and reactive to light.   Cardiovascular:      Rate and Rhythm: Normal rate and regular rhythm.   Pulmonary:      Effort: Pulmonary effort is normal.      Breath sounds: Normal breath sounds.   Abdominal:      General: Abdomen is " flat.      Palpations: Abdomen is soft.   Musculoskeletal:      Cervical back: Normal range of motion and neck supple.   Skin:     General: Skin is warm and dry.   Neurological:      General: No focal deficit present.      Mental Status: He is alert and oriented to person, place, and time.   Psychiatric:         Mood and Affect: Mood normal.         Behavior: Behavior normal.          RESULTS REVIEW  Lab Results   Component Value Date    PROBNP 61.2 09/25/2023     CMP          9/28/2023    03:07 9/29/2023    03:19 9/30/2023    03:20   CMP   Glucose 171  146  156    BUN 11  14  20    Creatinine 0.84  0.75  0.92    EGFR 90.9  94.1  86.7    Sodium 133  134  136    Potassium 4.3  4.1  3.9    Chloride 100  103  103    Calcium 8.3  8.3  8.3    BUN/Creatinine Ratio 13.1  18.7  21.7    Anion Gap 10.0  9.9  8.8      CBC w/diff          9/28/2023    03:07 9/29/2023    03:19 9/30/2023    03:20   CBC w/Diff   WBC 14.00  13.40  10.15    RBC 3.07  3.11  3.02    Hemoglobin 9.5  9.2  9.2    Hematocrit 28.0  27.5  27.1    MCV 91.2  88.4  89.7    MCH 30.9  29.6  30.5    MCHC 33.9  33.5  33.9    RDW 12.0  11.6  12.0    Platelets 129  160  187       Lipid Panel          5/22/2023    08:54 8/21/2023    09:54 9/25/2023    18:46   Lipid Panel   Total Cholesterol 182  154  143    Triglycerides 160  163  145    HDL Cholesterol 47  36  42    VLDL Cholesterol 28  28  25    LDL Cholesterol  107  90  76    LDL/HDL Ratio 2.19  2.37  1.71       Lab Results   Component Value Date    TSH 1.630 08/21/2023    TSH 1.880 05/22/2023    TSH 1.210 10/05/2022      Lab Results   Component Value Date    FREET4 0.93 08/21/2023    FREET4 1.02 05/22/2023    FREET4 1.01 10/05/2022      A1C Last 3 Results          5/22/2023    08:54 8/21/2023    09:54 9/25/2023    18:46   HGBA1C Last 3 Results   Hemoglobin A1C 7.40  7.30  6.80       Lab Results   Component Value Date    NWXURTZH59 736 08/21/2023    LCEZ38FP 64.8 08/21/2023    MG 1.9 09/27/2023        XR Chest 1  View    Result Date: 9/28/2023  No pneumothorax following chest tube removal.  This report was finalized on 9/28/2023 3:08 PM by Dr. Fawad Washington M.D.      XR Chest 1 View    Result Date: 9/27/2023  1. Partial resolution of probable atelectasis of the left lower lung since yesterday. 2. Chest otherwise appears unchanged from yesterday except for removal of the [place] catheter. 3. No significant pneumothorax is seen.  This report was finalized on 9/27/2023 7:27 AM by Dr. Kalin Yepez M.D.      XR Chest 1 View    Result Date: 9/26/2023  Postoperative changes related to median sternotomy. Mild curvilinear opacity left lung base most likely represents atelectasis.  This report was finalized on 9/26/2023 1:18 PM by Dr. Nacho Burger M.D.      XR Chest PA & Lateral    Result Date: 9/25/2023  No evidence for active disease in the chest.  This report was finalized on 9/25/2023 6:36 PM by Dr. Nacho Burger M.D.                 ASSESSMENT & PLAN  Diagnoses and all orders for this visit:    1. COVID-19 virus infection (Primary)  Assessment & Plan:  Was with friend 5 days ago who tested +for covid yesterday- Here at the clinic he was positive for COVID-negative for flu a and flu B    Plan-  Start Paxlovid SE discussed can get rebound covid infection , dry mouth, myalgias  Stop crestor for one week  Take Amlodipine 5 mg daily for one week  Will also give zpak, medrol dose ramón because of h/o asthma-prophylactic  Follow CDC rec-mask/isolate for 5 days then cont to mask for 5 more days  If sx of confusion, fever, SOA, worsening os sx occur -go to ER  O2 sats =95 % and good-here at clinic    Orders:  -     Nirmatrelvir&Ritonavir 300/100 (PAXLOVID) 20 x 150 MG & 10 x 100MG tablet therapy pack tablet; Take 3 tablets by mouth 2 (Two) Times a Day.  Dispense: 30 tablet; Refill: 0  -     methylPREDNISolone (MEDROL) 4 MG dose pack; Take 6 tablets by mouth Daily for 1 day, THEN 5 tablets Daily for 1 day, THEN 4 tablets  Daily for 1 day, THEN 3 tablets Daily for 1 day, THEN 2 tablets Daily for 1 day, THEN 1 tablet Daily for 1 day. Take as directed on package instructions.  Dispense: 21 tablet; Refill: 0  -     Discontinue: azithromycin (Zithromax Z-Tanner) 250 MG tablet; Take 2 tablets by mouth on day 1, then 1 tablet daily on days 2-5  Dispense: 6 tablet; Refill: 0  -     azithromycin (Zithromax Z-Tanner) 250 MG tablet; Take 2 tablets by mouth on day 1, then 1 tablet daily on days 2-5  Dispense: 6 tablet; Refill: 0    2. Fever, unspecified fever cause  -     POCT SARS-CoV-2 Antigen EMELY + Flu  -     Nirmatrelvir&Ritonavir 300/100 (PAXLOVID) 20 x 150 MG & 10 x 100MG tablet therapy pack tablet; Take 3 tablets by mouth 2 (Two) Times a Day.  Dispense: 30 tablet; Refill: 0  -     methylPREDNISolone (MEDROL) 4 MG dose pack; Take 6 tablets by mouth Daily for 1 day, THEN 5 tablets Daily for 1 day, THEN 4 tablets Daily for 1 day, THEN 3 tablets Daily for 1 day, THEN 2 tablets Daily for 1 day, THEN 1 tablet Daily for 1 day. Take as directed on package instructions.  Dispense: 21 tablet; Refill: 0  -     Discontinue: azithromycin (Zithromax Z-Tanner) 250 MG tablet; Take 2 tablets by mouth on day 1, then 1 tablet daily on days 2-5  Dispense: 6 tablet; Refill: 0  -     azithromycin (Zithromax Z-Tanner) 250 MG tablet; Take 2 tablets by mouth on day 1, then 1 tablet daily on days 2-5  Dispense: 6 tablet; Refill: 0    3. Mild intermittent asthma without complication  Comments:  stable -no soa or wheezing -not needing inhalers-last attack summer 2023    4. Chest congestion  -     POCT SARS-CoV-2 Antigen EMELY + Flu  -     Nirmatrelvir&Ritonavir 300/100 (PAXLOVID) 20 x 150 MG & 10 x 100MG tablet therapy pack tablet; Take 3 tablets by mouth 2 (Two) Times a Day.  Dispense: 30 tablet; Refill: 0  -     methylPREDNISolone (MEDROL) 4 MG dose pack; Take 6 tablets by mouth Daily for 1 day, THEN 5 tablets Daily for 1 day, THEN 4 tablets Daily for 1 day, THEN 3 tablets  Daily for 1 day, THEN 2 tablets Daily for 1 day, THEN 1 tablet Daily for 1 day. Take as directed on package instructions.  Dispense: 21 tablet; Refill: 0  -     Discontinue: azithromycin (Zithromax Z-Tanner) 250 MG tablet; Take 2 tablets by mouth on day 1, then 1 tablet daily on days 2-5  Dispense: 6 tablet; Refill: 0  -     azithromycin (Zithromax Z-Tanner) 250 MG tablet; Take 2 tablets by mouth on day 1, then 1 tablet daily on days 2-5  Dispense: 6 tablet; Refill: 0    5. Acute cough  -     POCT SARS-CoV-2 Antigen EMELY + Flu  -     Nirmatrelvir&Ritonavir 300/100 (PAXLOVID) 20 x 150 MG & 10 x 100MG tablet therapy pack tablet; Take 3 tablets by mouth 2 (Two) Times a Day.  Dispense: 30 tablet; Refill: 0  -     methylPREDNISolone (MEDROL) 4 MG dose pack; Take 6 tablets by mouth Daily for 1 day, THEN 5 tablets Daily for 1 day, THEN 4 tablets Daily for 1 day, THEN 3 tablets Daily for 1 day, THEN 2 tablets Daily for 1 day, THEN 1 tablet Daily for 1 day. Take as directed on package instructions.  Dispense: 21 tablet; Refill: 0  -     Discontinue: azithromycin (Zithromax Z-Tanner) 250 MG tablet; Take 2 tablets by mouth on day 1, then 1 tablet daily on days 2-5  Dispense: 6 tablet; Refill: 0  -     azithromycin (Zithromax Z-Tanner) 250 MG tablet; Take 2 tablets by mouth on day 1, then 1 tablet daily on days 2-5  Dispense: 6 tablet; Refill: 0                 Patient Instructions   Start Paxlovid SE discussed-can get rebound covid infection, dry mouth, myalgias  Stop crestor for one week  Take Amlodipine 5 mg daily for one week  Will also give zpak, medrol dose tanner  Follow CDC rec-mask/isolate for 5 days then cont to mask for 5 more days  If sx of confusion, fever, SOA, worsening os sx occur -go to ER  O2 sats =95 % and good-here at clinic  Keep appt with Cardio Dr Charles next week and appt with PCP DARRELL Pardo       FOLLOW UP  Return in about 10 weeks (around 1/15/2024), or if symptoms worsen or fail to improve, for Recheck,  Next scheduled follow up.    Patient was given instructions and counseling regarding his condition or for health maintenance advice. Please see specific information pulled into the AVS if appropriate.

## 2023-11-14 ENCOUNTER — OFFICE VISIT (OUTPATIENT)
Dept: SLEEP MEDICINE | Facility: HOSPITAL | Age: 78
End: 2023-11-14
Payer: MEDICARE

## 2023-11-14 VITALS
OXYGEN SATURATION: 99 % | BODY MASS INDEX: 25.45 KG/M2 | SYSTOLIC BLOOD PRESSURE: 133 MMHG | HEART RATE: 69 BPM | WEIGHT: 177.8 LBS | DIASTOLIC BLOOD PRESSURE: 64 MMHG | HEIGHT: 70 IN

## 2023-11-14 DIAGNOSIS — I10 HYPERTENSION, UNSPECIFIED TYPE: ICD-10-CM

## 2023-11-14 DIAGNOSIS — I27.20 PULMONARY HTN: ICD-10-CM

## 2023-11-14 DIAGNOSIS — G47.33 OSA ON CPAP: Primary | ICD-10-CM

## 2023-11-14 DIAGNOSIS — E66.3 OVERWEIGHT (BMI 25.0-29.9): ICD-10-CM

## 2023-11-14 DIAGNOSIS — I25.110 CORONARY ARTERY DISEASE INVOLVING NATIVE CORONARY ARTERY OF NATIVE HEART WITH UNSTABLE ANGINA PECTORIS: ICD-10-CM

## 2023-11-14 PROCEDURE — 3075F SYST BP GE 130 - 139MM HG: CPT | Performed by: INTERNAL MEDICINE

## 2023-11-14 PROCEDURE — 3078F DIAST BP <80 MM HG: CPT | Performed by: INTERNAL MEDICINE

## 2023-11-14 PROCEDURE — G0463 HOSPITAL OUTPT CLINIC VISIT: HCPCS

## 2023-11-14 RX ORDER — METHYLPREDNISOLONE 4 MG/1
TABLET ORAL
COMMUNITY
Start: 2023-11-06 | End: 2023-11-16

## 2023-11-14 NOTE — PROGRESS NOTES
Sleep Disorder Follow Up    Patient Name: Trenton Adames  Age/Sex: 78 y.o. male  : 1945  MRN: [id number]    Date of Encounter Visit: 23   Referring Provider: DARRELL Pardo  Place of Service: [facility]  Patient Care Team:  Jacquelin Pelaez APRN as PCP - General (Nurse Practitioner)    PROBLEM LIST:  Obstructive sleep apnea  Pulmonary hypertension  Diabetes mellitus  Primary hypertension  Hyperlipidemia  Overweight  Hypersomnia    History of Present Illness:  Trenton Adames is a 78 y.o. male who is here for follow up on HILLARY. Patient was last seen in the office on 2023.  Since last visit, patient had a home sleep study on 8/10/2023 that was positive for obstructive sleep apnea and was started on auto CPAP 6-20 and is here for follow-up.  Patient uses machine every night with no complaints from the mask or the pressure.  Patient uses a FFM (under the nose)  mask, which does not fit well. Patient some air leak when sleeping on the side .he also had dry mouth dry mouth.   He was changed FFM over the nose but still had dry mouth   No chin strap was used   Patient sleeps better and has a deeper sleep with the machine and feels more energy during the day time.  Currently on 6-20 cm H20.  [place] Sleepiness Scale (ESS) is 4.  Compliance download was reviewed and documented below.  Weight is 16 since last visit.  He did have CABG with 7 vessels bypass that resulted in the weight loss   Other comorbidities include  CAD , pul HTN, DM, essential HTN      Review of Systems:   A ten-system review was conducted and was negative except for the following:  dry mouth.        Past Medical History:  Past medical, surgical, social, and family history, except as mentioned above, was unchanged from the last visit.     Past Medical History:   Diagnosis Date    Arthritis     Asthma     Atypical chest pain     Coronary artery disease involving native coronary artery of native  heart with unstable angina pectoris 9/25/2023    Dysphagia, oral phase     NO CURRENT ISSUES    Essential (primary) hypertension     Foot pain, bilateral     Heart murmur 5/2023    Heart valve disease 7/2023    Hyperlipidemia, unspecified     Low back pain     Neuropathy in diabetes     Peripheral neuropathy     Pulmonary HTN 08/09/2023    Sleep apnea     USES CPAP    Type 2 diabetes mellitus without complication     Vitamin D deficiency, unspecified        Past Surgical History:   Procedure Laterality Date    CARDIAC CATHETERIZATION N/A 09/19/2023    Procedure: Left Heart Cath with possible coronary angioplasty;  Surgeon: Marc Charles MD;  Location:  [facility];  Service: Cardiology;  Laterality: N/A;    CATARACT EXTRACTION, BILATERAL  1/28/2014; 1/7/2014    COLONOSCOPY  2022    CORONARY ARTERY BYPASS GRAFT N/A 09/26/2023    Procedure: RUKHSANA STERNOTOMY CORONARY ARTERY BYPASS GRAFT TIMES 7 USING LEFT INTERNAL MAMMARY ARTERY AND RIGHT GREATER SAPHENOUS VEIN GRAFT PER ENDOSCOPIC VEIN HARVESTING AND PRP;  Surgeon: Jr Frederic Groves MD;  Location: [facility];  Service: Cardiothoracic;  Laterality: N/A;    ENDOMETRIAL ABLATION      GASTROSTOMY      HEMORRHOIDECTOMY  06/20/2014    POLYPECTOMY      SINUS SURGERY         Home Medications:     Current Outpatient Medications:     Accu-Chek Softclix Lancets lancets, USE ONE LANCET 2-3 TIMES DAILY AS NEEDED, Disp: 100 each, Rfl: 12    albuterol sulfate  (90 Base) MCG/ACT inhaler, Inhale 2 puffs Every 4 (Four) Hours As Needed for Wheezing., Disp: 18 g, Rfl: 5    amLODIPine (NORVASC) 10 MG tablet, TAKE ONE TABLET BY MOUTH DAILY, Disp: 90 tablet, Rfl: 1    aspirin 81 MG EC tablet, Take 1 tablet by mouth Daily., Disp: , Rfl:     azithromycin (Zithromax Z-Tanner) 250 MG tablet, Take 2 tablets by mouth on day 1, then 1 tablet daily on days 2-5, Disp: 6 tablet, Rfl: 0    Blood Glucose Monitoring Suppl (Accu-Chek Guide) w/Device kit, 1 each 3 (Three)  Times a Day As Needed (check 2-3 times daily as needed)., Disp: 1 kit, Rfl: 0    clobetasol (TEMOVATE) 0.05 % cream, Apply 1 application topically to the appropriate area as directed 2 (Two) Times a Day., Disp: 30 g, Rfl: 2    docusate sodium 100 MG capsule, Take 1 capsule by mouth 2 (Two) Times a Day., Disp: 180 capsule, Rfl: 1    esomeprazole (nexIUM) 20 MG capsule, Take 1 capsule by mouth Every Morning Before Breakfast., Disp: , Rfl:     fluorouracil (EFUDEX) 5 % cream, , Disp: , Rfl:     fluticasone (FLONASE) 50 MCG/ACT nasal spray, 1 spray into the nostril(s) as directed by provider Daily., Disp: , Rfl:     gabapentin (Neurontin) 800 MG tablet, Take 1 tablet by mouth 3 (Three) Times a Day., Disp: 90 tablet, Rfl: 5    glucose blood (Accu-Chek Guide) test strip, USE ONE TEST STRIP 2-3 TIMES DAILY AS NEEDED., Disp: 100 each, Rfl: 12    glucose blood test strip, Test 2-3 times a week, Disp: 100 each, Rfl: 1    hydroCHLOROthiazide (HYDRODIURIL) 25 MG tablet, Take 1 tablet by mouth Daily., Disp: 90 tablet, Rfl: 3    hydrOXYzine (ATARAX) 10 MG tablet, Take 1 tablet by mouth 3 (Three) Times a Day As Needed for Itching., Disp: 30 tablet, Rfl: 1    Krill Oil (Omega-3) 500 MG capsule, Take 1 capsule by mouth Daily., Disp: , Rfl:     Lancets (onetouch ultrasoft) lancets, Test 2-3 times a week., Disp: 100 each, Rfl: 12    loratadine (CLARITIN) 10 MG tablet, Take 1 tablet by mouth 2 (Two) Times a Day., Disp: , Rfl:     Melatonin 10 MG tablet, Take 1 tablet by mouth As Needed., Disp: , Rfl:     metFORMIN (GLUCOPHAGE) 1000 MG tablet, Take 1 tablet by mouth 2 (Two) Times a Day With Meals., Disp: 180 tablet, Rfl: 1    methylPREDNISolone (MEDROL) 4 MG tablet, , Disp: , Rfl:     metoprolol tartrate (LOPRESSOR) 25 MG tablet, Take 1 tablet by mouth Every 12 (Twelve) Hours., Disp: 60 tablet, Rfl: 5    montelukast (SINGULAIR) 10 MG tablet, TAKE 1 TABLET BY MOUTH DAILY, Disp: 90 tablet, Rfl: 0    Nirmatrelvir&Ritonavir 300/100  (PAXLOVID) 20 x 150 MG & 10 x 100MG tablet therapy pack tablet, Take 3 tablets by mouth 2 (Two) Times a Day., Disp: 30 tablet, Rfl: 0    ondansetron ODT (ZOFRAN-ODT) 4 MG disintegrating tablet, Place 1 tablet on the tongue Every 8 (Eight) Hours As Needed for Nausea or Vomiting., Disp: 30 tablet, Rfl: 1    potassium chloride (K-DUR,KLOR-CON) 20 MEQ CR tablet, Take 1 tablet by mouth Daily., Disp: 60 tablet, Rfl: 5    rosuvastatin (Crestor) 20 MG tablet, Take 1 tablet by mouth Every Night., Disp: 90 tablet, Rfl: 1    Semaglutide,0.25 or 0.5MG/DOS, (Ozempic, 0.25 or 0.5 MG/DOSE,) 2 MG/3ML solution pen-injector, Inject 0.25 mg under the skin into the appropriate area as directed 1 (One) Time Per Week for 4 weeks and then increase to 0.5 mg one time a week. (Patient taking differently: Inject 0.25 mg under the skin into the appropriate area as directed 1 (One) Time Per Week. LAST DOSE 23), Disp: 3 mL, Rfl: 5    Turmeric 500 MG capsule, Take  by mouth., Disp: , Rfl:     vitamin D3 125 MCG (5000 UT) capsule capsule, Take 1 capsule by mouth Daily., Disp: , Rfl:     furosemide (LASIX) 20 MG tablet, Take 1 tablet by mouth Daily for 7 days., Disp: 7 tablet, Rfl: 0    Current Facility-Administered Medications:     cyanocobalamin injection 1,000 mcg, 1,000 mcg, Intramuscular, Q28 Days, Jacquelin Pelaez APRN, 1,000 mcg at 23 1217    Allergies:  Allergies   Allergen Reactions    Cat Hair Extract Itching    Grass Itching    Pollen Extract Itching    Tape Rash       Past Social History:  Social History     Socioeconomic History    Marital status:    Tobacco Use    Smoking status: Former     Packs/day: 2.00     Years: 15.00     Additional pack years: 0.00     Total pack years: 30.00     Types: Cigarettes, Pipe     Quit date: 1995     Years since quittin.8    Smokeless tobacco: Never   Vaping Use    Vaping Use: Never used   Substance and Sexual Activity    Alcohol use: Yes     Alcohol/week: 8.0 standard  "drinks of alcohol     Types: 6 Cans of beer, 2 Drinks containing 0.5 oz of alcohol per week     Comment: Occasional     Drug use: Never    Sexual activity: Defer     Partners: Female       Past Family History:  Family History   Problem Relation Age of Onset    Cancer Mother     Heart disease Father     Diabetes Father     Arthritis Father     Heart disease Maternal Grandmother     Cancer Maternal Grandfather     Diabetes Paternal Grandmother     Malig Hyperthermia Neg Hx          Objective:        Vital Signs:   Visit Vitals  /64   Pulse 69   Ht 177.8 cm (70\")   Wt 80.6 kg (177 lb 12.8 oz)   SpO2 99%   BMI 25.51 kg/m²     Wt Readings from Last 3 Encounters:   11/14/23 80.6 kg (177 lb 12.8 oz)   11/06/23 80.3 kg (177 lb)   10/25/23 80.3 kg (177 lb)          Physical Exam:   GEN:  No acute distress, alert, cooperative, well developed   EYES:   Sclerae clear. No icterus. PERRL. Normal EOM  ENT:   External ears/nose normal, no oral lesions, no thrush, mucous membranes moist, Septum midline. Mallampati IV airway. Large uvula.    NECK:  Supple, midline trachea, no JVD  LUNGS: Normal chest on inspection, CTAB, no wheezes. No rhonchi. No crackles. Respirations regular, even and unlabored.   CV:  Regular rhythm and rate. Normal S1/S2. No murmurs, gallops, or rubs noted.  ABD:  Soft, nontender and nondistended. Normal bowel sounds. No guarding  EXT:  Moves all extremities well. No cyanosis. No redness. Trace Right edema.   Skin: Dry, intact, no bleeding      Diagnostic Data:  HST 8/10/2023:    Respiratory Disturbance Events:   This is a home sleep study done on 8/10/2023  Reported weight on the night of the study was 87.5 kg with a BMI of 27.7  Total monitoring time 457 minutes out of 473.4 minutes of recording time  Respiratory summary was positive for moderate obstructive sleep apnea with AHI of 15.4 with no central apnea noted, patient has significant positional component with apneas mainly in the supine position " while having normal AHI while sleeping on the side  Patient had sleep-related hypoxemia with a garth desaturation down to 85% with clinically borderline insignificant hypoxemia with only 4.4 minutes spent in the hypoxemic range  Percentage of snoring was 58.2%  Heart rate was mostly within normal limit throughout the night.   Impression:   Moderate HILLARY with supine component  Sleep-related hypoxemia  Plan:   Treatment is recommended, will initiate auto CPAP 6-20 and follow-up at the sleep center for further recommendations    Echocardiogram 5/30/2023:    Left ventricular systolic function is normal. Left ventricular ejection fraction appears to be 61 - 65%.    Left ventricular diastolic function was normal.    No regional wall motion abnormality    Estimated right ventricular systolic pressure from tricuspid regurgitation is moderately elevated (45-55 mmHg).    Compliance download from 10/13/2023 - 11/11/2023 showed 60% adherence with an average nightly use of 3 hours and 4 minutes (5 hours and 7 minutes per night on the nights used).  Currently on auto CPAP 6-20 with a median/95th percentile pressure of 9.2/13.4 cm H2O.  Air leak was mild with a median/95th percentile of 3.1/15.9 L/min  Residual AHI was 2.9 reflecting adequate control of the underlying sleep apnea      Assessment and Plan:       ICD-10-CM ICD-9-CM   1. HILLARY on CPAP  G47.33 327.23   2. Pulmonary HTN  I27.20 416.8   3. Overweight (BMI 25.0-29.9)  E66.3 278.02   4. Coronary artery disease involving native coronary artery of native heart with unstable angina pectoris  I25.110 414.01     411.1   5. Hypertension, unspecified type  I10 401.9       Recommendations:     Patient has moderate case of obstructive sleep apnea with significant comorbidities including pulmonary hypertension, systemic hypertension and coronary artery disease requiring 7 vessel bypass just recently.  This requires to be treated and patient has intolerance to CPAP mainly because of  the dry mouth.  We will try to add a chinstrap to minimize mouth breathing and patient was encouraged to continue using the humidifier, we will add some over-the-counter medication to help improve on this elevation to minimize the discomfort from the dry mouth as well  Patient  achieved significant weight loss mainly because of the recent cardiovascular surgery with bypass and he is down by 16 pounds, if he can get his weight down to 170 pounds, we may consider repeating the sleep study to see if the sleep apnea is gone specially that he reported that his sleep quality has improved even when not using the CPAP and his snoring is much less frequent.  Patient showed interest in surgical treatment for sleep apnea but he was advised to pursue nonsurgical methods first and then we will consider the hypoglossal nerve stimulator if he still has significant sleep apnea and is unable to use the CPAP despite the above recommended measures to help improve on the comfort.  No pressure adjustment is necessary  We will follow-up in 4 weeks  Patient verbalized understanding the above treatment plan and is agreeable to proceed    Orders Placed This Encounter   Procedures    PAP Therapy     No orders of the defined types were placed in this encounter.    Return in about 4 weeks (around 12/12/2023).    Naheed George MD   [facility]   11/14/23  09:25 EST    Dictated utilizing Dragon dictation

## 2023-11-16 ENCOUNTER — TELEPHONE (OUTPATIENT)
Dept: CARDIOLOGY | Facility: CLINIC | Age: 78
End: 2023-11-16
Payer: MEDICARE

## 2023-11-16 ENCOUNTER — OFFICE VISIT (OUTPATIENT)
Dept: CARDIOLOGY | Facility: CLINIC | Age: 78
End: 2023-11-16
Payer: MEDICARE

## 2023-11-16 VITALS
WEIGHT: 182.4 LBS | DIASTOLIC BLOOD PRESSURE: 74 MMHG | HEIGHT: 70 IN | BODY MASS INDEX: 26.11 KG/M2 | SYSTOLIC BLOOD PRESSURE: 129 MMHG | HEART RATE: 67 BPM

## 2023-11-16 DIAGNOSIS — E78.2 MIXED DYSLIPIDEMIA: ICD-10-CM

## 2023-11-16 DIAGNOSIS — I10 ESSENTIAL HYPERTENSION: ICD-10-CM

## 2023-11-16 DIAGNOSIS — Z95.1 S/P CABG (CORONARY ARTERY BYPASS GRAFT): ICD-10-CM

## 2023-11-16 DIAGNOSIS — I25.10 CORONARY ARTERY DISEASE INVOLVING NATIVE CORONARY ARTERY OF NATIVE HEART WITHOUT ANGINA PECTORIS: Primary | ICD-10-CM

## 2023-11-16 RX ORDER — ROSUVASTATIN CALCIUM 40 MG/1
40 TABLET, COATED ORAL NIGHTLY
Qty: 90 TABLET | Refills: 3 | Status: SHIPPED | OUTPATIENT
Start: 2023-11-16

## 2023-11-16 NOTE — PROGRESS NOTES
Chief Complaint  Post-op Open Heart Surgery    Subjective      Patient is here for follow-up on coronary disease.  He status post recent catheterization which demonstrated multivessel coronary disease, status post CABG x7 vessels on 9/26/2023 with Dr. Groves.  Postoperative course was uneventful.  He is doing quite well.  He has no complaints today.  Some soreness around sternotomy site.  He has no angina or significant dyspnea.  He has no palpitations, dizziness, presyncope or syncope.  He is compliant with his medications.  He had COVID-19 about 2 weeks ago and has been feeling tired since then.    Past Medical History:   Diagnosis Date    Arthritis     Asthma     Atypical chest pain     Coronary artery disease involving native coronary artery of native heart with unstable angina pectoris 9/25/2023    Dysphagia, oral phase     NO CURRENT ISSUES    Essential (primary) hypertension     Foot pain, bilateral     Heart murmur 5/2023    Heart valve disease 7/2023    Hyperlipidemia, unspecified     Low back pain     Neuropathy in diabetes     Peripheral neuropathy     Pulmonary HTN 08/09/2023    Sleep apnea     USES CPAP    Type 2 diabetes mellitus without complication     Vitamin D deficiency, unspecified          Current Outpatient Medications:     Accu-Chek Softclix Lancets lancets, USE ONE LANCET 2-3 TIMES DAILY AS NEEDED, Disp: 100 each, Rfl: 12    albuterol sulfate  (90 Base) MCG/ACT inhaler, Inhale 2 puffs Every 4 (Four) Hours As Needed for Wheezing., Disp: 18 g, Rfl: 5    amLODIPine (NORVASC) 10 MG tablet, TAKE ONE TABLET BY MOUTH DAILY, Disp: 90 tablet, Rfl: 1    aspirin 81 MG EC tablet, Take 1 tablet by mouth Daily., Disp: , Rfl:     azithromycin (Zithromax Z-Tanner) 250 MG tablet, Take 2 tablets by mouth on day 1, then 1 tablet daily on days 2-5, Disp: 6 tablet, Rfl: 0    Blood Glucose Monitoring Suppl (Accu-Chek Guide) w/Device kit, 1 each 3 (Three) Times a Day As Needed (check 2-3 times daily as  needed)., Disp: 1 kit, Rfl: 0    clobetasol (TEMOVATE) 0.05 % cream, Apply 1 application topically to the appropriate area as directed 2 (Two) Times a Day., Disp: 30 g, Rfl: 2    docusate sodium 100 MG capsule, Take 1 capsule by mouth 2 (Two) Times a Day., Disp: 180 capsule, Rfl: 1    esomeprazole (nexIUM) 20 MG capsule, Take 1 capsule by mouth Every Morning Before Breakfast., Disp: , Rfl:     fluorouracil (EFUDEX) 5 % cream, , Disp: , Rfl:     fluticasone (FLONASE) 50 MCG/ACT nasal spray, 1 spray into the nostril(s) as directed by provider Daily., Disp: , Rfl:     gabapentin (Neurontin) 800 MG tablet, Take 1 tablet by mouth 3 (Three) Times a Day., Disp: 90 tablet, Rfl: 5    glucose blood (Accu-Chek Guide) test strip, USE ONE TEST STRIP 2-3 TIMES DAILY AS NEEDED., Disp: 100 each, Rfl: 12    glucose blood test strip, Test 2-3 times a week, Disp: 100 each, Rfl: 1    hydroCHLOROthiazide (HYDRODIURIL) 25 MG tablet, Take 1 tablet by mouth Daily., Disp: 90 tablet, Rfl: 3    hydrOXYzine (ATARAX) 10 MG tablet, Take 1 tablet by mouth 3 (Three) Times a Day As Needed for Itching., Disp: 30 tablet, Rfl: 1    Krill Oil (Omega-3) 500 MG capsule, Take 1 capsule by mouth Daily., Disp: , Rfl:     Lancets (onetouch ultrasoft) lancets, Test 2-3 times a week., Disp: 100 each, Rfl: 12    loratadine (CLARITIN) 10 MG tablet, Take 1 tablet by mouth 2 (Two) Times a Day., Disp: , Rfl:     Melatonin 10 MG tablet, Take 1 tablet by mouth As Needed., Disp: , Rfl:     metFORMIN (GLUCOPHAGE) 1000 MG tablet, Take 1 tablet by mouth 2 (Two) Times a Day With Meals., Disp: 180 tablet, Rfl: 1    metoprolol tartrate (LOPRESSOR) 25 MG tablet, Take 1 tablet by mouth Every 12 (Twelve) Hours., Disp: 60 tablet, Rfl: 5    montelukast (SINGULAIR) 10 MG tablet, TAKE 1 TABLET BY MOUTH DAILY, Disp: 90 tablet, Rfl: 0    ondansetron ODT (ZOFRAN-ODT) 4 MG disintegrating tablet, Place 1 tablet on the tongue Every 8 (Eight) Hours As Needed for Nausea or Vomiting.,  Disp: 30 tablet, Rfl: 1    potassium chloride (K-DUR,KLOR-CON) 20 MEQ CR tablet, Take 1 tablet by mouth Daily., Disp: 60 tablet, Rfl: 5    rosuvastatin (Crestor) 40 MG tablet, Take 1 tablet by mouth Every Night., Disp: 90 tablet, Rfl: 3    Semaglutide,0.25 or 0.5MG/DOS, (Ozempic, 0.25 or 0.5 MG/DOSE,) 2 MG/3ML solution pen-injector, Inject 0.25 mg under the skin into the appropriate area as directed 1 (One) Time Per Week for 4 weeks and then increase to 0.5 mg one time a week. (Patient taking differently: Inject 0.25 mg under the skin into the appropriate area as directed 1 (One) Time Per Week. LAST DOSE 23), Disp: 3 mL, Rfl: 5    Turmeric 500 MG capsule, Take  by mouth., Disp: , Rfl:     vitamin D3 125 MCG (5000 UT) capsule capsule, Take 1 capsule by mouth Daily., Disp: , Rfl:     Current Facility-Administered Medications:     cyanocobalamin injection 1,000 mcg, 1,000 mcg, Intramuscular, Q28 Days, Jacquelin Pelaez APRN, 1,000 mcg at 23 1217    Medications Discontinued During This Encounter   Medication Reason    furosemide (LASIX) 20 MG tablet *Therapy completed    methylPREDNISolone (MEDROL) 4 MG tablet *Therapy completed    Nirmatrelvir&Ritonavir 300/100 (PAXLOVID) 20 x 150 MG & 10 x 100MG tablet therapy pack tablet *Therapy completed    rosuvastatin (Crestor) 20 MG tablet Reorder     Allergies   Allergen Reactions    Cat Hair Extract Itching    Grass Itching    Pollen Extract Itching    Tape Rash        Social History     Tobacco Use    Smoking status: Former     Packs/day: 2.00     Years: 15.00     Additional pack years: 0.00     Total pack years: 30.00     Types: Cigarettes, Pipe     Quit date: 1995     Years since quittin.8    Smokeless tobacco: Never   Vaping Use    Vaping Use: Never used   Substance Use Topics    Alcohol use: Yes     Alcohol/week: 8.0 standard drinks of alcohol     Types: 6 Cans of beer, 2 Drinks containing 0.5 oz of alcohol per week     Comment: Occasional     Drug  "use: Never       Family History   Problem Relation Age of Onset    Cancer Mother     Heart disease Father     Diabetes Father     Arthritis Father     Heart disease Maternal Grandmother     Cancer Maternal Grandfather     Diabetes Paternal Grandmother     Malig Hyperthermia Neg Hx         Objective     /74   Pulse 67   Ht 177.8 cm (70\")   Wt 82.7 kg (182 lb 6.4 oz)   BMI 26.17 kg/m²       Physical Exam    General Appearance:   no acute distress  Alert and oriented x3  HENT:   lips not cyanotic  Atraumatic  Neck:  No jvd   supple  Respiratory:  no respiratory distress  normal breath sounds  no rales  Cardiovascular:  no S3, no S4   2/6 systolic ejection murmur at the base  no rub  Extremities  No cyanosis  lower extremity edema: none    Skin:   warm, dry  No rashes      Result Review :     proBNP   Date Value Ref Range Status   09/25/2023 61.2 0.0 - 1,800.0 pg/mL Final     CMP          9/28/2023    03:07 9/29/2023    03:19 9/30/2023    03:20   CMP   Glucose 171  146  156    BUN 11  14  20    Creatinine 0.84  0.75  0.92    EGFR 90.9  94.1  86.7    Sodium 133  134  136    Potassium 4.3  4.1  3.9    Chloride 100  103  103    Calcium 8.3  8.3  8.3    BUN/Creatinine Ratio 13.1  18.7  21.7    Anion Gap 10.0  9.9  8.8      CBC w/diff          9/28/2023    03:07 9/29/2023    03:19 9/30/2023    03:20   CBC w/Diff   WBC 14.00  13.40  10.15    RBC 3.07  3.11  3.02    Hemoglobin 9.5  9.2  9.2    Hematocrit 28.0  27.5  27.1    MCV 91.2  88.4  89.7    MCH 30.9  29.6  30.5    MCHC 33.9  33.5  33.9    RDW 12.0  11.6  12.0    Platelets 129  160  187       Lab Results   Component Value Date    TSH 1.630 08/21/2023      Lab Results   Component Value Date    FREET4 0.93 08/21/2023      No results found for: \"DDIMERQUANT\"  Magnesium   Date Value Ref Range Status   09/27/2023 1.9 1.6 - 2.4 mg/dL Final      No results found for: \"DIGOXIN\"   No results found for: \"TROPONINT\"        Lipid Panel          5/22/2023    08:54 8/21/2023 " "   09:54 9/25/2023    18:46   Lipid Panel   Total Cholesterol 182  154  143    Triglycerides 160  163  145    HDL Cholesterol 47  36  42    VLDL Cholesterol 28  28  25    LDL Cholesterol  107  90  76    LDL/HDL Ratio 2.19  2.37  1.71      No results found for: \"POCTROP\"    Results for orders placed in visit on 09/26/23    Diagnostic IntraOp Coy    Narrative  Diagnostic IntraOp Coy    Procedure Performed: Diagnostic IntraOp Coy  Start Time:  9/26/2023 7:52 AM  End Time:   9/26/2023 7:52 AM    Preanesthesia Checklist:  Patient identified, IV assessed, risks and benefits discussed, monitors and equipment assessed, procedure being performed at surgeon's request and anesthesia consent obtained.    General Procedure Information  Diagnostic Indications for Echo:  assessment of ascending aorta, assessment of surgical repair, defect repair evaluation and hemodynamic monitoring  Physician Requesting Echo: Jr Frederic Groves MD  ICD Code for Medical Necessity:  Non Rheumatic MR  Location performed:  OR  Intubated  Bite block placed  Heart visualized  Probe Insertion:  Easy  Probe Type:  Multiplane  Modalities:  2D only, color flow mapping, continuous wave Doppler and pulse wave Doppler    Echocardiographic and Doppler Measurements    Ventricles    Right Ventricle:  Cavity size normal.  Hypertrophy not present.  Thrombus not present.  Global function normal.  Left Ventricle:  Cavity size normal.  Diastolic dimension 3.6 cm.  Thrombus not present.  Global Function normal.  Ejection Fraction 55%.        Valves    Aortic Valve:  Annulus normal.  Stenosis not present.  Regurgitation absent.  Leaflets calcified and thickened.    Mitral Valve:  Annulus normal.  Regurgitation trace.  Leaflets normal.  Leaflet motions normal.    Tricuspid Valve:  Annulus normal.  Stenosis not present.  Regurgitation mild.  Leaflets normal.  Pulmonic Valve:  Regurgitation mild.      Aorta    Ascending Aorta:  Size normal.  Dissection not present.  " Plaque thickness less than 3 mm.  Mobile plaque not present.  Aortic Arch:  Size normal.  Dissection not present.  Plaque thickness less than 3 mm.  Mobile plaque not present.  Descending Aorta:  Size normal.  Dissection not present.  Plaque thickness less than 3 mm.  Mobile plaque not present.      Atria    Right Atrium:  Size normal.  Spontaneous echo contrast not present.  Thrombus not present.  Tumor not present.  Device present.    Left Atrium:  Size normal.  Spontaneous echo contrast not present.  Thrombus not present.  Tumor not present.  Device not present.  Left atrial appendage normal.      Septa        Ventricular Septum:  Intra-ventricular septum morphology normal.    Diastolic Function Measurements:  Diastolic Dysfunction Grade= III  E=  ms  A=  ms  E/A Ratio=  2.2  DT=  ms  S/D=  IVRT=    Other Findings  Pericardium:  normal  Pleural Effusion:  none  Pulmonary Arteries:  normal  Pulmonary Venous Flow:  blunted (decreased) systolic flow    Anesthesia Information  Performed Personally  Anesthesiologist:  Marissa Curtis MD      Echocardiogram Comments:  Post CPB  LVEF 60%  MR/TR are mild  No aortic dissection post decannulation                 Diagnoses and all orders for this visit:    1. Coronary artery disease involving native coronary artery of native heart without angina pectoris (Primary)    2. Mixed dyslipidemia  -     Lipid Panel; Future    3. S/P CABG (coronary artery bypass graft)    4. Essential hypertension    Other orders  -     rosuvastatin (Crestor) 40 MG tablet; Take 1 tablet by mouth Every Night.  Dispense: 90 tablet; Refill: 3      Assessment:    Coronary artery disease: Status post recent CABG x7 vessels on 9/26/2023 by Dr. Mclean.  Postoperative course was uneventful.  He is doing well overall.  He has some soreness around sternotomy site.  He has no angina or anginal-like symptoms.  His wounds are healing well.  Continue current medications.    Mixed dyslipidemia: Recent lipid  profile was noted.  LDL is above target of 70.  Rosuvastatin dose will be increased to 40 mg daily.  We will repeat lipid profile in 2 months.    Essential hypertension: Stable on current regimen.  Continue the same.      Follow Up     Return in about 3 months (around 2/16/2024) for With Asuncion RAMÍREZ.        Patient was given instructions and counseling regarding his condition or for health maintenance advice. Please see specific information pulled into the AVS if appropriate.

## 2023-11-16 NOTE — TELEPHONE ENCOUNTER
Procedure: Dental cleaning, filling and restorations    Medication Directive: NA    PMH: CAD s/p CABG 9/2023, HTN    Last Seen: 11/16/23

## 2023-11-17 NOTE — TELEPHONE ENCOUNTER
The patient has undergone cardiovascular evaluation from a cardiac standpoint only. Patient is low risk and is ok to proceed.

## 2023-11-21 ENCOUNTER — LAB (OUTPATIENT)
Dept: INTERNAL MEDICINE | Facility: CLINIC | Age: 78
End: 2023-11-21
Payer: MEDICARE

## 2023-11-21 DIAGNOSIS — E11.40 TYPE 2 DIABETES MELLITUS WITH DIABETIC NEUROPATHY, UNSPECIFIED WHETHER LONG TERM INSULIN USE: ICD-10-CM

## 2023-11-21 LAB
ALBUMIN SERPL-MCNC: 4.2 G/DL (ref 3.5–5.2)
ALBUMIN/GLOB SERPL: 1.4 G/DL
ALP SERPL-CCNC: 42 U/L (ref 39–117)
ALT SERPL W P-5'-P-CCNC: 21 U/L (ref 1–41)
ANION GAP SERPL CALCULATED.3IONS-SCNC: 14.1 MMOL/L (ref 5–15)
AST SERPL-CCNC: 16 U/L (ref 1–40)
BILIRUB SERPL-MCNC: 0.3 MG/DL (ref 0–1.2)
BUN SERPL-MCNC: 12 MG/DL (ref 8–23)
BUN/CREAT SERPL: 15 (ref 7–25)
CALCIUM SPEC-SCNC: 9.5 MG/DL (ref 8.6–10.5)
CHLORIDE SERPL-SCNC: 96 MMOL/L (ref 98–107)
CO2 SERPL-SCNC: 25.9 MMOL/L (ref 22–29)
CREAT SERPL-MCNC: 0.8 MG/DL (ref 0.76–1.27)
EGFRCR SERPLBLD CKD-EPI 2021: 91.7 ML/MIN/1.73
GLOBULIN UR ELPH-MCNC: 2.9 GM/DL
GLUCOSE SERPL-MCNC: 153 MG/DL (ref 65–99)
HBA1C MFR BLD: 6.4 % (ref 4.8–5.6)
POTASSIUM SERPL-SCNC: 3.9 MMOL/L (ref 3.5–5.2)
PROT SERPL-MCNC: 7.1 G/DL (ref 6–8.5)
SODIUM SERPL-SCNC: 136 MMOL/L (ref 136–145)

## 2023-11-21 PROCEDURE — 36415 COLL VENOUS BLD VENIPUNCTURE: CPT | Performed by: NURSE PRACTITIONER

## 2023-11-21 PROCEDURE — 83036 HEMOGLOBIN GLYCOSYLATED A1C: CPT | Performed by: NURSE PRACTITIONER

## 2023-11-21 PROCEDURE — 80053 COMPREHEN METABOLIC PANEL: CPT | Performed by: NURSE PRACTITIONER

## 2023-11-26 DIAGNOSIS — E11.40 TYPE 2 DIABETES MELLITUS WITH DIABETIC NEUROPATHY, WITHOUT LONG-TERM CURRENT USE OF INSULIN: ICD-10-CM

## 2023-11-27 RX ORDER — GABAPENTIN 800 MG/1
800 TABLET ORAL 3 TIMES DAILY
Qty: 90 TABLET | Refills: 2 | Status: SHIPPED | OUTPATIENT
Start: 2023-11-27

## 2023-11-29 ENCOUNTER — CLINICAL SUPPORT (OUTPATIENT)
Dept: INTERNAL MEDICINE | Facility: CLINIC | Age: 78
End: 2023-11-29
Payer: MEDICARE

## 2023-11-29 DIAGNOSIS — E53.8 VITAMIN B 12 DEFICIENCY: Primary | ICD-10-CM

## 2023-11-29 RX ADMIN — CYANOCOBALAMIN 1000 MCG: 1000 INJECTION, SOLUTION INTRAMUSCULAR; SUBCUTANEOUS at 12:18

## 2023-12-03 NOTE — PROGRESS NOTES
Chief Complaint  Diabetes (3 month follow up, labs done. The patient would like to discuss some prescriptions. The patient is also having some leg swelling in the right calf, ankle. )  Subjective    History of Present Illness  Trenton Adames is a 78 y.o. male  presents to [facility] for follow-up  for diabetes, hypertension and hyperlipidemia. Recent labs reviewed.  The patient is complaining of right leg swelling and ankle pain. This resolved after taking Lasix post op vein grafting.      Specialist include: Dr. Stephens for skin issues, Dr. Selby for chronic back pain, Dr. Charles for heart murmur, left carotid bruit and chest pain and Dr. George for sleep medicine.      Past Medical History:   Diagnosis Date    Arthritis     Asthma     Atypical chest pain     Coronary artery disease involving native coronary artery of native heart with unstable angina pectoris 9/25/2023    Dysphagia, oral phase     NO CURRENT ISSUES    Essential (primary) hypertension     Foot pain, bilateral     Heart murmur 5/2023    Heart valve disease 7/2023    Hyperlipidemia, unspecified     Low back pain     Neuropathy in diabetes     Peripheral neuropathy     Pulmonary HTN 08/09/2023    Sleep apnea     USES CPAP    Type 2 diabetes mellitus without complication     Vitamin D deficiency, unspecified         Past Surgical History:   Procedure Laterality Date    CARDIAC CATHETERIZATION N/A 09/19/2023    Procedure: Left Heart Cath with possible coronary angioplasty;  Surgeon: Marc Charles MD;  Location: [facility] CATH INVASIVE LOCATION;  Service: Cardiology;  Laterality: N/A;    CATARACT EXTRACTION, BILATERAL  1/28/2014; 1/7/2014    COLONOSCOPY  2022    CORONARY ARTERY BYPASS GRAFT N/A 09/26/2023    Procedure: RUKHSANA STERNOTOMY CORONARY ARTERY BYPASS GRAFT TIMES 7 USING LEFT INTERNAL MAMMARY ARTERY AND RIGHT GREATER SAPHENOUS VEIN GRAFT PER ENDOSCOPIC VEIN HARVESTING AND PRP;  Surgeon: Jr Germain  Frederic WITT MD;  Location: [facility];  Service: Cardiothoracic;  Laterality: N/A;    ENDOMETRIAL ABLATION      GASTROSTOMY      HEMORRHOIDECTOMY  06/20/2014    POLYPECTOMY      SINUS SURGERY          Allergies   Allergen Reactions    Cat Hair Extract Itching    Grass Itching    Pollen Extract Itching    Tape Rash          Current Outpatient Medications:     Accu-Chek Softclix Lancets lancets, USE ONE LANCET 2-3 TIMES DAILY AS NEEDED, Disp: 100 each, Rfl: 12    albuterol sulfate  (90 Base) MCG/ACT inhaler, Inhale 2 puffs Every 4 (Four) Hours As Needed for Wheezing., Disp: 18 g, Rfl: 5    amLODIPine (NORVASC) 10 MG tablet, TAKE ONE TABLET BY MOUTH DAILY, Disp: 90 tablet, Rfl: 1    aspirin 81 MG EC tablet, Take 1 tablet by mouth Daily., Disp: , Rfl:     Blood Glucose Monitoring Suppl (Accu-Chek Guide) w/Device kit, 1 each 3 (Three) Times a Day As Needed (check 2-3 times daily as needed)., Disp: 1 kit, Rfl: 0    clobetasol (TEMOVATE) 0.05 % cream, Apply 1 application topically to the appropriate area as directed 2 (Two) Times a Day., Disp: 30 g, Rfl: 2    docusate sodium 100 MG capsule, Take 1 capsule by mouth 2 (Two) Times a Day., Disp: 180 capsule, Rfl: 1    esomeprazole (nexIUM) 20 MG capsule, Take 1 capsule by mouth Every Morning Before Breakfast., Disp: , Rfl:     fluorouracil (EFUDEX) 5 % cream, , Disp: , Rfl:     fluticasone (FLONASE) 50 MCG/ACT nasal spray, 1 spray into the nostril(s) as directed by provider Daily., Disp: , Rfl:     gabapentin (NEURONTIN) 800 MG tablet, TAKE ONE TABLET BY MOUTH THREE TIMES A DAY, Disp: 90 tablet, Rfl: 2    glucose blood (Accu-Chek Guide) test strip, USE ONE TEST STRIP 2-3 TIMES DAILY AS NEEDED., Disp: 100 each, Rfl: 12    glucose blood test strip, Test 2-3 times a week, Disp: 100 each, Rfl: 1    hydroCHLOROthiazide (HYDRODIURIL) 25 MG tablet, Take 1 tablet by mouth Daily., Disp: 90 tablet, Rfl: 3    hydrOXYzine (ATARAX) 10 MG tablet, Take 1 tablet by mouth 3 (Three) Times  "a Day As Needed for Itching., Disp: 30 tablet, Rfl: 1    Krill Oil (Omega-3) 500 MG capsule, Take 1 capsule by mouth Daily., Disp: , Rfl:     Lancets (onetouch ultrasoft) lancets, Test 2-3 times a week., Disp: 100 each, Rfl: 12    loratadine (CLARITIN) 10 MG tablet, Take 1 tablet by mouth 2 (Two) Times a Day., Disp: , Rfl:     Melatonin 10 MG tablet, Take 1 tablet by mouth As Needed., Disp: , Rfl:     metFORMIN (GLUCOPHAGE) 1000 MG tablet, Take 1 tablet by mouth 2 (Two) Times a Day With Meals., Disp: 180 tablet, Rfl: 1    metoprolol tartrate (LOPRESSOR) 25 MG tablet, Take 1 tablet by mouth Every 12 (Twelve) Hours., Disp: 180 tablet, Rfl: 3    montelukast (SINGULAIR) 10 MG tablet, TAKE 1 TABLET BY MOUTH DAILY, Disp: 90 tablet, Rfl: 0    ondansetron ODT (ZOFRAN-ODT) 4 MG disintegrating tablet, Place 1 tablet on the tongue Every 8 (Eight) Hours As Needed for Nausea or Vomiting., Disp: 30 tablet, Rfl: 1    potassium chloride (K-DUR,KLOR-CON) 20 MEQ CR tablet, Take 1 tablet by mouth Daily., Disp: 90 tablet, Rfl: 1    rosuvastatin (Crestor) 40 MG tablet, Take 1 tablet by mouth Every Night., Disp: 90 tablet, Rfl: 3    Turmeric 500 MG capsule, Take  by mouth., Disp: , Rfl:     vitamin D3 125 MCG (5000 UT) capsule capsule, Take 1 capsule by mouth Daily., Disp: , Rfl:     furosemide (Lasix) 20 MG tablet, Take 1 tablet by mouth 2 (Two) Times a Day., Disp: 10 tablet, Rfl: 0    Semaglutide, 1 MG/DOSE, (Ozempic, 1 MG/DOSE,) 4 MG/3ML solution pen-injector, Inject 1 mg under the skin into the appropriate area as directed 1 (One) Time Per Week., Disp: 3 mL, Rfl: 11    Current Facility-Administered Medications:     cyanocobalamin injection 1,000 mcg, 1,000 mcg, Intramuscular, Q28 Days, Jacquelin Pelaez APRN, 1,000 mcg at 11/29/23 1218    Objective   /70 (BP Location: Right arm, Patient Position: Sitting, Cuff Size: Adult)   Pulse 69   Temp 97.3 °F (36.3 °C) (Temporal)   Ht 177.8 cm (70\")   Wt 81.1 kg (178 lb 12.8 oz)   " "SpO2 96%   BMI 25.66 kg/m²    Estimated body mass index is 25.66 kg/m² as calculated from the following:    Height as of this encounter: 177.8 cm (70\").    Weight as of this encounter: 81.1 kg (178 lb 12.8 oz).   Physical Exam  Vitals reviewed.   Constitutional:       General: He is not in acute distress.  HENT:      Head: Normocephalic and atraumatic.   Cardiovascular:      Rate and Rhythm: Normal rate and regular rhythm.      Heart sounds: Murmur heard.   Pulmonary:      Effort: Pulmonary effort is normal.      Breath sounds: Normal breath sounds. No wheezing, rhonchi or rales.   Musculoskeletal:      Right lower leg: Edema present.      Comments: Mild edema worse on the right due to vein grafting. Compression stockings in place.   Lymphadenopathy:      Cervical: No cervical adenopathy.   Skin:     General: Skin is warm and dry.   Neurological:      General: No focal deficit present.      Mental Status: He is alert.   Psychiatric:         Thought Content: Thought content normal.        Result Review :  The following data was reviewed by: DARRELL Pardo on 12/04/2023:  CMP          9/29/2023    03:19 9/30/2023    03:20 11/21/2023    10:25   CMP   Glucose 146  156  153    BUN 14  20  12    Creatinine 0.75  0.92  0.80    EGFR 94.1  86.7  91.7    Sodium 134  136  136    Potassium 4.1  3.9  3.9    Chloride 103  103  96    Calcium 8.3  8.3  9.5    Total Protein   7.1    Albumin   4.2    Globulin   2.9    Total Bilirubin   0.3    Alkaline Phosphatase   42    AST (SGOT)   16    ALT (SGPT)   21    Albumin/Globulin Ratio   1.4    BUN/Creatinine Ratio 18.7  21.7  15.0    Anion Gap 9.9  8.8  14.1      A1C Last 3 Results          8/21/2023    09:54 9/25/2023    18:46 11/21/2023    10:25   HGBA1C Last 3 Results   Hemoglobin A1C 7.30  6.80  6.40                    Assessment and Plan   Diagnoses and all orders for this visit:    1. Type 2 diabetes mellitus with diabetic neuropathy, without long-term current use of " insulin (Primary)  -     Comprehensive Metabolic Panel; Future  -     CBC & Differential; Future  -     Hemoglobin A1c; Future  -     Cancel: Microalbumin / Creatinine Urine Ratio - Urine, Clean Catch; Future  -     Hemoglobin A1c; Future  -     Microalbumin / Creatinine Urine Ratio - Urine, Clean Catch; Future    2. Primary hypertension  -     T4, Free; Future  -     TSH; Future  -     Magnesium; Future  -     Basic Metabolic Panel; Future    3. Hyperlipidemia, unspecified hyperlipidemia type  -     Cancel: Lipid Panel; Future    4. Leg swelling    5. Vitamin B 12 deficiency  -     Folate; Future  -     Vitamin B12; Future    6. Vitamin D deficiency  -     Vitamin D,25-Hydroxy; Future    7. Screening for malignant neoplasm of prostate  -     PSA Screen; Future    8. Encounter for long-term current use of medication  -     Urine Drug Screen - Urine, Clean Catch; Future    Other orders  -     furosemide (Lasix) 20 MG tablet; Take 1 tablet by mouth 2 (Two) Times a Day.  Dispense: 10 tablet; Refill: 0  -     metoprolol tartrate (LOPRESSOR) 25 MG tablet; Take 1 tablet by mouth Every 12 (Twelve) Hours.  Dispense: 180 tablet; Refill: 3  -     Semaglutide, 1 MG/DOSE, (Ozempic, 1 MG/DOSE,) 4 MG/3ML solution pen-injector; Inject 1 mg under the skin into the appropriate area as directed 1 (One) Time Per Week.  Dispense: 3 mL; Refill: 11  -     potassium chloride (K-DUR,KLOR-CON) 20 MEQ CR tablet; Take 1 tablet by mouth Daily.  Dispense: 90 tablet; Refill: 1    Type 2 diabetes mellitus with diabetic neuropathy:  Improved. HA1C is 6.40 and at goal taking Ozempic 0.5 mg weekly and metformin 1000 mg twice a day. Increase Oaempic 1 mg weekly and decrease metformin 1000 mg 1/2 tab twice a day.  Improving on gabapentin to 800 mg tid and to increase to 800 mg tid.  HERMILO reviewed.       Hypertension:  Blood pressure well-controlled. Continue metoprolol 25 mg BID, amlodipine 10 mg daily, lisinopril 40 mg daily and HCTZ 25 mg daily.       Hyperlipidemia: Stable on atorvastatin 40 mg daily and Omega 500 mg daily.    Leg swelling: Lasix 20 mg every am for 10 days.  Follow-up with cardiology if not improving.      Patient was given instructions and counseling regarding his condition or for health maintenance advice. Please see specific information pulled into the AVS if appropriate.     Follow Up   Return in about 3 months (around 3/4/2024) for Recheck.    Dictated Utilizing Dragon Dictation.  Please note that portions of this note were completed with a voice recognition program.  Part of this note may be an electronic transcription/translation of spoken language to printed text using the Dragon Dictation System.    DARRELL Pardo

## 2023-12-04 ENCOUNTER — OFFICE VISIT (OUTPATIENT)
Dept: INTERNAL MEDICINE | Facility: CLINIC | Age: 78
End: 2023-12-04
Payer: MEDICARE

## 2023-12-04 VITALS
BODY MASS INDEX: 25.6 KG/M2 | SYSTOLIC BLOOD PRESSURE: 130 MMHG | OXYGEN SATURATION: 96 % | HEART RATE: 69 BPM | WEIGHT: 178.8 LBS | HEIGHT: 70 IN | TEMPERATURE: 97.3 F | DIASTOLIC BLOOD PRESSURE: 70 MMHG

## 2023-12-04 DIAGNOSIS — E78.5 HYPERLIPIDEMIA, UNSPECIFIED HYPERLIPIDEMIA TYPE: ICD-10-CM

## 2023-12-04 DIAGNOSIS — E55.9 VITAMIN D DEFICIENCY: ICD-10-CM

## 2023-12-04 DIAGNOSIS — Z79.899 ENCOUNTER FOR LONG-TERM CURRENT USE OF MEDICATION: ICD-10-CM

## 2023-12-04 DIAGNOSIS — E53.8 VITAMIN B 12 DEFICIENCY: ICD-10-CM

## 2023-12-04 DIAGNOSIS — Z12.5 SCREENING FOR MALIGNANT NEOPLASM OF PROSTATE: ICD-10-CM

## 2023-12-04 DIAGNOSIS — I10 PRIMARY HYPERTENSION: ICD-10-CM

## 2023-12-04 DIAGNOSIS — E11.40 TYPE 2 DIABETES MELLITUS WITH DIABETIC NEUROPATHY, WITHOUT LONG-TERM CURRENT USE OF INSULIN: Primary | ICD-10-CM

## 2023-12-04 DIAGNOSIS — M79.89 LEG SWELLING: ICD-10-CM

## 2023-12-04 RX ORDER — POTASSIUM CHLORIDE 20 MEQ/1
20 TABLET, EXTENDED RELEASE ORAL DAILY
Qty: 90 TABLET | Refills: 1 | Status: SHIPPED | OUTPATIENT
Start: 2023-12-04

## 2023-12-04 RX ORDER — FUROSEMIDE 20 MG/1
20 TABLET ORAL 2 TIMES DAILY
Qty: 10 TABLET | Refills: 0 | Status: SHIPPED | OUTPATIENT
Start: 2023-12-04

## 2023-12-04 RX ORDER — SEMAGLUTIDE 1.34 MG/ML
1 INJECTION, SOLUTION SUBCUTANEOUS WEEKLY
Qty: 3 ML | Refills: 11 | Status: SHIPPED | OUTPATIENT
Start: 2023-12-04

## 2023-12-06 RX ORDER — AMLODIPINE BESYLATE 10 MG/1
10 TABLET ORAL DAILY
Qty: 90 TABLET | Refills: 1 | Status: SHIPPED | OUTPATIENT
Start: 2023-12-06

## 2023-12-06 RX ORDER — FUROSEMIDE 20 MG/1
20 TABLET ORAL 2 TIMES DAILY
Qty: 10 TABLET | Refills: 0 | OUTPATIENT
Start: 2023-12-06

## 2023-12-13 ENCOUNTER — TELEPHONE (OUTPATIENT)
Dept: INTERNAL MEDICINE | Facility: CLINIC | Age: 78
End: 2023-12-13

## 2023-12-13 NOTE — TELEPHONE ENCOUNTER
Caller: DEN    Relationship to patient: Home Health    Best call back number: [phone]    Patient is needing: JACINTO [facility] IS DISMISSING PATIENT FROM HOME HEALTH NURSING ON 12.18.2023 AND HE WILL BE ATTENDING A REHAB STARTING IN JANUARY

## 2023-12-19 ENCOUNTER — OFFICE VISIT (OUTPATIENT)
Dept: SLEEP MEDICINE | Facility: HOSPITAL | Age: 78
End: 2023-12-19
Payer: MEDICARE

## 2023-12-19 VITALS
BODY MASS INDEX: 24.91 KG/M2 | DIASTOLIC BLOOD PRESSURE: 69 MMHG | SYSTOLIC BLOOD PRESSURE: 125 MMHG | OXYGEN SATURATION: 100 % | WEIGHT: 174 LBS | HEIGHT: 70 IN | HEART RATE: 59 BPM

## 2023-12-19 DIAGNOSIS — G47.34 SLEEP RELATED HYPOXIA: ICD-10-CM

## 2023-12-19 DIAGNOSIS — I10 HYPERTENSION, UNSPECIFIED TYPE: ICD-10-CM

## 2023-12-19 DIAGNOSIS — G47.33 OSA ON CPAP: Primary | ICD-10-CM

## 2023-12-19 DIAGNOSIS — I25.110 CORONARY ARTERY DISEASE INVOLVING NATIVE CORONARY ARTERY OF NATIVE HEART WITH UNSTABLE ANGINA PECTORIS: ICD-10-CM

## 2023-12-19 PROBLEM — R29.818 SUSPECTED SLEEP APNEA: Status: RESOLVED | Noted: 2023-08-09 | Resolved: 2023-12-19

## 2023-12-19 PROCEDURE — 3074F SYST BP LT 130 MM HG: CPT | Performed by: INTERNAL MEDICINE

## 2023-12-19 PROCEDURE — G0463 HOSPITAL OUTPT CLINIC VISIT: HCPCS

## 2023-12-19 PROCEDURE — 3078F DIAST BP <80 MM HG: CPT | Performed by: INTERNAL MEDICINE

## 2023-12-19 RX ORDER — SEMAGLUTIDE 1.34 MG/ML
INJECTION, SOLUTION SUBCUTANEOUS
COMMUNITY
Start: 2023-12-18

## 2023-12-19 NOTE — PROGRESS NOTES
Sleep Disorder Follow Up    Patient Name: Trenton Adames  Age/Sex: 78 y.o. male  : 1945  MRN: [id number]    Date of Encounter Visit: 23   Referring Provider: Naheed George MD  Place of Service: [facility]  Patient Care Team:  Jacquelin Pelaez APRN as PCP - General (Nurse Practitioner)    PROBLEM LIST:  Obstructive sleep apnea  Pulmonary hypertension  Diabetes mellitus  Primary hypertension  Hyperlipidemia  Overweight  Hypersomnia    History of Present Illness:  Trenton Adames is a 78 y.o. male who is here for follow up on HILLARY.  patient was seen in the office on 2023, at the time his CPAP compliance was at 60% with less than 4 hours average nightly use.  Recommendation were to add a chinstrap to minimize the mouth breathing and the discomfort that comes from that and to continue using the humidifier and we suggested some over-the-counter medication to help minimize the discomfort from the dry mouth.  On the download today the patient is doing a lot better with sats of 100% as of  with an average nightly use of 5 hours and 5 minutes on the same auto CPAP setting with residual AHI of 2.3 with very minimal air leak.  The xilymelts helped a lot with the dry mouth   Patient uses machine every night with no complaints from the mask or the pressure.  Patient uses a FFM (under the nose)  mask, which does not fit well.   He is waking up with the pressure higher with worsening air leak as result of that, he reset it and same thing happens again   He was changed FFM over the nose but still had dry mouth   No chin strap was ordered ut it was not delivered and he did not request it  He is complaining of the pressure going up after he sleeps and causing him to wake up    He is not as happy with the CPAP, he is willing to continue to work with it but he is very interested in pursuing the hypoglossal nerve stimulator   Currently on 6-20 cm H20.  Shruthi  Sleepiness Scale (ESS) is 4.  Compliance download was reviewed and documented below.  Weight is down by 4 since last visit.  He did have CABG with 7 vessels bypass that resulted in the weight loss   Other comorbidities include  CAD , pul HTN, DM, essential HTN      Review of Systems:   A ten-system review was conducted and was negative except for the following:  dry mouth.        Past Medical History:  Past medical, surgical, social, and family history, except as mentioned above, was unchanged from the last visit.     Past Medical History:   Diagnosis Date    Arthritis     Asthma     Atypical chest pain     Coronary artery disease involving native coronary artery of native heart with unstable angina pectoris 9/25/2023    Dysphagia, oral phase     NO CURRENT ISSUES    Essential (primary) hypertension     Foot pain, bilateral     Heart murmur 5/2023    Heart valve disease 7/2023    Hyperlipidemia, unspecified     Low back pain     Neuropathy in diabetes     Peripheral neuropathy     Pulmonary HTN 08/09/2023    Sleep apnea     USES CPAP    Type 2 diabetes mellitus without complication     Vitamin D deficiency, unspecified        Past Surgical History:   Procedure Laterality Date    CARDIAC CATHETERIZATION N/A 09/19/2023    Procedure: Left Heart Cath with possible coronary angioplasty;  Surgeon: Marc Charles MD;  Location: [facility] CATH INVASIVE LOCATION;  Service: Cardiology;  Laterality: N/A;    CATARACT EXTRACTION, BILATERAL  1/28/2014; 1/7/2014    COLONOSCOPY  2022    CORONARY ARTERY BYPASS GRAFT N/A 09/26/2023    Procedure: RUKHSANA STERNOTOMY CORONARY ARTERY BYPASS GRAFT TIMES 7 USING LEFT INTERNAL MAMMARY ARTERY AND RIGHT GREATER SAPHENOUS VEIN GRAFT PER ENDOSCOPIC VEIN HARVESTING AND PRP;  Surgeon: Jr Frederic Groves MD;  [facility];  Service: Cardiothoracic;  Laterality: N/A;    ENDOMETRIAL ABLATION      GASTROSTOMY      HEMORRHOIDECTOMY  06/20/2014    POLYPECTOMY      SINUS SURGERY         Home  Medications:     Current Outpatient Medications:     Accu-Chek Softclix Lancets lancets, USE ONE LANCET 2-3 TIMES DAILY AS NEEDED, Disp: 100 each, Rfl: 12    albuterol sulfate  (90 Base) MCG/ACT inhaler, Inhale 2 puffs Every 4 (Four) Hours As Needed for Wheezing., Disp: 18 g, Rfl: 5    amLODIPine (NORVASC) 10 MG tablet, TAKE 1 TABLET BY MOUTH DAILY, Disp: 90 tablet, Rfl: 1    aspirin 81 MG EC tablet, Take 1 tablet by mouth Daily., Disp: , Rfl:     Blood Glucose Monitoring Suppl (Accu-Chek Guide) w/Device kit, 1 each 3 (Three) Times a Day As Needed (check 2-3 times daily as needed)., Disp: 1 kit, Rfl: 0    clobetasol (TEMOVATE) 0.05 % cream, Apply 1 application topically to the appropriate area as directed 2 (Two) Times a Day., Disp: 30 g, Rfl: 2    docusate sodium 100 MG capsule, Take 1 capsule by mouth 2 (Two) Times a Day., Disp: 180 capsule, Rfl: 1    esomeprazole (nexIUM) 20 MG capsule, Take 1 capsule by mouth Every Morning Before Breakfast., Disp: , Rfl:     fluorouracil (EFUDEX) 5 % cream, , Disp: , Rfl:     fluticasone (FLONASE) 50 MCG/ACT nasal spray, 1 spray into the nostril(s) as directed by provider Daily., Disp: , Rfl:     furosemide (Lasix) 20 MG tablet, Take 1 tablet by mouth 2 (Two) Times a Day., Disp: 10 tablet, Rfl: 0    gabapentin (NEURONTIN) 800 MG tablet, TAKE ONE TABLET BY MOUTH THREE TIMES A DAY, Disp: 90 tablet, Rfl: 2    glucose blood (Accu-Chek Guide) test strip, USE ONE TEST STRIP 2-3 TIMES DAILY AS NEEDED., Disp: 100 each, Rfl: 12    glucose blood test strip, Test 2-3 times a week, Disp: 100 each, Rfl: 1    hydroCHLOROthiazide (HYDRODIURIL) 25 MG tablet, Take 1 tablet by mouth Daily., Disp: 90 tablet, Rfl: 3    hydrOXYzine (ATARAX) 10 MG tablet, Take 1 tablet by mouth 3 (Three) Times a Day As Needed for Itching., Disp: 30 tablet, Rfl: 1    Krill Oil (Omega-3) 500 MG capsule, Take 1 capsule by mouth Daily., Disp: , Rfl:     Lancets (onetouch ultrasoft) lancets, Test 2-3 times a week.,  Disp: 100 each, Rfl: 12    loratadine (CLARITIN) 10 MG tablet, Take 1 tablet by mouth 2 (Two) Times a Day., Disp: , Rfl:     Melatonin 10 MG tablet, Take 1 tablet by mouth As Needed., Disp: , Rfl:     metFORMIN (GLUCOPHAGE) 1000 MG tablet, Take 1 tablet by mouth 2 (Two) Times a Day With Meals. (Patient taking differently: Take 0.5 tablets by mouth 2 (Two) Times a Day With Meals.), Disp: 180 tablet, Rfl: 1    metoprolol tartrate (LOPRESSOR) 25 MG tablet, Take 1 tablet by mouth Every 12 (Twelve) Hours., Disp: 180 tablet, Rfl: 3    montelukast (SINGULAIR) 10 MG tablet, TAKE 1 TABLET BY MOUTH DAILY, Disp: 90 tablet, Rfl: 0    ondansetron ODT (ZOFRAN-ODT) 4 MG disintegrating tablet, Place 1 tablet on the tongue Every 8 (Eight) Hours As Needed for Nausea or Vomiting., Disp: 30 tablet, Rfl: 1    potassium chloride (K-DUR,KLOR-CON) 20 MEQ CR tablet, Take 1 tablet by mouth Daily., Disp: 90 tablet, Rfl: 1    rosuvastatin (Crestor) 40 MG tablet, Take 1 tablet by mouth Every Night., Disp: 90 tablet, Rfl: 3    Semaglutide, 1 MG/DOSE, (Ozempic, 1 MG/DOSE,) 4 MG/3ML solution pen-injector, Inject 1 mg under the skin into the appropriate area as directed 1 (One) Time Per Week., Disp: 3 mL, Rfl: 11    Semaglutide, 1 MG/DOSE, (Ozempic, 1 MG/DOSE,) 4 MG/3ML solution pen-injector, 1 mg WEEKLY (route: subcutaneous), Disp: , Rfl:     Turmeric 500 MG capsule, Take  by mouth., Disp: , Rfl:     vitamin D3 125 MCG (5000 UT) capsule capsule, Take 1 capsule by mouth Daily., Disp: , Rfl:     Current Facility-Administered Medications:     cyanocobalamin injection 1,000 mcg, 1,000 mcg, Intramuscular, Q28 Days, Jacquelin Pelaez APRN, 1,000 mcg at 11/29/23 1218    Allergies:  Allergies   Allergen Reactions    Cat Hair Extract Itching    Grass Itching    Pollen Extract Itching    Tape Rash       Past Social History:  Social History     Socioeconomic History    Marital status:    Tobacco Use    Smoking status: Former     Packs/day: 2.00      "Years: 15.00     Additional pack years: 0.00     Total pack years: 30.00     Types: Cigarettes, Pipe     Quit date: 1995     Years since quittin.9    Smokeless tobacco: Never   Vaping Use    Vaping Use: Never used   Substance and Sexual Activity    Alcohol use: Yes     Alcohol/week: 8.0 standard drinks of alcohol     Types: 6 Cans of beer, 2 Drinks containing 0.5 oz of alcohol per week     Comment: Occasional     Drug use: Never    Sexual activity: Defer     Partners: Female       Past Family History:  Family History   Problem Relation Age of Onset    Cancer Mother     Heart disease Father     Diabetes Father     Arthritis Father     Heart disease Maternal Grandmother     Cancer Maternal Grandfather     Diabetes Paternal Grandmother     Malig Hyperthermia Neg Hx          Objective:        Vital Signs:   Visit Vitals  /69   Pulse 59   Ht 177.8 cm (70\")   Wt 78.9 kg (174 lb)   SpO2 100%   BMI 24.97 kg/m²     Wt Readings from Last 3 Encounters:   23 78.9 kg (174 lb)   23 81.1 kg (178 lb 12.8 oz)   23 82.7 kg (182 lb 6.4 oz)          Physical Exam:   GEN:  No acute distress, alert, cooperative, well developed   EYES:   Sclerae clear. No icterus. PERRL. Normal EOM  ENT:   External ears/nose normal, no oral lesions, no thrush, mucous membranes moist, Septum midline. Mallampati IV airway. Large uvula.    NECK:  Supple, midline trachea, no JVD  LUNGS: Normal chest on inspection, CTAB, no wheezes. No rhonchi. No crackles. Respirations regular, even and unlabored.   CV:  Regular rhythm and rate. Normal S1/S2. No murmurs, gallops, or rubs noted.  ABD:  Soft, nontender and nondistended. Normal bowel sounds. No guarding  EXT:  Moves all extremities well. No cyanosis. No redness. Trace Right edema.   Skin: Dry, intact, no bleeding      Diagnostic Data:  HST 8/10/2023:    Respiratory Disturbance Events:   This is a home sleep study done on 8/10/2023  Reported weight on the night of the study was " 87.5 kg with a BMI of 27.7  Total monitoring time 457 minutes out of 473.4 minutes of recording time  Respiratory summary was positive for moderate obstructive sleep apnea with AHI of 15.4 with no central apnea noted, patient has significant positional component with apneas mainly in the supine position while having normal AHI while sleeping on the side  Patient had sleep-related hypoxemia with a garth desaturation down to 85% with clinically borderline insignificant hypoxemia with only 4.4 minutes spent in the hypoxemic range  Percentage of snoring was 58.2%  Heart rate was mostly within normal limit throughout the night.   Impression:   Moderate HILLARY with supine component  Sleep-related hypoxemia  Plan:   Treatment is recommended, will initiate auto CPAP 6-20 and follow-up at the sleep center for further recommendations    Echocardiogram 5/30/2023:    Left ventricular systolic function is normal. Left ventricular ejection fraction appears to be 61 - 65%.    Left ventricular diastolic function was normal.    No regional wall motion abnormality    Estimated right ventricular systolic pressure from tricuspid regurgitation is moderately elevated (45-55 mmHg).    Compliance download from 10/13/2023 - 11/11/2023 showed 60% adherence with an average nightly use of 3 hours and 4 minutes (5 hours and 7 minutes per night on the nights used).  Currently on auto CPAP 6-20 with a median/95th percentile pressure of 9.2/13.4 cm H2O.  Air leak was mild with a median/95th percentile of 3.1/15.9 L/min  Residual AHI was 2.9 reflecting adequate control of the underlying sleep apnea      Assessment and Plan:       ICD-10-CM ICD-9-CM   1. HILLARY on CPAP  G47.33 327.23   2. Hypertension, unspecified type  I10 401.9   3. Coronary artery disease involving native coronary artery of native heart with unstable angina pectoris  I25.110 414.01     411.1   4. Sleep related hypoxia  G47.34 327.24         Recommendations:     Patient has significant  case of sleep apnea with significant comorbidities with coronary artery disease status post multiple vessel bypass and pulmonary hypertension, he does have sleep-related hypoxemia because of the sleep apnea and treatment is strongly recommended  On the CPAP is very well-controlled however patient is using it consistently but he is using it because he has to and patient is willing to consider any other treatment options even if it include surgery.  Patient already have some investigation done on his own regarding the hypoglossal nerve stimulator and he is very interested in getting that pursued meanwhile he is agreeable to continue to use a CPAP  Given the specific issues he had with the CPAP, we will try to switch to the nasal pillow or the nasal cushion in addition to the chinstrap and I will reduce the pressure on the CPAP machine, this might result in suboptimal control of the sleep apnea but it is still much better especially if the patient is more comfortable with that and is willing to continue to use it while is being evaluated for the surgical procedure  In case patient is not a surgical candidate then CPAP will be recommended to be continued with what ever adjustment needed to help improve on the comfort level  Patient will follow-up with me in 4 months but we will go ahead and initiate the referral for the ENT surgical evaluation  Education provided about the information from the download, we also discussed the measures to help with the comfort level on the CPAP while we are trying to pursue surgical evaluation  We did discuss the hypoglossal nerve stimulator and anticipate more detailed education from the surgical team if the patient deemed a good candidate    Orders Placed This Encounter   Procedures    PAP Therapy    Ambulatory Referral to ENT (Otolaryngology)     No orders of the defined types were placed in this encounter.    Return in about 4 months (around 4/19/2024).    Naheed George MD    [facility]   12/19/23  10:30 EST    Dictated utilizing Dragon dictation

## 2023-12-27 ENCOUNTER — CLINICAL SUPPORT (OUTPATIENT)
Dept: INTERNAL MEDICINE | Age: 78
End: 2023-12-27
Payer: MEDICARE

## 2023-12-27 PROCEDURE — 96372 THER/PROPH/DIAG INJ SC/IM: CPT | Performed by: NURSE PRACTITIONER

## 2023-12-27 RX ORDER — ALBUTEROL SULFATE 90 UG/1
2 AEROSOL, METERED RESPIRATORY (INHALATION) EVERY 4 HOURS PRN
Qty: 18 G | Refills: 5 | Status: SHIPPED | OUTPATIENT
Start: 2023-12-27

## 2023-12-27 RX ADMIN — CYANOCOBALAMIN 1000 MCG: 1000 INJECTION, SOLUTION INTRAMUSCULAR; SUBCUTANEOUS at 11:54

## 2024-01-08 ENCOUNTER — LAB (OUTPATIENT)
Dept: INTERNAL MEDICINE | Facility: CLINIC | Age: 79
End: 2024-01-08
Payer: MEDICARE

## 2024-01-08 DIAGNOSIS — E78.2 MIXED DYSLIPIDEMIA: ICD-10-CM

## 2024-01-08 LAB
CHOLEST SERPL-MCNC: 106 MG/DL (ref 0–200)
HDLC SERPL-MCNC: 36 MG/DL (ref 40–60)
LDLC SERPL CALC-MCNC: 40 MG/DL (ref 0–100)
LDLC/HDLC SERPL: 0.92 {RATIO}
TRIGL SERPL-MCNC: 184 MG/DL (ref 0–150)
VLDLC SERPL-MCNC: 30 MG/DL (ref 5–40)

## 2024-01-08 PROCEDURE — 36415 COLL VENOUS BLD VENIPUNCTURE: CPT | Performed by: INTERNAL MEDICINE

## 2024-01-08 PROCEDURE — 80061 LIPID PANEL: CPT | Performed by: INTERNAL MEDICINE

## 2024-01-12 ENCOUNTER — OFFICE VISIT (OUTPATIENT)
Dept: PODIATRY | Facility: CLINIC | Age: 79
End: 2024-01-12
Payer: MEDICARE

## 2024-01-12 VITALS
WEIGHT: 178 LBS | DIASTOLIC BLOOD PRESSURE: 75 MMHG | OXYGEN SATURATION: 96 % | HEART RATE: 66 BPM | BODY MASS INDEX: 25.54 KG/M2 | SYSTOLIC BLOOD PRESSURE: 125 MMHG | TEMPERATURE: 98.4 F

## 2024-01-12 DIAGNOSIS — M79.672 FOOT PAIN, BILATERAL: ICD-10-CM

## 2024-01-12 DIAGNOSIS — M79.671 FOOT PAIN, BILATERAL: ICD-10-CM

## 2024-01-12 DIAGNOSIS — Z79.4 TYPE 2 DIABETES MELLITUS WITH DIABETIC POLYNEUROPATHY, WITH LONG-TERM CURRENT USE OF INSULIN: Primary | ICD-10-CM

## 2024-01-12 DIAGNOSIS — E11.42 TYPE 2 DIABETES MELLITUS WITH DIABETIC POLYNEUROPATHY, WITH LONG-TERM CURRENT USE OF INSULIN: Primary | ICD-10-CM

## 2024-01-12 DIAGNOSIS — G62.9 NEUROPATHY: ICD-10-CM

## 2024-01-12 DIAGNOSIS — E11.8 DM FEET: ICD-10-CM

## 2024-01-12 NOTE — PROGRESS NOTES
[facility] - PODIATRY    Today's Date: 24    Patient Name: rTenton Adames  MRN: [id number]  CSN: [id number]  PCP: Jacquelin Pelaez APRN, Last PCP Visit:  2023  Referring Provider: No ref. provider found    SUBJECTIVE     Chief Complaint   Patient presents with    Left Foot - Follow-up, Diabetes, Nail Problem    Right Foot - Follow-up, Diabetes, Nail Problem     HPI: Trenton Adames, a 78 y.o.male, presents to clinic for a diabetic foot evaluation.    New, Established, New Problem:  est    Onset: Insidious    Nature:  NIDDM    Stable, worsening, improving:  stable    Patient controlling diabetes via:  Oral meds    Patient states their most recent blood glucose readin.      Patient denies any fevers, chills, nausea, vomiting, shortness of breath, nor any other constitutional signs nor symptoms.    Increasing neuropathy symptoms.    Medical changes: CABG x 7 with Dr. Groves.    Past Medical History:   Diagnosis Date    Arthritis     Asthma     Atypical chest pain     Coronary artery disease involving native coronary artery of native heart with unstable angina pectoris 2023    Dysphagia, oral phase     NO CURRENT ISSUES    Essential (primary) hypertension     Foot pain, bilateral     Heart murmur 2023    Heart valve disease 2023    Hyperlipidemia, unspecified     Low back pain     Neuropathy in diabetes     Peripheral neuropathy     Pulmonary HTN 2023    Sleep apnea     USES CPAP    Type 2 diabetes mellitus without complication     Vitamin D deficiency, unspecified      Past Surgical History:   Procedure Laterality Date    CARDIAC CATHETERIZATION N/A 2023    Procedure: Left Heart Cath with possible coronary angioplasty;  Surgeon: Marc Charles MD;  Location: [facility] INVASIVE LOCATION;  Service: Cardiology;  Laterality: N/A;    CATARACT EXTRACTION, BILATERAL  2014; 2014    COLONOSCOPY      CORONARY ARTERY BYPASS GRAFT N/A 2023     Procedure: RUKHSANA STERNOTOMY CORONARY ARTERY BYPASS GRAFT TIMES 7 USING LEFT INTERNAL MAMMARY ARTERY AND RIGHT GREATER SAPHENOUS VEIN GRAFT PER ENDOSCOPIC VEIN HARVESTING AND PRP;  Surgeon: Jr Frederic Groves MD;  Location: [facility];  Service: Cardiothoracic;  Laterality: N/A;    ENDOMETRIAL ABLATION      GASTROSTOMY      HEMORRHOIDECTOMY  2014    POLYPECTOMY      SINUS SURGERY       Family History   Problem Relation Age of Onset    Cancer Mother     Heart disease Father     Diabetes Father     Arthritis Father     Heart disease Maternal Grandmother     Cancer Maternal Grandfather     Diabetes Paternal Grandmother     Malig Hyperthermia Neg Hx      Social History     Socioeconomic History    Marital status:    Tobacco Use    Smoking status: Former     Packs/day: 2.00     Years: 15.00     Additional pack years: 0.00     Total pack years: 30.00     Types: Cigarettes, Pipe     Quit date: 1995     Years since quittin.0    Smokeless tobacco: Never   Vaping Use    Vaping Use: Never used   Substance and Sexual Activity    Alcohol use: Yes     Alcohol/week: 8.0 standard drinks of alcohol     Types: 6 Cans of beer, 2 Drinks containing 0.5 oz of alcohol per week     Comment: Occasional     Drug use: Never    Sexual activity: Defer     Partners: Female     Allergies   Allergen Reactions    Cat Hair Extract Itching    Grass Itching    Pollen Extract Itching    Tape Rash     Current Outpatient Medications   Medication Sig Dispense Refill    Accu-Chek Softclix Lancets lancets USE ONE LANCET 2-3 TIMES DAILY AS NEEDED 100 each 12    albuterol sulfate  (90 Base) MCG/ACT inhaler Inhale 2 puffs Every 4 (Four) Hours As Needed for Wheezing. 18 g 5    amLODIPine (NORVASC) 10 MG tablet TAKE 1 TABLET BY MOUTH DAILY 90 tablet 1    aspirin 81 MG EC tablet Take 1 tablet by mouth Daily.      Blood Glucose Monitoring Suppl (Accu-Chek Guide) w/Device kit 1 each 3 (Three) Times a Day As Needed (check 2-3 times  daily as needed). 1 kit 0    clobetasol (TEMOVATE) 0.05 % cream Apply 1 application topically to the appropriate area as directed 2 (Two) Times a Day. 30 g 2    docusate sodium 100 MG capsule Take 1 capsule by mouth 2 (Two) Times a Day. 180 capsule 1    esomeprazole (nexIUM) 20 MG capsule Take 1 capsule by mouth Every Morning Before Breakfast.      fluorouracil (EFUDEX) 5 % cream       fluticasone (FLONASE) 50 MCG/ACT nasal spray 1 spray into the nostril(s) as directed by provider Daily.      furosemide (Lasix) 20 MG tablet Take 1 tablet by mouth 2 (Two) Times a Day. 10 tablet 0    gabapentin (NEURONTIN) 800 MG tablet TAKE ONE TABLET BY MOUTH THREE TIMES A DAY 90 tablet 2    glucose blood (Accu-Chek Guide) test strip USE ONE TEST STRIP 2-3 TIMES DAILY AS NEEDED. 100 each 12    glucose blood test strip Test 2-3 times a week 100 each 1    hydroCHLOROthiazide (HYDRODIURIL) 25 MG tablet Take 1 tablet by mouth Daily. 90 tablet 3    hydrOXYzine (ATARAX) 10 MG tablet Take 1 tablet by mouth 3 (Three) Times a Day As Needed for Itching. 30 tablet 1    Krill Oil (Omega-3) 500 MG capsule Take 1 capsule by mouth Daily.      Lancets (onetouch ultrasoft) lancets Test 2-3 times a week. 100 each 12    loratadine (CLARITIN) 10 MG tablet Take 1 tablet by mouth 2 (Two) Times a Day.      Melatonin 10 MG tablet Take 1 tablet by mouth As Needed.      metFORMIN (GLUCOPHAGE) 1000 MG tablet Take 1 tablet by mouth 2 (Two) Times a Day With Meals. (Patient taking differently: Take 0.5 tablets by mouth 2 (Two) Times a Day With Meals.) 180 tablet 1    metoprolol tartrate (LOPRESSOR) 25 MG tablet Take 1 tablet by mouth Every 12 (Twelve) Hours. 180 tablet 3    montelukast (SINGULAIR) 10 MG tablet TAKE 1 TABLET BY MOUTH DAILY 90 tablet 0    ondansetron ODT (ZOFRAN-ODT) 4 MG disintegrating tablet Place 1 tablet on the tongue Every 8 (Eight) Hours As Needed for Nausea or Vomiting. 30 tablet 1    potassium chloride (K-DUR,KLOR-CON) 20 MEQ CR tablet  Take 1 tablet by mouth Daily. 90 tablet 1    rosuvastatin (Crestor) 40 MG tablet Take 1 tablet by mouth Every Night. 90 tablet 3    Semaglutide, 1 MG/DOSE, (Ozempic, 1 MG/DOSE,) 4 MG/3ML solution pen-injector Inject 1 mg under the skin into the appropriate area as directed 1 (One) Time Per Week. 3 mL 11    Semaglutide, 1 MG/DOSE, (Ozempic, 1 MG/DOSE,) 4 MG/3ML solution pen-injector 1 mg WEEKLY (route: subcutaneous)      Turmeric 500 MG capsule Take  by mouth.      vitamin D3 125 MCG (5000 UT) capsule capsule Take 1 capsule by mouth Daily.      Ibuprofen 3 %, Gabapentin 10 %, Baclofen 2 %, lidocaine 4 %, Ketamine HCl 4 % Apply 1-2 g topically to the appropriate area as directed 3 (Three) to 4 (Four) times daily. 90 g 5    Ibuprofen 3 %, Gabapentin 10 %, Baclofen 2 %, lidocaine 4 %, Ketamine HCl 4 % Apply 1-2 g topically to the appropriate area as directed 3 (Three) to 4 (Four) times daily. 90 g 5     Current Facility-Administered Medications   Medication Dose Route Frequency Provider Last Rate Last Admin    cyanocobalamin injection 1,000 mcg  1,000 mcg Intramuscular Q28 Days Jacquelin Pelaez APRN   1,000 mcg at 12/27/23 1154     Review of Systems   Constitutional: Negative.    Neurological:  Positive for numbness.   All other systems reviewed and are negative.      OBJECTIVE     Vitals:    01/12/24 0852   BP: 125/75   Pulse: 66   Temp: 98.4 °F (36.9 °C)   SpO2: 96%       Body mass index is 25.54 kg/m².    Lab Results   Component Value Date    HGBA1C 6.40 (H) 11/21/2023       Lab Results   Component Value Date    GLUCOSE 153 (H) 11/21/2023    CALCIUM 9.5 11/21/2023     11/21/2023    K 3.9 11/21/2023    CO2 25.9 11/21/2023    CL 96 (L) 11/21/2023    BUN 12 11/21/2023    CREATININE 0.80 11/21/2023    EGFRIFNONA 84 11/17/2021    BCR 15.0 11/21/2023    ANIONGAP 14.1 11/21/2023       Patient seen in no apparent distress.      PHYSICAL EXAM:     Foot/Ankle Exam    GENERAL  Diabetic foot exam performed    Appearance:   elderly  Orientation:  AAOx3  Affect:  appropriate  Gait:  unimpaired  Assistance:  independent  Right shoe gear: casual shoe  Left shoe gear: casual shoe    VASCULAR     Right Foot Vascularity   Dorsalis pedis:  1+  Posterior tibial:  1+  Skin temperature:  warm  Edema grading:  None  CFT:  < 3 seconds  Pedal hair growth:  Absent  Varicosities:  moderate varicosities     Left Foot Vascularity   Dorsalis pedis:  1+  Posterior tibial:  1+  Skin temperature:  warm  Edema grading:  None  CFT:  < 3 seconds  Pedal hair growth:  Absent  Varicosities:  moderate varicosities     NEUROLOGIC     Right Foot Neurologic   Light touch sensation: diminished  Vibratory sensation: diminished  Hot/Cold sensation: diminished  Protective Sensation using [place]-Henri Monofilament:   Sites intact: 4  Sites tested: 10     Left Foot Neurologic   Light touch sensation: diminished  Vibratory sensation: diminished  Hot/Cold sensation:  diminished  Protective Sensation using [place]-Henri Monofilament:   Sites intact: 4  Sites tested: 10    MUSCLE STRENGTH     Right Foot Muscle Strength   Foot dorsiflexion:  4  Foot plantar flexion:  4  Foot inversion:  4  Foot eversion:  4     Left Foot Muscle Strength   Foot dorsiflexion:  4  Foot plantar flexion:  4  Foot inversion:  4  Foot eversion:  4    RANGE OF MOTION     Right Foot Range of Motion   Foot and ankle ROM within normal limits       Left Foot Range of Motion   Foot and ankle ROM within normal limits      DERMATOLOGIC      Right Foot Dermatologic   Skin  Right foot skin is intact.      Left Foot Dermatologic   Skin  Left foot skin is intact.     Diabetic Foot Exam Performed and Monofilament Test Performed    ASSESSMENT/PLAN     Diagnoses and all orders for this visit:    1. Type 2 diabetes mellitus with diabetic polyneuropathy, with long-term current use of insulin (Primary)    2. Neuropathy  -     Ibuprofen 3 %, Gabapentin 10 %, Baclofen 2 %, lidocaine 4 %, Ketamine HCl 4 %; Apply  1-2 g topically to the appropriate area as directed 3 (Three) to 4 (Four) times daily.  Dispense: 90 g; Refill: 5    3. Foot pain, bilateral    4. DM feet    Rx:  Topical, compounded, neuropathy medication was written; see attached prescription.    Comprehensive lower extremity examination and evaluation was performed.    Discussed findings and treatment plan including risks, benefits, and treatment options with patient in detail. Patient agreed with treatment plan.    Medications and allergies reviewed.  Reviewed available blood glucose and HgB A1C lab values along with other pertinent labs.  These were discussed with the patient as to their importance of diabetic maintenance.    Diabetic foot exam performed and documented this date, compliant with CQM required standards. Detail of findings as noted in physical exam.  Lower extremity Neurologic exam for diabetic patient performed and documented this date, compliant with PQRS required standards. Detail of findings as noted in physical exam.  Advised patient importance of good routine lower extremity hygiene. Advised patient importance of evaluating for intact skin and pain free nail borders.  Advised patient to use mirror to evaluate plantar/ soles of feet for better visualization. Advised patient monitor and phone office to be seen if any cracking to skin, open lesions, painful nail borders or if nails become elongated prior to next visit. Advised patient importance of daily cleansing of lower extremities, followed by good skin cream to maintain normal hydration of skin. Also advised patient importance of close daily monitoring of blood sugar. Advised to regulate diet and medications to maintain control of blood sugar in optimal range. Contact primary care provider if difficulties maintaining blood sugar levels.  Advised Patient of presence of Diabetes Mellitus condition.  Advised Patient risk of progression and worsening or improvement, then return of condition.   Will monitor condition for any change in future. Treat with most appropriate treatment pending status of condition.  Counseled and advised patient extensively on nature and ramifications of diabetes. Standard instructions given to patient for good diabetic foot care and maintenance. Advised importance of careful monitoring to avoid break down and complications secondary to diabetes. Advised patient importance of strict maintenance of blood sugar control. Advised patient of possible ominous results from neglect of condition, i.e.: amputation/ loss of digits, feet and legs, or even death.  Patient states understands counseling, will monitor closely, continue good hygiene and routine diabetic foot care. Patient will contact office is questions or problems.      An After Visit Summary was printed and given to the patient at discharge, including (if requested) any available informative/educational handouts regarding diagnosis, treatment, or medications. All questions were answered to patient/family satisfaction. Should symptoms fail to improve or worsen they agree to call or return to clinic or to go to the Emergency Department. Discussed the importance of following up with any needed screening tests/labs/specialist appointments and any requested follow-up recommended by me today. Importance of maintaining follow-up discussed and patient accepts that missed appointments can delay diagnosis and potentially lead to worsening of conditions.    Return in about 1 year (around 1/12/2025) for DFE., or sooner if acute issues arise.    This document has been electronically signed by Sonny Parish DPM on January 12, 2024 09:28 EST

## 2024-01-18 RX ORDER — MONTELUKAST SODIUM 10 MG/1
10 TABLET ORAL DAILY
Qty: 90 TABLET | Refills: 0 | Status: SHIPPED | OUTPATIENT
Start: 2024-01-18

## 2024-01-23 ENCOUNTER — OFFICE VISIT (OUTPATIENT)
Dept: CARDIAC REHAB | Facility: HOSPITAL | Age: 79
End: 2024-01-23
Payer: MEDICARE

## 2024-01-23 VITALS
WEIGHT: 179.9 LBS | OXYGEN SATURATION: 99 % | DIASTOLIC BLOOD PRESSURE: 60 MMHG | SYSTOLIC BLOOD PRESSURE: 140 MMHG | BODY MASS INDEX: 25.81 KG/M2

## 2024-01-23 DIAGNOSIS — Z95.1 S/P CABG (CORONARY ARTERY BYPASS GRAFT): Primary | ICD-10-CM

## 2024-01-23 PROCEDURE — 93798 PHYS/QHP OP CAR RHAB W/ECG: CPT

## 2024-01-23 NOTE — PROGRESS NOTES
Phase II and III Education    Discipline Teaching:  Cardiac Rehab Phase II [x]      Cardiac Rehab Phase III []      Pulmonary Rehab II []      Pulmonary Rehab III []      Peripheral Artery Disease []        Class Given:  Asthma Management []      Beginners Cardiac Rehab []      Beginners Pulmonary Rehab []      CAD I []      CAD II []      CHF []      Diabetes Mellitus []     Diet & Cholesterol []     Diet Consult []      Energy Conservation []     Exercise []     Grocery Store Tour []     Harmonica Therapy []     High Blood Pressure []     Living with COPD []     Medication Safety []     Peripheral Artery Disease []     S & S of Infection []      Stress []        Education Topics:  Angina []      Arrhythmias []      Asthma Management []      Beginning Cardiac Rehab [x]      Blood Pressure [x]     Blood Sugar [x]     Breathing Retrainment []     CHF []     Cooking []     Daily Weight [x]     Diabetes Mellitus [x]      Diet [x]     Energy Conservation []     Exercise [x]      Foot Care []      Grocery Store Tour []      Heart Disease [x]      Heart Function [x]      Incision Care []     Living with COPD []     Medication Safety []     PAD Symptoms/Treatment []     Reconditioning Exercises []     S & S Infection []     Smoking Consult []     Stress Management []     Test Results []       Teaching Aids:  Video []      PAD Handout []      Pulmonary Workbook []      CAD Teaching Packet []      Stress Teaching Packet []     Exercise Teaching Packet []      Diet Teaching Packet []      CHF Teaching Packet []      Blood Pressure Teaching Packet []      Smoking Cessation Packet []      Medication Safety Handout []      Pflex Training []      Diabetes Teaching Packet []      Harmonica Therapy Packet []        Teaching Method:  Discussion [x]      Written Information [x]      Audio Visual [x]      Demonstration []        Teaching Recipient:  Patient [x]      Family []      Friend []      Legal Guardian []      Primary Care  Giver []      Significant Other []        Barriers To Learning:  None [x]      Auditory []      Cultural []      Emotional []      Financial []      Language []    Mental []      Motivation []      Physical []      Reading Skills []      Quaker []      Verbal/Cognitive []      Visual []      Written/Cognitive []        Teaching Response:  Verified Via Teach back [x]      Return Demo Via Teach Back []      Reinforcement Needed []      Unable to Return Demo []      Unable to Comprehend []      Declines Teaching  []        Additional Education Topics:  Instructed pt on exercise goals(RPE/MET level, s/s to report, exercise routine), dietary goals(BMI/wt reduction goal, lipid panel, Heart Healthy diet, statin reviewed with pt), importance of keeping blood pressure log, eating 1 hr prior to exercising and checking blood sugar pre and post exericse.      Follow Up Instruction Comment:

## 2024-01-23 NOTE — PROGRESS NOTES
Chief Complaint  Cardiac Rehab Phase II    Trenton Adames presents to [facility] CARDIOPULMONARY REHABILITATION    Physical Exam  Constitutional:       Appearance: Normal appearance.   Cardiovascular:      Rate and Rhythm: Normal rate and regular rhythm.   Pulmonary:      Effort: Pulmonary effort is normal.      Breath sounds: Normal breath sounds.   Musculoskeletal:      Right lower leg: No edema.      Left lower leg: No edema.   Skin:     General: Skin is warm and dry.      Comments: Midsternal/right leg incisions healed   Neurological:      Mental Status: He is alert and oriented to person, place, and time.   Psychiatric:         Mood and Affect: Mood normal.         Behavior: Behavior normal.         Thought Content: Thought content normal.         Judgment: Judgment normal.      Comments: PHQ-9 score 1  [place] score 14  No psychosocial needs identified.      Result Review :          Assessment and Plan    Diagnoses and all orders for this visit:    1. S/P CABG (coronary artery bypass graft) (Primary)        Lucila Pinto RN

## 2024-01-24 ENCOUNTER — TREATMENT (OUTPATIENT)
Dept: CARDIAC REHAB | Facility: HOSPITAL | Age: 79
End: 2024-01-24
Payer: MEDICARE

## 2024-01-24 DIAGNOSIS — Z95.1 S/P CABG (CORONARY ARTERY BYPASS GRAFT): Primary | ICD-10-CM

## 2024-01-24 PROCEDURE — 93798 PHYS/QHP OP CAR RHAB W/ECG: CPT

## 2024-01-26 ENCOUNTER — TREATMENT (OUTPATIENT)
Dept: CARDIAC REHAB | Facility: HOSPITAL | Age: 79
End: 2024-01-26
Payer: MEDICARE

## 2024-01-26 DIAGNOSIS — Z95.1 S/P CABG (CORONARY ARTERY BYPASS GRAFT): Primary | ICD-10-CM

## 2024-01-26 PROCEDURE — 93798 PHYS/QHP OP CAR RHAB W/ECG: CPT

## 2024-01-29 ENCOUNTER — TREATMENT (OUTPATIENT)
Dept: CARDIAC REHAB | Facility: HOSPITAL | Age: 79
End: 2024-01-29
Payer: MEDICARE

## 2024-01-29 DIAGNOSIS — Z95.1 S/P CABG (CORONARY ARTERY BYPASS GRAFT): Primary | ICD-10-CM

## 2024-01-29 PROCEDURE — 93798 PHYS/QHP OP CAR RHAB W/ECG: CPT

## 2024-01-31 ENCOUNTER — APPOINTMENT (OUTPATIENT)
Dept: CARDIAC REHAB | Facility: HOSPITAL | Age: 79
End: 2024-01-31
Payer: MEDICARE

## 2024-02-02 ENCOUNTER — TREATMENT (OUTPATIENT)
Dept: CARDIAC REHAB | Facility: HOSPITAL | Age: 79
End: 2024-02-02
Payer: MEDICARE

## 2024-02-02 DIAGNOSIS — Z95.1 S/P CABG (CORONARY ARTERY BYPASS GRAFT): Primary | ICD-10-CM

## 2024-02-02 PROCEDURE — 93798 PHYS/QHP OP CAR RHAB W/ECG: CPT

## 2024-02-05 ENCOUNTER — TREATMENT (OUTPATIENT)
Dept: CARDIAC REHAB | Facility: HOSPITAL | Age: 79
End: 2024-02-05
Payer: MEDICARE

## 2024-02-05 DIAGNOSIS — Z95.1 S/P CABG (CORONARY ARTERY BYPASS GRAFT): Primary | ICD-10-CM

## 2024-02-05 PROCEDURE — 93798 PHYS/QHP OP CAR RHAB W/ECG: CPT

## 2024-02-07 ENCOUNTER — TREATMENT (OUTPATIENT)
Dept: CARDIAC REHAB | Facility: HOSPITAL | Age: 79
End: 2024-02-07
Payer: MEDICARE

## 2024-02-07 DIAGNOSIS — Z95.1 S/P CABG (CORONARY ARTERY BYPASS GRAFT): Primary | ICD-10-CM

## 2024-02-07 PROCEDURE — 93798 PHYS/QHP OP CAR RHAB W/ECG: CPT

## 2024-02-08 ENCOUNTER — TELEPHONE (OUTPATIENT)
Dept: SLEEP MEDICINE | Facility: HOSPITAL | Age: 79
End: 2024-02-08
Payer: MEDICARE

## 2024-02-08 NOTE — TELEPHONE ENCOUNTER
Spoke with patient he states he was seen by Dr. Macedo 1/31/24. Once he gets release from Cardiologist he will be scheduled for surgery.

## 2024-02-09 ENCOUNTER — TREATMENT (OUTPATIENT)
Dept: CARDIAC REHAB | Facility: HOSPITAL | Age: 79
End: 2024-02-09
Payer: MEDICARE

## 2024-02-09 ENCOUNTER — CLINICAL SUPPORT (OUTPATIENT)
Dept: INTERNAL MEDICINE | Facility: CLINIC | Age: 79
End: 2024-02-09
Payer: MEDICARE

## 2024-02-09 DIAGNOSIS — E53.8 VITAMIN B 12 DEFICIENCY: Primary | ICD-10-CM

## 2024-02-09 DIAGNOSIS — Z95.1 S/P CABG (CORONARY ARTERY BYPASS GRAFT): Primary | ICD-10-CM

## 2024-02-09 PROCEDURE — 93798 PHYS/QHP OP CAR RHAB W/ECG: CPT

## 2024-02-09 RX ADMIN — CYANOCOBALAMIN 1000 MCG: 1000 INJECTION, SOLUTION INTRAMUSCULAR; SUBCUTANEOUS at 09:18

## 2024-02-12 ENCOUNTER — TREATMENT (OUTPATIENT)
Dept: CARDIAC REHAB | Facility: HOSPITAL | Age: 79
End: 2024-02-12
Payer: MEDICARE

## 2024-02-12 DIAGNOSIS — Z95.1 S/P CABG (CORONARY ARTERY BYPASS GRAFT): Primary | ICD-10-CM

## 2024-02-12 PROCEDURE — 93798 PHYS/QHP OP CAR RHAB W/ECG: CPT

## 2024-02-14 ENCOUNTER — TREATMENT (OUTPATIENT)
Dept: CARDIAC REHAB | Facility: HOSPITAL | Age: 79
End: 2024-02-14
Payer: MEDICARE

## 2024-02-14 DIAGNOSIS — Z95.1 S/P CABG (CORONARY ARTERY BYPASS GRAFT): Primary | ICD-10-CM

## 2024-02-14 PROCEDURE — 93798 PHYS/QHP OP CAR RHAB W/ECG: CPT

## 2024-02-19 ENCOUNTER — OFFICE VISIT (OUTPATIENT)
Dept: CARDIOLOGY | Facility: CLINIC | Age: 79
End: 2024-02-19
Payer: MEDICARE

## 2024-02-19 ENCOUNTER — TREATMENT (OUTPATIENT)
Dept: CARDIAC REHAB | Facility: HOSPITAL | Age: 79
End: 2024-02-19
Payer: MEDICARE

## 2024-02-19 VITALS
BODY MASS INDEX: 26.11 KG/M2 | HEART RATE: 64 BPM | HEIGHT: 70 IN | DIASTOLIC BLOOD PRESSURE: 71 MMHG | WEIGHT: 182.4 LBS | SYSTOLIC BLOOD PRESSURE: 138 MMHG

## 2024-02-19 DIAGNOSIS — Z01.810 PRE-OPERATIVE CARDIOVASCULAR EXAMINATION: ICD-10-CM

## 2024-02-19 DIAGNOSIS — E78.2 MIXED DYSLIPIDEMIA: ICD-10-CM

## 2024-02-19 DIAGNOSIS — I25.10 CORONARY ARTERY DISEASE INVOLVING NATIVE CORONARY ARTERY OF NATIVE HEART WITHOUT ANGINA PECTORIS: ICD-10-CM

## 2024-02-19 DIAGNOSIS — Z95.1 S/P CABG (CORONARY ARTERY BYPASS GRAFT): Primary | ICD-10-CM

## 2024-02-19 DIAGNOSIS — I10 ESSENTIAL HYPERTENSION: ICD-10-CM

## 2024-02-19 PROCEDURE — 93798 PHYS/QHP OP CAR RHAB W/ECG: CPT

## 2024-02-19 NOTE — PROGRESS NOTES
Chief Complaint  Hypertension, Hyperlipidemia, and Follow-up (3 mo f/u. )    Subjective        History of Present Illness  Trenton Adames presents to [facility] CARDIOLOGY for follow up.  Patient is a 78-year-old male with past medical history outlined below, significant for coronary artery disease status post CABG x 7 vessels on 9/26/2023 with Dr. Groves, hypertension, hyperlipidemia who presents for routine follow-up.  He is doing quite well.  He has no complaints or concerns today.  He still has some soreness around his right lower extremity where his saphenous vein graft was harvested from.  He denies any dizziness, lightheadedness, chest pain, dyspnea, orthopnea, edema or syncope.      Past Medical History:   Diagnosis Date    Arthritis     Asthma     Atypical chest pain     Coronary artery disease involving native coronary artery of native heart with unstable angina pectoris 9/25/2023    Dysphagia, oral phase     NO CURRENT ISSUES    Essential (primary) hypertension     Foot pain, bilateral     Heart murmur 5/2023    Heart valve disease 7/2023    Hyperlipidemia, unspecified     Low back pain     Neuropathy in diabetes     Peripheral neuropathy     Pulmonary HTN 08/09/2023    Sleep apnea     USES CPAP    Type 2 diabetes mellitus without complication     Vitamin D deficiency, unspecified        ALLERGY  Allergies   Allergen Reactions    Cat Hair Extract Itching    Grass Itching    Pollen Extract Itching    Tape Rash        Past Surgical History:   Procedure Laterality Date    CARDIAC CATHETERIZATION N/A 09/19/2023    Procedure: Left Heart Cath with possible coronary angioplasty;  Surgeon: Marc Charles MD;  Location: [facility] CATH INVASIVE LOCATION;  Service: Cardiology;  Laterality: N/A;    CATARACT EXTRACTION, BILATERAL  1/28/2014; 1/7/2014    COLONOSCOPY  2022    CORONARY ARTERY BYPASS GRAFT N/A 09/26/2023    Procedure: RUKHSANA STERNOTOMY CORONARY ARTERY BYPASS GRAFT TIMES 7 USING LEFT  INTERNAL MAMMARY ARTERY AND RIGHT GREATER SAPHENOUS VEIN GRAFT PER ENDOSCOPIC VEIN HARVESTING AND PRP;  Surgeon: Jr Frederic Groves MD;  Location: [facility];  Service: Cardiothoracic;  Laterality: N/A;    ENDOMETRIAL ABLATION      GASTROSTOMY      HEMORRHOIDECTOMY  2014    POLYPECTOMY      SINUS SURGERY          Social History     Socioeconomic History    Marital status:    Tobacco Use    Smoking status: Former     Packs/day: 2.00     Years: 15.00     Additional pack years: 0.00     Total pack years: 30.00     Types: Cigarettes, Pipe     Quit date: 1995     Years since quittin.1    Smokeless tobacco: Never   Vaping Use    Vaping Use: Never used   Substance and Sexual Activity    Alcohol use: Yes     Alcohol/week: 8.0 standard drinks of alcohol     Types: 6 Cans of beer, 2 Drinks containing 0.5 oz of alcohol per week     Comment: Occasional     Drug use: Never    Sexual activity: Defer     Partners: Female       Family History   Problem Relation Age of Onset    Cancer Mother     Heart disease Father     Diabetes Father     Arthritis Father     Heart disease Maternal Grandmother     Cancer Maternal Grandfather     Diabetes Paternal Grandmother     Malig Hyperthermia Neg Hx         Current Outpatient Medications on File Prior to Visit   Medication Sig    Accu-Chek Softclix Lancets lancets USE ONE LANCET 2-3 TIMES DAILY AS NEEDED    albuterol sulfate  (90 Base) MCG/ACT inhaler Inhale 2 puffs Every 4 (Four) Hours As Needed for Wheezing.    amLODIPine (NORVASC) 10 MG tablet TAKE 1 TABLET BY MOUTH DAILY    aspirin 81 MG EC tablet Take 1 tablet by mouth Daily.    Blood Glucose Monitoring Suppl (Accu-Chek Guide) w/Device kit 1 each 3 (Three) Times a Day As Needed (check 2-3 times daily as needed).    clobetasol (TEMOVATE) 0.05 % cream Apply 1 application topically to the appropriate area as directed 2 (Two) Times a Day.    docusate sodium 100 MG capsule Take 1 capsule by mouth 2 (Two) Times  a Day.    esomeprazole (nexIUM) 20 MG capsule Take 1 capsule by mouth Every Morning Before Breakfast.    fluticasone (FLONASE) 50 MCG/ACT nasal spray 1 spray into the nostril(s) as directed by provider Daily.    gabapentin (NEURONTIN) 800 MG tablet TAKE ONE TABLET BY MOUTH THREE TIMES A DAY    glucose blood (Accu-Chek Guide) test strip USE ONE TEST STRIP 2-3 TIMES DAILY AS NEEDED.    glucose blood test strip Test 2-3 times a week    hydroCHLOROthiazide (HYDRODIURIL) 25 MG tablet Take 1 tablet by mouth Daily.    hydrOXYzine (ATARAX) 10 MG tablet Take 1 tablet by mouth 3 (Three) Times a Day As Needed for Itching.    Ibuprofen 3 %, Gabapentin 10 %, Baclofen 2 %, lidocaine 4 %, Ketamine HCl 4 % Apply 1-2 g topically to the appropriate area as directed 3 (Three) to 4 (Four) times daily.    Ibuprofen 3 %, Gabapentin 10 %, Baclofen 2 %, lidocaine 4 %, Ketamine HCl 4 % Apply 1-2 g topically to the appropriate area as directed 3 (Three) to 4 (Four) times daily.    Krill Oil (Omega-3) 500 MG capsule Take 1 capsule by mouth Daily.    Lancets (onetouch ultrasoft) lancets Test 2-3 times a week.    loratadine (CLARITIN) 10 MG tablet Take 1 tablet by mouth 2 (Two) Times a Day.    Melatonin 10 MG tablet Take 1 tablet by mouth As Needed.    metFORMIN (GLUCOPHAGE) 1000 MG tablet Take 1 tablet by mouth 2 (Two) Times a Day With Meals. (Patient taking differently: Take 0.5 tablets by mouth 2 (Two) Times a Day With Meals.)    metoprolol tartrate (LOPRESSOR) 25 MG tablet Take 1 tablet by mouth Every 12 (Twelve) Hours.    montelukast (SINGULAIR) 10 MG tablet TAKE 1 TABLET BY MOUTH DAILY    ondansetron ODT (ZOFRAN-ODT) 4 MG disintegrating tablet Place 1 tablet on the tongue Every 8 (Eight) Hours As Needed for Nausea or Vomiting.    potassium chloride (K-DUR,KLOR-CON) 20 MEQ CR tablet Take 1 tablet by mouth Daily.    rosuvastatin (Crestor) 40 MG tablet Take 1 tablet by mouth Every Night.    Semaglutide, 1 MG/DOSE, (Ozempic, 1 MG/DOSE,) 4  "MG/3ML solution pen-injector Inject 1 mg under the skin into the appropriate area as directed 1 (One) Time Per Week.    Semaglutide, 1 MG/DOSE, (Ozempic, 1 MG/DOSE,) 4 MG/3ML solution pen-injector 1 mg WEEKLY (route: subcutaneous)    Turmeric 500 MG capsule Take 1 capsule by mouth Daily.    vitamin D3 125 MCG (5000 UT) capsule capsule Take 1 capsule by mouth Daily.    [DISCONTINUED] fluorouracil (EFUDEX) 5 % cream  (Patient not taking: Reported on 1/23/2024)    [DISCONTINUED] furosemide (Lasix) 20 MG tablet Take 1 tablet by mouth 2 (Two) Times a Day. (Patient not taking: Reported on 2/19/2024)     Current Facility-Administered Medications on File Prior to Visit   Medication    cyanocobalamin injection 1,000 mcg       Objective   Vitals:    02/19/24 1002   BP: 138/71   Pulse: 64   Weight: 82.7 kg (182 lb 6.4 oz)   Height: 177.8 cm (70\")       Physical Exam  Constitutional:       General: He is awake. He is not in acute distress.     Appearance: Normal appearance.   HENT:      Head: Normocephalic.      Nose: Nose normal. No congestion.   Eyes:      Extraocular Movements: Extraocular movements intact.      Conjunctiva/sclera: Conjunctivae normal.      Pupils: Pupils are equal, round, and reactive to light.   Neck:      Thyroid: No thyromegaly.      Vascular: No JVD.   Cardiovascular:      Rate and Rhythm: Normal rate and regular rhythm.      Chest Wall: PMI is not displaced.      Pulses: Normal pulses.      Heart sounds: Normal heart sounds, S1 normal and S2 normal. No murmur heard.     No friction rub. No gallop. No S3 or S4 sounds.   Pulmonary:      Effort: Pulmonary effort is normal.      Breath sounds: Normal breath sounds. No wheezing, rhonchi or rales.   Abdominal:      General: Bowel sounds are normal.      Palpations: Abdomen is soft.      Tenderness: There is no abdominal tenderness.   Musculoskeletal:      Cervical back: No tenderness.      Right lower leg: No edema.      Left lower leg: No edema. "   Lymphadenopathy:      Cervical: No cervical adenopathy.   Skin:     General: Skin is warm and dry.      Capillary Refill: Capillary refill takes less than 2 seconds.      Coloration: Skin is not cyanotic.      Findings: No petechiae or rash.      Nails: There is no clubbing.   Neurological:      Mental Status: He is alert.   Psychiatric:         Mood and Affect: Mood normal.         Behavior: Behavior is cooperative.           Result Review     The following data was reviewed by DARRELL Ibrahim on 02/19/24.      CMP          9/29/2023    03:19 9/30/2023    03:20 11/21/2023    10:25   CMP   Glucose 146  156  153    BUN 14  20  12    Creatinine 0.75  0.92  0.80    EGFR 94.1  86.7  91.7    Sodium 134  136  136    Potassium 4.1  3.9  3.9    Chloride 103  103  96    Calcium 8.3  8.3  9.5    Total Protein   7.1    Albumin   4.2    Globulin   2.9    Total Bilirubin   0.3    Alkaline Phosphatase   42    AST (SGOT)   16    ALT (SGPT)   21    Albumin/Globulin Ratio   1.4    BUN/Creatinine Ratio 18.7  21.7  15.0    Anion Gap 9.9  8.8  14.1      CBC w/diff          9/28/2023    03:07 9/29/2023    03:19 9/30/2023    03:20   CBC w/Diff   WBC 14.00  13.40  10.15    RBC 3.07  3.11  3.02    Hemoglobin 9.5  9.2  9.2    Hematocrit 28.0  27.5  27.1    MCV 91.2  88.4  89.7    MCH 30.9  29.6  30.5    MCHC 33.9  33.5  33.9    RDW 12.0  11.6  12.0    Platelets 129  160  187       Lipid Panel          8/21/2023    09:54 9/25/2023    18:46 1/8/2024    10:47   Lipid Panel   Total Cholesterol 154  143  106    Triglycerides 163  145  184    HDL Cholesterol 36  42  36    VLDL Cholesterol 28  25  30    LDL Cholesterol  90  76  40    LDL/HDL Ratio 2.37  1.71  0.92        Results for orders placed in visit on 09/26/23    Diagnostic IntraOp Coy    Narrative  Diagnostic IntraOp Coy    Procedure Performed: Diagnostic IntraOp Coy  Start Time:  9/26/2023 7:52 AM  End Time:   9/26/2023 7:52 AM    Preanesthesia Checklist:  Patient identified,  IV assessed, risks and benefits discussed, monitors and equipment assessed, procedure being performed at surgeon's request and anesthesia consent obtained.    General Procedure Information  Diagnostic Indications for Echo:  assessment of ascending aorta, assessment of surgical repair, defect repair evaluation and hemodynamic monitoring  Physician Requesting Echo: Jr Frederic Groves MD  ICD Code for Medical Necessity:  Non Rheumatic MR  Location performed:  OR  Intubated  Bite block placed  Heart visualized  Probe Insertion:  Easy  Probe Type:  Multiplane  Modalities:  2D only, color flow mapping, continuous wave Doppler and pulse wave Doppler    Echocardiographic and Doppler Measurements    Ventricles    Right Ventricle:  Cavity size normal.  Hypertrophy not present.  Thrombus not present.  Global function normal.  Left Ventricle:  Cavity size normal.  Diastolic dimension 3.6 cm.  Thrombus not present.  Global Function normal.  Ejection Fraction 55%.        Valves    Aortic Valve:  Annulus normal.  Stenosis not present.  Regurgitation absent.  Leaflets calcified and thickened.    Mitral Valve:  Annulus normal.  Regurgitation trace.  Leaflets normal.  Leaflet motions normal.    Tricuspid Valve:  Annulus normal.  Stenosis not present.  Regurgitation mild.  Leaflets normal.  Pulmonic Valve:  Regurgitation mild.      Aorta    Ascending Aorta:  Size normal.  Dissection not present.  Plaque thickness less than 3 mm.  Mobile plaque not present.  Aortic Arch:  Size normal.  Dissection not present.  Plaque thickness less than 3 mm.  Mobile plaque not present.  Descending Aorta:  Size normal.  Dissection not present.  Plaque thickness less than 3 mm.  Mobile plaque not present.      Atria    Right Atrium:  Size normal.  Spontaneous echo contrast not present.  Thrombus not present.  Tumor not present.  Device present.    Left Atrium:  Size normal.  Spontaneous echo contrast not present.  Thrombus not present.  Tumor not  present.  Device not present.  Left atrial appendage normal.      Septa        Ventricular Septum:  Intra-ventricular septum morphology normal.    Diastolic Function Measurements:  Diastolic Dysfunction Grade= III  E=  ms  A=  ms  E/A Ratio=  2.2  DT=  ms  S/D=  IVRT=    Other Findings  Pericardium:  normal  Pleural Effusion:  none  Pulmonary Arteries:  normal  Pulmonary Venous Flow:  blunted (decreased) systolic flow    Anesthesia Information  Performed Personally  Anesthesiologist:  Marissa Curtis MD      Echocardiogram Comments:  Post CPB  LVEF 60%  MR/TR are mild  No aortic dissection post decannulation    Results for orders placed during the hospital encounter of 08/22/23    Stress Test With Myocardial Perfusion One Day    Interpretation Summary  Patient received Lexiscan 0.4 mg IV infusion over 10 seconds.  Baseline ECG showed normal sinus rhythm.  At peak stress, no ischemic ST-T changes were noted.  No significant arrhythmias were noted.  Gated on SPECT images were reviewed.  Image quality is adequate.  There is a medium area of moderate perfusion defect in the distal anterior and apical segment with minimal reversibility on resting images which could represent resting myocardial ischemia versus attenuation artifact.  Correlate clinically.  The left ventricle is normal in size calculated ejection fraction of 64%.  No wall motion abnormalities were noted.  Overall, this presents a low to intermediate risk study.  Correlate these findings clinically.              Procedures    Assessment & Plan  Diagnoses and all orders for this visit:    1. S/P CABG (coronary artery bypass graft) (Primary)    2. Coronary artery disease involving native coronary artery of native heart without angina pectoris    3. Essential hypertension    4. Mixed dyslipidemia    5. Pre-operative cardiovascular examination      1.  History of coronary artery disease status post CABG x 7 vessels on 9/26/2023 by Dr. Groves.  Postoperative  course was uneventful.  He is doing very well overall.  He has no complaints or concerns today.  He denies any chest pain.  2.  As per #1.  3.  Stable on current regimen.  Continue the same.  4.  Continue statin therapy.  5.  Patient is low risk for perioperative cardiac complications and is okay to proceed.        The medical services provided during this encounter are part of ongoing care related to this patient's single serious condition or complex condition.        Follow Up   Return in about 6 months (around 8/19/2024) for With .    Patient was given instructions and counseling regarding his condition or for health maintenance advice. Please see specific information pulled into the AVS if appropriate.     Asuncion Galvan, APRN  02/19/24  10:08 EST    Dictated Utilizing Dragon Dictation

## 2024-02-21 ENCOUNTER — TREATMENT (OUTPATIENT)
Dept: CARDIAC REHAB | Facility: HOSPITAL | Age: 79
End: 2024-02-21
Payer: MEDICARE

## 2024-02-21 DIAGNOSIS — Z95.1 S/P CABG (CORONARY ARTERY BYPASS GRAFT): Primary | ICD-10-CM

## 2024-02-21 PROCEDURE — 93798 PHYS/QHP OP CAR RHAB W/ECG: CPT

## 2024-02-22 ENCOUNTER — TELEPHONE (OUTPATIENT)
Dept: CARDIOLOGY | Facility: CLINIC | Age: 79
End: 2024-02-22
Payer: MEDICARE

## 2024-02-22 NOTE — TELEPHONE ENCOUNTER
Procedure: Insertion of Hypoglossal Nerve Neuro-stimulator electrode with generator and Breathing sensor Electrode. (GENERAL)    Medication Directive: Aspirin    PMH: S/P CABG x 7 09/26/23, HTN, HLD    Last Seen: 02/19/24

## 2024-02-23 ENCOUNTER — TREATMENT (OUTPATIENT)
Dept: CARDIAC REHAB | Facility: HOSPITAL | Age: 79
End: 2024-02-23
Payer: MEDICARE

## 2024-02-23 DIAGNOSIS — Z95.1 S/P CABG (CORONARY ARTERY BYPASS GRAFT): Primary | ICD-10-CM

## 2024-02-23 PROCEDURE — 93798 PHYS/QHP OP CAR RHAB W/ECG: CPT

## 2024-02-26 ENCOUNTER — LAB (OUTPATIENT)
Dept: INTERNAL MEDICINE | Facility: CLINIC | Age: 79
End: 2024-02-26
Payer: MEDICARE

## 2024-02-26 ENCOUNTER — TREATMENT (OUTPATIENT)
Dept: CARDIAC REHAB | Facility: HOSPITAL | Age: 79
End: 2024-02-26
Payer: MEDICARE

## 2024-02-26 DIAGNOSIS — Z12.5 SCREENING FOR MALIGNANT NEOPLASM OF PROSTATE: ICD-10-CM

## 2024-02-26 DIAGNOSIS — E53.8 VITAMIN B 12 DEFICIENCY: ICD-10-CM

## 2024-02-26 DIAGNOSIS — I10 PRIMARY HYPERTENSION: ICD-10-CM

## 2024-02-26 DIAGNOSIS — Z95.1 S/P CABG (CORONARY ARTERY BYPASS GRAFT): Primary | ICD-10-CM

## 2024-02-26 DIAGNOSIS — E55.9 VITAMIN D DEFICIENCY: ICD-10-CM

## 2024-02-26 DIAGNOSIS — Z79.899 ENCOUNTER FOR LONG-TERM CURRENT USE OF MEDICATION: ICD-10-CM

## 2024-02-26 DIAGNOSIS — E11.40 TYPE 2 DIABETES MELLITUS WITH DIABETIC NEUROPATHY, WITHOUT LONG-TERM CURRENT USE OF INSULIN: ICD-10-CM

## 2024-02-26 LAB
25(OH)D3 SERPL-MCNC: 71.5 NG/ML (ref 30–100)
ALBUMIN SERPL-MCNC: 4.7 G/DL (ref 3.5–5.2)
ALBUMIN UR-MCNC: 23.6 MG/DL
ALBUMIN/GLOB SERPL: 1.7 G/DL
ALP SERPL-CCNC: 37 U/L (ref 39–117)
ALT SERPL W P-5'-P-CCNC: 26 U/L (ref 1–41)
AMPHET+METHAMPHET UR QL: NEGATIVE
ANION GAP SERPL CALCULATED.3IONS-SCNC: 21.1 MMOL/L (ref 5–15)
AST SERPL-CCNC: 23 U/L (ref 1–40)
BARBITURATES UR QL SCN: NEGATIVE
BASOPHILS # BLD AUTO: 0.11 10*3/MM3 (ref 0–0.2)
BASOPHILS NFR BLD AUTO: 1.4 % (ref 0–1.5)
BENZODIAZ UR QL SCN: NEGATIVE
BILIRUB SERPL-MCNC: 0.6 MG/DL (ref 0–1.2)
BUN SERPL-MCNC: 19 MG/DL (ref 8–23)
BUN/CREAT SERPL: 17.3 (ref 7–25)
CALCIUM SPEC-SCNC: 10.1 MG/DL (ref 8.6–10.5)
CANNABINOIDS SERPL QL: NEGATIVE
CHLORIDE SERPL-SCNC: 94 MMOL/L (ref 98–107)
CO2 SERPL-SCNC: 24.9 MMOL/L (ref 22–29)
COCAINE UR QL: NEGATIVE
CREAT SERPL-MCNC: 1.1 MG/DL (ref 0.76–1.27)
CREAT UR-MCNC: 57 MG/DL
DEPRECATED RDW RBC AUTO: 43 FL (ref 37–54)
EGFRCR SERPLBLD CKD-EPI 2021: 69.6 ML/MIN/1.73
EOSINOPHIL # BLD AUTO: 0.18 10*3/MM3 (ref 0–0.4)
EOSINOPHIL NFR BLD AUTO: 2.3 % (ref 0.3–6.2)
ERYTHROCYTE [DISTWIDTH] IN BLOOD BY AUTOMATED COUNT: 14.3 % (ref 12.3–15.4)
FENTANYL UR-MCNC: NEGATIVE NG/ML
FOLATE SERPL-MCNC: 5.66 NG/ML (ref 4.78–24.2)
GLOBULIN UR ELPH-MCNC: 2.8 GM/DL
GLUCOSE SERPL-MCNC: 121 MG/DL (ref 65–99)
HBA1C MFR BLD: 7.1 % (ref 4.8–5.6)
HCT VFR BLD AUTO: 40.9 % (ref 37.5–51)
HGB BLD-MCNC: 13.4 G/DL (ref 13–17.7)
IMM GRANULOCYTES # BLD AUTO: 0.02 10*3/MM3 (ref 0–0.05)
IMM GRANULOCYTES NFR BLD AUTO: 0.3 % (ref 0–0.5)
LYMPHOCYTES # BLD AUTO: 2.67 10*3/MM3 (ref 0.7–3.1)
LYMPHOCYTES NFR BLD AUTO: 33.5 % (ref 19.6–45.3)
MAGNESIUM SERPL-MCNC: 1.6 MG/DL (ref 1.6–2.4)
MCH RBC QN AUTO: 27.3 PG (ref 26.6–33)
MCHC RBC AUTO-ENTMCNC: 32.8 G/DL (ref 31.5–35.7)
MCV RBC AUTO: 83.5 FL (ref 79–97)
METHADONE UR QL SCN: NEGATIVE
MICROALBUMIN/CREAT UR: 414 MG/G (ref 0–29)
MONOCYTES # BLD AUTO: 0.67 10*3/MM3 (ref 0.1–0.9)
MONOCYTES NFR BLD AUTO: 8.4 % (ref 5–12)
NEUTROPHILS NFR BLD AUTO: 4.33 10*3/MM3 (ref 1.7–7)
NEUTROPHILS NFR BLD AUTO: 54.1 % (ref 42.7–76)
NRBC BLD AUTO-RTO: 0 /100 WBC (ref 0–0.2)
OPIATES UR QL: NEGATIVE
OXYCODONE UR QL SCN: NEGATIVE
PLATELET # BLD AUTO: 281 10*3/MM3 (ref 140–450)
PMV BLD AUTO: 10.9 FL (ref 6–12)
POTASSIUM SERPL-SCNC: 3.8 MMOL/L (ref 3.5–5.2)
PROT SERPL-MCNC: 7.5 G/DL (ref 6–8.5)
PSA SERPL-MCNC: 2.65 NG/ML (ref 0–4)
RBC # BLD AUTO: 4.9 10*6/MM3 (ref 4.14–5.8)
SODIUM SERPL-SCNC: 140 MMOL/L (ref 136–145)
T4 FREE SERPL-MCNC: 1 NG/DL (ref 0.93–1.7)
TSH SERPL DL<=0.05 MIU/L-ACNC: 1.37 UIU/ML (ref 0.27–4.2)
VIT B12 BLD-MCNC: 632 PG/ML (ref 211–946)
WBC NRBC COR # BLD AUTO: 7.98 10*3/MM3 (ref 3.4–10.8)

## 2024-02-26 PROCEDURE — 80307 DRUG TEST PRSMV CHEM ANLYZR: CPT | Performed by: NURSE PRACTITIONER

## 2024-02-26 PROCEDURE — 82570 ASSAY OF URINE CREATININE: CPT | Performed by: NURSE PRACTITIONER

## 2024-02-26 PROCEDURE — 36415 COLL VENOUS BLD VENIPUNCTURE: CPT | Performed by: NURSE PRACTITIONER

## 2024-02-26 PROCEDURE — 82043 UR ALBUMIN QUANTITATIVE: CPT | Performed by: NURSE PRACTITIONER

## 2024-02-26 PROCEDURE — 85025 COMPLETE CBC W/AUTO DIFF WBC: CPT | Performed by: NURSE PRACTITIONER

## 2024-02-26 PROCEDURE — 82746 ASSAY OF FOLIC ACID SERUM: CPT | Performed by: NURSE PRACTITIONER

## 2024-02-26 PROCEDURE — 84443 ASSAY THYROID STIM HORMONE: CPT | Performed by: NURSE PRACTITIONER

## 2024-02-26 PROCEDURE — G0103 PSA SCREENING: HCPCS | Performed by: NURSE PRACTITIONER

## 2024-02-26 PROCEDURE — 84439 ASSAY OF FREE THYROXINE: CPT | Performed by: NURSE PRACTITIONER

## 2024-02-26 PROCEDURE — 82607 VITAMIN B-12: CPT | Performed by: NURSE PRACTITIONER

## 2024-02-26 PROCEDURE — 93798 PHYS/QHP OP CAR RHAB W/ECG: CPT

## 2024-02-26 PROCEDURE — 83735 ASSAY OF MAGNESIUM: CPT | Performed by: NURSE PRACTITIONER

## 2024-02-26 PROCEDURE — 80053 COMPREHEN METABOLIC PANEL: CPT | Performed by: NURSE PRACTITIONER

## 2024-02-26 PROCEDURE — 83036 HEMOGLOBIN GLYCOSYLATED A1C: CPT | Performed by: NURSE PRACTITIONER

## 2024-02-26 PROCEDURE — 82306 VITAMIN D 25 HYDROXY: CPT | Performed by: NURSE PRACTITIONER

## 2024-02-26 NOTE — TELEPHONE ENCOUNTER
Can you edit your note to me did show that his CABG was in September 2023 not September 2024.  Thanks.      Patient is low risk for perioperative cardiac complications and is okay to proceed.  He may hold aspirin for 5 to 7 days prior to the procedure.

## 2024-02-28 ENCOUNTER — TREATMENT (OUTPATIENT)
Dept: CARDIAC REHAB | Facility: HOSPITAL | Age: 79
End: 2024-02-28
Payer: MEDICARE

## 2024-02-28 DIAGNOSIS — Z95.1 S/P CABG (CORONARY ARTERY BYPASS GRAFT): Primary | ICD-10-CM

## 2024-02-28 PROCEDURE — 93798 PHYS/QHP OP CAR RHAB W/ECG: CPT

## 2024-02-28 NOTE — PROGRESS NOTES
Phase II and III Education    Discipline Teaching:  Cardiac Rehab Phase II []      Cardiac Rehab Phase III []      Pulmonary Rehab II []      Pulmonary Rehab III []      Peripheral Artery Disease []        Class Given:  Asthma Management []      Beginners Cardiac Rehab []      Beginners Pulmonary Rehab []      CAD I []      CAD II []      CHF []      Diabetes Mellitus []     Diet & Cholesterol []     Diet Consult []      Energy Conservation []     Exercise []     Grocery Store Tour []     Harmonica Therapy []     High Blood Pressure []     Living with COPD []     Medication Safety []     Peripheral Artery Disease []     S & S of Infection []      Stress []        Education Topics:  Angina []      Arrhythmias []      Asthma Management []      Beginning Cardiac Rehab []      Blood Pressure []     Blood Sugar []     Breathing Retrainment []     CHF []     Cooking []     Daily Weight []     Diabetes Mellitus []      Diet []     Energy Conservation []     Exercise []      Foot Care []      Grocery Store Tour []      Heart Disease []      Heart Function []      Incision Care []     Living with COPD []     Medication Safety []     PAD Symptoms/Treatment []     Reconditioning Exercises []     S & S Infection []     Smoking Consult []     Stress Management []     Test Results []       Teaching Aids:  Video []      PAD Handout []      Pulmonary Workbook []      CAD Teaching Packet []      Stress Teaching Packet []     Exercise Teaching Packet []      Diet Teaching Packet []      CHF Teaching Packet []      Blood Pressure Teaching Packet []      Smoking Cessation Packet []      Medication Safety Handout []      Pflex Training []      Diabetes Teaching Packet []      Harmonica Therapy Packet []        Teaching Method:  Discussion []      Written Information []      Audio Visual []      Demonstration []        Teaching Recipient:  Patient []      Family []      Friend []      Legal Guardian []      Primary Care Giver []       Significant Other []        Barriers To Learning:  None []      Auditory []      Cultural []      Emotional []      Financial []      Language []    Mental []      Motivation []      Physical []      Reading Skills []      Gnosticist []      Verbal/Cognitive []      Visual []      Written/Cognitive []        Teaching Response:  Verified Via Teach back []      Return Demo Via Teach Back []      Reinforcement Needed []      Unable to Return Demo []      Unable to Comprehend []      Declines Teaching  []        Additional Education Topics:       Follow Up Instruction Comment:

## 2024-03-01 ENCOUNTER — TREATMENT (OUTPATIENT)
Dept: CARDIAC REHAB | Facility: HOSPITAL | Age: 79
End: 2024-03-01
Payer: MEDICARE

## 2024-03-01 DIAGNOSIS — Z95.1 S/P CABG (CORONARY ARTERY BYPASS GRAFT): Primary | ICD-10-CM

## 2024-03-01 PROCEDURE — 93798 PHYS/QHP OP CAR RHAB W/ECG: CPT

## 2024-03-03 NOTE — PROGRESS NOTES
Chief Complaint  Diabetes (3 month follow up., labs done. Patient would like to discuss metformin.)  Subjective    History of Present Illness  Trenton Adames is a 78 y.o. male  presents to [facility] for follow-up diabetes, hypertension and hyperlipidemia. Recent labs reviewed.     Specialist include: Dr. Stephens for skin issues, Dr. Selby for chronic back pain, Dr. Charles for heart murmur, left carotid bruit and chest pain and Dr. George for sleep medicine.      Past Medical History:   Diagnosis Date    Arthritis     Asthma     Atypical chest pain     Coronary artery disease involving native coronary artery of native heart with unstable angina pectoris 9/25/2023    Dysphagia, oral phase     NO CURRENT ISSUES    Essential (primary) hypertension     Foot pain, bilateral     Heart murmur 5/2023    Heart valve disease 7/2023    Hyperlipidemia, unspecified     Low back pain     Neuropathy in diabetes     Peripheral neuropathy     Pulmonary HTN 08/09/2023    Sleep apnea     USES CPAP    Type 2 diabetes mellitus without complication     Vitamin D deficiency, unspecified         Past Surgical History:   Procedure Laterality Date    CARDIAC CATHETERIZATION N/A 09/19/2023    Procedure: Left Heart Cath with possible coronary angioplasty;  Surgeon: Marc Charles MD;  Location: [facility] CATH INVASIVE LOCATION;  Service: Cardiology;  Laterality: N/A;    CATARACT EXTRACTION, BILATERAL  1/28/2014; 1/7/2014    COLONOSCOPY  2022    CORONARY ARTERY BYPASS GRAFT N/A 09/26/2023    Procedure: RUKHSANA STERNOTOMY CORONARY ARTERY BYPASS GRAFT TIMES 7 USING LEFT INTERNAL MAMMARY ARTERY AND RIGHT GREATER SAPHENOUS VEIN GRAFT PER ENDOSCOPIC VEIN HARVESTING AND PRP;  Surgeon: Jr Frederic Groves MD;  Location: [facility];  Service: Cardiothoracic;  Laterality: N/A;    ENDOMETRIAL ABLATION      GASTROSTOMY      HEMORRHOIDECTOMY  06/20/2014    POLYPECTOMY      SINUS SURGERY          Allergies    Allergen Reactions    Cat Hair Extract Itching    Grass Itching    Pollen Extract Itching    Tape Rash          Current Outpatient Medications:     Accu-Chek Softclix Lancets lancets, USE ONE LANCET 2-3 TIMES DAILY AS NEEDED, Disp: 100 each, Rfl: 12    albuterol sulfate  (90 Base) MCG/ACT inhaler, Inhale 2 puffs Every 4 (Four) Hours As Needed for Wheezing., Disp: 18 g, Rfl: 5    amLODIPine (NORVASC) 10 MG tablet, TAKE 1 TABLET BY MOUTH DAILY, Disp: 90 tablet, Rfl: 1    aspirin 81 MG EC tablet, Take 1 tablet by mouth Daily., Disp: , Rfl:     Blood Glucose Monitoring Suppl (Accu-Chek Guide) w/Device kit, 1 each 3 (Three) Times a Day As Needed (check 2-3 times daily as needed)., Disp: 1 kit, Rfl: 0    clobetasol (TEMOVATE) 0.05 % cream, Apply 1 application topically to the appropriate area as directed 2 (Two) Times a Day., Disp: 30 g, Rfl: 2    docusate sodium 100 MG capsule, Take 1 capsule by mouth 2 (Two) Times a Day., Disp: 180 capsule, Rfl: 1    esomeprazole (nexIUM) 20 MG capsule, Take 1 capsule by mouth Every Morning Before Breakfast., Disp: , Rfl:     fluticasone (FLONASE) 50 MCG/ACT nasal spray, 1 spray into the nostril(s) as directed by provider Daily., Disp: , Rfl:     gabapentin (NEURONTIN) 800 MG tablet, Take 1 tablet by mouth 3 (Three) Times a Day., Disp: 90 tablet, Rfl: 5    glucose blood (Accu-Chek Guide) test strip, USE ONE TEST STRIP 2-3 TIMES DAILY AS NEEDED., Disp: 100 each, Rfl: 12    glucose blood test strip, Test 2-3 times a week, Disp: 100 each, Rfl: 1    hydroCHLOROthiazide (HYDRODIURIL) 25 MG tablet, Take 1 tablet by mouth Daily., Disp: 90 tablet, Rfl: 3    hydrOXYzine (ATARAX) 10 MG tablet, Take 1 tablet by mouth 3 (Three) Times a Day As Needed for Itching., Disp: 30 tablet, Rfl: 1    Ibuprofen 3 %, Gabapentin 10 %, Baclofen 2 %, lidocaine 4 %, Ketamine HCl 4 %, Apply 1-2 g topically to the appropriate area as directed 3 (Three) to 4 (Four) times daily., Disp: 90 g, Rfl: 5     "Ibuprofen 3 %, Gabapentin 10 %, Baclofen 2 %, lidocaine 4 %, Ketamine HCl 4 %, Apply 1-2 g topically to the appropriate area as directed 3 (Three) to 4 (Four) times daily., Disp: 90 g, Rfl: 5    Krill Oil (Omega-3) 500 MG capsule, Take 1 capsule by mouth Daily., Disp: , Rfl:     Lancets (onetouch ultrasoft) lancets, Test 2-3 times a week., Disp: 100 each, Rfl: 12    loratadine (CLARITIN) 10 MG tablet, Take 1 tablet by mouth 2 (Two) Times a Day., Disp: , Rfl:     Melatonin 10 MG tablet, Take 1 tablet by mouth As Needed., Disp: , Rfl:     metoprolol tartrate (LOPRESSOR) 25 MG tablet, Take 1 tablet by mouth Every 12 (Twelve) Hours., Disp: 180 tablet, Rfl: 3    montelukast (SINGULAIR) 10 MG tablet, TAKE 1 TABLET BY MOUTH DAILY, Disp: 90 tablet, Rfl: 0    ondansetron ODT (ZOFRAN-ODT) 4 MG disintegrating tablet, Place 1 tablet on the tongue Every 8 (Eight) Hours As Needed for Nausea or Vomiting., Disp: 30 tablet, Rfl: 1    potassium chloride (K-DUR,KLOR-CON) 20 MEQ CR tablet, Take 1 tablet by mouth Daily., Disp: 90 tablet, Rfl: 1    rosuvastatin (Crestor) 40 MG tablet, Take 1 tablet by mouth Every Night., Disp: 90 tablet, Rfl: 3    Turmeric 500 MG capsule, Take 1 capsule by mouth Daily., Disp: , Rfl:     vitamin D3 125 MCG (5000 UT) capsule capsule, Take 1 capsule by mouth Daily., Disp: , Rfl:     metFORMIN (GLUCOPHAGE) 500 MG tablet, Take 1 tablet by mouth 2 (Two) Times a Day With Meals., Disp: 180 tablet, Rfl: 3    Semaglutide, 2 MG/DOSE, (OZEMPIC) 8 MG/3ML solution pen-injector, Inject 2 mg under the skin into the appropriate area as directed 1 (One) Time Per Week., Disp: 9 mL, Rfl: 3    Current Facility-Administered Medications:     cyanocobalamin injection 1,000 mcg, 1,000 mcg, Intramuscular, Q28 Days, Jacquelin Pelaez APRN, 1,000 mcg at 03/04/24 0816    Objective   /80 (BP Location: Right arm, Patient Position: Sitting, Cuff Size: Adult)   Pulse 66   Temp 97.3 °F (36.3 °C) (Temporal)   Ht 177.8 cm (70\")   " "Wt 80.5 kg (177 lb 6.4 oz)   SpO2 99%   BMI 25.45 kg/m²    Estimated body mass index is 25.45 kg/m² as calculated from the following:    Height as of this encounter: 177.8 cm (70\").    Weight as of this encounter: 80.5 kg (177 lb 6.4 oz).   Physical Exam  Vitals reviewed.   Constitutional:       General: He is not in acute distress.  HENT:      Head: Normocephalic and atraumatic.   Cardiovascular:      Rate and Rhythm: Normal rate and regular rhythm.      Heart sounds: Murmur heard.   Pulmonary:      Effort: Pulmonary effort is normal.      Breath sounds: Normal breath sounds. No wheezing, rhonchi or rales.   Musculoskeletal:      Right lower leg: No edema.      Left lower leg: No edema.   Skin:     General: Skin is warm and dry.   Neurological:      General: No focal deficit present.      Mental Status: He is alert.   Psychiatric:         Thought Content: Thought content normal.        Result Review :  The following data was reviewed by: DARRELL Pardo on 03/04/2024:  Common labs          11/21/2023    10:25 1/8/2024    10:47 2/26/2024    09:08 2/26/2024    09:17   Common Labs   Glucose 153   121     BUN 12   19     Creatinine 0.80   1.10     Sodium 136   140     Potassium 3.9   3.8     Chloride 96   94     Calcium 9.5   10.1     Albumin 4.2   4.7     Total Bilirubin 0.3   0.6     Alkaline Phosphatase 42   37     AST (SGOT) 16   23     ALT (SGPT) 21   26     WBC   7.98     Hemoglobin   13.4     Hematocrit   40.9     Platelets   281     Total Cholesterol  106      Triglycerides  184      HDL Cholesterol  36      LDL Cholesterol   40      Hemoglobin A1C 6.40   7.10     Microalbumin, Urine    23.6    PSA   2.650                   Assessment and Plan   Diagnoses and all orders for this visit:    1. Type 2 diabetes mellitus with diabetic neuropathy, without long-term current use of insulin (Primary)  -     Hemoglobin A1c; Future  -     Microalbumin / Creatinine Urine Ratio - Urine, Clean Catch; Future  -     " gabapentin (NEURONTIN) 800 MG tablet; Take 1 tablet by mouth 3 (Three) Times a Day.  Dispense: 90 tablet; Refill: 5    2. Primary hypertension  -     Comprehensive Metabolic Panel; Future  -     CBC & Differential; Future  -     T4, Free; Future  -     TSH; Future    3. Mixed dyslipidemia  -     Lipid Panel; Future    4. Vitamin D deficiency  -     Vitamin D,25-Hydroxy; Future    5. Vitamin B 12 deficiency    6. Need for COVID-19 vaccine  -     COVID-19 F23 (Pfizer) 12yrs+ (COMIRNATY)    Other orders  -     Semaglutide, 2 MG/DOSE, (OZEMPIC) 8 MG/3ML solution pen-injector; Inject 2 mg under the skin into the appropriate area as directed 1 (One) Time Per Week.  Dispense: 9 mL; Refill: 3  -     metFORMIN (GLUCOPHAGE) 500 MG tablet; Take 1 tablet by mouth 2 (Two) Times a Day With Meals.  Dispense: 180 tablet; Refill: 3       Type 2 diabetes mellitus with diabetic neuropathy:  HA1C is 7.10. Increase Ozempic to 2 mg weekly and continue metformin 500 mg 1 tab twice a day.  Stable on gabapentin 800 mg tid. UDS and HERMILO reviewed. Controlled substance contract reviewed and signed.      Hypertension:  Blood pressure well-controlled on metoprolol 25 mg BID, amlodipine 10 mg daily, lisinopril 40 mg daily and HCTZ 25 mg daily. Continue current treatment plan.      Hyperlipidemia: Stable on atorvastatin 40 mg daily and Omega 500 mg daily.    Vitamin D deficiency: Level is normal. Monitor.    Vitamin B-12 deficiency: Level is normal. Monitor.     Patient was given instructions and counseling regarding his condition or for health maintenance advice. Please see specific information pulled into the AVS if appropriate.     Follow Up   Return in about 3 months (around 6/4/2024) for Medicare Wellness.    Dictated Utilizing Dragon Dictation.  Please note that portions of this note were completed with a voice recognition program.  Part of this note may be an electronic transcription/translation of spoken language to printed text using  the Dragon Dictation System.    Jacquelin Pelaez, APRN

## 2024-03-04 ENCOUNTER — OFFICE VISIT (OUTPATIENT)
Dept: INTERNAL MEDICINE | Facility: CLINIC | Age: 79
End: 2024-03-04
Payer: MEDICARE

## 2024-03-04 VITALS
HEIGHT: 70 IN | BODY MASS INDEX: 25.4 KG/M2 | HEART RATE: 66 BPM | OXYGEN SATURATION: 99 % | DIASTOLIC BLOOD PRESSURE: 80 MMHG | TEMPERATURE: 97.3 F | WEIGHT: 177.4 LBS | SYSTOLIC BLOOD PRESSURE: 117 MMHG

## 2024-03-04 DIAGNOSIS — Z23 NEED FOR COVID-19 VACCINE: ICD-10-CM

## 2024-03-04 DIAGNOSIS — I10 PRIMARY HYPERTENSION: ICD-10-CM

## 2024-03-04 DIAGNOSIS — E53.8 VITAMIN B 12 DEFICIENCY: ICD-10-CM

## 2024-03-04 DIAGNOSIS — E55.9 VITAMIN D DEFICIENCY: ICD-10-CM

## 2024-03-04 DIAGNOSIS — E78.2 MIXED DYSLIPIDEMIA: ICD-10-CM

## 2024-03-04 DIAGNOSIS — E11.40 TYPE 2 DIABETES MELLITUS WITH DIABETIC NEUROPATHY, WITHOUT LONG-TERM CURRENT USE OF INSULIN: Primary | ICD-10-CM

## 2024-03-04 RX ORDER — GABAPENTIN 800 MG/1
800 TABLET ORAL 3 TIMES DAILY
Qty: 90 TABLET | Refills: 5 | Status: SHIPPED | OUTPATIENT
Start: 2024-03-04

## 2024-03-04 RX ADMIN — CYANOCOBALAMIN 1000 MCG: 1000 INJECTION, SOLUTION INTRAMUSCULAR; SUBCUTANEOUS at 08:16

## 2024-03-06 ENCOUNTER — TREATMENT (OUTPATIENT)
Dept: CARDIAC REHAB | Facility: HOSPITAL | Age: 79
End: 2024-03-06
Payer: MEDICARE

## 2024-03-06 DIAGNOSIS — Z95.1 S/P CABG (CORONARY ARTERY BYPASS GRAFT): Primary | ICD-10-CM

## 2024-03-06 PROCEDURE — 93798 PHYS/QHP OP CAR RHAB W/ECG: CPT

## 2024-03-08 ENCOUNTER — TREATMENT (OUTPATIENT)
Dept: CARDIAC REHAB | Facility: HOSPITAL | Age: 79
End: 2024-03-08
Payer: MEDICARE

## 2024-03-08 DIAGNOSIS — Z95.1 S/P CABG (CORONARY ARTERY BYPASS GRAFT): Primary | ICD-10-CM

## 2024-03-08 PROCEDURE — 93798 PHYS/QHP OP CAR RHAB W/ECG: CPT

## 2024-03-11 ENCOUNTER — TREATMENT (OUTPATIENT)
Dept: CARDIAC REHAB | Facility: HOSPITAL | Age: 79
End: 2024-03-11
Payer: MEDICARE

## 2024-03-11 DIAGNOSIS — Z95.1 S/P CABG (CORONARY ARTERY BYPASS GRAFT): Primary | ICD-10-CM

## 2024-03-11 PROCEDURE — 93798 PHYS/QHP OP CAR RHAB W/ECG: CPT

## 2024-03-13 ENCOUNTER — TREATMENT (OUTPATIENT)
Dept: CARDIAC REHAB | Facility: HOSPITAL | Age: 79
End: 2024-03-13
Payer: MEDICARE

## 2024-03-13 DIAGNOSIS — Z95.1 S/P CABG (CORONARY ARTERY BYPASS GRAFT): Primary | ICD-10-CM

## 2024-03-13 PROCEDURE — 93798 PHYS/QHP OP CAR RHAB W/ECG: CPT

## 2024-03-15 ENCOUNTER — TREATMENT (OUTPATIENT)
Dept: CARDIAC REHAB | Facility: HOSPITAL | Age: 79
End: 2024-03-15
Payer: MEDICARE

## 2024-03-15 DIAGNOSIS — Z95.1 S/P CABG (CORONARY ARTERY BYPASS GRAFT): Primary | ICD-10-CM

## 2024-03-15 PROCEDURE — 93798 PHYS/QHP OP CAR RHAB W/ECG: CPT

## 2024-03-18 ENCOUNTER — TREATMENT (OUTPATIENT)
Dept: CARDIAC REHAB | Facility: HOSPITAL | Age: 79
End: 2024-03-18
Payer: MEDICARE

## 2024-03-18 DIAGNOSIS — Z95.1 S/P CABG (CORONARY ARTERY BYPASS GRAFT): Primary | ICD-10-CM

## 2024-03-18 PROCEDURE — 93798 PHYS/QHP OP CAR RHAB W/ECG: CPT

## 2024-03-20 ENCOUNTER — TREATMENT (OUTPATIENT)
Dept: CARDIAC REHAB | Facility: HOSPITAL | Age: 79
End: 2024-03-20
Payer: MEDICARE

## 2024-03-20 DIAGNOSIS — Z95.1 S/P CABG (CORONARY ARTERY BYPASS GRAFT): Primary | ICD-10-CM

## 2024-03-20 PROCEDURE — 93798 PHYS/QHP OP CAR RHAB W/ECG: CPT

## 2024-03-22 ENCOUNTER — TREATMENT (OUTPATIENT)
Dept: CARDIAC REHAB | Facility: HOSPITAL | Age: 79
End: 2024-03-22
Payer: MEDICARE

## 2024-03-22 DIAGNOSIS — Z95.1 S/P CABG (CORONARY ARTERY BYPASS GRAFT): Primary | ICD-10-CM

## 2024-03-22 PROCEDURE — 93798 PHYS/QHP OP CAR RHAB W/ECG: CPT

## 2024-03-25 ENCOUNTER — TREATMENT (OUTPATIENT)
Dept: CARDIAC REHAB | Facility: HOSPITAL | Age: 79
End: 2024-03-25
Payer: MEDICARE

## 2024-03-25 DIAGNOSIS — Z95.1 S/P CABG (CORONARY ARTERY BYPASS GRAFT): Primary | ICD-10-CM

## 2024-03-25 PROCEDURE — 93798 PHYS/QHP OP CAR RHAB W/ECG: CPT

## 2024-03-27 ENCOUNTER — TREATMENT (OUTPATIENT)
Dept: CARDIAC REHAB | Facility: HOSPITAL | Age: 79
End: 2024-03-27
Payer: MEDICARE

## 2024-03-27 DIAGNOSIS — Z95.1 S/P CABG (CORONARY ARTERY BYPASS GRAFT): Primary | ICD-10-CM

## 2024-03-27 PROCEDURE — 93798 PHYS/QHP OP CAR RHAB W/ECG: CPT

## 2024-03-29 ENCOUNTER — TREATMENT (OUTPATIENT)
Dept: CARDIAC REHAB | Facility: HOSPITAL | Age: 79
End: 2024-03-29
Payer: MEDICARE

## 2024-03-29 DIAGNOSIS — Z95.1 S/P CABG (CORONARY ARTERY BYPASS GRAFT): Primary | ICD-10-CM

## 2024-03-29 PROCEDURE — 93798 PHYS/QHP OP CAR RHAB W/ECG: CPT

## 2024-04-01 ENCOUNTER — TREATMENT (OUTPATIENT)
Dept: CARDIAC REHAB | Facility: HOSPITAL | Age: 79
End: 2024-04-01
Payer: MEDICARE

## 2024-04-01 DIAGNOSIS — Z95.1 S/P CABG (CORONARY ARTERY BYPASS GRAFT): Primary | ICD-10-CM

## 2024-04-01 PROCEDURE — 93798 PHYS/QHP OP CAR RHAB W/ECG: CPT

## 2024-04-03 ENCOUNTER — TREATMENT (OUTPATIENT)
Dept: CARDIAC REHAB | Facility: HOSPITAL | Age: 79
End: 2024-04-03
Payer: MEDICARE

## 2024-04-03 ENCOUNTER — CLINICAL SUPPORT (OUTPATIENT)
Dept: INTERNAL MEDICINE | Age: 79
End: 2024-04-03
Payer: MEDICARE

## 2024-04-03 DIAGNOSIS — Z95.1 S/P CABG (CORONARY ARTERY BYPASS GRAFT): Primary | ICD-10-CM

## 2024-04-03 DIAGNOSIS — E53.8 VITAMIN B 12 DEFICIENCY: Primary | ICD-10-CM

## 2024-04-03 PROCEDURE — 93798 PHYS/QHP OP CAR RHAB W/ECG: CPT

## 2024-04-03 RX ADMIN — CYANOCOBALAMIN 1000 MCG: 1000 INJECTION, SOLUTION INTRAMUSCULAR; SUBCUTANEOUS at 09:03

## 2024-04-05 ENCOUNTER — TREATMENT (OUTPATIENT)
Dept: CARDIAC REHAB | Facility: HOSPITAL | Age: 79
End: 2024-04-05
Payer: MEDICARE

## 2024-04-05 DIAGNOSIS — Z95.1 S/P CABG (CORONARY ARTERY BYPASS GRAFT): Primary | ICD-10-CM

## 2024-04-05 PROCEDURE — 93798 PHYS/QHP OP CAR RHAB W/ECG: CPT

## 2024-04-08 ENCOUNTER — TREATMENT (OUTPATIENT)
Dept: CARDIAC REHAB | Facility: HOSPITAL | Age: 79
End: 2024-04-08
Payer: MEDICARE

## 2024-04-08 DIAGNOSIS — Z95.1 S/P CABG (CORONARY ARTERY BYPASS GRAFT): Primary | ICD-10-CM

## 2024-04-08 PROCEDURE — 93798 PHYS/QHP OP CAR RHAB W/ECG: CPT

## 2024-04-10 ENCOUNTER — TREATMENT (OUTPATIENT)
Dept: CARDIAC REHAB | Facility: HOSPITAL | Age: 79
End: 2024-04-10
Payer: MEDICARE

## 2024-04-10 ENCOUNTER — OFFICE VISIT (OUTPATIENT)
Dept: SLEEP MEDICINE | Facility: HOSPITAL | Age: 79
End: 2024-04-10
Payer: MEDICARE

## 2024-04-10 VITALS
HEART RATE: 71 BPM | BODY MASS INDEX: 24.77 KG/M2 | DIASTOLIC BLOOD PRESSURE: 65 MMHG | OXYGEN SATURATION: 97 % | WEIGHT: 173 LBS | SYSTOLIC BLOOD PRESSURE: 130 MMHG | HEIGHT: 70 IN

## 2024-04-10 DIAGNOSIS — G47.33 OSA ON CPAP: Primary | ICD-10-CM

## 2024-04-10 DIAGNOSIS — Z95.1 S/P CABG (CORONARY ARTERY BYPASS GRAFT): Primary | ICD-10-CM

## 2024-04-10 DIAGNOSIS — G47.34 SLEEP RELATED HYPOXIA: ICD-10-CM

## 2024-04-10 DIAGNOSIS — E11.9 TYPE 2 DIABETES MELLITUS WITHOUT COMPLICATION, WITHOUT LONG-TERM CURRENT USE OF INSULIN: ICD-10-CM

## 2024-04-10 DIAGNOSIS — I27.20 PULMONARY HTN: ICD-10-CM

## 2024-04-10 PROBLEM — E66.3 OVERWEIGHT (BMI 25.0-29.9): Status: RESOLVED | Noted: 2023-08-09 | Resolved: 2024-04-10

## 2024-04-10 PROCEDURE — 93798 PHYS/QHP OP CAR RHAB W/ECG: CPT

## 2024-04-10 PROCEDURE — G0463 HOSPITAL OUTPT CLINIC VISIT: HCPCS

## 2024-04-10 NOTE — PROGRESS NOTES
Sleep Disorder Follow Up    Patient Name: Trenton Adames  Age/Sex: 78 y.o. male  : 1945  MRN: [id number]    Date of Encounter Visit: 04/10/24   Referring Provider: DARRELL Pardo  Place of Service: [facility]  Patient Care Team:  Jacquelin Pelaez APRN as PCP - General (Nurse Practitioner)    PROBLEM LIST:  Obstructive sleep apnea  Pulmonary hypertension  Diabetes mellitus  Primary hypertension  Hyperlipidemia  Hypersomnia  Coronary artery disease    History of Present Illness:  Trenton Adames is a 78 y.o. male who is here for follow up on HILLARY.  patient was seen in the office on 2023, .  Recommendation were to continue working with the CPAP and change his mask to a nasal pillow with a chinstrap (the chin straps is not being used because it continues to slide off)  Patient also was referred to ENT for evaluation for the inspire device if unable to tolerate the CPAP, he has a follow up after cardiology clearance.  CPAP setting was adjusted to 6-12 from initial setting of 6-20  He was on XyliMelts to help with the dry mouth  He likes the nasal pillow better  Despite the improved adherence, he is still not very happy with the CPAP and is using it because he has to, and he is still interested in pursuing the inspire device   Patient uses machine every night with less complaints   Patient uses a nose mask, which does fit better.   He is not as happy with the CPAP, he is willing to continue to work with it but he is very interested in pursuing the hypoglossal nerve stimulator   Currently on 6-12 cm H20.  [place] Sleepiness Scale (ESS) is 2.  Compliance download was reviewed and documented below.  Weight is up by 1 since last visit.  He did have CABG with 7 vessels bypass that resulted in the weight loss   Other comorbidities include  CAD , pul HTN, DM, essential HTN      Review of Systems:   A ten-system review was conducted and was negative except for the  following:  dry mouth, nasal congestion (typical at this time of the year for him)       Past Medical History:  Past medical, surgical, social, and family history, except as mentioned above, was unchanged from the last visit.     Past Medical History:   Diagnosis Date    Arthritis     Asthma     Atypical chest pain     Coronary artery disease involving native coronary artery of native heart with unstable angina pectoris 9/25/2023    Dysphagia, oral phase     NO CURRENT ISSUES    Essential (primary) hypertension     Foot pain, bilateral     Heart murmur 5/2023    Heart valve disease 7/2023    Hyperlipidemia, unspecified     Low back pain     Neuropathy in diabetes     Peripheral neuropathy     Pulmonary HTN 08/09/2023    Sleep apnea     USES CPAP    Type 2 diabetes mellitus without complication     Vitamin D deficiency, unspecified        Past Surgical History:   Procedure Laterality Date    CARDIAC CATHETERIZATION N/A 09/19/2023    Procedure: Left Heart Cath with possible coronary angioplasty;  Surgeon: Marc Charles MD;  [facility] CATH INVAS[facility];  Service: Cardiology;  Laterality: N/A;    CATARACT EXTRACTION, BILATERAL  1/28/2014; 1/7/2014    COLONOSCOPY  2022    CORONARY ARTERY BYPASS GRAFT N/A 09/26/2023    Procedure: RUKHSANA STERNOTOMY CORONARY ARTERY BYPASS GRAFT TIMES 7 USING LEFT INTERNAL MAMMARY ARTERY AND RIGHT GREATER SAPHENOUS VEIN GRAFT PER ENDOSCOPIC VEIN HARVESTING AND PRP;  Surgeon: Jr Frederic Groves MD;  Location: [facility];  Service: Cardiothoracic;  Laterality: N/A;    ENDOMETRIAL ABLATION      GASTROSTOMY      HEMORRHOIDECTOMY  06/20/2014    POLYPECTOMY      SINUS SURGERY         Home Medications:     Current Outpatient Medications:     Accu-Chek Softclix Lancets lancets, USE ONE LANCET 2-3 TIMES DAILY AS NEEDED, Disp: 100 each, Rfl: 12    albuterol sulfate  (90 Base) MCG/ACT inhaler, Inhale 2 puffs Every 4 (Four) Hours As Needed for Wheezing., Disp: 18 g, Rfl: 5     amLODIPine (NORVASC) 10 MG tablet, TAKE 1 TABLET BY MOUTH DAILY, Disp: 90 tablet, Rfl: 1    aspirin 81 MG EC tablet, Take 1 tablet by mouth Daily., Disp: , Rfl:     Blood Glucose Monitoring Suppl (Accu-Chek Guide) w/Device kit, 1 each 3 (Three) Times a Day As Needed (check 2-3 times daily as needed)., Disp: 1 kit, Rfl: 0    clobetasol (TEMOVATE) 0.05 % cream, Apply 1 application topically to the appropriate area as directed 2 (Two) Times a Day., Disp: 30 g, Rfl: 2    docusate sodium 100 MG capsule, Take 1 capsule by mouth 2 (Two) Times a Day., Disp: 180 capsule, Rfl: 1    esomeprazole (nexIUM) 20 MG capsule, Take 1 capsule by mouth Every Morning Before Breakfast., Disp: , Rfl:     fluticasone (FLONASE) 50 MCG/ACT nasal spray, 1 spray into the nostril(s) as directed by provider Daily., Disp: , Rfl:     gabapentin (NEURONTIN) 800 MG tablet, Take 1 tablet by mouth 3 (Three) Times a Day., Disp: 90 tablet, Rfl: 5    glucose blood (Accu-Chek Guide) test strip, USE ONE TEST STRIP 2-3 TIMES DAILY AS NEEDED., Disp: 100 each, Rfl: 12    glucose blood test strip, Test 2-3 times a week, Disp: 100 each, Rfl: 1    hydroCHLOROthiazide (HYDRODIURIL) 25 MG tablet, Take 1 tablet by mouth Daily., Disp: 90 tablet, Rfl: 3    hydrOXYzine (ATARAX) 10 MG tablet, Take 1 tablet by mouth 3 (Three) Times a Day As Needed for Itching., Disp: 30 tablet, Rfl: 1    Ibuprofen 3 %, Gabapentin 10 %, Baclofen 2 %, lidocaine 4 %, Ketamine HCl 4 %, Apply 1-2 g topically to the appropriate area as directed 3 (Three) to 4 (Four) times daily., Disp: 90 g, Rfl: 5    Ibuprofen 3 %, Gabapentin 10 %, Baclofen 2 %, lidocaine 4 %, Ketamine HCl 4 %, Apply 1-2 g topically to the appropriate area as directed 3 (Three) to 4 (Four) times daily., Disp: 90 g, Rfl: 5    Krill Oil (Omega-3) 500 MG capsule, Take 1 capsule by mouth Daily., Disp: , Rfl:     Lancets (onetouch ultrasoft) lancets, Test 2-3 times a week., Disp: 100 each, Rfl: 12    loratadine (CLARITIN) 10 MG  tablet, Take 1 tablet by mouth 2 (Two) Times a Day., Disp: , Rfl:     Melatonin 10 MG tablet, Take 1 tablet by mouth As Needed., Disp: , Rfl:     metFORMIN (GLUCOPHAGE) 500 MG tablet, Take 1 tablet by mouth 2 (Two) Times a Day With Meals., Disp: 180 tablet, Rfl: 3    metoprolol tartrate (LOPRESSOR) 25 MG tablet, Take 1 tablet by mouth Every 12 (Twelve) Hours., Disp: 180 tablet, Rfl: 3    montelukast (SINGULAIR) 10 MG tablet, TAKE 1 TABLET BY MOUTH DAILY, Disp: 90 tablet, Rfl: 0    ondansetron ODT (ZOFRAN-ODT) 4 MG disintegrating tablet, Place 1 tablet on the tongue Every 8 (Eight) Hours As Needed for Nausea or Vomiting., Disp: 30 tablet, Rfl: 1    potassium chloride (K-DUR,KLOR-CON) 20 MEQ CR tablet, Take 1 tablet by mouth Daily., Disp: 90 tablet, Rfl: 1    rosuvastatin (Crestor) 40 MG tablet, Take 1 tablet by mouth Every Night., Disp: 90 tablet, Rfl: 3    Semaglutide, 2 MG/DOSE, (OZEMPIC) 8 MG/3ML solution pen-injector, Inject 2 mg under the skin into the appropriate area as directed 1 (One) Time Per Week., Disp: 9 mL, Rfl: 3    Turmeric 500 MG capsule, Take 1 capsule by mouth Daily., Disp: , Rfl:     vitamin D3 125 MCG (5000 UT) capsule capsule, Take 1 capsule by mouth Daily., Disp: , Rfl:     Current Facility-Administered Medications:     cyanocobalamin injection 1,000 mcg, 1,000 mcg, Intramuscular, Q28 Days, Jacquelin Pelaez APRN, 1,000 mcg at 24 0903    Allergies:  Allergies   Allergen Reactions    Cat Hair Extract Itching    Grass Itching    Pollen Extract Itching    Tape Rash       Past Social History:  Social History     Socioeconomic History    Marital status:    Tobacco Use    Smoking status: Former     Current packs/day: 0.00     Average packs/day: 2.0 packs/day for 15.0 years (30.0 ttl pk-yrs)     Types: Cigarettes, Pipe     Start date: 1980     Quit date: 1995     Years since quittin.2    Smokeless tobacco: Never   Vaping Use    Vaping status: Never Used   Substance and Sexual  "Activity    Alcohol use: Yes     Alcohol/week: 8.0 standard drinks of alcohol     Types: 6 Cans of beer, 2 Drinks containing 0.5 oz of alcohol per week     Comment: Occasional     Drug use: Never    Sexual activity: Defer     Partners: Female       Past Family History:  Family History   Problem Relation Age of Onset    Cancer Mother     Heart disease Father     Diabetes Father     Arthritis Father     Heart disease Maternal Grandmother     Cancer Maternal Grandfather     Diabetes Paternal Grandmother     Malig Hyperthermia Neg Hx          Objective:        Vital Signs:   Visit Vitals  /65   Pulse 71   Ht 177.8 cm (70\")   Wt 78.5 kg (173 lb)   SpO2 97%   BMI 24.82 kg/m²     Wt Readings from Last 3 Encounters:   04/10/24 78.5 kg (173 lb)   03/04/24 80.5 kg (177 lb 6.4 oz)   02/19/24 82.7 kg (182 lb 6.4 oz)          Physical Exam:   GEN:  No acute distress, alert, cooperative, well developed   EYES:   Sclerae clear. No icterus. PERRL. Normal EOM  ENT:   External ears/nose normal, no oral lesions, no thrush, mucous membranes moist, Septum midline. Mallampati IV airway. Large uvula.    NECK:  Supple, midline trachea, no JVD  LUNGS: Normal chest on inspection, CTAB, no wheezes. No rhonchi. No crackles. Respirations regular, even and unlabored.   CV:  Regular rhythm and rate. Normal S1/S2. No murmurs, gallops, or rubs noted.  ABD:  Soft, nontender and nondistended. Normal bowel sounds. No guarding  EXT:  Moves all extremities well. No cyanosis. No redness. Trace Right edema.   Skin: Dry, intact, no bleeding      Diagnostic Data:  HST 8/10/2023:    Respiratory Disturbance Events:   This is a home sleep study done on 8/10/2023  Reported weight on the night of the study was 87.5 kg with a BMI of 27.7  Total monitoring time 457 minutes out of 473.4 minutes of recording time  Respiratory summary was positive for moderate obstructive sleep apnea with AHI of 15.4 with no central apnea noted, patient has significant " positional component with apneas mainly in the supine position while having normal AHI while sleeping on the side  Patient had sleep-related hypoxemia with a garth desaturation down to 85% with clinically borderline insignificant hypoxemia with only 4.4 minutes spent in the hypoxemic range  Percentage of snoring was 58.2%  Heart rate was mostly within normal limit throughout the night.   Impression:   Moderate HILLARY with supine component  Sleep-related hypoxemia  Plan:   Treatment is recommended, will initiate auto CPAP 6-20 and follow-up at the sleep center for further recommendations    Echocardiogram 5/30/2023:    Left ventricular systolic function is normal. Left ventricular ejection fraction appears to be 61 - 65%.    Left ventricular diastolic function was normal.    No regional wall motion abnormality    Estimated right ventricular systolic pressure from tricuspid regurgitation is moderately elevated (45-55 mmHg).    Compliance download from 3/5/2024 - 4/3/2024 showed 100% adherence with significant improvement from prior downloads  Average nightly use 5 hours and 26 minutes  On auto CPAP 6-12 with a median/95th percentile pressure of 7.4/9.8 cm H2O  Air leak is moderate with a median/95th percentile of 11.0/32.7 L/min  Residual  AHI is down to 1.0 reflecting good control of the sleep apnea      Assessment and Plan:       ICD-10-CM ICD-9-CM   1. HILLARY on CPAP  G47.33 327.23   2. Sleep related hypoxia  G47.34 327.24   3. Pulmonary HTN  I27.20 416.8   4. Type 2 diabetes mellitus without complication, without long-term current use of insulin  E11.9 250.00           Recommendations:     Patient has moderate sleep apnea on the sleep study with sleep hypoxemia, patient has multiple comorbidities including pulmonary hypertension, and coronary artery disease.  Treatment is strongly recommended to be continued  Patient is at the point where he is able to use the CPAP consistently every night but he is not very comfortable  with that and he is using it as a bridge therapy until he gets his hypoglossal nerve stimulator which is his preferred treatment of choice at this point  No further pressure adjustment or mask modification necessary  Patient already got the cardiology clearance for the procedure and he has a follow-up with the ENT to schedule and finalize the step before the surgery  Will follow-up after the hypoglossal nerve stimulator placement for the activation of the hypoglossal nerve stimulator and will arrange for further follow-up accordingly, we will set a tentative follow-up in 3 months (they will be adjusted further, patient need to be seen in 4 weeks after his surgery assuming no complications or other causes of delay)    No orders of the defined types were placed in this encounter.    No orders of the defined types were placed in this encounter.    Return in about 3 months (around 7/10/2024).    Naheed George MD   [facility]   04/10/24  09:38 EDT    Dictated utilizing Dragon dictation

## 2024-04-12 ENCOUNTER — TREATMENT (OUTPATIENT)
Dept: CARDIAC REHAB | Facility: HOSPITAL | Age: 79
End: 2024-04-12
Payer: MEDICARE

## 2024-04-12 DIAGNOSIS — Z95.1 S/P CABG (CORONARY ARTERY BYPASS GRAFT): Primary | ICD-10-CM

## 2024-04-12 PROCEDURE — 93798 PHYS/QHP OP CAR RHAB W/ECG: CPT

## 2024-04-15 ENCOUNTER — TREATMENT (OUTPATIENT)
Dept: CARDIAC REHAB | Facility: HOSPITAL | Age: 79
End: 2024-04-15
Payer: MEDICARE

## 2024-04-15 DIAGNOSIS — Z95.1 S/P CABG (CORONARY ARTERY BYPASS GRAFT): Primary | ICD-10-CM

## 2024-04-15 PROCEDURE — 93798 PHYS/QHP OP CAR RHAB W/ECG: CPT

## 2024-04-17 ENCOUNTER — TREATMENT (OUTPATIENT)
Dept: CARDIAC REHAB | Facility: HOSPITAL | Age: 79
End: 2024-04-17
Payer: MEDICARE

## 2024-04-17 DIAGNOSIS — Z95.1 S/P CABG (CORONARY ARTERY BYPASS GRAFT): Primary | ICD-10-CM

## 2024-04-17 PROCEDURE — 93798 PHYS/QHP OP CAR RHAB W/ECG: CPT

## 2024-04-17 RX ORDER — MONTELUKAST SODIUM 10 MG/1
10 TABLET ORAL DAILY
Qty: 90 TABLET | Refills: 0 | Status: SHIPPED | OUTPATIENT
Start: 2024-04-17

## 2024-04-19 ENCOUNTER — TREATMENT (OUTPATIENT)
Dept: CARDIAC REHAB | Facility: HOSPITAL | Age: 79
End: 2024-04-19
Payer: MEDICARE

## 2024-04-19 DIAGNOSIS — Z95.1 S/P CABG (CORONARY ARTERY BYPASS GRAFT): Primary | ICD-10-CM

## 2024-04-19 PROCEDURE — 93798 PHYS/QHP OP CAR RHAB W/ECG: CPT

## 2024-04-22 ENCOUNTER — APPOINTMENT (OUTPATIENT)
Dept: CARDIAC REHAB | Facility: HOSPITAL | Age: 79
End: 2024-04-22
Payer: MEDICARE

## 2024-04-24 ENCOUNTER — APPOINTMENT (OUTPATIENT)
Dept: CARDIAC REHAB | Facility: HOSPITAL | Age: 79
End: 2024-04-24
Payer: MEDICARE

## 2024-04-26 ENCOUNTER — APPOINTMENT (OUTPATIENT)
Dept: CARDIAC REHAB | Facility: HOSPITAL | Age: 79
End: 2024-04-26
Payer: MEDICARE

## 2024-04-29 ENCOUNTER — APPOINTMENT (OUTPATIENT)
Dept: CARDIAC REHAB | Facility: HOSPITAL | Age: 79
End: 2024-04-29
Payer: MEDICARE

## 2024-05-08 ENCOUNTER — CLINICAL SUPPORT (OUTPATIENT)
Dept: INTERNAL MEDICINE | Age: 79
End: 2024-05-08
Payer: MEDICARE

## 2024-05-08 DIAGNOSIS — E53.8 VITAMIN B 12 DEFICIENCY: Primary | ICD-10-CM

## 2024-05-08 PROCEDURE — 96372 THER/PROPH/DIAG INJ SC/IM: CPT | Performed by: NURSE PRACTITIONER

## 2024-05-08 RX ADMIN — CYANOCOBALAMIN 1000 MCG: 1000 INJECTION, SOLUTION INTRAMUSCULAR; SUBCUTANEOUS at 10:06

## 2024-05-13 RX ORDER — PSEUDOEPHEDRINE HCL 30 MG
100 TABLET ORAL 2 TIMES DAILY
Qty: 180 CAPSULE | Refills: 1 | Status: SHIPPED | OUTPATIENT
Start: 2024-05-13

## 2024-05-29 ENCOUNTER — LAB (OUTPATIENT)
Dept: INTERNAL MEDICINE | Age: 79
End: 2024-05-29
Payer: MEDICARE

## 2024-05-29 DIAGNOSIS — E78.2 MIXED DYSLIPIDEMIA: ICD-10-CM

## 2024-05-29 DIAGNOSIS — I10 PRIMARY HYPERTENSION: ICD-10-CM

## 2024-05-29 DIAGNOSIS — E11.40 TYPE 2 DIABETES MELLITUS WITH DIABETIC NEUROPATHY, WITHOUT LONG-TERM CURRENT USE OF INSULIN: ICD-10-CM

## 2024-05-29 DIAGNOSIS — E55.9 VITAMIN D DEFICIENCY: ICD-10-CM

## 2024-05-29 LAB
25(OH)D3 SERPL-MCNC: 79.8 NG/ML (ref 30–100)
ALBUMIN SERPL-MCNC: 4.8 G/DL (ref 3.5–5.2)
ALBUMIN UR-MCNC: 28 MG/DL
ALBUMIN/GLOB SERPL: 1.5 G/DL
ALP SERPL-CCNC: 44 U/L (ref 39–117)
ALT SERPL W P-5'-P-CCNC: 23 U/L (ref 1–41)
ANION GAP SERPL CALCULATED.3IONS-SCNC: 17 MMOL/L (ref 5–15)
AST SERPL-CCNC: 22 U/L (ref 1–40)
BASOPHILS # BLD AUTO: 0.08 10*3/MM3 (ref 0–0.2)
BASOPHILS NFR BLD AUTO: 1 % (ref 0–1.5)
BILIRUB SERPL-MCNC: 0.4 MG/DL (ref 0–1.2)
BUN SERPL-MCNC: 22 MG/DL (ref 8–23)
BUN/CREAT SERPL: 15.3 (ref 7–25)
CALCIUM SPEC-SCNC: 9.9 MG/DL (ref 8.6–10.5)
CHLORIDE SERPL-SCNC: 88 MMOL/L (ref 98–107)
CHOLEST SERPL-MCNC: 93 MG/DL (ref 0–200)
CO2 SERPL-SCNC: 29 MMOL/L (ref 22–29)
CREAT SERPL-MCNC: 1.44 MG/DL (ref 0.76–1.27)
CREAT UR-MCNC: 61.5 MG/DL
DEPRECATED RDW RBC AUTO: 41.9 FL (ref 37–54)
EGFRCR SERPLBLD CKD-EPI 2021: 50.4 ML/MIN/1.73
EOSINOPHIL # BLD AUTO: 0.21 10*3/MM3 (ref 0–0.4)
EOSINOPHIL NFR BLD AUTO: 2.6 % (ref 0.3–6.2)
ERYTHROCYTE [DISTWIDTH] IN BLOOD BY AUTOMATED COUNT: 13.6 % (ref 12.3–15.4)
GLOBULIN UR ELPH-MCNC: 3.1 GM/DL
GLUCOSE SERPL-MCNC: 133 MG/DL (ref 65–99)
HBA1C MFR BLD: 6.4 % (ref 4.8–5.6)
HCT VFR BLD AUTO: 41.2 % (ref 37.5–51)
HDLC SERPL-MCNC: 35 MG/DL (ref 40–60)
HGB BLD-MCNC: 13.5 G/DL (ref 13–17.7)
IMM GRANULOCYTES # BLD AUTO: 0.01 10*3/MM3 (ref 0–0.05)
IMM GRANULOCYTES NFR BLD AUTO: 0.1 % (ref 0–0.5)
LDLC SERPL CALC-MCNC: 35 MG/DL (ref 0–100)
LDLC/HDLC SERPL: 0.9 {RATIO}
LYMPHOCYTES # BLD AUTO: 2.13 10*3/MM3 (ref 0.7–3.1)
LYMPHOCYTES NFR BLD AUTO: 26.8 % (ref 19.6–45.3)
MCH RBC QN AUTO: 28 PG (ref 26.6–33)
MCHC RBC AUTO-ENTMCNC: 32.8 G/DL (ref 31.5–35.7)
MCV RBC AUTO: 85.3 FL (ref 79–97)
MICROALBUMIN/CREAT UR: 455.3 MG/G (ref 0–29)
MONOCYTES # BLD AUTO: 0.62 10*3/MM3 (ref 0.1–0.9)
MONOCYTES NFR BLD AUTO: 7.8 % (ref 5–12)
NEUTROPHILS NFR BLD AUTO: 4.89 10*3/MM3 (ref 1.7–7)
NEUTROPHILS NFR BLD AUTO: 61.7 % (ref 42.7–76)
NRBC BLD AUTO-RTO: 0 /100 WBC (ref 0–0.2)
PLATELET # BLD AUTO: 284 10*3/MM3 (ref 140–450)
PMV BLD AUTO: 11.6 FL (ref 6–12)
POTASSIUM SERPL-SCNC: 3.1 MMOL/L (ref 3.5–5.2)
PROT SERPL-MCNC: 7.9 G/DL (ref 6–8.5)
RBC # BLD AUTO: 4.83 10*6/MM3 (ref 4.14–5.8)
SODIUM SERPL-SCNC: 134 MMOL/L (ref 136–145)
T4 FREE SERPL-MCNC: 1.29 NG/DL (ref 0.93–1.7)
TRIGL SERPL-MCNC: 132 MG/DL (ref 0–150)
TSH SERPL DL<=0.05 MIU/L-ACNC: 1.04 UIU/ML (ref 0.27–4.2)
VLDLC SERPL-MCNC: 23 MG/DL (ref 5–40)
WBC NRBC COR # BLD AUTO: 7.94 10*3/MM3 (ref 3.4–10.8)

## 2024-05-29 PROCEDURE — 82570 ASSAY OF URINE CREATININE: CPT | Performed by: NURSE PRACTITIONER

## 2024-05-29 PROCEDURE — 84443 ASSAY THYROID STIM HORMONE: CPT | Performed by: NURSE PRACTITIONER

## 2024-05-29 PROCEDURE — 80061 LIPID PANEL: CPT | Performed by: NURSE PRACTITIONER

## 2024-05-29 PROCEDURE — 83036 HEMOGLOBIN GLYCOSYLATED A1C: CPT | Performed by: NURSE PRACTITIONER

## 2024-05-29 PROCEDURE — 85025 COMPLETE CBC W/AUTO DIFF WBC: CPT | Performed by: NURSE PRACTITIONER

## 2024-05-29 PROCEDURE — 80053 COMPREHEN METABOLIC PANEL: CPT | Performed by: NURSE PRACTITIONER

## 2024-05-29 PROCEDURE — 82043 UR ALBUMIN QUANTITATIVE: CPT | Performed by: NURSE PRACTITIONER

## 2024-05-29 PROCEDURE — 36415 COLL VENOUS BLD VENIPUNCTURE: CPT | Performed by: NURSE PRACTITIONER

## 2024-05-29 PROCEDURE — 82306 VITAMIN D 25 HYDROXY: CPT | Performed by: NURSE PRACTITIONER

## 2024-05-29 PROCEDURE — 84439 ASSAY OF FREE THYROXINE: CPT | Performed by: NURSE PRACTITIONER

## 2024-05-31 RX ORDER — AMLODIPINE BESYLATE 10 MG/1
10 TABLET ORAL DAILY
Qty: 90 TABLET | Refills: 1 | Status: SHIPPED | OUTPATIENT
Start: 2024-05-31

## 2024-06-03 NOTE — PROGRESS NOTES
The ABCs of the Annual Wellness Visit  Subsequent Medicare Wellness Visit    Subjective    Trenton Adames is a 78 y.o. male who presents for a Subsequent Medicare Wellness Visit.    The following portions of the patient's history were reviewed and   updated as appropriate: allergies, current medications, past family history, past medical history, past social history, past surgical history, and problem list.    Compared to one year ago, the patient feels his physical   health is better.    Compared to one year ago, the patient feels his mental   health is the same.    Recent Hospitalizations:  This patient has had a [facility] admission record on file within the last 365 days.    Current Medical Providers:  Patient Care Team:  Jacquelin Pelaez APRN as PCP - General (Nurse Practitioner)    Outpatient Medications Prior to Visit   Medication Sig Dispense Refill    Accu-Chek Softclix Lancets lancets USE ONE LANCET 2-3 TIMES DAILY AS NEEDED 100 each 12    albuterol sulfate  (90 Base) MCG/ACT inhaler Inhale 2 puffs Every 4 (Four) Hours As Needed for Wheezing. 18 g 5    aspirin 81 MG EC tablet Take 1 tablet by mouth Daily.      Blood Glucose Monitoring Suppl (Accu-Chek Guide) w/Device kit 1 each 3 (Three) Times a Day As Needed (check 2-3 times daily as needed). 1 kit 0    clobetasol (TEMOVATE) 0.05 % cream Apply 1 application topically to the appropriate area as directed 2 (Two) Times a Day. 30 g 2    docusate sodium 100 MG capsule Take 1 capsule by mouth 2 (Two) Times a Day. 180 capsule 1    esomeprazole (nexIUM) 20 MG capsule Take 1 capsule by mouth Every Morning Before Breakfast.      fluticasone (FLONASE) 50 MCG/ACT nasal spray 1 spray into the nostril(s) as directed by provider Daily.      gabapentin (NEURONTIN) 800 MG tablet Take 1 tablet by mouth 3 (Three) Times a Day. 90 tablet 5    glucose blood (Accu-Chek Guide) test strip USE ONE TEST STRIP 2-3 TIMES DAILY AS NEEDED. 100 each 12    glucose  blood test strip Test 2-3 times a week 100 each 1    hydrOXYzine (ATARAX) 10 MG tablet Take 1 tablet by mouth 3 (Three) Times a Day As Needed for Itching. 30 tablet 1    Ibuprofen 3 %, Gabapentin 10 %, Baclofen 2 %, lidocaine 4 %, Ketamine HCl 4 % Apply 1-2 g topically to the appropriate area as directed 3 (Three) to 4 (Four) times daily. 90 g 5    Ibuprofen 3 %, Gabapentin 10 %, Baclofen 2 %, lidocaine 4 %, Ketamine HCl 4 % Apply 1-2 g topically to the appropriate area as directed 3 (Three) to 4 (Four) times daily. 90 g 5    Krill Oil (Omega-3) 500 MG capsule Take 1 capsule by mouth Daily.      Lancets (onetouch ultrasoft) lancets Test 2-3 times a week. 100 each 12    loratadine (CLARITIN) 10 MG tablet Take 1 tablet by mouth 2 (Two) Times a Day.      Melatonin 10 MG tablet Take 1 tablet by mouth As Needed.      metFORMIN (GLUCOPHAGE) 500 MG tablet Take 1 tablet by mouth 2 (Two) Times a Day With Meals. 180 tablet 3    metoprolol tartrate (LOPRESSOR) 25 MG tablet Take 1 tablet by mouth Every 12 (Twelve) Hours. 180 tablet 3    montelukast (SINGULAIR) 10 MG tablet TAKE 1 TABLET BY MOUTH DAILY 90 tablet 0    ondansetron ODT (ZOFRAN-ODT) 4 MG disintegrating tablet Place 1 tablet on the tongue Every 8 (Eight) Hours As Needed for Nausea or Vomiting. 30 tablet 1    rosuvastatin (Crestor) 40 MG tablet Take 1 tablet by mouth Every Night. 90 tablet 3    Semaglutide, 2 MG/DOSE, (OZEMPIC) 8 MG/3ML solution pen-injector Inject 2 mg under the skin into the appropriate area as directed 1 (One) Time Per Week. 9 mL 3    Turmeric 500 MG capsule Take 1 capsule by mouth Daily.      vitamin D3 125 MCG (5000 UT) capsule capsule Take 1 capsule by mouth Daily.      amLODIPine (NORVASC) 10 MG tablet TAKE 1 TABLET BY MOUTH DAILY 90 tablet 1    hydroCHLOROthiazide (HYDRODIURIL) 25 MG tablet Take 1 tablet by mouth Daily. 90 tablet 3    potassium chloride (K-DUR,KLOR-CON) 20 MEQ CR tablet Take 1 tablet by mouth Daily. 90 tablet 1  "    Facility-Administered Medications Prior to Visit   Medication Dose Route Frequency Provider Last Rate Last Admin    cyanocobalamin injection 1,000 mcg  1,000 mcg Intramuscular Q28 Days Jacquelin Pelaez APRN   1,000 mcg at 05/08/24 1006       No opioid medication identified on active medication list. I have reviewed chart for other potential  high risk medication/s and harmful drug interactions in the elderly.        Aspirin is on active medication list. Aspirin use is indicated based on review of current medical condition/s. Pros and cons of this therapy have been discussed today. Benefits of this medication outweigh potential harm.  Patient has been encouraged to continue taking this medication.  .      Patient Active Problem List   Diagnosis    Allergic rhinitis    Anemia    Asthma    Benign prostatic hyperplasia    Cervical radiculopathy    Diabetes mellitus, type II    Hyperlipidemia    Hypertension    Impotence of organic origin    Kidney stones    Renal calculus    Prostate disorder    Shortness of breath    Vitamin D deficiency    Vitamin B 12 deficiency    Iron deficiency anemia    Ophthalmic herpes zoster    Acute URI    Pulmonary HTN    Snoring    Chest pain, atypical    Coronary artery disease involving native coronary artery of native heart with unstable angina pectoris    CAD (coronary artery disease)    COVID-19 virus infection    HILLARY on CPAP    Sleep related hypoxia     Advance Care Planning   Advance Care Planning     Advance Directive is not on file.  ACP discussion was held with the patient during this visit. Patient does not have an advance directive, information provided.     Objective    Vitals:    06/04/24 0803   BP: 90/70   BP Location: Left arm   Patient Position: Sitting   Cuff Size: Adult   Pulse: 65   Temp: 97.3 °F (36.3 °C)   TempSrc: Temporal   SpO2: 97%   Weight: 77.1 kg (170 lb)   Height: 177.8 cm (70\")     Estimated body mass index is 24.39 kg/m² as calculated from the " "following:    Height as of this encounter: 177.8 cm (70\").    Weight as of this encounter: 77.1 kg (170 lb).    BMI is within normal parameters. No other follow-up for BMI required.      Does the patient have evidence of cognitive impairment? No    Lab Results   Component Value Date    TRIG 132 2024    HDL 35 (L) 2024    LDL 35 2024    VLDL 23 2024    HGBA1C 6.40 (H) 2024        HEALTH RISK ASSESSMENT    Smoking Status:  Social History     Tobacco Use   Smoking Status Former    Current packs/day: 0.00    Average packs/day: 2.0 packs/day for 15.0 years (30.0 ttl pk-yrs)    Types: Cigarettes, Pipe    Start date: 1980    Quit date: 1995    Years since quittin.4   Smokeless Tobacco Never     Alcohol Consumption:  Social History     Substance and Sexual Activity   Alcohol Use Yes    Alcohol/week: 8.0 standard drinks of alcohol    Types: 6 Cans of beer, 2 Drinks containing 0.5 oz of alcohol per week    Comment: Occasional      Fall Risk Screen:    [facility] Fall Risk Assessment has not been completed.    Depression Screening:      3/4/2024     8:04 AM   PHQ-2/PHQ-9 Depression Screening   Little Interest or Pleasure in Doing Things 0-->not at all   Feeling Down, Depressed or Hopeless 0-->not at all   Trouble Falling or Staying Asleep, or Sleeping Too Much 0-->not at all   Feeling Tired or Having Little Energy 0-->not at all   Poor Appetite or Overeating 0-->not at all   Feeling Bad about Yourself - or that You are a Failure or Have Let Yourself or Your Family Down 0-->not at all   Trouble Concentrating on Things, Such as Reading the Newspaper or Watching Television 0-->not at all   Moving or Speaking So Slowly that Other People Could Have Noticed? Or the Opposite - Being So Fidgety 0-->not at all   Thoughts that You Would be Better Off Dead or of Hurting Yourself in Some Way 0-->not at all   PHQ-9: Brief Depression Severity Measure Score 0   If You Checked Off Any Problems, How " Difficult Have These Problems Made It For You to Do Your Work, Take Care of Things at Home, or Get Along with Other People? not difficult at all       Health Habits and Functional and Cognitive Screenin/30/2023     8:53 AM   Functional & Cognitive Status   Do you have difficulty preparing food and eating? No   Do you have difficulty bathing yourself, getting dressed or grooming yourself? No   Do you have difficulty using the toilet? No   Do you have difficulty moving around from place to place? No   Do you have trouble with steps or getting out of a bed or a chair? No   Current Diet Well Balanced Diet   Dental Exam Up to date   Eye Exam Up to date   Exercise (times per week) Other   Current Exercises Include Gardening;Yard Work;Walking   Do you need help using the phone?  No   Are you deaf or do you have serious difficulty hearing?  No   Do you need help to go to places out of walking distance? No   Do you need help shopping? No   Do you need help preparing meals?  No   Do you need help with housework?  No   Do you need help with laundry? No   Do you need help taking your medications? No   Do you need help managing money? No   Do you ever drive or ride in a car without wearing a seat belt? No   Have you felt unusual stress, anger or loneliness in the last month? No   Who do you live with? Spouse   If you need help, do you have trouble finding someone available to you? No   Have you been bothered in the last four weeks by sexual problems? No   Do you have difficulty concentrating, remembering or making decisions? No       Age-appropriate Screening Schedule:  Refer to the list below for future screening recommendations based on patient's age, sex and/or medical conditions. Orders for these recommended tests are listed in the plan section. The patient has been provided with a written plan.    Health Maintenance   Topic Date Due    COVID-19 Vaccine (2023- season) 2024    INFLUENZA VACCINE   "08/01/2024    HEMOGLOBIN A1C  11/29/2024    DIABETIC EYE EXAM  05/15/2025    LIPID PANEL  05/29/2025    URINE MICROALBUMIN  05/29/2025    ANNUAL WELLNESS VISIT  06/04/2025    TDAP/TD VACCINES (2 - Td or Tdap) 05/25/2032    HEPATITIS C SCREENING  Completed    RSV Vaccine - Adults  Completed    Pneumococcal Vaccine 65+  Completed    ZOSTER VACCINE  Completed    COLORECTAL CANCER SCREENING  Discontinued                  CMS Preventative Services Quick Reference  Risk Factors Identified During Encounter  None Identified  The above risks/problems have been discussed with the patient.  Pertinent information has been shared with the patient in the After Visit Summary.  An After Visit Summary and PPPS were made available to the patient.    Follow Up:   Next Medicare Wellness visit to be scheduled in 1 year.       Additional E&M Note during same encounter follows:  Patient has multiple medical problems which are significant and separately identifiable that require additional work above and beyond the Medicare Wellness Visit.      Chief Complaint  Medicare Wellness-subsequent    Subjective        HPI  Trenton Adames is also being seen today for  follow-up diabetes, hypertension and hyperlipidemia. Recent labs reviewed. Blood pressures have been /60 at home and patient is having \"low energy\".      Specialist:   Dr. Stephens for skin issues   Dr. Selby for chronic back pain   Dr. Charles for CAD, s/p CABG, heart murmur and left carotid bruit.   Dr. George for sleep medicine.    Dr. Thakur-ophthalmology  Dr. Parish-podiatry     Objective   Vital Signs:  BP 90/70 (BP Location: Left arm, Patient Position: Sitting, Cuff Size: Adult)   Pulse 65   Temp 97.3 °F (36.3 °C) (Temporal)   Ht 177.8 cm (70\")   Wt 77.1 kg (170 lb)   SpO2 97%   BMI 24.39 kg/m²     Physical Exam  Vitals reviewed.   Constitutional:       General: He is not in acute distress.  HENT:      Head: Normocephalic and atraumatic.      Right Ear: " Tympanic membrane and ear canal normal.      Left Ear: Tympanic membrane and ear canal normal.   Eyes:      Conjunctiva/sclera: Conjunctivae normal.   Cardiovascular:      Rate and Rhythm: Normal rate and regular rhythm.      Heart sounds: Murmur heard.   Pulmonary:      Effort: Pulmonary effort is normal.      Breath sounds: Normal breath sounds. No wheezing, rhonchi or rales.   Abdominal:      General: There is no distension.      Palpations: Abdomen is soft. There is no mass.      Tenderness: There is no abdominal tenderness.   Musculoskeletal:      Right lower leg: No edema.      Left lower leg: No edema.   Lymphadenopathy:      Cervical: No cervical adenopathy.   Skin:     General: Skin is warm and dry.      Coloration: Skin is not jaundiced or pale.      Findings: Lesion present.      Comments: Crusty lesion to right chest and axilla areas.   Neurological:      General: No focal deficit present.      Mental Status: He is alert.   Psychiatric:         Mood and Affect: Mood normal.         Thought Content: Thought content normal.          The following data was reviewed by: DARRELL Pardo on 06/04/2024:  Common labs          1/8/2024    10:47 2/26/2024    09:08 2/26/2024    09:17 5/29/2024    09:48 5/29/2024    09:53   Common Labs   Glucose  121   133     BUN  19   22     Creatinine  1.10   1.44     Sodium  140   134     Potassium  3.8   3.1     Chloride  94   88     Calcium  10.1   9.9     Albumin  4.7   4.8     Total Bilirubin  0.6   0.4     Alkaline Phosphatase  37   44     AST (SGOT)  23   22     ALT (SGPT)  26   23     WBC  7.98   7.94     Hemoglobin  13.4   13.5     Hematocrit  40.9   41.2     Platelets  281   284     Total Cholesterol 106    93     Triglycerides 184    132     HDL Cholesterol 36    35     LDL Cholesterol  40    35     Hemoglobin A1C  7.10   6.40     Microalbumin, Urine   23.6   28.0    PSA  2.650                    Assessment and Plan   Diagnoses and all orders for this  visit:    1. Medicare annual wellness visit, subsequent (Primary)    2. Type 2 diabetes mellitus with diabetic neuropathy, without long-term current use of insulin    3. Primary hypertension  -     Basic Metabolic Panel; Future    4. Hypokalemia    5. Mixed dyslipidemia    6. Vitamin D deficiency    7. Vitamin B 12 deficiency    8. Skin lesion of chest wall    Other orders  -     losartan (Cozaar) 25 MG tablet; Take 1 tablet by mouth Daily.  Dispense: 90 tablet; Refill: 1    Type 2 diabetes mellitus with diabetic neuropathy:  HA1C is 6.40.  Continue Ozempic to 2 mg weekly and decrease metformin 500 mg 1 tab to once a day.  Stable on gabapentin 800 mg tid. UDS and HERMILO reviewed. Controlled substance contract reviewed and signed.      Hypertension: Blood pressures have been low.  The patient is not experiencing dizziness but has had low energy.  GFR decreased to 50.  Stop amlodipine 10 mg daily and hydrochlorothiazide 25 mg fred.  Start losartan 25 mg once daily and continue metoprolol 25 mg twice a day.  Repeat BMP and follow-up in 2-3 weeks.    Hypokalemia:  K+ is 3.1. Hold potassium 20 mg daily after 2 days. Medication changes as above.     Hyperlipidemia: Stable on Crestor 40 mg daily and Omega-3 500 mg daily.     Vitamin D deficiency: Level is normal. Monitor.     Vitamin B-12 deficiency: Level is normal. Continue B-12 injections. Monitor.     Skin lesions of chest: Follow-up with Dr. Jose R Stephens.        Follow Up   Return in about 10 days (around 6/14/2024) for Recheck.  Patient was given instructions and counseling regarding his condition or for health maintenance advice. Please see specific information pulled into the AVS if appropriate.

## 2024-06-04 ENCOUNTER — OFFICE VISIT (OUTPATIENT)
Dept: INTERNAL MEDICINE | Age: 79
End: 2024-06-04
Payer: MEDICARE

## 2024-06-04 VITALS
TEMPERATURE: 97.3 F | WEIGHT: 170 LBS | BODY MASS INDEX: 24.34 KG/M2 | HEIGHT: 70 IN | SYSTOLIC BLOOD PRESSURE: 90 MMHG | OXYGEN SATURATION: 97 % | DIASTOLIC BLOOD PRESSURE: 70 MMHG | HEART RATE: 65 BPM

## 2024-06-04 DIAGNOSIS — E11.40 TYPE 2 DIABETES MELLITUS WITH DIABETIC NEUROPATHY, WITHOUT LONG-TERM CURRENT USE OF INSULIN: ICD-10-CM

## 2024-06-04 DIAGNOSIS — Z00.00 MEDICARE ANNUAL WELLNESS VISIT, SUBSEQUENT: Primary | ICD-10-CM

## 2024-06-04 DIAGNOSIS — L98.9 SKIN LESION OF CHEST WALL: ICD-10-CM

## 2024-06-04 DIAGNOSIS — E53.8 VITAMIN B 12 DEFICIENCY: ICD-10-CM

## 2024-06-04 DIAGNOSIS — E78.2 MIXED DYSLIPIDEMIA: ICD-10-CM

## 2024-06-04 DIAGNOSIS — E55.9 VITAMIN D DEFICIENCY: ICD-10-CM

## 2024-06-04 DIAGNOSIS — E87.6 HYPOKALEMIA: ICD-10-CM

## 2024-06-04 DIAGNOSIS — I10 PRIMARY HYPERTENSION: ICD-10-CM

## 2024-06-04 PROCEDURE — 1159F MED LIST DOCD IN RCRD: CPT | Performed by: NURSE PRACTITIONER

## 2024-06-04 PROCEDURE — 3074F SYST BP LT 130 MM HG: CPT | Performed by: NURSE PRACTITIONER

## 2024-06-04 PROCEDURE — 3078F DIAST BP <80 MM HG: CPT | Performed by: NURSE PRACTITIONER

## 2024-06-04 PROCEDURE — 1160F RVW MEDS BY RX/DR IN RCRD: CPT | Performed by: NURSE PRACTITIONER

## 2024-06-04 PROCEDURE — 99214 OFFICE O/P EST MOD 30 MIN: CPT | Performed by: NURSE PRACTITIONER

## 2024-06-04 PROCEDURE — G0439 PPPS, SUBSEQ VISIT: HCPCS | Performed by: NURSE PRACTITIONER

## 2024-06-04 PROCEDURE — 1126F AMNT PAIN NOTED NONE PRSNT: CPT | Performed by: NURSE PRACTITIONER

## 2024-06-04 PROCEDURE — 96372 THER/PROPH/DIAG INJ SC/IM: CPT | Performed by: NURSE PRACTITIONER

## 2024-06-04 PROCEDURE — 96160 PT-FOCUSED HLTH RISK ASSMT: CPT | Performed by: NURSE PRACTITIONER

## 2024-06-04 RX ORDER — LOSARTAN POTASSIUM 25 MG/1
25 TABLET ORAL DAILY
Qty: 90 TABLET | Refills: 1 | Status: SHIPPED | OUTPATIENT
Start: 2024-06-04

## 2024-06-04 RX ADMIN — CYANOCOBALAMIN 1000 MCG: 1000 INJECTION, SOLUTION INTRAMUSCULAR; SUBCUTANEOUS at 09:57

## 2024-06-13 ENCOUNTER — LAB (OUTPATIENT)
Dept: INTERNAL MEDICINE | Age: 79
End: 2024-06-13
Payer: MEDICARE

## 2024-06-13 DIAGNOSIS — I10 PRIMARY HYPERTENSION: ICD-10-CM

## 2024-06-13 LAB
ANION GAP SERPL CALCULATED.3IONS-SCNC: 12.1 MMOL/L (ref 5–15)
BUN SERPL-MCNC: 15 MG/DL (ref 8–23)
BUN/CREAT SERPL: 13.8 (ref 7–25)
CALCIUM SPEC-SCNC: 9 MG/DL (ref 8.6–10.5)
CHLORIDE SERPL-SCNC: 104 MMOL/L (ref 98–107)
CO2 SERPL-SCNC: 24.9 MMOL/L (ref 22–29)
CREAT SERPL-MCNC: 1.09 MG/DL (ref 0.76–1.27)
EGFRCR SERPLBLD CKD-EPI 2021: 70.3 ML/MIN/1.73
GLUCOSE SERPL-MCNC: 175 MG/DL (ref 65–99)
POTASSIUM SERPL-SCNC: 3.8 MMOL/L (ref 3.5–5.2)
SODIUM SERPL-SCNC: 141 MMOL/L (ref 136–145)

## 2024-06-13 PROCEDURE — 36415 COLL VENOUS BLD VENIPUNCTURE: CPT | Performed by: NURSE PRACTITIONER

## 2024-06-13 PROCEDURE — 80048 BASIC METABOLIC PNL TOTAL CA: CPT | Performed by: NURSE PRACTITIONER

## 2024-06-13 NOTE — PROGRESS NOTES
"Chief Complaint  Hypertension (The patient is coming in for a 10 day follow up. )  Subjective    History of Present Illness  Trenton Adames is a 78 y.o. male  presents to [facility] for follow-up hypertension and hypokalemia. Blood pressures had been low at home and patient reported having \"low energy\". Treatment plan changed and patient is here for repeat visit and had BMP done yesterday. Today . He is back to taking one metformin a day.  Patient reports his systolic blood pressure in the mornings runs 115-120/60-70.    Past Medical History:   Diagnosis Date    Arthritis     Asthma     Atypical chest pain     Coronary artery disease involving native coronary artery of native heart with unstable angina pectoris 9/25/2023    Dysphagia, oral phase     NO CURRENT ISSUES    Essential (primary) hypertension     Foot pain, bilateral     Heart murmur 5/2023    Heart valve disease 7/2023    Hyperlipidemia, unspecified     Low back pain     Neuropathy in diabetes     Peripheral neuropathy     Pulmonary HTN 08/09/2023    Sleep apnea     USES CPAP    Type 2 diabetes mellitus without complication     Vitamin D deficiency, unspecified         Past Surgical History:   Procedure Laterality Date    CARDIAC CATHETERIZATION N/A 09/19/2023    Procedure: Left Heart Cath with possible coronary angioplasty;  Surgeon: Marc Charles MD;  Location: [facility] CATH INVASIVE LOCATION;  Service: Cardiology;  Laterality: N/A;    CATARACT EXTRACTION, BILATERAL  1/28/2014; 1/7/2014    COLONOSCOPY  2022    CORONARY ARTERY BYPASS GRAFT N/A 09/26/2023    Procedure: RUKHSANA STERNOTOMY CORONARY ARTERY BYPASS GRAFT TIMES 7 USING LEFT INTERNAL MAMMARY ARTERY AND RIGHT GREATER SAPHENOUS VEIN GRAFT PER ENDOSCOPIC VEIN HARVESTING AND PRP;  Surgeon: Jr Frederic Groves MD;  Location: [facility];  Service: Cardiothoracic;  Laterality: N/A;    ENDOMETRIAL ABLATION      GASTROSTOMY      HEMORRHOIDECTOMY  " 06/20/2014    POLYPECTOMY      SINUS SURGERY          Allergies   Allergen Reactions    Cat Hair Extract Itching    Grass Itching    Pollen Extract Itching    Tape Rash          Current Outpatient Medications:     Accu-Chek Softclix Lancets lancets, USE ONE LANCET 2-3 TIMES DAILY AS NEEDED, Disp: 100 each, Rfl: 12    albuterol sulfate  (90 Base) MCG/ACT inhaler, Inhale 2 puffs Every 4 (Four) Hours As Needed for Wheezing., Disp: 18 g, Rfl: 5    aspirin 81 MG EC tablet, Take 1 tablet by mouth Daily., Disp: , Rfl:     Blood Glucose Monitoring Suppl (Accu-Chek Guide) w/Device kit, 1 each 3 (Three) Times a Day As Needed (check 2-3 times daily as needed)., Disp: 1 kit, Rfl: 0    clobetasol (TEMOVATE) 0.05 % cream, Apply 1 application topically to the appropriate area as directed 2 (Two) Times a Day., Disp: 30 g, Rfl: 2    docusate sodium 100 MG capsule, Take 1 capsule by mouth 2 (Two) Times a Day., Disp: 180 capsule, Rfl: 1    esomeprazole (nexIUM) 20 MG capsule, Take 1 capsule by mouth Every Morning Before Breakfast., Disp: , Rfl:     fluticasone (FLONASE) 50 MCG/ACT nasal spray, 1 spray into the nostril(s) as directed by provider Daily., Disp: , Rfl:     gabapentin (NEURONTIN) 800 MG tablet, Take 1 tablet by mouth 3 (Three) Times a Day., Disp: 90 tablet, Rfl: 5    glucose blood (Accu-Chek Guide) test strip, USE ONE TEST STRIP 2-3 TIMES DAILY AS NEEDED., Disp: 100 each, Rfl: 12    glucose blood test strip, Test 2-3 times a week, Disp: 100 each, Rfl: 1    hydrOXYzine (ATARAX) 10 MG tablet, Take 1 tablet by mouth 3 (Three) Times a Day As Needed for Itching., Disp: 30 tablet, Rfl: 1    Ibuprofen 3 %, Gabapentin 10 %, Baclofen 2 %, lidocaine 4 %, Ketamine HCl 4 %, Apply 1-2 g topically to the appropriate area as directed 3 (Three) to 4 (Four) times daily., Disp: 90 g, Rfl: 5    Ibuprofen 3 %, Gabapentin 10 %, Baclofen 2 %, lidocaine 4 %, Ketamine HCl 4 %, Apply 1-2 g topically to the appropriate area as directed 3  "(Three) to 4 (Four) times daily., Disp: 90 g, Rfl: 5    Krill Oil (Omega-3) 500 MG capsule, Take 1 capsule by mouth Daily., Disp: , Rfl:     Lancets (onetouch ultrasoft) lancets, Test 2-3 times a week., Disp: 100 each, Rfl: 12    loratadine (CLARITIN) 10 MG tablet, Take 1 tablet by mouth 2 (Two) Times a Day., Disp: , Rfl:     losartan (Cozaar) 25 MG tablet, Take 1 tablet by mouth Daily., Disp: 90 tablet, Rfl: 1    Melatonin 10 MG tablet, Take 1 tablet by mouth As Needed., Disp: , Rfl:     metFORMIN (GLUCOPHAGE) 500 MG tablet, Take 1 tablet by mouth 2 (Two) Times a Day With Meals., Disp: 180 tablet, Rfl: 3    metoprolol tartrate (LOPRESSOR) 25 MG tablet, Take 1 tablet by mouth Every 12 (Twelve) Hours., Disp: 180 tablet, Rfl: 3    montelukast (SINGULAIR) 10 MG tablet, TAKE 1 TABLET BY MOUTH DAILY, Disp: 90 tablet, Rfl: 0    ondansetron ODT (ZOFRAN-ODT) 4 MG disintegrating tablet, Place 1 tablet on the tongue Every 8 (Eight) Hours As Needed for Nausea or Vomiting., Disp: 30 tablet, Rfl: 1    rosuvastatin (Crestor) 40 MG tablet, Take 1 tablet by mouth Every Night., Disp: 90 tablet, Rfl: 3    Semaglutide, 2 MG/DOSE, (OZEMPIC) 8 MG/3ML solution pen-injector, Inject 2 mg under the skin into the appropriate area as directed 1 (One) Time Per Week., Disp: 9 mL, Rfl: 3    Turmeric 500 MG capsule, Take 1 capsule by mouth Daily., Disp: , Rfl:     vitamin D3 125 MCG (5000 UT) capsule capsule, Take 1 capsule by mouth Daily., Disp: , Rfl:     Current Facility-Administered Medications:     cyanocobalamin injection 1,000 mcg, 1,000 mcg, Intramuscular, Q28 Days, Jacquelin Pelaez APRN, 1,000 mcg at 06/04/24 0957    Objective   /60 (BP Location: Left arm, Patient Position: Sitting, Cuff Size: Adult)   Pulse 78   Temp 98 °F (36.7 °C) (Temporal)   Ht 177.8 cm (70\")   Wt 78.7 kg (173 lb 6.4 oz)   SpO2 96%   BMI 24.88 kg/m²    Estimated body mass index is 24.88 kg/m² as calculated from the following:    Height as of this " "encounter: 177.8 cm (70\").    Weight as of this encounter: 78.7 kg (173 lb 6.4 oz).   Physical Exam  Vitals reviewed.   Constitutional:       General: He is not in acute distress.  HENT:      Head: Normocephalic and atraumatic.   Pulmonary:      Effort: Pulmonary effort is normal.   Neurological:      General: No focal deficit present.      Mental Status: He is alert.   Psychiatric:         Thought Content: Thought content normal.        Result Review :  The following data was reviewed by: DARRELL Pardo on 06/14/2024:  Common labs          2/26/2024    09:08 2/26/2024    09:17 5/29/2024    09:48 5/29/2024    09:53 6/13/2024    11:05   Common Labs   Glucose 121   133   175    BUN 19   22   15    Creatinine 1.10   1.44   1.09    Sodium 140   134   141    Potassium 3.8   3.1   3.8    Chloride 94   88   104    Calcium 10.1   9.9   9.0    Albumin 4.7   4.8      Total Bilirubin 0.6   0.4      Alkaline Phosphatase 37   44      AST (SGOT) 23   22      ALT (SGPT) 26   23      WBC 7.98   7.94      Hemoglobin 13.4   13.5      Hematocrit 40.9   41.2      Platelets 281   284      Total Cholesterol   93      Triglycerides   132      HDL Cholesterol   35      LDL Cholesterol    35      Hemoglobin A1C 7.10   6.40      Microalbumin, Urine  23.6   28.0     PSA 2.650                      Assessment and Plan   Diagnoses and all orders for this visit:    1. Primary hypertension (Primary)  -     Comprehensive Metabolic Panel; Future  -     CBC & Differential; Future  -     T4, Free; Future  -     TSH; Future  -     Magnesium; Future    2. Hypokalemia    3. Type 2 diabetes mellitus with diabetic neuropathy, without long-term current use of insulin  -     Comprehensive Metabolic Panel; Future  -     Hemoglobin A1c; Future    4. Vitamin D deficiency  -     Vitamin D,25-Hydroxy; Future    5. Vitamin B 12 deficiency  -     CBC & Differential; Future  -     Vitamin B12; Future  -     Folate; Future    6. Hyperlipidemia, unspecified " hyperlipidemia type  -     Lipid Panel; Future    Hypertension: The patient reports energy is improved.  Continue   losartan 25 mg once daily and continue metoprolol 25 mg twice a day.      Hypokalemia. Level at 3.8 and normal. Kidney function improved to normal.    Diabetes: FBS has been good after medication changes. Continue current treatment.    BMI is within normal parameters. No other follow-up for BMI required.     Patient was given instructions and counseling regarding his condition or for health maintenance advice. Please see specific information pulled into the AVS if appropriate.     Follow Up   Return in about 3 months (around 9/14/2024) for Recheck.    Dictated Utilizing Dragon Dictation.  Please note that portions of this note were completed with a voice recognition program.  Part of this note may be an electronic transcription/translation of spoken language to printed text using the Dragon Dictation System.    DARRELL Pardo

## 2024-06-14 ENCOUNTER — OFFICE VISIT (OUTPATIENT)
Dept: INTERNAL MEDICINE | Age: 79
End: 2024-06-14
Payer: MEDICARE

## 2024-06-14 VITALS
BODY MASS INDEX: 24.82 KG/M2 | OXYGEN SATURATION: 96 % | SYSTOLIC BLOOD PRESSURE: 100 MMHG | HEIGHT: 70 IN | DIASTOLIC BLOOD PRESSURE: 60 MMHG | TEMPERATURE: 98 F | HEART RATE: 78 BPM | WEIGHT: 173.4 LBS

## 2024-06-14 DIAGNOSIS — E87.6 HYPOKALEMIA: ICD-10-CM

## 2024-06-14 DIAGNOSIS — E53.8 VITAMIN B 12 DEFICIENCY: ICD-10-CM

## 2024-06-14 DIAGNOSIS — E55.9 VITAMIN D DEFICIENCY: ICD-10-CM

## 2024-06-14 DIAGNOSIS — E78.5 HYPERLIPIDEMIA, UNSPECIFIED HYPERLIPIDEMIA TYPE: ICD-10-CM

## 2024-06-14 DIAGNOSIS — E11.40 TYPE 2 DIABETES MELLITUS WITH DIABETIC NEUROPATHY, WITHOUT LONG-TERM CURRENT USE OF INSULIN: ICD-10-CM

## 2024-06-14 DIAGNOSIS — I10 PRIMARY HYPERTENSION: Primary | ICD-10-CM

## 2024-06-14 PROCEDURE — 1160F RVW MEDS BY RX/DR IN RCRD: CPT | Performed by: NURSE PRACTITIONER

## 2024-06-14 PROCEDURE — 3078F DIAST BP <80 MM HG: CPT | Performed by: NURSE PRACTITIONER

## 2024-06-14 PROCEDURE — 99214 OFFICE O/P EST MOD 30 MIN: CPT | Performed by: NURSE PRACTITIONER

## 2024-06-14 PROCEDURE — 1159F MED LIST DOCD IN RCRD: CPT | Performed by: NURSE PRACTITIONER

## 2024-06-14 PROCEDURE — 1126F AMNT PAIN NOTED NONE PRSNT: CPT | Performed by: NURSE PRACTITIONER

## 2024-06-14 PROCEDURE — 3074F SYST BP LT 130 MM HG: CPT | Performed by: NURSE PRACTITIONER

## 2024-07-01 ENCOUNTER — CLINICAL SUPPORT (OUTPATIENT)
Dept: INTERNAL MEDICINE | Age: 79
End: 2024-07-01
Payer: MEDICARE

## 2024-07-01 DIAGNOSIS — E53.8 VITAMIN B 12 DEFICIENCY: Primary | ICD-10-CM

## 2024-07-01 PROCEDURE — 96372 THER/PROPH/DIAG INJ SC/IM: CPT | Performed by: NURSE PRACTITIONER

## 2024-07-01 RX ADMIN — CYANOCOBALAMIN 1000 MCG: 1000 INJECTION, SOLUTION INTRAMUSCULAR; SUBCUTANEOUS at 14:06

## 2024-07-15 RX ORDER — MONTELUKAST SODIUM 10 MG/1
10 TABLET ORAL DAILY
Qty: 90 TABLET | Refills: 0 | Status: SHIPPED | OUTPATIENT
Start: 2024-07-15

## 2024-07-22 RX ORDER — BLOOD SUGAR DIAGNOSTIC
STRIP MISCELLANEOUS
Qty: 100 EACH | Refills: 12 | Status: SHIPPED | OUTPATIENT
Start: 2024-07-22

## 2024-07-22 RX ORDER — LANCETS
EACH MISCELLANEOUS
Qty: 100 EACH | Refills: 12 | Status: SHIPPED | OUTPATIENT
Start: 2024-07-22

## 2024-08-01 ENCOUNTER — CLINICAL SUPPORT (OUTPATIENT)
Dept: INTERNAL MEDICINE | Age: 79
End: 2024-08-01
Payer: MEDICARE

## 2024-08-01 DIAGNOSIS — E53.8 VITAMIN B 12 DEFICIENCY: Primary | ICD-10-CM

## 2024-08-01 PROCEDURE — 96372 THER/PROPH/DIAG INJ SC/IM: CPT | Performed by: NURSE PRACTITIONER

## 2024-08-01 RX ADMIN — CYANOCOBALAMIN 1000 MCG: 1000 INJECTION, SOLUTION INTRAMUSCULAR; SUBCUTANEOUS at 11:48

## 2024-08-21 ENCOUNTER — OFFICE VISIT (OUTPATIENT)
Dept: CARDIOLOGY | Facility: CLINIC | Age: 79
End: 2024-08-21
Payer: MEDICARE

## 2024-08-21 VITALS
BODY MASS INDEX: 25.83 KG/M2 | WEIGHT: 180.4 LBS | HEIGHT: 70 IN | DIASTOLIC BLOOD PRESSURE: 72 MMHG | HEART RATE: 60 BPM | SYSTOLIC BLOOD PRESSURE: 161 MMHG

## 2024-08-21 DIAGNOSIS — I25.10 CORONARY ARTERY DISEASE INVOLVING NATIVE CORONARY ARTERY OF NATIVE HEART WITHOUT ANGINA PECTORIS: ICD-10-CM

## 2024-08-21 DIAGNOSIS — I10 ESSENTIAL HYPERTENSION: ICD-10-CM

## 2024-08-21 DIAGNOSIS — Z95.1 S/P CABG (CORONARY ARTERY BYPASS GRAFT): Primary | ICD-10-CM

## 2024-08-21 DIAGNOSIS — E78.2 MIXED DYSLIPIDEMIA: ICD-10-CM

## 2024-08-21 NOTE — PROGRESS NOTES
Chief Complaint  Hyperlipidemia, Hypertension, and Follow-up (6 mo f/u. )    Subjective        History of Present Illness  Trenton Adames presents to [facility] for follow up.   Patient is a 78-year-old male with past medical history outlined below, significant for coronary artery disease status post CABG x 7 vessels on 9/26/2023 with Dr. Groves, hypertension, hyperlipidemia who presents for routine follow-up. He is doing quite well.  His only complaint today is back pain.  He denies any chest pain or discomfort.  He has no palpitations.  He denies any dyspnea or edema.  Past Medical History:   Diagnosis Date    Arthritis     Asthma     Atypical chest pain     Coronary artery disease involving native coronary artery of native heart with unstable angina pectoris 9/25/2023    Dysphagia, oral phase     NO CURRENT ISSUES    Essential (primary) hypertension     Foot pain, bilateral     Heart murmur 5/2023    Heart valve disease 7/2023    Hyperlipidemia, unspecified     Low back pain     Neuropathy in diabetes     Peripheral neuropathy     Pulmonary HTN 08/09/2023    Sleep apnea     USES CPAP    Type 2 diabetes mellitus without complication     Vitamin D deficiency, unspecified        ALLERGY  Allergies   Allergen Reactions    Cat Hair Extract Itching    Grass Itching    Pollen Extract Itching    Tape Rash        Past Surgical History:   Procedure Laterality Date    CARDIAC CATHETERIZATION N/A 09/19/2023    Procedure: Left Heart Cath with possible coronary angioplasty;  Surgeon: Marc Charles MD;  Location: [facility] INVASIVE LOCATION;  Service: Cardiology;  Laterality: N/A;    CATARACT EXTRACTION, BILATERAL  1/28/2014; 1/7/2014    COLONOSCOPY  2022    CORONARY ARTERY BYPASS GRAFT N/A 09/26/2023    Procedure: RUKHSANA STERNOTOMY CORONARY ARTERY BYPASS GRAFT TIMES 7 USING LEFT INTERNAL MAMMARY ARTERY AND RIGHT GREATER SAPHENOUS VEIN GRAFT PER ENDOSCOPIC VEIN HARVESTING AND PRP;   Surgeon: Jr Frederic Groves MD;  Location: [facility];  Service: Cardiothoracic;  Laterality: N/A;    ENDOMETRIAL ABLATION      GASTROSTOMY      HEMORRHOIDECTOMY  2014    POLYPECTOMY      SINUS SURGERY          Social History     Socioeconomic History    Marital status:    Tobacco Use    Smoking status: Former     Current packs/day: 0.00     Average packs/day: 2.0 packs/day for 15.0 years (30.0 ttl pk-yrs)     Types: Cigarettes, Pipe     Start date: 1980     Quit date: 1995     Years since quittin.6    Smokeless tobacco: Never   Vaping Use    Vaping status: Never Used   Substance and Sexual Activity    Alcohol use: Yes     Alcohol/week: 8.0 standard drinks of alcohol     Types: 6 Cans of beer, 2 Drinks containing 0.5 oz of alcohol per week     Comment: Occasional     Drug use: Never    Sexual activity: Defer     Partners: Female       Family History   Problem Relation Age of Onset    Cancer Mother     Heart disease Father     Diabetes Father     Arthritis Father     Heart disease Maternal Grandmother     Cancer Maternal Grandfather     Diabetes Paternal Grandmother     Malig Hyperthermia Neg Hx         Current Outpatient Medications on File Prior to Visit   Medication Sig    Accu-Chek Softclix Lancets lancets USE TO TEST BLOOD SUGAR 2-3 TIMES DAILY AS NEEDED    albuterol sulfate  (90 Base) MCG/ACT inhaler Inhale 2 puffs Every 4 (Four) Hours As Needed for Wheezing.    aspirin 81 MG EC tablet Take 1 tablet by mouth Daily.    Blood Glucose Monitoring Suppl (Accu-Chek Guide) w/Device kit 1 each 3 (Three) Times a Day As Needed (check 2-3 times daily as needed).    clobetasol (TEMOVATE) 0.05 % cream Apply 1 application topically to the appropriate area as directed 2 (Two) Times a Day.    docusate sodium 100 MG capsule Take 1 capsule by mouth 2 (Two) Times a Day.    esomeprazole (nexIUM) 20 MG capsule Take 1 capsule by mouth Every Morning Before Breakfast.    fluticasone (FLONASE) 50  MCG/ACT nasal spray 1 spray into the nostril(s) as directed by provider Daily.    gabapentin (NEURONTIN) 800 MG tablet Take 1 tablet by mouth 3 (Three) Times a Day.    glucose blood (Accu-Chek Guide) test strip USE TO TEST BLOOD SUGAR 2-3 TIMES A DAY AS NEEDED    glucose blood test strip Test 2-3 times a week    hydrOXYzine (ATARAX) 10 MG tablet Take 1 tablet by mouth 3 (Three) Times a Day As Needed for Itching.    Ibuprofen 3 %, Gabapentin 10 %, Baclofen 2 %, lidocaine 4 %, Ketamine HCl 4 % Apply 1-2 g topically to the appropriate area as directed 3 (Three) to 4 (Four) times daily.    Ibuprofen 3 %, Gabapentin 10 %, Baclofen 2 %, lidocaine 4 %, Ketamine HCl 4 % Apply 1-2 g topically to the appropriate area as directed 3 (Three) to 4 (Four) times daily.    Krill Oil (Omega-3) 500 MG capsule Take 1 capsule by mouth Daily.    loratadine (CLARITIN) 10 MG tablet Take 1 tablet by mouth 2 (Two) Times a Day.    losartan (Cozaar) 25 MG tablet Take 1 tablet by mouth Daily.    Melatonin 10 MG tablet Take 1 tablet by mouth As Needed.    metFORMIN (GLUCOPHAGE) 500 MG tablet Take 1 tablet by mouth 2 (Two) Times a Day With Meals.    metoprolol tartrate (LOPRESSOR) 25 MG tablet Take 1 tablet by mouth Every 12 (Twelve) Hours.    montelukast (SINGULAIR) 10 MG tablet TAKE 1 TABLET BY MOUTH DAILY    ondansetron ODT (ZOFRAN-ODT) 4 MG disintegrating tablet Place 1 tablet on the tongue Every 8 (Eight) Hours As Needed for Nausea or Vomiting.    rosuvastatin (Crestor) 40 MG tablet Take 1 tablet by mouth Every Night.    Semaglutide, 2 MG/DOSE, (OZEMPIC) 8 MG/3ML solution pen-injector Inject 2 mg under the skin into the appropriate area as directed 1 (One) Time Per Week.    Turmeric 500 MG capsule Take 1 capsule by mouth Daily.    vitamin D3 125 MCG (5000 UT) capsule capsule Take 1 capsule by mouth Daily.     Current Facility-Administered Medications on File Prior to Visit   Medication    cyanocobalamin injection 1,000 mcg       Objective  "  Vitals:    08/21/24 1219   BP: 161/72   Pulse: 60   Weight: 81.8 kg (180 lb 6.4 oz)   Height: 177.8 cm (70\")       Physical Exam  Constitutional:       General: He is awake. He is not in acute distress.     Appearance: Normal appearance.   HENT:      Head: Normocephalic.      Nose: Nose normal. No congestion.   Eyes:      Extraocular Movements: Extraocular movements intact.      Conjunctiva/sclera: Conjunctivae normal.      Pupils: Pupils are equal, round, and reactive to light.   Neck:      Thyroid: No thyromegaly.      Vascular: No JVD.   Cardiovascular:      Rate and Rhythm: Normal rate and regular rhythm.      Chest Wall: PMI is not displaced.      Pulses: Normal pulses.      Heart sounds: Normal heart sounds, S1 normal and S2 normal. No murmur heard.     No friction rub. No gallop. No S3 or S4 sounds.   Pulmonary:      Effort: Pulmonary effort is normal.      Breath sounds: Normal breath sounds. No wheezing, rhonchi or rales.   Abdominal:      General: Bowel sounds are normal.      Palpations: Abdomen is soft.      Tenderness: There is no abdominal tenderness.   Musculoskeletal:      Cervical back: No tenderness.      Right lower leg: No edema.      Left lower leg: No edema.   Lymphadenopathy:      Cervical: No cervical adenopathy.   Skin:     General: Skin is warm and dry.      Capillary Refill: Capillary refill takes less than 2 seconds.      Coloration: Skin is not cyanotic.      Findings: No petechiae or rash.      Nails: There is no clubbing.   Neurological:      Mental Status: He is alert.   Psychiatric:         Mood and Affect: Mood normal.         Behavior: Behavior is cooperative.           Result Review     The following data was reviewed by DARRELL Ibrahim on 08/21/24.      CMP          2/26/2024    09:08 5/29/2024    09:48 6/13/2024    11:05   CMP   Glucose 121  133  175    BUN 19  22  15    Creatinine 1.10  1.44  1.09    EGFR 69.6  50.4  70.3    Sodium 140  134  141    Potassium 3.8  3.1 "  3.8    Chloride 94  88  104    Calcium 10.1  9.9  9.0    Total Protein 7.5  7.9     Albumin 4.7  4.8     Globulin 2.8  3.1     Total Bilirubin 0.6  0.4     Alkaline Phosphatase 37  44     AST (SGOT) 23  22     ALT (SGPT) 26  23     Albumin/Globulin Ratio 1.7  1.5     BUN/Creatinine Ratio 17.3  15.3  13.8    Anion Gap 21.1  17.0  12.1      CBC w/diff          9/30/2023    03:20 2/26/2024    09:08 5/29/2024    09:48   CBC w/Diff   WBC 10.15  7.98  7.94    RBC 3.02  4.90  4.83    Hemoglobin 9.2  13.4  13.5    Hematocrit 27.1  40.9  41.2    MCV 89.7  83.5  85.3    MCH 30.5  27.3  28.0    MCHC 33.9  32.8  32.8    RDW 12.0  14.3  13.6    Platelets 187  281  284    Neutrophil Rel %  54.1  61.7    Immature Granulocyte Rel %  0.3  0.1    Lymphocyte Rel %  33.5  26.8    Monocyte Rel %  8.4  7.8    Eosinophil Rel %  2.3  2.6    Basophil Rel %  1.4  1.0       Lipid Panel          9/25/2023    18:46 1/8/2024    10:47 5/29/2024    09:48   Lipid Panel   Total Cholesterol 143  106  93    Triglycerides 145  184  132    HDL Cholesterol 42  36  35    VLDL Cholesterol 25  30  23    LDL Cholesterol  76  40  35    LDL/HDL Ratio 1.71  0.92  0.90          Results for orders placed during the hospital encounter of 08/22/23    Stress Test With Myocardial Perfusion One Day    Interpretation Summary  Patient received Lexiscan 0.4 mg IV infusion over 10 seconds.  Baseline ECG showed normal sinus rhythm.  At peak stress, no ischemic ST-T changes were noted.  No significant arrhythmias were noted.  Gated on SPECT images were reviewed.  Image quality is adequate.  There is a medium area of moderate perfusion defect in the distal anterior and apical segment with minimal reversibility on resting images which could represent resting myocardial ischemia versus attenuation artifact.  Correlate clinically.  The left ventricle is normal in size calculated ejection fraction of 64%.  No wall motion abnormalities were noted.  Overall, this presents a low  to intermediate risk study.  Correlate these findings clinically.    No results found for this or any previous visit.          Procedures    Assessment & Plan  Diagnoses and all orders for this visit:    1. S/P CABG (coronary artery bypass graft) (Primary)    2. Coronary artery disease involving native coronary artery of native heart without angina pectoris    3. Essential hypertension    4. Mixed dyslipidemia        1.  History of coronary artery disease status post CABG x 7 vessels on 9/26/2023 by Dr. Groves.  Postoperative course was uneventful.  He is doing very well overall.  He has no complaints or concerns today.  He denies any chest pain.  2.  As per #1.  3.  Blood pressure is elevated in the office today but he reports normal blood pressure readings at home.  Continue current antihypertensive regimen.  4.  Most recent lipid panel was reviewed and demonstrates good cholesterol control.  Continue statin therapy.          The medical services provided during this encounter are part of ongoing care related to this patient's single serious condition or complex condition.    Follow Up   Return in about 6 months (around 2/21/2025) for With Dr. Graham due to Dr.A ernst.    Patient was given instructions and counseling regarding his condition or for health maintenance advice. Please see specific information pulled into the AVS if appropriate.     Asuncion Galvan, APRN  08/21/24  12:26 EDT    Dictated Utilizing Dragon Dictation

## 2024-08-22 ENCOUNTER — PATIENT MESSAGE (OUTPATIENT)
Dept: CARDIOLOGY | Facility: CLINIC | Age: 79
End: 2024-08-22
Payer: MEDICARE

## 2024-08-23 DIAGNOSIS — N52.9 ERECTILE DYSFUNCTION, UNSPECIFIED ERECTILE DYSFUNCTION TYPE: ICD-10-CM

## 2024-08-23 DIAGNOSIS — N52.9 IMPOTENCE OF ORGANIC ORIGIN: Primary | ICD-10-CM

## 2024-08-23 DIAGNOSIS — N40.0 BENIGN PROSTATIC HYPERPLASIA WITHOUT LOWER URINARY TRACT SYMPTOMS: ICD-10-CM

## 2024-08-30 DIAGNOSIS — E11.40 TYPE 2 DIABETES MELLITUS WITH DIABETIC NEUROPATHY, WITHOUT LONG-TERM CURRENT USE OF INSULIN: ICD-10-CM

## 2024-08-30 RX ORDER — GABAPENTIN 800 MG/1
800 TABLET ORAL 3 TIMES DAILY
Qty: 90 TABLET | Refills: 5 | Status: SHIPPED | OUTPATIENT
Start: 2024-08-30

## 2024-09-11 ENCOUNTER — DOCUMENTATION (OUTPATIENT)
Dept: PHYSICAL THERAPY | Facility: CLINIC | Age: 79
End: 2024-09-11
Payer: MEDICARE

## 2024-09-11 NOTE — PROGRESS NOTES
Outpatient Physical Therapy  [address], [place] [address]      Discharge Summary  Discharge Summary from Physical Therapy Report      Dates  PT visit: 5/19/23-6/16/23  Number of Visits: 3       Goals  Plan Goals: LOW BACK PROBLEMS:     1. The patient complains of low back pain.  LTG 1: 12 weeks:  The patient will report a pain rating of 3/10 or better at worst in order to improve  tolerance to activities of daily living and improve sleep quality.  STATUS:  Not met, ongoing   STG 1a: 6 weeks:  The patient will report a pain rating of 5/10 or better at its worst.  STATUS:  Not met, ongoing   TREATMENT:  Therapeutic exercises, manual therapy, aquatic therapy, home exercise   instruction, and modalities as needed for pain to include:  electrical stimulation, moist heat, ice,   ultrasound, and diathermy.        2. The patient demonstrates weakness of the B hip.  LTG 2: 12 weeks:  The patient will demonstrate 5 /5 strength for B hip flexion, abduction,  and extension in order to improve hip stability.  STATUS:  Not met, ongoing   STG 2a: 6 weeks:  The patient will demonstrate 4+ /5 strength for B hip flexion, abduction,  and extension.  STATUS:  Not met, ongoing   TREATMENT: Therapeutic exercises, manual therapy, aquatic therapy, home exercise instruction,  and modalities as needed for pain to include:  electrical stimulation, moist heat, ice, ultrasound, and   diathermy.           3. Mobility: Walking/Moving Around Functional Limitation                   LTG 3: 12 weeks:  The patient will demonstrate 1-19 % limitation by achieving a score of 1-9 on the KHUSHBU.  STATUS:  Not met, ongoing  STG 3 a: 6 weeks:  The patient will demonstrate independent HEP.  STATUS:  Met, ongoing   TREATMENT:  Manual therapy, therapeutic exercise, home exercise instruction, and modalities as needed to include: moist heat, electrical stimulation, and ultrasound    Discharge Plan: Continue with current home exercise program as  instructed    Date of Discharge 9/11/2024      Electronically signed:   Sangeetha Yuen PTA  Physical Therapist Assistant  [facility] License #: [id number]

## 2024-09-13 ENCOUNTER — LAB (OUTPATIENT)
Dept: LAB | Facility: HOSPITAL | Age: 79
End: 2024-09-13
Payer: MEDICARE

## 2024-09-13 DIAGNOSIS — E78.5 HYPERLIPIDEMIA, UNSPECIFIED HYPERLIPIDEMIA TYPE: ICD-10-CM

## 2024-09-13 DIAGNOSIS — I10 PRIMARY HYPERTENSION: ICD-10-CM

## 2024-09-13 DIAGNOSIS — E55.9 VITAMIN D DEFICIENCY: ICD-10-CM

## 2024-09-13 DIAGNOSIS — E53.8 VITAMIN B 12 DEFICIENCY: ICD-10-CM

## 2024-09-13 DIAGNOSIS — E11.40 TYPE 2 DIABETES MELLITUS WITH DIABETIC NEUROPATHY, WITHOUT LONG-TERM CURRENT USE OF INSULIN: ICD-10-CM

## 2024-09-13 LAB
25(OH)D3 SERPL-MCNC: 71.9 NG/ML (ref 30–100)
ALBUMIN SERPL-MCNC: 4.4 G/DL (ref 3.5–5.2)
ALBUMIN/GLOB SERPL: 1.5 G/DL
ALP SERPL-CCNC: 38 U/L (ref 39–117)
ALT SERPL W P-5'-P-CCNC: 24 U/L (ref 1–41)
ANION GAP SERPL CALCULATED.3IONS-SCNC: 8.1 MMOL/L (ref 5–15)
AST SERPL-CCNC: 23 U/L (ref 1–40)
BASOPHILS # BLD AUTO: 0.07 10*3/MM3 (ref 0–0.2)
BASOPHILS NFR BLD AUTO: 1.2 % (ref 0–1.5)
BILIRUB SERPL-MCNC: 0.3 MG/DL (ref 0–1.2)
BUN SERPL-MCNC: 13 MG/DL (ref 8–23)
BUN/CREAT SERPL: 14 (ref 7–25)
CALCIUM SPEC-SCNC: 9.7 MG/DL (ref 8.6–10.5)
CHLORIDE SERPL-SCNC: 98 MMOL/L (ref 98–107)
CHOLEST SERPL-MCNC: 89 MG/DL (ref 0–200)
CO2 SERPL-SCNC: 26.9 MMOL/L (ref 22–29)
CREAT SERPL-MCNC: 0.93 MG/DL (ref 0.76–1.27)
DEPRECATED RDW RBC AUTO: 39.4 FL (ref 37–54)
EGFRCR SERPLBLD CKD-EPI 2021: 85.1 ML/MIN/1.73
EOSINOPHIL # BLD AUTO: 0.18 10*3/MM3 (ref 0–0.4)
EOSINOPHIL NFR BLD AUTO: 3 % (ref 0.3–6.2)
ERYTHROCYTE [DISTWIDTH] IN BLOOD BY AUTOMATED COUNT: 12.4 % (ref 12.3–15.4)
FOLATE SERPL-MCNC: 7.7 NG/ML (ref 4.78–24.2)
GLOBULIN UR ELPH-MCNC: 2.9 GM/DL
GLUCOSE SERPL-MCNC: 103 MG/DL (ref 65–99)
HBA1C MFR BLD: 6 % (ref 4.8–5.6)
HCT VFR BLD AUTO: 38.1 % (ref 37.5–51)
HDLC SERPL-MCNC: 38 MG/DL (ref 40–60)
HGB BLD-MCNC: 12.9 G/DL (ref 13–17.7)
IMM GRANULOCYTES # BLD AUTO: 0.01 10*3/MM3 (ref 0–0.05)
IMM GRANULOCYTES NFR BLD AUTO: 0.2 % (ref 0–0.5)
LDLC SERPL CALC-MCNC: 31 MG/DL (ref 0–100)
LDLC/HDLC SERPL: 0.77 {RATIO}
LYMPHOCYTES # BLD AUTO: 2.18 10*3/MM3 (ref 0.7–3.1)
LYMPHOCYTES NFR BLD AUTO: 36.9 % (ref 19.6–45.3)
MAGNESIUM SERPL-MCNC: 1.6 MG/DL (ref 1.6–2.4)
MCH RBC QN AUTO: 29.5 PG (ref 26.6–33)
MCHC RBC AUTO-ENTMCNC: 33.9 G/DL (ref 31.5–35.7)
MCV RBC AUTO: 87.2 FL (ref 79–97)
MONOCYTES # BLD AUTO: 0.52 10*3/MM3 (ref 0.1–0.9)
MONOCYTES NFR BLD AUTO: 8.8 % (ref 5–12)
NEUTROPHILS NFR BLD AUTO: 2.95 10*3/MM3 (ref 1.7–7)
NEUTROPHILS NFR BLD AUTO: 49.9 % (ref 42.7–76)
NRBC BLD AUTO-RTO: 0 /100 WBC (ref 0–0.2)
PLATELET # BLD AUTO: 241 10*3/MM3 (ref 140–450)
PMV BLD AUTO: 11.3 FL (ref 6–12)
POTASSIUM SERPL-SCNC: 4.6 MMOL/L (ref 3.5–5.2)
PROT SERPL-MCNC: 7.3 G/DL (ref 6–8.5)
RBC # BLD AUTO: 4.37 10*6/MM3 (ref 4.14–5.8)
SODIUM SERPL-SCNC: 133 MMOL/L (ref 136–145)
T4 FREE SERPL-MCNC: 0.95 NG/DL (ref 0.92–1.68)
TRIGL SERPL-MCNC: 109 MG/DL (ref 0–150)
TSH SERPL DL<=0.05 MIU/L-ACNC: 1.66 UIU/ML (ref 0.27–4.2)
VIT B12 BLD-MCNC: 764 PG/ML (ref 211–946)
VLDLC SERPL-MCNC: 20 MG/DL (ref 5–40)
WBC NRBC COR # BLD AUTO: 5.91 10*3/MM3 (ref 3.4–10.8)

## 2024-09-13 PROCEDURE — 83735 ASSAY OF MAGNESIUM: CPT

## 2024-09-13 PROCEDURE — 83036 HEMOGLOBIN GLYCOSYLATED A1C: CPT

## 2024-09-13 PROCEDURE — 80053 COMPREHEN METABOLIC PANEL: CPT

## 2024-09-13 PROCEDURE — 80061 LIPID PANEL: CPT

## 2024-09-13 PROCEDURE — 82306 VITAMIN D 25 HYDROXY: CPT

## 2024-09-13 PROCEDURE — 82746 ASSAY OF FOLIC ACID SERUM: CPT

## 2024-09-13 PROCEDURE — 84443 ASSAY THYROID STIM HORMONE: CPT

## 2024-09-13 PROCEDURE — 85025 COMPLETE CBC W/AUTO DIFF WBC: CPT

## 2024-09-13 PROCEDURE — 82607 VITAMIN B-12: CPT

## 2024-09-13 PROCEDURE — 36415 COLL VENOUS BLD VENIPUNCTURE: CPT

## 2024-09-13 PROCEDURE — 84439 ASSAY OF FREE THYROXINE: CPT

## 2024-09-20 ENCOUNTER — OFFICE VISIT (OUTPATIENT)
Dept: INTERNAL MEDICINE | Age: 79
End: 2024-09-20
Payer: MEDICARE

## 2024-09-20 VITALS
BODY MASS INDEX: 24.79 KG/M2 | HEART RATE: 86 BPM | OXYGEN SATURATION: 98 % | DIASTOLIC BLOOD PRESSURE: 59 MMHG | SYSTOLIC BLOOD PRESSURE: 121 MMHG | WEIGHT: 173.2 LBS | TEMPERATURE: 97.2 F | HEIGHT: 70 IN

## 2024-09-20 DIAGNOSIS — M19.90 ARTHRITIS: ICD-10-CM

## 2024-09-20 DIAGNOSIS — E55.9 VITAMIN D DEFICIENCY: ICD-10-CM

## 2024-09-20 DIAGNOSIS — E78.5 HYPERLIPIDEMIA, UNSPECIFIED HYPERLIPIDEMIA TYPE: ICD-10-CM

## 2024-09-20 DIAGNOSIS — E87.6 HYPOKALEMIA: ICD-10-CM

## 2024-09-20 DIAGNOSIS — E53.8 VITAMIN B 12 DEFICIENCY: ICD-10-CM

## 2024-09-20 DIAGNOSIS — E87.1 HYPONATREMIA: ICD-10-CM

## 2024-09-20 DIAGNOSIS — E11.40 TYPE 2 DIABETES MELLITUS WITH DIABETIC NEUROPATHY, WITHOUT LONG-TERM CURRENT USE OF INSULIN: Primary | ICD-10-CM

## 2024-09-20 DIAGNOSIS — Z23 NEED FOR INFLUENZA VACCINATION: ICD-10-CM

## 2024-09-20 DIAGNOSIS — I10 PRIMARY HYPERTENSION: ICD-10-CM

## 2024-09-20 PROCEDURE — 1159F MED LIST DOCD IN RCRD: CPT | Performed by: NURSE PRACTITIONER

## 2024-09-20 PROCEDURE — 3074F SYST BP LT 130 MM HG: CPT | Performed by: NURSE PRACTITIONER

## 2024-09-20 PROCEDURE — 90662 IIV NO PRSV INCREASED AG IM: CPT | Performed by: NURSE PRACTITIONER

## 2024-09-20 PROCEDURE — 99214 OFFICE O/P EST MOD 30 MIN: CPT | Performed by: NURSE PRACTITIONER

## 2024-09-20 PROCEDURE — 3078F DIAST BP <80 MM HG: CPT | Performed by: NURSE PRACTITIONER

## 2024-09-20 PROCEDURE — G0008 ADMIN INFLUENZA VIRUS VAC: HCPCS | Performed by: NURSE PRACTITIONER

## 2024-09-20 PROCEDURE — 1126F AMNT PAIN NOTED NONE PRSNT: CPT | Performed by: NURSE PRACTITIONER

## 2024-09-20 PROCEDURE — 1160F RVW MEDS BY RX/DR IN RCRD: CPT | Performed by: NURSE PRACTITIONER

## 2024-09-20 PROCEDURE — 96372 THER/PROPH/DIAG INJ SC/IM: CPT | Performed by: NURSE PRACTITIONER

## 2024-09-20 RX ADMIN — CYANOCOBALAMIN 1000 MCG: 1000 INJECTION, SOLUTION INTRAMUSCULAR; SUBCUTANEOUS at 09:51

## 2024-10-09 DIAGNOSIS — J45.20 MILD INTERMITTENT ASTHMA WITHOUT COMPLICATION: Primary | ICD-10-CM

## 2024-10-09 RX ORDER — MONTELUKAST SODIUM 10 MG/1
10 TABLET ORAL DAILY
Qty: 90 TABLET | Refills: 0 | Status: SHIPPED | OUTPATIENT
Start: 2024-10-09

## 2024-11-04 ENCOUNTER — CLINICAL SUPPORT (OUTPATIENT)
Dept: INTERNAL MEDICINE | Age: 79
End: 2024-11-04
Payer: MEDICARE

## 2024-11-04 DIAGNOSIS — E53.8 VITAMIN B 12 DEFICIENCY: Primary | ICD-10-CM

## 2024-11-04 PROCEDURE — 96372 THER/PROPH/DIAG INJ SC/IM: CPT | Performed by: NURSE PRACTITIONER

## 2024-11-04 RX ADMIN — CYANOCOBALAMIN 1000 MCG: 1000 INJECTION, SOLUTION INTRAMUSCULAR; SUBCUTANEOUS at 09:29

## 2024-11-11 ENCOUNTER — TELEPHONE (OUTPATIENT)
Dept: CARDIOLOGY | Facility: CLINIC | Age: 79
End: 2024-11-11

## 2024-11-11 RX ORDER — ROSUVASTATIN CALCIUM 40 MG/1
40 TABLET, COATED ORAL NIGHTLY
Qty: 90 TABLET | Refills: 3 | Status: CANCELLED | OUTPATIENT
Start: 2024-11-11

## 2024-11-11 NOTE — TELEPHONE ENCOUNTER
[facility] received a fax that requires your attention. The document has been indexed to the patient’s chart for your review.      Reason for sending: EXTERNAL MEDICAL RECORD NOTIFICATION     Documents Description: ROSUVASTATIN REFILL-[employer]-11.11.24    Name of Sender: KYUNG     Date Indexed: 11.11.24

## 2024-11-12 RX ORDER — ROSUVASTATIN CALCIUM 40 MG/1
40 TABLET, COATED ORAL NIGHTLY
Qty: 90 TABLET | Refills: 3 | Status: SHIPPED | OUTPATIENT
Start: 2024-11-12

## 2024-11-21 RX ORDER — DOCUSATE SODIUM 100 MG/1
100 CAPSULE, LIQUID FILLED ORAL 2 TIMES DAILY
Qty: 180 CAPSULE | Refills: 1 | Status: SHIPPED | OUTPATIENT
Start: 2024-11-21

## 2024-12-02 ENCOUNTER — CLINICAL SUPPORT (OUTPATIENT)
Dept: INTERNAL MEDICINE | Age: 79
End: 2024-12-02
Payer: MEDICARE

## 2024-12-02 DIAGNOSIS — E53.8 VITAMIN B 12 DEFICIENCY: Primary | ICD-10-CM

## 2024-12-02 PROCEDURE — 96372 THER/PROPH/DIAG INJ SC/IM: CPT | Performed by: NURSE PRACTITIONER

## 2024-12-02 RX ORDER — LOSARTAN POTASSIUM 25 MG/1
25 TABLET ORAL DAILY
Qty: 90 TABLET | Refills: 1 | Status: SHIPPED | OUTPATIENT
Start: 2024-12-02

## 2024-12-02 RX ADMIN — CYANOCOBALAMIN 1000 MCG: 1000 INJECTION, SOLUTION INTRAMUSCULAR; SUBCUTANEOUS at 10:08

## 2024-12-09 ENCOUNTER — OFFICE VISIT (OUTPATIENT)
Dept: INTERNAL MEDICINE | Age: 79
End: 2024-12-09
Payer: MEDICARE

## 2024-12-09 VITALS
BODY MASS INDEX: 25.43 KG/M2 | TEMPERATURE: 97.8 F | HEART RATE: 89 BPM | WEIGHT: 177.6 LBS | SYSTOLIC BLOOD PRESSURE: 129 MMHG | OXYGEN SATURATION: 100 % | HEIGHT: 70 IN | DIASTOLIC BLOOD PRESSURE: 80 MMHG

## 2024-12-09 DIAGNOSIS — M65.4 DE QUERVAIN'S TENOSYNOVITIS, LEFT: Primary | ICD-10-CM

## 2024-12-09 PROCEDURE — 1160F RVW MEDS BY RX/DR IN RCRD: CPT | Performed by: NURSE PRACTITIONER

## 2024-12-09 PROCEDURE — 3079F DIAST BP 80-89 MM HG: CPT | Performed by: NURSE PRACTITIONER

## 2024-12-09 PROCEDURE — 1126F AMNT PAIN NOTED NONE PRSNT: CPT | Performed by: NURSE PRACTITIONER

## 2024-12-09 PROCEDURE — 3074F SYST BP LT 130 MM HG: CPT | Performed by: NURSE PRACTITIONER

## 2024-12-09 PROCEDURE — 99213 OFFICE O/P EST LOW 20 MIN: CPT | Performed by: NURSE PRACTITIONER

## 2024-12-09 PROCEDURE — 1159F MED LIST DOCD IN RCRD: CPT | Performed by: NURSE PRACTITIONER

## 2024-12-30 ENCOUNTER — LAB (OUTPATIENT)
Dept: INTERNAL MEDICINE | Age: 79
End: 2024-12-30
Payer: MEDICARE

## 2024-12-30 DIAGNOSIS — I10 PRIMARY HYPERTENSION: ICD-10-CM

## 2024-12-30 DIAGNOSIS — E11.40 TYPE 2 DIABETES MELLITUS WITH DIABETIC NEUROPATHY, WITHOUT LONG-TERM CURRENT USE OF INSULIN: ICD-10-CM

## 2024-12-30 LAB
ALBUMIN SERPL-MCNC: 4 G/DL (ref 3.5–5.2)
ALBUMIN/GLOB SERPL: 1.4 G/DL
ALP SERPL-CCNC: 40 U/L (ref 39–117)
ALT SERPL W P-5'-P-CCNC: 15 U/L (ref 1–41)
ANION GAP SERPL CALCULATED.3IONS-SCNC: 9.9 MMOL/L (ref 5–15)
AST SERPL-CCNC: 16 U/L (ref 1–40)
BASOPHILS # BLD AUTO: 0.05 10*3/MM3 (ref 0–0.2)
BASOPHILS NFR BLD AUTO: 0.7 % (ref 0–1.5)
BILIRUB SERPL-MCNC: 0.4 MG/DL (ref 0–1.2)
BUN SERPL-MCNC: 12 MG/DL (ref 8–23)
BUN/CREAT SERPL: 12.8 (ref 7–25)
CALCIUM SPEC-SCNC: 9.2 MG/DL (ref 8.6–10.5)
CHLORIDE SERPL-SCNC: 103 MMOL/L (ref 98–107)
CO2 SERPL-SCNC: 26.1 MMOL/L (ref 22–29)
CREAT SERPL-MCNC: 0.94 MG/DL (ref 0.76–1.27)
DEPRECATED RDW RBC AUTO: 39.5 FL (ref 37–54)
EGFRCR SERPLBLD CKD-EPI 2021: 83.5 ML/MIN/1.73
EOSINOPHIL # BLD AUTO: 0.2 10*3/MM3 (ref 0–0.4)
EOSINOPHIL NFR BLD AUTO: 2.9 % (ref 0.3–6.2)
ERYTHROCYTE [DISTWIDTH] IN BLOOD BY AUTOMATED COUNT: 12.4 % (ref 12.3–15.4)
GLOBULIN UR ELPH-MCNC: 2.8 GM/DL
GLUCOSE SERPL-MCNC: 104 MG/DL (ref 65–99)
HBA1C MFR BLD: 6 % (ref 4.8–5.6)
HCT VFR BLD AUTO: 40.2 % (ref 37.5–51)
HGB BLD-MCNC: 13.6 G/DL (ref 13–17.7)
IMM GRANULOCYTES # BLD AUTO: 0.01 10*3/MM3 (ref 0–0.05)
IMM GRANULOCYTES NFR BLD AUTO: 0.1 % (ref 0–0.5)
LYMPHOCYTES # BLD AUTO: 2.26 10*3/MM3 (ref 0.7–3.1)
LYMPHOCYTES NFR BLD AUTO: 33.2 % (ref 19.6–45.3)
MCH RBC QN AUTO: 29.7 PG (ref 26.6–33)
MCHC RBC AUTO-ENTMCNC: 33.8 G/DL (ref 31.5–35.7)
MCV RBC AUTO: 87.8 FL (ref 79–97)
MONOCYTES # BLD AUTO: 0.43 10*3/MM3 (ref 0.1–0.9)
MONOCYTES NFR BLD AUTO: 6.3 % (ref 5–12)
NEUTROPHILS NFR BLD AUTO: 3.85 10*3/MM3 (ref 1.7–7)
NEUTROPHILS NFR BLD AUTO: 56.8 % (ref 42.7–76)
NRBC BLD AUTO-RTO: 0 /100 WBC (ref 0–0.2)
PLATELET # BLD AUTO: 255 10*3/MM3 (ref 140–450)
PMV BLD AUTO: 11.1 FL (ref 6–12)
POTASSIUM SERPL-SCNC: 4.2 MMOL/L (ref 3.5–5.2)
PROT SERPL-MCNC: 6.8 G/DL (ref 6–8.5)
RBC # BLD AUTO: 4.58 10*6/MM3 (ref 4.14–5.8)
SODIUM SERPL-SCNC: 139 MMOL/L (ref 136–145)
WBC NRBC COR # BLD AUTO: 6.8 10*3/MM3 (ref 3.4–10.8)

## 2024-12-30 PROCEDURE — 83036 HEMOGLOBIN GLYCOSYLATED A1C: CPT | Performed by: NURSE PRACTITIONER

## 2024-12-30 PROCEDURE — 96372 THER/PROPH/DIAG INJ SC/IM: CPT | Performed by: NURSE PRACTITIONER

## 2024-12-30 PROCEDURE — 85025 COMPLETE CBC W/AUTO DIFF WBC: CPT | Performed by: NURSE PRACTITIONER

## 2024-12-30 PROCEDURE — 80053 COMPREHEN METABOLIC PANEL: CPT | Performed by: NURSE PRACTITIONER

## 2024-12-30 RX ADMIN — CYANOCOBALAMIN 1000 MCG: 1000 INJECTION, SOLUTION INTRAMUSCULAR; SUBCUTANEOUS at 09:49

## 2025-01-04 DIAGNOSIS — J45.20 MILD INTERMITTENT ASTHMA WITHOUT COMPLICATION: ICD-10-CM

## 2025-01-06 PROBLEM — G57.93 NEUROPATHY OF BOTH FEET: Status: ACTIVE | Noted: 2025-01-06

## 2025-01-06 PROBLEM — E87.1 HYPONATREMIA: Status: ACTIVE | Noted: 2025-01-06

## 2025-01-06 PROBLEM — M19.90 ARTHRITIS: Status: ACTIVE | Noted: 2025-01-06

## 2025-01-06 NOTE — PROGRESS NOTES
Chief Complaint  Follow-up (The patient is coming in for a 3 month follow up, labs done. The patient states that he is needing a new PA for his ozempic.)    Subjective      History of Present Illness  The patient presents for evaluation of de Quervain's tenosynovitis, diabetes mellitus, hypertension, neuropathy, asthma, and hyperlipidemia.    He reports an improvement in his left wrist condition over the past 2 to 3 days, with a noticeable reduction in swelling and absence of sharp pain. However, he continues to experience mild aching in his thumb and fingers. He believes the brace is effective and does not currently see the need for orthopedic intervention. He removes the brace several times daily for rest, as the thumb positioning causes discomfort. He also removes the brace at night. He has been applying heat to the area for 15 minutes twice daily and plans to use an ice pack as well. He notes that the swelling has begun to subside.    His blood glucose levels range from the low 100s to 120s upon waking. He has discontinued metformin and is currently on Ozempic 2 mg weekly, which he reports as being highly effective. He anticipates needing a new 90-day prescription next month.    He continues to experience neuropathy, but the severity has decreased. He applies foot cream at night and can now walk to the bathroom in the morning without shoes, experiencing only mild discomfort. He believes gabapentin is effectively managing his symptoms and wishes to maintain his current dosage.    He has been using his albuterol rescue inhaler more frequently due to exposure to dog hair, which exacerbates his asthma. He reports that his asthma is improving again. He has 167 doses remaining in his current inhaler and does not require a refill until September 2025. He has been hospitalized three times for asthma, with the most recent episode requiring two doses of adrenaline and a tracheostomy kit.    He is on losartan 25 mg daily  and metoprolol 25 mg twice daily for blood pressure management. He also takes a potassium supplement at night.    He is on Crestor and omega-3 supplements for cholesterol management. He is also taking B12 injections, which he reports as being beneficial. He requests a PSA test during his next blood work.    SOCIAL HISTORY  He does not smoke marijuana or consume gummies.      Past Medical History:   Diagnosis Date    Arthritis     Asthma     Atypical chest pain     Coronary artery disease involving native coronary artery of native heart with unstable angina pectoris 9/25/2023    Dysphagia, oral phase     NO CURRENT ISSUES    Essential (primary) hypertension     Foot pain, bilateral     Heart murmur 5/2023    Heart valve disease 7/2023    Hyperlipidemia, unspecified     Low back pain     Neuropathy in diabetes     Peripheral neuropathy     Pulmonary HTN 08/09/2023    Sleep apnea     USES CPAP    Type 2 diabetes mellitus without complication     Vitamin D deficiency, unspecified         Past Surgical History:   Procedure Laterality Date    CARDIAC CATHETERIZATION N/A 09/19/2023    Procedure: Left Heart Cath with possible coronary angioplasty;  Surgeon: Marc Charles MD;  Location: [facility] CATH INVASIVE LOC[facility];  Service: Cardiology;  Laterality: N/A;    CATARACT EXTRACTION, BILATERAL  1/28/2014; 1/7/2014    COLONOSCOPY  2022    CORONARY ARTERY BYPASS GRAFT N/A 09/26/2023    Procedure: RUKHSANA STERNOTOMY CORONARY ARTERY BYPASS GRAFT TIMES 7 USING LEFT INTERNAL MAMMARY ARTERY AND RIGHT GREATER SAPHENOUS VEIN GRAFT PER ENDOSCOPIC VEIN HARVESTING AND PRP;  Surgeon: Jr Frederic Groves MD;  Location: [facility];  Service: Cardiothoracic;  Laterality: N/A;    ENDOMETRIAL ABLATION      GASTROSTOMY      HEMORRHOIDECTOMY  06/20/2014    POLYPECTOMY      SINUS SURGERY          Social History     Tobacco Use   Smoking Status Former    Current packs/day: 0.00    Average packs/day: 2.0 packs/day for 15.0 years (30.0 ttl pk-yrs)     Types: Cigarettes, Pipe    Start date: 1980    Quit date: 1995    Years since quittin.0    Passive exposure: Never   Smokeless Tobacco Never        Patient Care Team:  Jacquelin Pelaez APRN as PCP - General (Nurse Practitioner)  Jose R Stephens MD as Consulting Physician (Dermatology)  Naheed George MD as Consulting Physician (Pulmonary Disease)  Marc Charles MD as Consulting Physician (Cardiology)  John Thakur MD as Consulting Physician (Ophthalmology)  Sonny Parish DPM as Consulting Physician (Podiatry)    Allergies   Allergen Reactions    Cat Hair Extract Itching    Grass Itching    Pollen Extract Itching    Tape Rash          Current Outpatient Medications:     Accu-Chek Softclix Lancets lancets, USE TO TEST BLOOD SUGAR 2-3 TIMES DAILY AS NEEDED, Disp: 100 each, Rfl: 12    albuterol sulfate  (90 Base) MCG/ACT inhaler, Inhale 2 puffs Every 4 (Four) Hours As Needed for Wheezing., Disp: 18 g, Rfl: 5    aspirin 81 MG EC tablet, Take 1 tablet by mouth Daily., Disp: , Rfl:     Blood Glucose Monitoring Suppl (Accu-Chek Guide) w/Device kit, 1 each 3 (Three) Times a Day As Needed (check 2-3 times daily as needed)., Disp: 1 kit, Rfl: 0    clobetasol (TEMOVATE) 0.05 % cream, Apply 1 application topically to the appropriate area as directed 2 (Two) Times a Day., Disp: 30 g, Rfl: 2    docusate sodium (COLACE) 100 MG capsule, TAKE 1 CAPSULE BY MOUTH 2 TIMES A DAY, Disp: 180 capsule, Rfl: 1    esomeprazole (nexIUM) 20 MG capsule, Take 1 capsule by mouth Every Morning Before Breakfast., Disp: , Rfl:     fluticasone (FLONASE) 50 MCG/ACT nasal spray, Administer 1 spray into the nostril(s) as directed by provider Daily., Disp: , Rfl:     gabapentin (NEURONTIN) 800 MG tablet, Take 1 tablet by mouth 3 (Three) Times a Day., Disp: 90 tablet, Rfl: 5    glucose blood (Accu-Chek Guide) test strip, USE TO TEST BLOOD SUGAR 2-3 TIMES A DAY AS NEEDED, Disp: 100 each, Rfl: 12    glucose blood  test strip, Test 2-3 times a week, Disp: 100 each, Rfl: 1    hydrOXYzine (ATARAX) 10 MG tablet, Take 1 tablet by mouth 3 (Three) Times a Day As Needed for Itching., Disp: 30 tablet, Rfl: 1    Ibuprofen 3 %, Gabapentin 10 %, Baclofen 2 %, lidocaine 4 %, Ketamine HCl 4 %, Apply 1-2 g topically to the appropriate area as directed 3 (Three) to 4 (Four) times daily., Disp: 90 g, Rfl: 5    Krill Oil (Omega-3) 500 MG capsule, Take 1 capsule by mouth Daily., Disp: , Rfl:     loratadine (CLARITIN) 10 MG tablet, Take 1 tablet by mouth 2 (Two) Times a Day., Disp: , Rfl:     losartan (COZAAR) 25 MG tablet, Take 1 tablet by mouth Daily., Disp: 90 tablet, Rfl: 3    Melatonin 10 MG tablet, Take 1 tablet by mouth As Needed., Disp: , Rfl:     metoprolol tartrate (LOPRESSOR) 25 MG tablet, Take 1 tablet by mouth Every 12 (Twelve) Hours., Disp: 180 tablet, Rfl: 3    montelukast (SINGULAIR) 10 MG tablet, Take 1 tablet by mouth Daily., Disp: 90 tablet, Rfl: 3    ondansetron ODT (ZOFRAN-ODT) 4 MG disintegrating tablet, Place 1 tablet on the tongue Every 8 (Eight) Hours As Needed for Nausea or Vomiting., Disp: 30 tablet, Rfl: 1    rosuvastatin (Crestor) 40 MG tablet, Take 1 tablet by mouth Every Night., Disp: 90 tablet, Rfl: 3    Semaglutide, 2 MG/DOSE, (OZEMPIC) 8 MG/3ML solution pen-injector, Inject 2 mg under the skin into the appropriate area as directed 1 (One) Time Per Week., Disp: 9 mL, Rfl: 3    Turmeric 500 MG capsule, Take 1 capsule by mouth Daily., Disp: , Rfl:     vitamin D3 125 MCG (5000 UT) capsule capsule, Take 1 capsule by mouth Daily., Disp: , Rfl:     Current Facility-Administered Medications:     cyanocobalamin injection 1,000 mcg, 1,000 mcg, Intramuscular, Q28 Days, Jacquelin Pelaez APRN, 1,000 mcg at 12/30/24 0949    Objective     Vitals:    01/08/25 0810   BP: 125/80   BP Location: Left arm   Patient Position: Sitting   Cuff Size: Large Adult   Pulse: 58   Temp: 97.8 °F (36.6 °C)   TempSrc: Temporal   SpO2: 98%  "  Weight: 80.5 kg (177 lb 6.4 oz)   Height: 177.8 cm (70\")        Wt Readings from Last 3 Encounters:   01/08/25 80.5 kg (177 lb 6.4 oz)   12/09/24 80.6 kg (177 lb 9.6 oz)   09/20/24 78.6 kg (173 lb 3.2 oz)        BP Readings from Last 3 Encounters:   01/08/25 125/80   12/09/24 129/80   09/20/24 121/59          Physical Exam  Vitals reviewed.   Constitutional:       General: He is not in acute distress.  HENT:      Head: Normocephalic and atraumatic.   Cardiovascular:      Rate and Rhythm: Normal rate and regular rhythm.      Heart sounds: Normal heart sounds.   Pulmonary:      Effort: Pulmonary effort is normal.      Breath sounds: No wheezing, rhonchi or rales.   Musculoskeletal:      Left wrist: Tenderness present.      Right lower leg: No edema.      Left lower leg: No edema.      Comments: Tenderness over the left radial wrist.    Neurological:      General: No focal deficit present.      Mental Status: He is alert.   Psychiatric:         Thought Content: Thought content normal.                Result Review   The following data was reviewed by: DARRELL Pardo on 01/08/2025:  [x]  Tests & Results  []  Hospitalization/Emergency Department/Urgent Care  []  Internal/External Consultant Notes       Assessment and Plan     Diagnoses and all orders for this visit:    1. De Quervain's tenosynovitis, left (Primary)    2. Type 2 diabetes mellitus with diabetic neuropathy, without long-term current use of insulin  -     Hemoglobin A1c; Future  -     Microalbumin / Creatinine Urine Ratio - Urine, Clean Catch; Future  -     gabapentin (NEURONTIN) 800 MG tablet; Take 1 tablet by mouth 3 (Three) Times a Day.  Dispense: 90 tablet; Refill: 5  -     Microalbumin / Creatinine Urine Ratio - Urine, Clean Catch    3. Primary hypertension  -     Comprehensive Metabolic Panel; Future  -     CBC & Differential; Future  -     TSH Rfx On Abnormal To Free T4; Future    4. Neuropathy of both feet    5. Mild intermittent asthma " without complication  -     montelukast (SINGULAIR) 10 MG tablet; Take 1 tablet by mouth Daily.  Dispense: 90 tablet; Refill: 3    6. Hyperlipidemia, unspecified hyperlipidemia type  -     Lipid Panel; Future    7. Screening for malignant neoplasm of prostate  -     PSA Screen; Future    8. Vitamin D deficiency  -     Vitamin D,25-Hydroxy; Future    9. Vitamin B 12 deficiency  -     Vitamin B12; Future  -     Folate; Future    10. High risk medication use  -     POC Urine Drug Screen [facility]    Other orders  -     Semaglutide, 2 MG/DOSE, (OZEMPIC) 8 MG/3ML solution pen-injector; Inject 2 mg under the skin into the appropriate area as directed 1 (One) Time Per Week.  Dispense: 9 mL; Refill: 3  -     metoprolol tartrate (LOPRESSOR) 25 MG tablet; Take 1 tablet by mouth Every 12 (Twelve) Hours.  Dispense: 180 tablet; Refill: 3  -     losartan (COZAAR) 25 MG tablet; Take 1 tablet by mouth Daily.  Dispense: 90 tablet; Refill: 3  -     rosuvastatin (Crestor) 40 MG tablet; Take 1 tablet by mouth Every Night.  Dispense: 90 tablet; Refill: 3         Assessment & Plan  1. De Quervain's tenosynovitis.  The condition is showing signs of improvement, with a reduction in swelling and pain. He is advised to gradually reduce the use of the splint over several weeks to months, initially wearing it for 8 hours instead of 12, and then progressively decreasing the duration by a few hours each day. The application of heat in the morning and ice in the evening for 15 to 20 minutes is recommended. If the condition worsens or does not continue to improve, a referral to orthopedics will be considered.    2. Diabetes mellitus.  The diabetes is well-managed with an A1c level of 6.00, consistent with the previous reading 3 months ago. He will continue the current regimen of Ozempic 2 mg once a week. A prescription for Ozempic will be sent to [facility].    3. Hypertension.  Blood pressure is well-controlled. He will continue the current  regimen of losartan 25 mg daily and metoprolol 25 mg twice daily.    4. Neuropathy.  The neuropathy is well-managed with gabapentin 800 mg 3 times a day. He will continue the current dosage. A prescription for gabapentin will be sent to [facility]. HERMILO reviewed.  Discussed controlled substance contract and signed.  See scanned document.  Urine drug screen today.    5. Asthma.  The asthma is well-managed with montelukast and an albuterol rescue inhaler. A prescription for montelukast will be sent to [facility]. He will continue using the albuterol rescue inhaler as needed.    6.  Hyperlipidemia.  Lipids controlled on omega-3 and Crestor 40 mg daily and to continue.    7.  Health maintenance.  A PSA test will be conducted during the next blood work session.    Follow-up  The patient is scheduled for a follow-up visit in 6 months.     Patient was given instructions and counseling regarding his condition or for health maintenance advice. Please see specific information pulled into the AVS if appropriate.     Follow Up   Return in about 6 months (around 7/8/2025) for Medicare Wellness.    Patient or patient representative verbalized consent for the use of Ambient Listening during the visit with  DARRELL Pardo for chart documentation. 1/8/2025  08:16 EST    DARRELL Pardo

## 2025-01-07 RX ORDER — MONTELUKAST SODIUM 10 MG/1
10 TABLET ORAL DAILY
Qty: 90 TABLET | Refills: 0 | Status: SHIPPED | OUTPATIENT
Start: 2025-01-07 | End: 2025-01-08 | Stop reason: SDUPTHER

## 2025-01-08 ENCOUNTER — OFFICE VISIT (OUTPATIENT)
Dept: INTERNAL MEDICINE | Age: 80
End: 2025-01-08
Payer: MEDICARE

## 2025-01-08 VITALS
WEIGHT: 177.4 LBS | HEIGHT: 70 IN | SYSTOLIC BLOOD PRESSURE: 125 MMHG | HEART RATE: 58 BPM | BODY MASS INDEX: 25.4 KG/M2 | OXYGEN SATURATION: 98 % | TEMPERATURE: 97.8 F | DIASTOLIC BLOOD PRESSURE: 80 MMHG

## 2025-01-08 DIAGNOSIS — E53.8 VITAMIN B 12 DEFICIENCY: ICD-10-CM

## 2025-01-08 DIAGNOSIS — E78.5 HYPERLIPIDEMIA, UNSPECIFIED HYPERLIPIDEMIA TYPE: Chronic | ICD-10-CM

## 2025-01-08 DIAGNOSIS — M65.4 DE QUERVAIN'S TENOSYNOVITIS, LEFT: Primary | ICD-10-CM

## 2025-01-08 DIAGNOSIS — J45.20 MILD INTERMITTENT ASTHMA WITHOUT COMPLICATION: Chronic | ICD-10-CM

## 2025-01-08 DIAGNOSIS — Z12.5 SCREENING FOR MALIGNANT NEOPLASM OF PROSTATE: ICD-10-CM

## 2025-01-08 DIAGNOSIS — Z79.899 HIGH RISK MEDICATION USE: ICD-10-CM

## 2025-01-08 DIAGNOSIS — E55.9 VITAMIN D DEFICIENCY: ICD-10-CM

## 2025-01-08 DIAGNOSIS — G57.93 NEUROPATHY OF BOTH FEET: Chronic | ICD-10-CM

## 2025-01-08 DIAGNOSIS — I10 PRIMARY HYPERTENSION: Chronic | ICD-10-CM

## 2025-01-08 DIAGNOSIS — E11.40 TYPE 2 DIABETES MELLITUS WITH DIABETIC NEUROPATHY, WITHOUT LONG-TERM CURRENT USE OF INSULIN: Chronic | ICD-10-CM

## 2025-01-08 LAB
ALBUMIN UR-MCNC: 3.1 MG/DL
AMPHET+METHAMPHET UR QL: NEGATIVE
AMPHETAMINE INTERNAL CONTROL: NORMAL
AMPHETAMINES UR QL: NEGATIVE
BARBITURATE INTERNAL CONTROL: NORMAL
BARBITURATES UR QL SCN: NEGATIVE
BENZODIAZ UR QL SCN: NEGATIVE
BENZODIAZEPINE INTERNAL CONTROL: NORMAL
BUPRENORPHINE INTERNAL CONTROL: NORMAL
BUPRENORPHINE SERPL-MCNC: NEGATIVE NG/ML
CANNABINOIDS SERPL QL: NEGATIVE
COCAINE INTERNAL CONTROL: NORMAL
COCAINE UR QL: NEGATIVE
CREAT UR-MCNC: 75 MG/DL
EXPIRATION DATE: NORMAL
Lab: NORMAL
MDMA (ECSTASY) INTERNAL CONTROL: NORMAL
MDMA UR QL SCN: NEGATIVE
METHADONE INTERNAL CONTROL: NORMAL
METHADONE UR QL SCN: NEGATIVE
METHAMPHETAMINE INTERNAL CONTROL: NORMAL
MICROALBUMIN/CREAT UR: 41.3 MG/G (ref 0–29)
OPIATES INTERNAL CONTROL: NORMAL
OPIATES UR QL: NEGATIVE
OXYCODONE INTERNAL CONTROL: NORMAL
OXYCODONE UR QL SCN: NEGATIVE
PCP UR QL SCN: NEGATIVE
PHENCYCLIDINE INTERNAL CONTROL: NORMAL
THC INTERNAL CONTROL: NORMAL

## 2025-01-08 PROCEDURE — 3074F SYST BP LT 130 MM HG: CPT | Performed by: NURSE PRACTITIONER

## 2025-01-08 PROCEDURE — 1160F RVW MEDS BY RX/DR IN RCRD: CPT | Performed by: NURSE PRACTITIONER

## 2025-01-08 PROCEDURE — 3079F DIAST BP 80-89 MM HG: CPT | Performed by: NURSE PRACTITIONER

## 2025-01-08 PROCEDURE — 80305 DRUG TEST PRSMV DIR OPT OBS: CPT | Performed by: NURSE PRACTITIONER

## 2025-01-08 PROCEDURE — 1159F MED LIST DOCD IN RCRD: CPT | Performed by: NURSE PRACTITIONER

## 2025-01-08 PROCEDURE — 1126F AMNT PAIN NOTED NONE PRSNT: CPT | Performed by: NURSE PRACTITIONER

## 2025-01-08 PROCEDURE — 99214 OFFICE O/P EST MOD 30 MIN: CPT | Performed by: NURSE PRACTITIONER

## 2025-01-08 PROCEDURE — 82570 ASSAY OF URINE CREATININE: CPT | Performed by: NURSE PRACTITIONER

## 2025-01-08 PROCEDURE — 82043 UR ALBUMIN QUANTITATIVE: CPT | Performed by: NURSE PRACTITIONER

## 2025-01-08 RX ORDER — LOSARTAN POTASSIUM 25 MG/1
25 TABLET ORAL DAILY
Qty: 90 TABLET | Refills: 3 | Status: SHIPPED | OUTPATIENT
Start: 2025-01-08

## 2025-01-08 RX ORDER — ROSUVASTATIN CALCIUM 40 MG/1
40 TABLET, COATED ORAL NIGHTLY
Qty: 90 TABLET | Refills: 3 | Status: SHIPPED | OUTPATIENT
Start: 2025-01-08

## 2025-01-08 RX ORDER — GABAPENTIN 800 MG/1
800 TABLET ORAL 3 TIMES DAILY
Qty: 90 TABLET | Refills: 5 | Status: SHIPPED | OUTPATIENT
Start: 2025-01-08

## 2025-01-08 RX ORDER — MONTELUKAST SODIUM 10 MG/1
10 TABLET ORAL DAILY
Qty: 90 TABLET | Refills: 3 | Status: SHIPPED | OUTPATIENT
Start: 2025-01-08

## 2025-01-08 RX ORDER — METOPROLOL TARTRATE 25 MG/1
25 TABLET, FILM COATED ORAL EVERY 12 HOURS SCHEDULED
Qty: 180 TABLET | Refills: 3 | Status: SHIPPED | OUTPATIENT
Start: 2025-01-08

## 2025-01-10 ENCOUNTER — OFFICE VISIT (OUTPATIENT)
Dept: PODIATRY | Facility: CLINIC | Age: 80
End: 2025-01-10
Payer: MEDICARE

## 2025-01-10 VITALS
SYSTOLIC BLOOD PRESSURE: 147 MMHG | DIASTOLIC BLOOD PRESSURE: 71 MMHG | HEART RATE: 69 BPM | WEIGHT: 177 LBS | HEIGHT: 70 IN | BODY MASS INDEX: 25.34 KG/M2

## 2025-01-10 DIAGNOSIS — Z79.4 TYPE 2 DIABETES MELLITUS WITH DIABETIC POLYNEUROPATHY, WITH LONG-TERM CURRENT USE OF INSULIN: Primary | ICD-10-CM

## 2025-01-10 DIAGNOSIS — E11.8 DM FEET: ICD-10-CM

## 2025-01-10 DIAGNOSIS — G62.9 NEUROPATHY: ICD-10-CM

## 2025-01-10 DIAGNOSIS — E11.42 TYPE 2 DIABETES MELLITUS WITH DIABETIC POLYNEUROPATHY, WITH LONG-TERM CURRENT USE OF INSULIN: Primary | ICD-10-CM

## 2025-01-10 NOTE — PROGRESS NOTES
[facility] - PODIATRY    Today's Date: 01/10/25    Patient Name: Trenton Adames  MRN: [id number]  CSN: [id number]  PCP: Jacquelin Pelaez APRN, Last PCP Visit:  2025  Referring Provider: No ref. provider found    SUBJECTIVE     Chief Complaint   Patient presents with    Left Foot - Follow-up, Annual Exam, Diabetes    Right Foot - Follow-up, Annual Exam, Diabetes     HPI: Trenton Adames, a 79 y.o.male, presents to clinic for a diabetic foot evaluation.    New, Established, New Problem:  est    Onset: Insidious    Nature:  NIDDM    Stable, worsening, improving:  stable    Patient controlling diabetes via:  Oral meds    Patient states their most recent blood glucose readin    Patient denies any fevers, chills, nausea, vomiting, shortness of breath, nor any other constitutional signs nor symptoms.    Patient relates need a refill topical compounded neuropathy medication for continued neuropathy symptoms.    Medical changes: None    I have reviewed/confirmed previously documented HPI with no changes.     Past Medical History:   Diagnosis Date    Arthritis     Asthma     Atypical chest pain     Coronary artery disease involving native coronary artery of native heart with unstable angina pectoris 2023    Dysphagia, oral phase     NO CURRENT ISSUES    Essential (primary) hypertension     Foot pain, bilateral     Heart murmur 2023    Heart valve disease 2023    Hyperlipidemia, unspecified     Low back pain     Neuropathy in diabetes     Peripheral neuropathy     Pulmonary HTN 2023    Sleep apnea     USES CPAP    Type 2 diabetes mellitus without complication     Vitamin D deficiency, unspecified      Past Surgical History:   Procedure Laterality Date    CARDIAC CATHETERIZATION N/A 2023    Procedure: Left Heart Cath with possible coronary angioplasty;  Surgeon: Marc Charles MD;  Location: [facility] CATH INVASIVE LOCATION;  Service: Cardiology;  Laterality: N/A;     CATARACT EXTRACTION, BILATERAL  2014; 2014    COLONOSCOPY      CORONARY ARTERY BYPASS GRAFT N/A 2023    Procedure: RUKHSANA STERNOTOMY CORONARY ARTERY BYPASS GRAFT TIMES 7 USING LEFT INTERNAL MAMMARY ARTERY AND RIGHT GREATER SAPHENOUS VEIN GRAFT PER ENDOSCOPIC VEIN HARVESTING AND PRP;  Surgeon: Jr Frederic Groves MD;  Location: [facility];  Service: Cardiothoracic;  Laterality: N/A;    ENDOMETRIAL ABLATION      GASTROSTOMY      HEMORRHOIDECTOMY  2014    POLYPECTOMY      SINUS SURGERY       Family History   Problem Relation Age of Onset    Cancer Mother     Heart disease Father     Diabetes Father     Arthritis Father     Heart disease Maternal Grandmother     Cancer Maternal Grandfather     Diabetes Paternal Grandmother     Malig Hyperthermia Neg Hx      Social History     Socioeconomic History    Marital status:    Tobacco Use    Smoking status: Former     Current packs/day: 0.00     Average packs/day: 2.0 packs/day for 15.0 years (30.0 ttl pk-yrs)     Types: Cigarettes, Pipe     Start date: 1980     Quit date: 1995     Years since quittin.0     Passive exposure: Never    Smokeless tobacco: Never   Vaping Use    Vaping status: Never Used   Substance and Sexual Activity    Alcohol use: Yes     Alcohol/week: 8.0 standard drinks of alcohol     Types: 6 Cans of beer, 2 Drinks containing 0.5 oz of alcohol per week     Comment: Occasional     Drug use: Never    Sexual activity: Defer     Partners: Female     Allergies   Allergen Reactions    Cat Hair Extract Itching    Grass Itching    Pollen Extract Itching    Tape Rash     Current Outpatient Medications   Medication Sig Dispense Refill    Accu-Chek Softclix Lancets lancets USE TO TEST BLOOD SUGAR 2-3 TIMES DAILY AS NEEDED 100 each 12    albuterol sulfate  (90 Base) MCG/ACT inhaler Inhale 2 puffs Every 4 (Four) Hours As Needed for Wheezing. 18 g 5    aspirin 81 MG EC tablet Take 1 tablet by mouth Daily.      Blood  Glucose Monitoring Suppl (Accu-Chek Guide) w/Device kit 1 each 3 (Three) Times a Day As Needed (check 2-3 times daily as needed). 1 kit 0    clobetasol (TEMOVATE) 0.05 % cream Apply 1 application topically to the appropriate area as directed 2 (Two) Times a Day. 30 g 2    docusate sodium (COLACE) 100 MG capsule TAKE 1 CAPSULE BY MOUTH 2 TIMES A  capsule 1    esomeprazole (nexIUM) 20 MG capsule Take 1 capsule by mouth Every Morning Before Breakfast.      fluticasone (FLONASE) 50 MCG/ACT nasal spray Administer 1 spray into the nostril(s) as directed by provider Daily.      gabapentin (NEURONTIN) 800 MG tablet Take 1 tablet by mouth 3 (Three) Times a Day. 90 tablet 5    glucose blood (Accu-Chek Guide) test strip USE TO TEST BLOOD SUGAR 2-3 TIMES A DAY AS NEEDED 100 each 12    glucose blood test strip Test 2-3 times a week 100 each 1    hydrOXYzine (ATARAX) 10 MG tablet Take 1 tablet by mouth 3 (Three) Times a Day As Needed for Itching. 30 tablet 1    Ibuprofen 3 %, Gabapentin 10 %, Baclofen 2 %, lidocaine 4 %, Ketamine HCl 4 % Apply 1-2 g topically to the appropriate area as directed 3 (Three) to 4 (Four) times daily. 90 g 5    Krill Oil (Omega-3) 500 MG capsule Take 1 capsule by mouth Daily.      loratadine (CLARITIN) 10 MG tablet Take 1 tablet by mouth 2 (Two) Times a Day.      losartan (COZAAR) 25 MG tablet Take 1 tablet by mouth Daily. 90 tablet 3    Melatonin 10 MG tablet Take 1 tablet by mouth As Needed.      metoprolol tartrate (LOPRESSOR) 25 MG tablet Take 1 tablet by mouth Every 12 (Twelve) Hours. 180 tablet 3    montelukast (SINGULAIR) 10 MG tablet Take 1 tablet by mouth Daily. 90 tablet 3    ondansetron ODT (ZOFRAN-ODT) 4 MG disintegrating tablet Place 1 tablet on the tongue Every 8 (Eight) Hours As Needed for Nausea or Vomiting. 30 tablet 1    rosuvastatin (Crestor) 40 MG tablet Take 1 tablet by mouth Every Night. 90 tablet 3    Semaglutide, 2 MG/DOSE, (OZEMPIC) 8 MG/3ML solution pen-injector Inject  2 mg under the skin into the appropriate area as directed 1 (One) Time Per Week. 9 mL 3    Turmeric 500 MG capsule Take 1 capsule by mouth Daily.      vitamin D3 125 MCG (5000 UT) capsule capsule Take 1 capsule by mouth Daily.      Ibuprofen 3 %, Gabapentin 10 %, Baclofen 2 %, lidocaine 4 %, Ketamine HCl 4 % Apply 1-2 g topically to the appropriate area as directed 3 (Three) to 4 (Four) times daily. 90 g 5     Current Facility-Administered Medications   Medication Dose Route Frequency Provider Last Rate Last Admin    cyanocobalamin injection 1,000 mcg  1,000 mcg Intramuscular Q28 Days Jacquelin Pelaez APRTANYA   1,000 mcg at 12/30/24 0949     Review of Systems   Constitutional: Negative.    Neurological:  Positive for numbness.   All other systems reviewed and are negative.      OBJECTIVE     Vitals:    01/10/25 0820   BP: 147/71   Pulse: 69       Body mass index is 25.4 kg/m².    Lab Results   Component Value Date    HGBA1C 6.00 (H) 12/30/2024       Lab Results   Component Value Date    GLUCOSE 104 (H) 12/30/2024    CALCIUM 9.2 12/30/2024     12/30/2024    K 4.2 12/30/2024    CO2 26.1 12/30/2024     12/30/2024    BUN 12 12/30/2024    CREATININE 0.94 12/30/2024    EGFRIFNONA 84 11/17/2021    BCR 12.8 12/30/2024    ANIONGAP 9.9 12/30/2024       Patient seen in no apparent distress.      PHYSICAL EXAM:     Foot/Ankle Exam    GENERAL  Diabetic foot exam performed    Appearance:  elderly and chronically ill  Orientation:  AAOx3  Affect:  appropriate  Gait:  unimpaired  Assistance:  independent  Right shoe gear: casual shoe  Left shoe gear: casual shoe    VASCULAR     Right Foot Vascularity   Dorsalis pedis:  1+  Posterior tibial:  1+  Skin temperature:  warm  Edema grading:  None  CFT:  < 3 seconds  Pedal hair growth:  Absent  Varicosities:  moderate varicosities     Left Foot Vascularity   Dorsalis pedis:  1+  Posterior tibial:  1+  Skin temperature:  warm  Edema grading:  None  CFT:  < 3 seconds  Pedal hair  growth:  Absent  Varicosities:  moderate varicosities     NEUROLOGIC     Right Foot Neurologic   Light touch sensation: diminished  Vibratory sensation: diminished  Hot/Cold sensation: diminished  Protective Sensation using Burgin-Henri Monofilament:   Sites intact: 4  Sites tested: 10     Left Foot Neurologic   Light touch sensation: diminished  Vibratory sensation: diminished  Hot/Cold sensation:  diminished  Protective Sensation using Burgin-Henri Monofilament:   Sites intact: 4  Sites tested: 10    MUSCLE STRENGTH     Right Foot Muscle Strength   Foot dorsiflexion:  4  Foot plantar flexion:  4  Foot inversion:  4  Foot eversion:  4     Left Foot Muscle Strength   Foot dorsiflexion:  4  Foot plantar flexion:  4  Foot inversion:  4  Foot eversion:  4    RANGE OF MOTION     Right Foot Range of Motion   Foot and ankle ROM within normal limits       Left Foot Range of Motion   Foot and ankle ROM within normal limits      DERMATOLOGIC      Right Foot Dermatologic   Skin  Right foot skin is intact.      Left Foot Dermatologic   Skin  Left foot skin is intact.     Diabetic Foot Exam Performed and Monofilament Test Performed    I have reexamined the patient the results are consistent with the previously documented exam.    ASSESSMENT/PLAN     Diagnoses and all orders for this visit:    1. Type 2 diabetes mellitus with diabetic polyneuropathy, with long-term current use of insulin (Primary)    2. Neuropathy  -     Ibuprofen 3 %, Gabapentin 10 %, Baclofen 2 %, lidocaine 4 %, Ketamine HCl 4 %; Apply 1-2 g topically to the appropriate area as directed 3 (Three) to 4 (Four) times daily.  Dispense: 90 g; Refill: 5    3. Foot pain, bilateral    4. DM feet    Rx:  Topical, compounded, neuropathy medication was written; see attached prescription.    Comprehensive lower extremity examination and evaluation was performed.    Discussed findings and treatment plan including risks, benefits, and treatment options with patient  in detail. Patient agreed with treatment plan.    Medications and allergies reviewed.  Reviewed available blood glucose and HgB A1C lab values along with other pertinent labs.  These were discussed with the patient as to their importance of diabetic maintenance.    Diabetic foot exam performed and documented this date, compliant with CQM required standards. Detail of findings as noted in physical exam.  Lower extremity Neurologic exam for diabetic patient performed and documented this date, compliant with PQRS required standards. Detail of findings as noted in physical exam.  Advised patient importance of good routine lower extremity hygiene. Advised patient importance of evaluating for intact skin and pain free nail borders.  Advised patient to use mirror to evaluate plantar/ soles of feet for better visualization. Advised patient monitor and phone office to be seen if any cracking to skin, open lesions, painful nail borders or if nails become elongated prior to next visit. Advised patient importance of daily cleansing of lower extremities, followed by good skin cream to maintain normal hydration of skin. Also advised patient importance of close daily monitoring of blood sugar. Advised to regulate diet and medications to maintain control of blood sugar in optimal range. Contact primary care provider if difficulties maintaining blood sugar levels.  Advised Patient of presence of Diabetes Mellitus condition.  Advised Patient risk of progression and worsening or improvement, then return of condition.  Will monitor condition for any change in future. Treat with most appropriate treatment pending status of condition.  Counseled and advised patient extensively on nature and ramifications of diabetes. Standard instructions given to patient for good diabetic foot care and maintenance. Advised importance of careful monitoring to avoid break down and complications secondary to diabetes. Advised patient importance of strict  maintenance of blood sugar control. Advised patient of possible ominous results from neglect of condition, i.e.: amputation/ loss of digits, feet and legs, or even death.  Patient states understands counseling, will monitor closely, continue good hygiene and routine diabetic foot care. Patient will contact office is questions or problems.      An After Visit Summary was printed and given to the patient at discharge, including (if requested) any available informative/educational handouts regarding diagnosis, treatment, or medications. All questions were answered to patient/family satisfaction. Should symptoms fail to improve or worsen they agree to call or return to clinic or to go to the Emergency Department. Discussed the importance of following up with any needed screening tests/labs/specialist appointments and any requested follow-up recommended by me today. Importance of maintaining follow-up discussed and patient accepts that missed appointments can delay diagnosis and potentially lead to worsening of conditions.    Return in about 1 year (around 1/10/2026) for DFE., or sooner if acute issues arise.    I have reviewed the assessment and plan and verified the accuracy of it. No changes to assessment and plan since the information was documented. Sonny Parish DPM 01/10/25     I have dictated this note utilizing Dragon Dictation.  Please note that portions of this note were completed with a voice recognition program.  Part of this note may be an electronic transcription/translation of spoken language to printed text using the Dragon Dictation System.      This document has been electronically signed by Sonny Parish DPM on January 10, 2025 08:40 EST

## 2025-01-13 ENCOUNTER — OFFICE VISIT (OUTPATIENT)
Dept: INTERNAL MEDICINE | Age: 80
End: 2025-01-13
Payer: MEDICARE

## 2025-01-13 VITALS
SYSTOLIC BLOOD PRESSURE: 146 MMHG | TEMPERATURE: 98 F | HEIGHT: 70 IN | HEART RATE: 65 BPM | DIASTOLIC BLOOD PRESSURE: 70 MMHG | BODY MASS INDEX: 25.51 KG/M2 | WEIGHT: 178.2 LBS | OXYGEN SATURATION: 96 %

## 2025-01-13 DIAGNOSIS — J01.10 ACUTE NON-RECURRENT FRONTAL SINUSITIS: Primary | ICD-10-CM

## 2025-01-13 PROCEDURE — 99213 OFFICE O/P EST LOW 20 MIN: CPT | Performed by: INTERNAL MEDICINE

## 2025-01-13 PROCEDURE — 1160F RVW MEDS BY RX/DR IN RCRD: CPT | Performed by: INTERNAL MEDICINE

## 2025-01-13 PROCEDURE — 1159F MED LIST DOCD IN RCRD: CPT | Performed by: INTERNAL MEDICINE

## 2025-01-13 PROCEDURE — 3078F DIAST BP <80 MM HG: CPT | Performed by: INTERNAL MEDICINE

## 2025-01-13 PROCEDURE — 1126F AMNT PAIN NOTED NONE PRSNT: CPT | Performed by: INTERNAL MEDICINE

## 2025-01-13 PROCEDURE — 3077F SYST BP >= 140 MM HG: CPT | Performed by: INTERNAL MEDICINE

## 2025-01-13 RX ORDER — CEFDINIR 300 MG/1
300 CAPSULE ORAL 2 TIMES DAILY
Qty: 20 CAPSULE | Refills: 0 | Status: SHIPPED | OUTPATIENT
Start: 2025-01-13

## 2025-01-13 RX ORDER — PREDNISONE 20 MG/1
20 TABLET ORAL 2 TIMES DAILY
Qty: 10 TABLET | Refills: 0 | Status: SHIPPED | OUTPATIENT
Start: 2025-01-13 | End: 2025-01-18

## 2025-01-13 NOTE — PROGRESS NOTES
"Chief Complaint  Headache and Sinus pressure (Pt states that on Saturday he started with headache and sinus pressure, he was negative at home for COVID. He states that he has had a dog in his house the past couple weeks. He has now went back home. He is leaving to go on a cruise on Saturday and would like something for this. )    Subjective      Trenton Adames presents to [facility] INTERNAL MEDICINE    History of present illness:  Patient is 79-year-old male, patient of [facility], with underlying diabetes, hypertension, hyperlipidemia, among others, who is being seen in 1/25 for acute issues in regards to sinusitis as per chief complaint above.      Review of Systems   Constitutional:  Negative for appetite change, fatigue and fever.   HENT:  Positive for sinus pressure. Negative for congestion and ear pain.    Eyes:  Negative for blurred vision.   Respiratory:  Negative for cough, chest tightness, shortness of breath and wheezing.    Cardiovascular:  Negative for chest pain, palpitations and leg swelling.   Gastrointestinal:  Negative for abdominal pain.   Genitourinary:  Negative for difficulty urinating, dysuria and hematuria.   Musculoskeletal:  Negative for arthralgias and gait problem.   Skin:  Negative for skin lesions.   Neurological:  Positive for headache. Negative for syncope, memory problem and confusion.   Psychiatric/Behavioral:  Negative for self-injury and depressed mood.        Objective   Vital Signs:   /70   Pulse 65   Temp 98 °F (36.7 °C) (Skin)   Ht 177.8 cm (70\")   Wt 80.8 kg (178 lb 3.2 oz)   SpO2 96%   BMI 25.57 kg/m²         Physical Exam  Vitals and nursing note reviewed.   Constitutional:       General: He is not in acute distress.     Appearance: Normal appearance. He is not toxic-appearing.   HENT:      Head: Atraumatic.      Right Ear: External ear normal.      Left Ear: External ear normal.      Nose: Nose normal.      Mouth/Throat:      Mouth: " Mucous membranes are moist.   Eyes:      General:         Right eye: No discharge.         Left eye: No discharge.      Extraocular Movements: Extraocular movements intact.      Pupils: Pupils are equal, round, and reactive to light.   Cardiovascular:      Rate and Rhythm: Normal rate and regular rhythm.      Pulses: Normal pulses.      Heart sounds: Normal heart sounds. No murmur heard.     No gallop.   Pulmonary:      Effort: Pulmonary effort is normal. No respiratory distress.      Breath sounds: No wheezing, rhonchi or rales.   Abdominal:      General: There is no distension.      Palpations: Abdomen is soft. There is no mass.      Tenderness: There is no abdominal tenderness. There is no guarding.   Musculoskeletal:         General: No swelling or tenderness.      Cervical back: No tenderness.      Right lower leg: No edema.      Left lower leg: No edema.   Skin:     General: Skin is warm and dry.      Findings: No rash.   Neurological:      General: No focal deficit present.      Mental Status: He is alert and oriented to person, place, and time. Mental status is at baseline.      Motor: No weakness.      Gait: Gait normal.   Psychiatric:         Mood and Affect: Mood normal.         Thought Content: Thought content normal.          Result Review   The following data was reviewed by: Les Reilly MD on 01/13/2025:  [x] Laboratory  [] Microbiology  [] Radiology  [] EKG/telemetry  [] Cardiology/Vascular  [] Pathology  [x] Old records             Assessment and Plan   Diagnoses and all orders for this visit:    1. Acute non-recurrent frontal sinusitis (Primary)  Assessment & Plan:  Patient having purulent nasal discharge past several days, having significant frontal headache as well.    He tested negative at home for COVID.    Presently is afebrile and his sats are 98% on room air.  Lungs are clear on exam.    Will treat per orders, discussed with patient he can use couple 3 days of Afrin etc. as  well.      Other orders  -     cefdinir (OMNICEF) 300 MG capsule; Take 1 capsule by mouth 2 (Two) Times a Day.  Dispense: 20 capsule; Refill: 0  -     predniSONE (DELTASONE) 20 MG tablet; Take 1 tablet by mouth 2 (Two) Times a Day for 5 days.  Dispense: 10 tablet; Refill: 0                      Follow Up   Return for Next scheduled follow up.  Patient was given instructions and counseling regarding his condition or for health maintenance advice. Please see specific information pulled into the AVS if appropriate.     Total Time Spent:   minutes     This time includes time spent by me in the following activities: preparing for the visit, reviewing extensive past medical history and tests, performing a medically appropriate examination and/or evaluation, counseling and educating the patient and/or caregivers, ordering medications, tests, or procedures, referring and/or communicating with other health care professionals and documenting information in the medical record all on this date of service.

## 2025-01-13 NOTE — ASSESSMENT & PLAN NOTE
Patient having purulent nasal discharge past several days, having significant frontal headache as well.    He tested negative at home for COVID.    Presently is afebrile and his sats are 98% on room air.  Lungs are clear on exam.    Will treat per orders, discussed with patient he can use couple 3 days of Afrin etc. as well.

## 2025-01-16 ENCOUNTER — OFFICE VISIT (OUTPATIENT)
Dept: UROLOGY | Age: 80
End: 2025-01-16
Payer: MEDICARE

## 2025-01-16 VITALS — BODY MASS INDEX: 25.48 KG/M2 | HEIGHT: 70 IN | WEIGHT: 178 LBS

## 2025-01-16 DIAGNOSIS — N52.9 ERECTILE DYSFUNCTION, UNSPECIFIED ERECTILE DYSFUNCTION TYPE: Primary | ICD-10-CM

## 2025-01-16 DIAGNOSIS — N40.1 BENIGN PROSTATIC HYPERPLASIA WITH LOWER URINARY TRACT SYMPTOMS, SYMPTOM DETAILS UNSPECIFIED: ICD-10-CM

## 2025-01-16 NOTE — PROGRESS NOTES
UROLOGY OFFICE H&P NOTE    Subjective   HPI  Trenton Adames is a 79 y.o. male.  Presents for evaluation of erectile dysfunction and BPH.  History of Present Illness  The patient presents for evaluation of erectile dysfunction.    He reports a history of erectile dysfunction, which he initially managed with Viagra and Cialis, but these medications have since lost their efficacy. He expresses interest in exploring the option of a penile prosthesis or implant.   He is uncircumcised and reports no issues with his foreskin.    He underwent bypass surgery in 09/2023 at [facility], performed by Dr. Groves, and has been on a regimen of baby aspirin since then. He also reports a history of diabetes, which is currently well-managed with Ozempic, resulting in blood sugar levels ranging from 100 to 120 upon waking. His hemoglobin A1c level has remained stable at 6.     He reports a weak urinary stream, which has not worsened since his open heart surgery. He notes that excessive use of decongestants exacerbates this symptom. He was previously prescribed Flomax by a urologist, but discontinued it due to adverse effects. He reports an improvement in his symptoms after discontinuing Flomax and changing decongestants.    _____________  Hemoglobin A1c 12/30/2024: 6.0          Medical History:  Past Medical History:   Diagnosis Date    Arthritis     Asthma     Atypical chest pain     Coronary artery disease involving native coronary artery of native heart with unstable angina pectoris 09/25/2023    Dysphagia, oral phase     NO CURRENT ISSUES    Erectile dysfunction     Essential (primary) hypertension     Foot pain, bilateral     Heart murmur 5/2023    Heart valve disease 7/2023    Hyperlipidemia, unspecified     Low back pain     Neuropathy in diabetes     Peripheral neuropathy     Pulmonary HTN 08/09/2023    Sleep apnea     USES CPAP    Type 2 diabetes mellitus without complication     Vitamin D deficiency, unspecified          Social History:  Social History     Socioeconomic History    Marital status:    Tobacco Use    Smoking status: Former     Current packs/day: 0.00     Average packs/day: 2.0 packs/day for 15.0 years (30.0 ttl pk-yrs)     Types: Cigarettes, Pipe     Start date: 1980     Quit date: 1995     Years since quittin.0     Passive exposure: Never    Smokeless tobacco: Never   Vaping Use    Vaping status: Never Used   Substance and Sexual Activity    Alcohol use: Yes     Alcohol/week: 8.0 standard drinks of alcohol     Types: 6 Cans of beer, 2 Drinks containing 0.5 oz of alcohol per week     Comment: Occasional     Drug use: Never    Sexual activity: Defer     Partners: Female        Family History:  Family History   Problem Relation Age of Onset    Cancer Mother     Heart disease Father     Diabetes Father     Arthritis Father     Heart disease Maternal Grandmother     Cancer Maternal Grandfather     Diabetes Paternal Grandmother     Malig Hyperthermia Neg Hx         Surgical History:  Past Surgical History:   Procedure Laterality Date    CARDIAC CATHETERIZATION N/A 2023    Procedure: Left Heart Cath with possible coronary angioplasty;  Surgeon: Marc Charles MD;  Location: [facility] CATH INVASIVE LOCATION;  Service: Cardiology;  Laterality: N/A;    CATARACT EXTRACTION, BILATERAL  2014; 2014    COLONOSCOPY      CORONARY ARTERY BYPASS GRAFT N/A 2023    Procedure: RUKHSANA STERNOTOMY CORONARY ARTERY BYPASS GRAFT TIMES 7 USING LEFT INTERNAL MAMMARY ARTERY AND RIGHT GREATER SAPHENOUS VEIN GRAFT PER ENDOSCOPIC VEIN HARVESTING AND PRP;  Surgeon: Jr Frederic Groves MD;  Location: [facility];  Service: Cardiothoracic;  Laterality: N/A;    ENDOMETRIAL ABLATION      GASTROSTOMY      HEMORRHOIDECTOMY  2014    POLYPECTOMY      SINUS SURGERY          Allergies:  Allergies   Allergen Reactions    Cat Hair Extract Itching    Grass Itching    Pollen Extract Itching    Tape Rash         Current Medications:  Current Outpatient Medications   Medication Sig Dispense Refill    Accu-Chek Softclix Lancets lancets USE TO TEST BLOOD SUGAR 2-3 TIMES DAILY AS NEEDED 100 each 12    albuterol sulfate  (90 Base) MCG/ACT inhaler Inhale 2 puffs Every 4 (Four) Hours As Needed for Wheezing. 18 g 5    aspirin 81 MG EC tablet Take 1 tablet by mouth Daily.      Blood Glucose Monitoring Suppl (Accu-Chek Guide) w/Device kit 1 each 3 (Three) Times a Day As Needed (check 2-3 times daily as needed). 1 kit 0    cefdinir (OMNICEF) 300 MG capsule Take 1 capsule by mouth 2 (Two) Times a Day. 20 capsule 0    clobetasol (TEMOVATE) 0.05 % cream Apply 1 application topically to the appropriate area as directed 2 (Two) Times a Day. 30 g 2    docusate sodium (COLACE) 100 MG capsule TAKE 1 CAPSULE BY MOUTH 2 TIMES A  capsule 1    esomeprazole (nexIUM) 20 MG capsule Take 1 capsule by mouth Every Morning Before Breakfast.      fluticasone (FLONASE) 50 MCG/ACT nasal spray Administer 1 spray into the nostril(s) as directed by provider Daily.      gabapentin (NEURONTIN) 800 MG tablet Take 1 tablet by mouth 3 (Three) Times a Day. 90 tablet 5    glucose blood (Accu-Chek Guide) test strip USE TO TEST BLOOD SUGAR 2-3 TIMES A DAY AS NEEDED 100 each 12    glucose blood test strip Test 2-3 times a week 100 each 1    hydrOXYzine (ATARAX) 10 MG tablet Take 1 tablet by mouth 3 (Three) Times a Day As Needed for Itching. 30 tablet 1    Ibuprofen 3 %, Gabapentin 10 %, Baclofen 2 %, lidocaine 4 %, Ketamine HCl 4 % Apply 1-2 g topically to the appropriate area as directed 3 (Three) to 4 (Four) times daily. 90 g 5    Ibuprofen 3 %, Gabapentin 10 %, Baclofen 2 %, lidocaine 4 %, Ketamine HCl 4 % Apply 1-2 g topically to the appropriate area as directed 3 (Three) to 4 (Four) times daily. 90 g 5    Krill Oil (Omega-3) 500 MG capsule Take 1 capsule by mouth Daily.      loratadine (CLARITIN) 10 MG tablet Take 1 tablet by mouth 2 (Two) Times  "a Day.      losartan (COZAAR) 25 MG tablet Take 1 tablet by mouth Daily. 90 tablet 3    Melatonin 10 MG tablet Take 1 tablet by mouth As Needed.      metoprolol tartrate (LOPRESSOR) 25 MG tablet Take 1 tablet by mouth Every 12 (Twelve) Hours. 180 tablet 3    montelukast (SINGULAIR) 10 MG tablet Take 1 tablet by mouth Daily. 90 tablet 3    ondansetron ODT (ZOFRAN-ODT) 4 MG disintegrating tablet Place 1 tablet on the tongue Every 8 (Eight) Hours As Needed for Nausea or Vomiting. 30 tablet 1    predniSONE (DELTASONE) 20 MG tablet Take 1 tablet by mouth 2 (Two) Times a Day for 5 days. 10 tablet 0    rosuvastatin (Crestor) 40 MG tablet Take 1 tablet by mouth Every Night. 90 tablet 3    Semaglutide, 2 MG/DOSE, (OZEMPIC) 8 MG/3ML solution pen-injector Inject 2 mg under the skin into the appropriate area as directed 1 (One) Time Per Week. 9 mL 3    Turmeric 500 MG capsule Take 1 capsule by mouth Daily.      vitamin D3 125 MCG (5000 UT) capsule capsule Take 1 capsule by mouth Daily.       Current Facility-Administered Medications   Medication Dose Route Frequency Provider Last Rate Last Admin    cyanocobalamin injection 1,000 mcg  1,000 mcg Intramuscular Q28 Days Jacquelin Pelaez APRN   1,000 mcg at 12/30/24 0949       Review of systems  A review of systems was performed, and positive findings are noted in the HPI.    Objective     Vital Signs:   Ht 177.8 cm (70\")   Wt 80.7 kg (178 lb)   BMI 25.54 kg/m²       Physical exam  No acute distress, well-nourished  Awake alert and oriented  Mood normal; affect normal  : Uncircumcised male; no phimosis; no palpable plaque, no penile lesions; no scrotal abnormalities, bilateral descended testis of normal size, shape, consistency; no cord abnormality  Physical Exam      Problem List:  Patient Active Problem List   Diagnosis    Allergic rhinitis    Anemia    Asthma    Benign prostatic hyperplasia    Cervical radiculopathy    Diabetes mellitus, type II    Hyperlipidemia    " Hypertension    Impotence of organic origin    Kidney stones    Renal calculus    Prostate disorder    Shortness of breath    Vitamin D deficiency    Vitamin B 12 deficiency    Iron deficiency anemia    Ophthalmic herpes zoster    Acute URI    Pulmonary HTN    Snoring    Chest pain, atypical    Coronary artery disease involving native coronary artery of native heart with unstable angina pectoris    CAD (coronary artery disease)    COVID-19 virus infection    HILLARY on CPAP    Sleep related hypoxia    Arthritis    Neuropathy of both feet    Hyponatremia    De Quervain's tenosynovitis, left    Acute non-recurrent frontal sinusitis       Assessment & Plan   Diagnoses and all orders for this visit:    1. Erectile dysfunction, unspecified erectile dysfunction type (Primary)    2. Benign prostatic hyperplasia with lower urinary tract symptoms, symptom details unspecified        Assessment & Plan  Refractory erectile dysfunction after failed PO meds    Recommend progressing to treatment via Inflatable penile prosthesis (IPP) discussed with patient at length     Discussed that several devices are currently available, including malleable (non-inflatable) models as well as two- or three-piece inflatable prostheses. Aware that I implant the 3 piece implant; educational materials provided.   Benefits of prostheses include the ability to generate a functional erection sufficient for intercourse on-demand, for as long as is desired, and as frequently as is desired. The length of the implanted cylinders is fitted to his individual anatomy and this is unable to be altered; unable to increase size.  Per physical exam, believe him to be a candidate for IPP and anticipate good functional result.    The potential risks discussed of prosthesis surgery include the risks inherent in the surgical procedure, possible changes in the appearance of the penis including perceived loss of length, and the potential for device malfunction or failure.   The surgery was discussed at length including possible adverse effects of hematoma, penile edema, pain, infection, and damage to surrounding structures, corpus or urethral injury.  Aware that injury or infection may require device removal or delay in implant.  Discussed that IPP is considered irreversible; as it is unlikely that a man´s penis will be reliably responsive to other ED therapies after prosthesis explant.  Discussed anticipated perioperative and postoperative course and the learning curve required to reliably use the device.  We also discussed the risk of anesthesia up to and including death.      Patient voiced understanding and would like to proceed.     Plan to run patient in for through [employer] benefit verification system so that patient may assess cost.      Cardiac clearance to hold aspirin when appropriate    Schedule OR pending insurance approval   all questions addressed               Patient or patient representative verbalized consent for the use of Ambient Listening during the visit with  Rita Hernandez MD for chart documentation. 1/17/2025  16:50 EST

## 2025-01-27 ENCOUNTER — CLINICAL SUPPORT (OUTPATIENT)
Dept: INTERNAL MEDICINE | Age: 80
End: 2025-01-27
Payer: MEDICARE

## 2025-01-27 DIAGNOSIS — E53.8 VITAMIN B 12 DEFICIENCY: Primary | ICD-10-CM

## 2025-01-27 PROCEDURE — 96372 THER/PROPH/DIAG INJ SC/IM: CPT | Performed by: NURSE PRACTITIONER

## 2025-01-27 RX ADMIN — CYANOCOBALAMIN 1000 MCG: 1000 INJECTION, SOLUTION INTRAMUSCULAR; SUBCUTANEOUS at 12:56

## 2025-01-28 ENCOUNTER — HOSPITAL ENCOUNTER (OUTPATIENT)
Dept: GENERAL RADIOLOGY | Facility: HOSPITAL | Age: 80
Discharge: HOME | End: 2025-01-28
Admitting: NURSE PRACTITIONER
Payer: MEDICARE

## 2025-01-28 ENCOUNTER — OFFICE VISIT (OUTPATIENT)
Dept: INTERNAL MEDICINE | Age: 80
End: 2025-01-28
Payer: MEDICARE

## 2025-01-28 VITALS
SYSTOLIC BLOOD PRESSURE: 110 MMHG | HEART RATE: 66 BPM | BODY MASS INDEX: 25.4 KG/M2 | RESPIRATION RATE: 16 BRPM | OXYGEN SATURATION: 96 % | TEMPERATURE: 97.7 F | HEIGHT: 70 IN | DIASTOLIC BLOOD PRESSURE: 58 MMHG | WEIGHT: 177.4 LBS

## 2025-01-28 DIAGNOSIS — J45.20 MILD INTERMITTENT ASTHMA WITHOUT COMPLICATION: ICD-10-CM

## 2025-01-28 DIAGNOSIS — J10.1 INFLUENZA A: Primary | ICD-10-CM

## 2025-01-28 DIAGNOSIS — R09.81 NASAL CONGESTION: ICD-10-CM

## 2025-01-28 DIAGNOSIS — J22 ACUTE LOWER RESPIRATORY INFECTION: ICD-10-CM

## 2025-01-28 DIAGNOSIS — J10.1 INFLUENZA A: ICD-10-CM

## 2025-01-28 LAB
EXPIRATION DATE: ABNORMAL
FLUAV AG UPPER RESP QL IA.RAPID: DETECTED
FLUBV AG UPPER RESP QL IA.RAPID: NOT DETECTED
INTERNAL CONTROL: ABNORMAL
Lab: ABNORMAL
SARS-COV-2 AG UPPER RESP QL IA.RAPID: NOT DETECTED

## 2025-01-28 PROCEDURE — 3074F SYST BP LT 130 MM HG: CPT | Performed by: NURSE PRACTITIONER

## 2025-01-28 PROCEDURE — 99214 OFFICE O/P EST MOD 30 MIN: CPT | Performed by: NURSE PRACTITIONER

## 2025-01-28 PROCEDURE — 1126F AMNT PAIN NOTED NONE PRSNT: CPT | Performed by: NURSE PRACTITIONER

## 2025-01-28 PROCEDURE — 3078F DIAST BP <80 MM HG: CPT | Performed by: NURSE PRACTITIONER

## 2025-01-28 PROCEDURE — 71046 X-RAY EXAM CHEST 2 VIEWS: CPT

## 2025-01-28 PROCEDURE — 1159F MED LIST DOCD IN RCRD: CPT | Performed by: NURSE PRACTITIONER

## 2025-01-28 PROCEDURE — 87428 SARSCOV & INF VIR A&B AG IA: CPT | Performed by: NURSE PRACTITIONER

## 2025-01-28 PROCEDURE — 1160F RVW MEDS BY RX/DR IN RCRD: CPT | Performed by: NURSE PRACTITIONER

## 2025-01-28 RX ORDER — AZITHROMYCIN 250 MG/1
TABLET, FILM COATED ORAL
Qty: 6 TABLET | Refills: 0 | Status: SHIPPED | OUTPATIENT
Start: 2025-01-28 | End: 2025-02-02

## 2025-01-28 RX ORDER — GUAIFENESIN AND DEXTROMETHORPHAN HYDROBROMIDE 600; 30 MG/1; MG/1
2 TABLET, EXTENDED RELEASE ORAL 2 TIMES DAILY PRN
Qty: 40 TABLET | Refills: 0 | Status: SHIPPED | OUTPATIENT
Start: 2025-01-28

## 2025-01-28 NOTE — PROGRESS NOTES
Chief Complaint  Nasal Congestion (79 year old male here today for sinus issues that started two weeks ago, was seen on 1/13/25 here and then they got better and went out of town and today he feels worse. )    Subjective      History of Present Illness  The patient is a 79-year-old male who presents for follow-up of sinusitis.    He was previously evaluated by Dr. Reilly for sinus-related issues and was prescribed an antibiotic, which initially improved his condition. However, during a recent cruise, he experienced a recurrence of symptoms, including sinus congestion that progressed to chest congestion. He reports no chest pain but admits to mild shortness of breath. He also experienced a low-grade fever last night, with a temperature slightly above 99 degrees. He has been experiencing these symptoms for the past 5 days, with body aches on Saturday. He reports ear pressure and began expectorating thick, brownish sputum yesterday. His appetite has been inconsistent, with periods of hunger alternating with lack of interest in food. He has been coughing intermittently at night, which disrupts his sleep. He has been taking Tylenol Arthritis, 2 tablets in the morning and 2 before bedtime.     He has asthma but reports no wheezing or significant shortness of breath.    IMMUNIZATIONS  He has had his influenza vaccine.       Past Medical History:   Diagnosis Date    Arthritis     Asthma     Atypical chest pain     Coronary artery disease involving native coronary artery of native heart with unstable angina pectoris 09/25/2023    Dysphagia, oral phase     NO CURRENT ISSUES    Erectile dysfunction     Essential (primary) hypertension     Foot pain, bilateral     Heart murmur 5/2023    Heart valve disease 7/2023    Hyperlipidemia, unspecified     Low back pain     Neuropathy in diabetes     Peripheral neuropathy     Pulmonary HTN 08/09/2023    Sleep apnea     USES CPAP    Type 2 diabetes mellitus without complication      Vitamin D deficiency, unspecified         Past Surgical History:   Procedure Laterality Date    CARDIAC CATHETERIZATION N/A 2023    Procedure: Left Heart Cath with possible coronary angioplasty;  Surgeon: Marc Charles MD;  Location:  [facility] CATH INVASIVE LOCATION;  Service: Cardiology;  Laterality: N/A;    CATARACT EXTRACTION, BILATERAL  2014; 2014    COLONOSCOPY      CORONARY ARTERY BYPASS GRAFT N/A 2023    Procedure: RUKHSANA STERNOTOMY CORONARY ARTERY BYPASS GRAFT TIMES 7 USING LEFT INTERNAL MAMMARY ARTERY AND RIGHT GREATER SAPHENOUS VEIN GRAFT PER ENDOSCOPIC VEIN HARVESTING AND PRP;  Surgeon: Jr Frederic Groves MD;  Location: [facility];  Service: Cardiothoracic;  Laterality: N/A;    ENDOMETRIAL ABLATION      GASTROSTOMY      HEMORRHOIDECTOMY  2014    POLYPECTOMY      SINUS SURGERY          Social History     Tobacco Use   Smoking Status Former    Current packs/day: 0.00    Average packs/day: 2.0 packs/day for 15.0 years (30.0 ttl pk-yrs)    Types: Cigarettes, Pipe    Start date: 1980    Quit date: 1995    Years since quittin.0    Passive exposure: Never   Smokeless Tobacco Never        Patient Care Team:  Jacquelin Pelaez APRN as PCP - General (Nurse Practitioner)  Jose R Stephens MD as Consulting Physician (Dermatology)  Naheed George MD as Consulting Physician (Pulmonary Disease)  Marc Charles MD as Consulting Physician (Cardiology)  John Thakur MD as Consulting Physician (Ophthalmology)  Sonny Parish DPM as Consulting Physician (Podiatry)    Allergies   Allergen Reactions    Cat Dander Itching    Grass Itching    Pollen Extract Itching    Tape Rash          Current Outpatient Medications:     Accu-Chek Softclix Lancets lancets, USE TO TEST BLOOD SUGAR 2-3 TIMES DAILY AS NEEDED, Disp: 100 each, Rfl: 12    albuterol sulfate  (90 Base) MCG/ACT inhaler, Inhale 2 puffs Every 4 (Four) Hours As Needed for Wheezing., Disp: 18 g, Rfl:  5    aspirin 81 MG EC tablet, Take 1 tablet by mouth Daily., Disp: , Rfl:     Blood Glucose Monitoring Suppl (Accu-Chek Guide) w/Device kit, 1 each 3 (Three) Times a Day As Needed (check 2-3 times daily as needed)., Disp: 1 kit, Rfl: 0    clobetasol (TEMOVATE) 0.05 % cream, Apply 1 application topically to the appropriate area as directed 2 (Two) Times a Day., Disp: 30 g, Rfl: 2    docusate sodium (COLACE) 100 MG capsule, TAKE 1 CAPSULE BY MOUTH 2 TIMES A DAY, Disp: 180 capsule, Rfl: 1    esomeprazole (nexIUM) 20 MG capsule, Take 1 capsule by mouth Every Morning Before Breakfast., Disp: , Rfl:     fluticasone (FLONASE) 50 MCG/ACT nasal spray, Administer 1 spray into the nostril(s) as directed by provider Daily., Disp: , Rfl:     gabapentin (NEURONTIN) 800 MG tablet, Take 1 tablet by mouth 3 (Three) Times a Day., Disp: 90 tablet, Rfl: 5    glucose blood (Accu-Chek Guide) test strip, USE TO TEST BLOOD SUGAR 2-3 TIMES A DAY AS NEEDED, Disp: 100 each, Rfl: 12    glucose blood test strip, Test 2-3 times a week, Disp: 100 each, Rfl: 1    hydrOXYzine (ATARAX) 10 MG tablet, Take 1 tablet by mouth 3 (Three) Times a Day As Needed for Itching., Disp: 30 tablet, Rfl: 1    Ibuprofen 3 %, Gabapentin 10 %, Baclofen 2 %, lidocaine 4 %, Ketamine HCl 4 %, Apply 1-2 g topically to the appropriate area as directed 3 (Three) to 4 (Four) times daily., Disp: 90 g, Rfl: 5    Ibuprofen 3 %, Gabapentin 10 %, Baclofen 2 %, lidocaine 4 %, Ketamine HCl 4 %, Apply 1-2 g topically to the appropriate area as directed 3 (Three) to 4 (Four) times daily., Disp: 90 g, Rfl: 5    Krill Oil (Omega-3) 500 MG capsule, Take 1 capsule by mouth Daily., Disp: , Rfl:     loratadine (CLARITIN) 10 MG tablet, Take 1 tablet by mouth 2 (Two) Times a Day., Disp: , Rfl:     losartan (COZAAR) 25 MG tablet, Take 1 tablet by mouth Daily., Disp: 90 tablet, Rfl: 3    Melatonin 10 MG tablet, Take 1 tablet by mouth As Needed., Disp: , Rfl:     metoprolol tartrate (LOPRESSOR)  "25 MG tablet, Take 1 tablet by mouth Every 12 (Twelve) Hours., Disp: 180 tablet, Rfl: 3    montelukast (SINGULAIR) 10 MG tablet, Take 1 tablet by mouth Daily., Disp: 90 tablet, Rfl: 3    ondansetron ODT (ZOFRAN-ODT) 4 MG disintegrating tablet, Place 1 tablet on the tongue Every 8 (Eight) Hours As Needed for Nausea or Vomiting., Disp: 30 tablet, Rfl: 1    rosuvastatin (Crestor) 40 MG tablet, Take 1 tablet by mouth Every Night., Disp: 90 tablet, Rfl: 3    Semaglutide, 2 MG/DOSE, (OZEMPIC) 8 MG/3ML solution pen-injector, Inject 2 mg under the skin into the appropriate area as directed 1 (One) Time Per Week., Disp: 9 mL, Rfl: 3    Turmeric 500 MG capsule, Take 1 capsule by mouth Daily., Disp: , Rfl:     vitamin D3 125 MCG (5000 UT) capsule capsule, Take 1 capsule by mouth Daily., Disp: , Rfl:     azithromycin (ZITHROMAX) 250 MG tablet, Take 2 tablets by mouth Daily for 1 day, THEN 1 tablet Daily for 4 days., Disp: 6 tablet, Rfl: 0    guaifenesin-dextromethorphan 600-30 mg (MUCINEX DM)  MG tablet sustained-release 12 hour, Take 2 tablets by mouth 2 (Two) Times a Day As Needed (cough and congestion)., Disp: 40 tablet, Rfl: 0    Current Facility-Administered Medications:     cyanocobalamin injection 1,000 mcg, 1,000 mcg, Intramuscular, Q28 Days, Jacquelin Pelaez APRN, 1,000 mcg at 01/27/25 1256    Objective     Vitals:    01/28/25 0818   BP: 110/58   BP Location: Left arm   Patient Position: Sitting   Cuff Size: Small Adult   Pulse: 66   Resp: 16   Temp: 97.7 °F (36.5 °C)   TempSrc: Temporal   SpO2: 96%   Weight: 80.5 kg (177 lb 6.4 oz)   Height: 177.8 cm (70\")        Wt Readings from Last 3 Encounters:   01/28/25 80.5 kg (177 lb 6.4 oz)   01/16/25 80.7 kg (178 lb)   01/13/25 80.8 kg (178 lb 3.2 oz)        BP Readings from Last 3 Encounters:   01/28/25 110/58   01/13/25 146/70   01/10/25 147/71      Physical Exam  Vitals reviewed.   Constitutional:       General: He is not in acute distress.  HENT:      Head: " Normocephalic and atraumatic.      Right Ear: Tympanic membrane and ear canal normal.      Left Ear: Tympanic membrane and ear canal normal.   Cardiovascular:      Rate and Rhythm: Normal rate and regular rhythm.      Heart sounds: Normal heart sounds.   Pulmonary:      Effort: Pulmonary effort is normal.      Breath sounds: Examination of the right-upper field reveals rhonchi. Examination of the left-upper field reveals rhonchi. Examination of the right-middle field reveals rhonchi. Examination of the right-lower field reveals rhonchi. Rhonchi present.   Neurological:      General: No focal deficit present.      Mental Status: He is alert.   Psychiatric:         Thought Content: Thought content normal.                Result Review   The following data was reviewed by: DARRELL Pardo on 01/28/2025:  [x]  Tests & Results  []  Hospitalization/Emergency Department/Urgent Care  []  Internal/External Consultant Notes       Assessment and Plan     Diagnoses and all orders for this visit:    1. Influenza A (Primary)  -     XR Chest PA & Lateral; Future    2. Mild intermittent asthma without complication  -     XR Chest PA & Lateral; Future    3. Acute lower respiratory infection  -     XR Chest PA & Lateral; Future    4. Nasal congestion  -     POCT SARS-CoV-2 Antigen EMELY + Flu    Other orders  -     azithromycin (ZITHROMAX) 250 MG tablet; Take 2 tablets by mouth Daily for 1 day, THEN 1 tablet Daily for 4 days.  Dispense: 6 tablet; Refill: 0  -     guaifenesin-dextromethorphan 600-30 mg (MUCINEX DM)  MG tablet sustained-release 12 hour; Take 2 tablets by mouth 2 (Two) Times a Day As Needed (cough and congestion).  Dispense: 40 tablet; Refill: 0         Assessment & Plan  1. Influenza A.  He developed Influenza A after returning from a cruise. His immune system was likely compromised due to a pre-existing sinus infection. He reports chest congestion and coughing up thick, brownish sputum. His lungs sound very  congested, and there is a possibility of pneumonia. A chest x-ray has been ordered stat to rule out pneumonia. A prescription for Z-Tanner has been provided to help with lung inflammation and congestion. He is advised to take Mucinex every 12 hours during the day and to stay hydrated. If he experiences severe shortness of breath, a significant increase in fever, vomiting, or weakness, he should seek immediate medical attention at the hospital. He can continue taking Tylenol as needed for fever and body aches.    2. Asthma.  He has a history of asthma but reports no significant shortness of breath currently. He is advised to monitor his symptoms closely, especially given his current respiratory infection.    Follow-up  The patient will follow up next week for a lung examination.       Patient was given instructions and counseling regarding his condition or for health maintenance advice. Please see specific information pulled into the AVS if appropriate.     Follow Up   Return in 1 week (on 2/4/2025) for Recheck, or if symptoms worsen or fail to improve.    Patient or patient representative verbalized consent for the use of Ambient Listening during the visit with  DARRELL Pardo for chart documentation. 1/28/2025  08:36 EST    DARRELL Pardo

## 2025-02-03 NOTE — PROGRESS NOTES
Chief Complaint  Illness (79 year old male here today for a one week follow up after being diagnosed with flu A. )    Subjective      History of Present Illness  The patient is a 79-year-old male who presents for follow-up.    He reports an improvement in his condition, although he continues to experience a cough and sinus congestion. He has been using Mucinex, which he procured from [employer]. His cough is productive, with clear sputum, a change from the dark, chunky sputum he had last week. He also notes a shift in the color of his nasal discharge from yellowish to greenish, but only from the left nostril. His right nostril remains clear. He does not report any wheezing. He has been diligent about hydration, consuming one Liquid I.V. daily and maintaining a full 16-ounce glass of water. He describes his cough as dry and notes that it is less productive at night. He has been managing his left-sided nasal congestion with saline rinses. He is uncertain about previous use of Levaquin. He has completed a course of prednisone and an antibiotic prior to his departure, as well as a Z-Tanner. He was previously evaluated by Dr. Duran for a sinus-related issue and was prescribed an antibiotic, which initially improved his symptoms. However, during a subsequent cruise, he contracted influenza A. He completed a course of Z-Tanner, and a chest x-ray was performed, which yielded normal results.    His blood pressure readings have been consistently around 110/60s to 115 when measured in the morning.    His blood glucose levels have returned to normal, with readings of 108 this morning and 109 the previous day. He notes that his blood glucose levels increased to 230 to 240 while on prednisone.    Supplemental Information  He had bypass surgery in September 2023.    IMMUNIZATIONS  He has received the influenza vaccine.       Past Medical History:   Diagnosis Date    Arthritis     Asthma     Atypical chest pain     Coronary artery  disease involving native coronary artery of native heart with unstable angina pectoris 2023    Dysphagia, oral phase     NO CURRENT ISSUES    Erectile dysfunction     Essential (primary) hypertension     Foot pain, bilateral     Heart murmur 2023    Heart valve disease 2023    Hyperlipidemia, unspecified     Low back pain     Neuropathy in diabetes     Peripheral neuropathy     Pulmonary HTN 2023    Sleep apnea     USES CPAP    Type 2 diabetes mellitus without complication     Vitamin D deficiency, unspecified         Past Surgical History:   Procedure Laterality Date    CARDIAC CATHETERIZATION N/A 2023    Procedure: Left Heart Cath with possible coronary angioplasty;  Surgeon: Marc Charles MD;  Location: [facility] CATH INVASIVE LOCATION;  Service: Cardiology;  Laterality: N/A;    CATARACT EXTRACTION, BILATERAL  2014; 2014    COLONOSCOPY      CORONARY ARTERY BYPASS GRAFT N/A 2023    Procedure: RUKHSANA STERNOTOMY CORONARY ARTERY BYPASS GRAFT TIMES 7 USING LEFT INTERNAL MAMMARY ARTERY AND RIGHT GREATER SAPHENOUS VEIN GRAFT PER ENDOSCOPIC VEIN HARVESTING AND PRP;  Surgeon: Jr Frederic Groves MD;  Location: [facility];  Service: Cardiothoracic;  Laterality: N/A;    ENDOMETRIAL ABLATION      GASTROSTOMY      HEMORRHOIDECTOMY  2014    POLYPECTOMY      SINUS SURGERY          Social History     Tobacco Use   Smoking Status Former    Current packs/day: 0.00    Average packs/day: 2.0 packs/day for 15.0 years (30.0 ttl pk-yrs)    Types: Cigarettes, Pipe    Start date: 1980    Quit date: 1995    Years since quittin.1    Passive exposure: Never   Smokeless Tobacco Never        Patient Care Team:  Jacquelin Pelaez APRN as PCP - General (Nurse Practitioner)  Jose R Stephens MD as Consulting Physician (Dermatology)  Naheed George MD as Consulting Physician (Pulmonary Disease)  Marc Charles MD as Consulting Physician (Cardiology)  John Thakur MD as  Consulting Physician (Ophthalmology)  Sonny Parish DPM as Consulting Physician (Podiatry)    Allergies   Allergen Reactions    Cat Dander Itching    Grass Itching    Pollen Extract Itching    Tape Rash          Current Outpatient Medications:     Accu-Chek Softclix Lancets lancets, USE TO TEST BLOOD SUGAR 2-3 TIMES DAILY AS NEEDED, Disp: 100 each, Rfl: 12    albuterol sulfate  (90 Base) MCG/ACT inhaler, Inhale 2 puffs Every 4 (Four) Hours As Needed for Wheezing., Disp: 18 g, Rfl: 5    aspirin 81 MG EC tablet, Take 1 tablet by mouth Daily., Disp: , Rfl:     Blood Glucose Monitoring Suppl (Accu-Chek Guide) w/Device kit, 1 each 3 (Three) Times a Day As Needed (check 2-3 times daily as needed)., Disp: 1 kit, Rfl: 0    clobetasol (TEMOVATE) 0.05 % cream, Apply 1 application topically to the appropriate area as directed 2 (Two) Times a Day., Disp: 30 g, Rfl: 2    docusate sodium (COLACE) 100 MG capsule, TAKE 1 CAPSULE BY MOUTH 2 TIMES A DAY, Disp: 180 capsule, Rfl: 1    esomeprazole (nexIUM) 20 MG capsule, Take 1 capsule by mouth Every Morning Before Breakfast., Disp: , Rfl:     fluticasone (FLONASE) 50 MCG/ACT nasal spray, Administer 1 spray into the nostril(s) as directed by provider Daily., Disp: , Rfl:     gabapentin (NEURONTIN) 800 MG tablet, Take 1 tablet by mouth 3 (Three) Times a Day., Disp: 90 tablet, Rfl: 5    glucose blood (Accu-Chek Guide) test strip, USE TO TEST BLOOD SUGAR 2-3 TIMES A DAY AS NEEDED, Disp: 100 each, Rfl: 12    guaifenesin-dextromethorphan 600-30 mg (MUCINEX DM)  MG tablet sustained-release 12 hour, Take 2 tablets by mouth 2 (Two) Times a Day As Needed (cough and congestion)., Disp: 40 tablet, Rfl: 0    hydrOXYzine (ATARAX) 10 MG tablet, Take 1 tablet by mouth 3 (Three) Times a Day As Needed for Itching., Disp: 30 tablet, Rfl: 1    Ibuprofen 3 %, Gabapentin 10 %, Baclofen 2 %, lidocaine 4 %, Ketamine HCl 4 %, Apply 1-2 g topically to the appropriate area as directed 3  (Three) to 4 (Four) times daily., Disp: 90 g, Rfl: 5    Krill Oil (Omega-3) 500 MG capsule, Take 1 capsule by mouth Daily., Disp: , Rfl:     loratadine (CLARITIN) 10 MG tablet, Take 1 tablet by mouth 2 (Two) Times a Day., Disp: , Rfl:     losartan (COZAAR) 25 MG tablet, Take 1 tablet by mouth Daily., Disp: 90 tablet, Rfl: 3    Melatonin 10 MG tablet, Take 1 tablet by mouth As Needed., Disp: , Rfl:     metoprolol tartrate (LOPRESSOR) 25 MG tablet, Take 1 tablet by mouth Every 12 (Twelve) Hours., Disp: 180 tablet, Rfl: 3    montelukast (SINGULAIR) 10 MG tablet, Take 1 tablet by mouth Daily., Disp: 90 tablet, Rfl: 3    ondansetron ODT (ZOFRAN-ODT) 4 MG disintegrating tablet, Place 1 tablet on the tongue Every 8 (Eight) Hours As Needed for Nausea or Vomiting., Disp: 30 tablet, Rfl: 1    rosuvastatin (Crestor) 40 MG tablet, Take 1 tablet by mouth Every Night., Disp: 90 tablet, Rfl: 3    Semaglutide, 2 MG/DOSE, (OZEMPIC) 8 MG/3ML solution pen-injector, Inject 2 mg under the skin into the appropriate area as directed 1 (One) Time Per Week., Disp: 9 mL, Rfl: 3    Turmeric 500 MG capsule, Take 1 capsule by mouth Daily., Disp: , Rfl:     vitamin D3 125 MCG (5000 UT) capsule capsule, Take 1 capsule by mouth Daily., Disp: , Rfl:     glucose blood test strip, Test 2-3 times a week (Patient not taking: Reported on 2/4/2025), Disp: 100 each, Rfl: 1    levoFLOXacin (LEVAQUIN) 500 MG tablet, Take 1 tablet by mouth Daily for 5 days., Disp: 5 tablet, Rfl: 0    Probiotic, Lactobacillus, capsule, Take 1 capsule by mouth Daily., Disp: 30 capsule, Rfl: 0    Current Facility-Administered Medications:     cyanocobalamin injection 1,000 mcg, 1,000 mcg, Intramuscular, Q28 Days, Jacquelin Pelaez APRN, 1,000 mcg at 01/27/25 1256    Objective     Vitals:    02/04/25 1019   BP: 152/70   BP Location: Left arm   Patient Position: Sitting   Cuff Size: Large Adult   Pulse: 63   Resp: 18   Temp: 97.1 °F (36.2 °C)   TempSrc: Temporal   SpO2: 95%  "  Weight: 79.4 kg (175 lb)   Height: 177.8 cm (70\")        Wt Readings from Last 3 Encounters:   02/04/25 79.4 kg (175 lb)   01/28/25 80.5 kg (177 lb 6.4 oz)   01/16/25 80.7 kg (178 lb)        BP Readings from Last 3 Encounters:   02/04/25 152/70   01/28/25 110/58   01/13/25 146/70        Physical Exam  Vital Signs  Blood pressure is 152/70.    Physical Exam  Constitutional:       General: He is not in acute distress.  HENT:      Head: Normocephalic and atraumatic.      Nose:      Right Sinus: No maxillary sinus tenderness.      Left Sinus: Maxillary sinus tenderness present.   Cardiovascular:      Rate and Rhythm: Normal rate and regular rhythm.      Heart sounds: Normal heart sounds.   Pulmonary:      Breath sounds: Normal breath sounds. No wheezing, rhonchi or rales.   Lymphadenopathy:      Cervical: No cervical adenopathy.   Neurological:      General: No focal deficit present.      Mental Status: He is alert.                Result Review   The following data was reviewed by: DARRELL Pardo on 02/04/2025:  [x]  Tests & Results  []  Hospitalization/Emergency Department/Urgent Care  []  Internal/External Consultant Notes       Assessment and Plan     Diagnoses and all orders for this visit:    1. Influenza A (Primary)    2. Maxillary sinusitis, unspecified chronicity    3. Primary hypertension    4. Type 2 diabetes mellitus with diabetic neuropathy, without long-term current use of insulin    5. Mild intermittent asthma without complication    Other orders  -     levoFLOXacin (LEVAQUIN) 500 MG tablet; Take 1 tablet by mouth Daily for 5 days.  Dispense: 5 tablet; Refill: 0  -     Probiotic, Lactobacillus, capsule; Take 1 capsule by mouth Daily.  Dispense: 30 capsule; Refill: 0         Assessment & Plan  1. Influenza A.  He is currently one week post-influenza A infection and is afebrile. His lungs are clear upon examination. The persistent cough may be attributed to bronchitis, potentially exacerbated by his " asthma. The cough could persist for several weeks, but it should gradually resolve. He is advised to continue with saline rinses for his sinuses and to apply warm compresses to the area. He may discontinue Mucinex if he feels it is no longer necessary.    2. Left-sided sinus infection.  He reports blowing greenish snot out of his left sinus, indicating a possible bacterial infection. A prescription for Levaquin 500 mg has been issued, to be taken for a duration of 5 days. The potential side effects, including tendinitis, blood sugar disturbances, and cardiac arrhythmias, have been discussed. He is advised to avoid activities that may strain the tendons, such as running and jumping. If there is no improvement after 5 days, he should contact the office.    3. Elevated blood pressure.  His blood pressure today is 152/70, which is higher than his usual readings of 110/60s to 115. He is advised to monitor his blood pressure daily.    4. Diabetes mellitus.  His blood sugar levels have returned to normal, with readings of 108 this morning and 109 yesterday. He notes that his blood glucose levels increased to 230 to 240 while on prednisone.     Patient was given instructions and counseling regarding his condition or for health maintenance advice. Please see specific information pulled into the AVS if appropriate.     Follow Up   Return for Next scheduled follow up, or if symptoms worsen or fail to improve.    Patient or patient representative verbalized consent for the use of Ambient Listening during the visit with  DARRELL Pardo for chart documentation. 2/4/2025  10:27 EST    DARRELL Pardo

## 2025-02-04 ENCOUNTER — OFFICE VISIT (OUTPATIENT)
Dept: INTERNAL MEDICINE | Age: 80
End: 2025-02-04
Payer: MEDICARE

## 2025-02-04 VITALS
SYSTOLIC BLOOD PRESSURE: 152 MMHG | DIASTOLIC BLOOD PRESSURE: 70 MMHG | HEIGHT: 70 IN | OXYGEN SATURATION: 95 % | RESPIRATION RATE: 18 BRPM | HEART RATE: 63 BPM | BODY MASS INDEX: 25.05 KG/M2 | WEIGHT: 175 LBS | TEMPERATURE: 97.1 F

## 2025-02-04 DIAGNOSIS — E11.40 TYPE 2 DIABETES MELLITUS WITH DIABETIC NEUROPATHY, WITHOUT LONG-TERM CURRENT USE OF INSULIN: ICD-10-CM

## 2025-02-04 DIAGNOSIS — J32.0 MAXILLARY SINUSITIS, UNSPECIFIED CHRONICITY: ICD-10-CM

## 2025-02-04 DIAGNOSIS — J10.1 INFLUENZA A: Primary | ICD-10-CM

## 2025-02-04 DIAGNOSIS — J45.20 MILD INTERMITTENT ASTHMA WITHOUT COMPLICATION: ICD-10-CM

## 2025-02-04 DIAGNOSIS — I10 PRIMARY HYPERTENSION: ICD-10-CM

## 2025-02-04 PROCEDURE — 1160F RVW MEDS BY RX/DR IN RCRD: CPT | Performed by: NURSE PRACTITIONER

## 2025-02-04 PROCEDURE — 3077F SYST BP >= 140 MM HG: CPT | Performed by: NURSE PRACTITIONER

## 2025-02-04 PROCEDURE — 1126F AMNT PAIN NOTED NONE PRSNT: CPT | Performed by: NURSE PRACTITIONER

## 2025-02-04 PROCEDURE — 1159F MED LIST DOCD IN RCRD: CPT | Performed by: NURSE PRACTITIONER

## 2025-02-04 PROCEDURE — 99214 OFFICE O/P EST MOD 30 MIN: CPT | Performed by: NURSE PRACTITIONER

## 2025-02-04 PROCEDURE — 3078F DIAST BP <80 MM HG: CPT | Performed by: NURSE PRACTITIONER

## 2025-02-04 RX ORDER — ACETAMINOPHEN 500 MG
1 TABLET ORAL DAILY
Qty: 30 CAPSULE | Refills: 0 | Status: SHIPPED | OUTPATIENT
Start: 2025-02-04

## 2025-02-04 RX ORDER — LEVOFLOXACIN 500 MG/1
500 TABLET, FILM COATED ORAL DAILY
Qty: 5 TABLET | Refills: 0 | Status: SHIPPED | OUTPATIENT
Start: 2025-02-04 | End: 2025-02-09

## 2025-02-10 NOTE — PROGRESS NOTES
Chief Complaint  Follow-up (The patient is coming in for a 10 day follow up from having Flu A. He states that he still has his cough. And sinus stuff going on. He has nadege on three rounds of antibiotics. )    Subjective      History of Present Illness  The patient is a 79-year-old male who presents for follow-up.    He reports a significant improvement in his sinus condition, estimating it to be at 90 percent recovery. He has been performing nasal rinses, which have resulted in the expulsion of yellow-green mucus. He also mentions a persistent cough that is gradually improving. He notes a decrease in nighttime coughing and continues to expectorate slightly. He is currently in the third week of his illness. He has not experienced any episodes of asthma or wheezing.    His primary concern at present is constipation, which he attributes to his antibiotic regimen. Despite taking MiraLAX nightly and Dulcolax, he has not found relief. He is considering the use of magnesium citrate. He has been taking [employer] probiotics daily. He also takes a stool softener twice daily and uses MiraLAX as needed. Daily use of MiraLAX resulted in loose stools, so he adjusted the frequency to every other day, which was effective until recently.    His blood pressure was elevated during his last visit, prompting him to monitor it daily upon waking. He has not made any changes to his medication regimen. His home readings have been within the normal range, with only one or two instances of systolic readings in the 130s.    His blood glucose levels have been consistently in the low 100s, with a reading of 116 this morning. He recalls one instance of a reading around 130, but it subsequently returned to the teens. He believes the Ozempic is working well for him. His last blood work showed an A1c of 6 and a blood glucose level of approximately 120. He considers his diabetes to be well-controlled at present.    He prefers Crestor over Lipitor,  noting that it does not cause joint pain. He has observed a decrease in his triglycerides since resuming Crestor.    Supplemental Information  His thumb condition is improving, and he has discontinued the use of the brace at night.    FAMILY HISTORY  The patient's paternal grandmother was a type I diabetic, and most of the patient's father's siblings were type II diabetics. The patient's maternal grandfather and mother  from cancer.    IMMUNIZATIONS  The patient has been receiving the influenza vaccine annually since the 1970s.       Past Medical History:   Diagnosis Date    Arthritis     Asthma     Atypical chest pain     Coronary artery disease involving native coronary artery of native heart with unstable angina pectoris 2023    Dysphagia, oral phase     NO CURRENT ISSUES    Erectile dysfunction     Essential (primary) hypertension     Foot pain, bilateral     Heart murmur 2023    Heart valve disease 2023    Hyperlipidemia, unspecified     Low back pain     Neuropathy in diabetes     Peripheral neuropathy     Pulmonary HTN 2023    Sleep apnea     USES CPAP    Type 2 diabetes mellitus without complication     Vitamin D deficiency, unspecified         Past Surgical History:   Procedure Laterality Date    CARDIAC CATHETERIZATION N/A 2023    Procedure: Left Heart Cath with possible coronary angioplasty;  Surgeon: Marc Charles MD;  Location: [facility] CATH INVASIVE LOCATION;  Service: Cardiology;  Laterality: N/A;    CATARACT EXTRACTION, BILATERAL  2014; 2014    COLONOSCOPY      CORONARY ARTERY BYPASS GRAFT N/A 2023    Procedure: RUKHSANA STERNOTOMY CORONARY ARTERY BYPASS GRAFT TIMES 7 USING LEFT INTERNAL MAMMARY ARTERY AND RIGHT GREATER SAPHENOUS VEIN GRAFT PER ENDOSCOPIC VEIN HARVESTING AND PRP;  Surgeon: Jr Frederic Groves MD;  Location: [facility];  Service: Cardiothoracic;  Laterality: N/A;    ENDOMETRIAL ABLATION      GASTROSTOMY      HEMORRHOIDECTOMY  2014     POLYPECTOMY      SINUS SURGERY          Social History     Tobacco Use   Smoking Status Former    Current packs/day: 0.00    Average packs/day: 2.0 packs/day for 15.0 years (30.0 ttl pk-yrs)    Types: Cigarettes, Pipe    Start date: 1980    Quit date: 1995    Years since quittin.1    Passive exposure: Never   Smokeless Tobacco Never        Patient Care Team:  Jacquelin Pelaez APRN as PCP - General (Nurse Practitioner)  Jose R Stephens MD as Consulting Physician (Dermatology)  Naheed George MD as Consulting Physician (Pulmonary Disease)  Marc Charles MD as Consulting Physician (Cardiology)  John Thakur MD as Consulting Physician (Ophthalmology)  Sonny Parish DPM as Consulting Physician (Podiatry)    Allergies   Allergen Reactions    Cat Dander Itching    Grass Itching    Pollen Extract Itching    Tape Rash          Current Outpatient Medications:     Accu-Chek Softclix Lancets lancets, USE TO TEST BLOOD SUGAR 2-3 TIMES DAILY AS NEEDED, Disp: 100 each, Rfl: 12    albuterol sulfate  (90 Base) MCG/ACT inhaler, Inhale 2 puffs Every 4 (Four) Hours As Needed for Wheezing., Disp: 18 g, Rfl: 5    aspirin 81 MG EC tablet, Take 1 tablet by mouth Daily., Disp: , Rfl:     Blood Glucose Monitoring Suppl (Accu-Chek Guide) w/Device kit, 1 each 3 (Three) Times a Day As Needed (check 2-3 times daily as needed)., Disp: 1 kit, Rfl: 0    clobetasol (TEMOVATE) 0.05 % cream, Apply 1 application topically to the appropriate area as directed 2 (Two) Times a Day., Disp: 30 g, Rfl: 2    docusate sodium (COLACE) 100 MG capsule, TAKE 1 CAPSULE BY MOUTH 2 TIMES A DAY, Disp: 180 capsule, Rfl: 1    esomeprazole (nexIUM) 20 MG capsule, Take 1 capsule by mouth Every Morning Before Breakfast., Disp: , Rfl:     fluticasone (FLONASE) 50 MCG/ACT nasal spray, Administer 1 spray into the nostril(s) as directed by provider Daily., Disp: , Rfl:     gabapentin (NEURONTIN) 800 MG tablet, Take 1 tablet by mouth 3  (Three) Times a Day., Disp: 90 tablet, Rfl: 5    glucose blood (Accu-Chek Guide) test strip, USE TO TEST BLOOD SUGAR 2-3 TIMES A DAY AS NEEDED, Disp: 100 each, Rfl: 12    guaifenesin-dextromethorphan 600-30 mg (MUCINEX DM)  MG tablet sustained-release 12 hour, Take 2 tablets by mouth 2 (Two) Times a Day As Needed (cough and congestion)., Disp: 40 tablet, Rfl: 0    hydrOXYzine (ATARAX) 10 MG tablet, Take 1 tablet by mouth 3 (Three) Times a Day As Needed for Itching., Disp: 30 tablet, Rfl: 1    Ibuprofen 3 %, Gabapentin 10 %, Baclofen 2 %, lidocaine 4 %, Ketamine HCl 4 %, Apply 1-2 g topically to the appropriate area as directed 3 (Three) to 4 (Four) times daily., Disp: 90 g, Rfl: 5    Krill Oil (Omega-3) 500 MG capsule, Take 1 capsule by mouth Daily., Disp: , Rfl:     loratadine (CLARITIN) 10 MG tablet, Take 1 tablet by mouth 2 (Two) Times a Day., Disp: , Rfl:     losartan (COZAAR) 25 MG tablet, Take 1 tablet by mouth Daily., Disp: 90 tablet, Rfl: 3    Melatonin 10 MG tablet, Take 1 tablet by mouth As Needed., Disp: , Rfl:     metoprolol tartrate (LOPRESSOR) 25 MG tablet, Take 1 tablet by mouth Every 12 (Twelve) Hours., Disp: 180 tablet, Rfl: 3    montelukast (SINGULAIR) 10 MG tablet, Take 1 tablet by mouth Daily., Disp: 90 tablet, Rfl: 3    ondansetron ODT (ZOFRAN-ODT) 4 MG disintegrating tablet, Place 1 tablet on the tongue Every 8 (Eight) Hours As Needed for Nausea or Vomiting., Disp: 30 tablet, Rfl: 1    Probiotic, Lactobacillus, capsule, Take 1 capsule by mouth Daily., Disp: 30 capsule, Rfl: 0    rosuvastatin (Crestor) 40 MG tablet, Take 1 tablet by mouth Every Night., Disp: 90 tablet, Rfl: 3    Semaglutide, 2 MG/DOSE, (OZEMPIC) 8 MG/3ML solution pen-injector, Inject 2 mg under the skin into the appropriate area as directed 1 (One) Time Per Week., Disp: 9 mL, Rfl: 3    Turmeric 500 MG capsule, Take 1 capsule by mouth Daily., Disp: , Rfl:     vitamin D3 125 MCG (5000 UT) capsule capsule, Take 1 capsule by  "mouth Daily., Disp: , Rfl:     glucose blood test strip, Test 2-3 times a week (Patient not taking: Reported on 2/14/2025), Disp: 100 each, Rfl: 1    Current Facility-Administered Medications:     cyanocobalamin injection 1,000 mcg, 1,000 mcg, Intramuscular, Q28 Days, Jacquelin Pelaez APRN, 1,000 mcg at 01/27/25 1256    Objective     Vitals:    02/14/25 0959   BP: 122/70   BP Location: Left arm   Patient Position: Sitting   Cuff Size: Large Adult   Pulse: 62   Temp: 96.9 °F (36.1 °C)   TempSrc: Temporal   SpO2: 94%   Weight: 82.3 kg (181 lb 6.4 oz)   Height: 177.8 cm (70\")        Wt Readings from Last 3 Encounters:   02/14/25 82.3 kg (181 lb 6.4 oz)   02/04/25 79.4 kg (175 lb)   01/28/25 80.5 kg (177 lb 6.4 oz)        BP Readings from Last 3 Encounters:   02/14/25 122/70   02/04/25 152/70   01/28/25 110/58        Physical Exam  Vital Signs  Blood pressure is 122/70.    Physical Exam  Constitutional:       General: He is not in acute distress.  HENT:      Head: Normocephalic and atraumatic.      Nose:      Right Sinus: No maxillary sinus tenderness.      Left Sinus: No maxillary sinus tenderness.   Eyes:      Conjunctiva/sclera: Conjunctivae normal.   Cardiovascular:      Rate and Rhythm: Normal rate and regular rhythm.      Heart sounds: Normal heart sounds.   Pulmonary:      Breath sounds: Normal breath sounds. No wheezing, rhonchi or rales.   Lymphadenopathy:      Cervical: No cervical adenopathy.   Neurological:      General: No focal deficit present.      Mental Status: He is alert.   Psychiatric:         Thought Content: Thought content normal.                Result Review   The following data was reviewed by: DARRELL Pardo on 02/14/2025:  []  Tests & Results  []  Hospitalization/Emergency Department/Urgent Care  []  Internal/External Consultant Notes       Assessment and Plan     Diagnoses and all orders for this visit:    1. Maxillary sinusitis, unspecified chronicity (Primary)    2. Constipation, " unspecified constipation type    3. Primary hypertension    4. Type 2 diabetes mellitus with diabetic neuropathy, without long-term current use of insulin    5. Hyperlipidemia, unspecified hyperlipidemia type         Assessment & Plan  1. Post-influenza follow-up.  His sinus condition has shown significant improvement, with a 90% reduction in symptoms. The discolored drainage has resolved, and he reports a decrease in pain. He continues to experience a mild cough, which is expected to persist for some time.    2. Constipation.  He has been experiencing constipation, likely due to his current medications, including Crestor and Ozempic. He is advised to take a dose of milk of magnesia, 30 mL, and if there is no bowel movement within 4 hours, he may take another dose. If there is still no bowel movement, an enema may be considered. He is also encouraged to continue taking probiotics daily.    3. Hypertension.  His blood pressure has normalized, with a reading of 122/70 today. It is believed that his previous elevated readings were due to his illness.    4. Diabetes Mellitus.  His diabetes is well-controlled, with an A1c level of 6. He reports that his blood sugar levels have been running in the low 100s, with occasional spikes. He is advised to continue his current medication regimen, including Ozempic.    5. Hyperlipidemia.  He is on a healthy dose of Crestor, which has been effective in managing his cholesterol levels. He reports that his triglycerides have come down since being on Crestor. He is advised to continue his current medication regimen.     Patient was given instructions and counseling regarding his condition or for health maintenance advice. Please see specific information pulled into the AVS if appropriate.     Follow Up   Return for Next scheduled follow up, or if symptoms worsen or fail to improve.    Patient or patient representative verbalized consent for the use of Ambient Listening during the visit  with  DARRELL Pardo for chart documentation. 2/14/2025  10:27 EST    DARRELL Pardo

## 2025-02-14 ENCOUNTER — OFFICE VISIT (OUTPATIENT)
Dept: INTERNAL MEDICINE | Age: 80
End: 2025-02-14
Payer: MEDICARE

## 2025-02-14 VITALS
WEIGHT: 181.4 LBS | DIASTOLIC BLOOD PRESSURE: 70 MMHG | HEIGHT: 70 IN | BODY MASS INDEX: 25.97 KG/M2 | SYSTOLIC BLOOD PRESSURE: 122 MMHG | OXYGEN SATURATION: 94 % | HEART RATE: 62 BPM | TEMPERATURE: 96.9 F

## 2025-02-14 DIAGNOSIS — K59.00 CONSTIPATION, UNSPECIFIED CONSTIPATION TYPE: ICD-10-CM

## 2025-02-14 DIAGNOSIS — E78.5 HYPERLIPIDEMIA, UNSPECIFIED HYPERLIPIDEMIA TYPE: ICD-10-CM

## 2025-02-14 DIAGNOSIS — E11.40 TYPE 2 DIABETES MELLITUS WITH DIABETIC NEUROPATHY, WITHOUT LONG-TERM CURRENT USE OF INSULIN: ICD-10-CM

## 2025-02-14 DIAGNOSIS — I10 PRIMARY HYPERTENSION: ICD-10-CM

## 2025-02-14 DIAGNOSIS — J32.0 MAXILLARY SINUSITIS, UNSPECIFIED CHRONICITY: Primary | ICD-10-CM

## 2025-02-14 PROCEDURE — 3078F DIAST BP <80 MM HG: CPT | Performed by: NURSE PRACTITIONER

## 2025-02-14 PROCEDURE — 1159F MED LIST DOCD IN RCRD: CPT | Performed by: NURSE PRACTITIONER

## 2025-02-14 PROCEDURE — 99214 OFFICE O/P EST MOD 30 MIN: CPT | Performed by: NURSE PRACTITIONER

## 2025-02-14 PROCEDURE — 1126F AMNT PAIN NOTED NONE PRSNT: CPT | Performed by: NURSE PRACTITIONER

## 2025-02-14 PROCEDURE — 3074F SYST BP LT 130 MM HG: CPT | Performed by: NURSE PRACTITIONER

## 2025-02-14 PROCEDURE — 1160F RVW MEDS BY RX/DR IN RCRD: CPT | Performed by: NURSE PRACTITIONER

## 2025-02-26 ENCOUNTER — OFFICE VISIT (OUTPATIENT)
Dept: CARDIOLOGY | Facility: CLINIC | Age: 80
End: 2025-02-26
Payer: MEDICARE

## 2025-02-26 VITALS
DIASTOLIC BLOOD PRESSURE: 72 MMHG | HEIGHT: 70 IN | WEIGHT: 181 LBS | SYSTOLIC BLOOD PRESSURE: 132 MMHG | HEART RATE: 60 BPM | BODY MASS INDEX: 25.91 KG/M2

## 2025-02-26 DIAGNOSIS — E78.2 MIXED DYSLIPIDEMIA: ICD-10-CM

## 2025-02-26 DIAGNOSIS — I25.10 CORONARY ARTERY DISEASE INVOLVING NATIVE CORONARY ARTERY OF NATIVE HEART WITHOUT ANGINA PECTORIS: Primary | ICD-10-CM

## 2025-02-26 DIAGNOSIS — I10 ESSENTIAL HYPERTENSION: ICD-10-CM

## 2025-02-26 DIAGNOSIS — Z95.1 S/P CABG (CORONARY ARTERY BYPASS GRAFT): ICD-10-CM

## 2025-02-26 PROCEDURE — 3078F DIAST BP <80 MM HG: CPT | Performed by: INTERNAL MEDICINE

## 2025-02-26 PROCEDURE — 99214 OFFICE O/P EST MOD 30 MIN: CPT | Performed by: INTERNAL MEDICINE

## 2025-02-26 PROCEDURE — 1159F MED LIST DOCD IN RCRD: CPT | Performed by: INTERNAL MEDICINE

## 2025-02-26 PROCEDURE — 3075F SYST BP GE 130 - 139MM HG: CPT | Performed by: INTERNAL MEDICINE

## 2025-02-26 PROCEDURE — 1160F RVW MEDS BY RX/DR IN RCRD: CPT | Performed by: INTERNAL MEDICINE

## 2025-02-26 NOTE — PROGRESS NOTES
Chief Complaint  6 month follow up , Coronary Artery Disease, Hypertension, S/P CABG (coronary artery bypass graft), and <9>Mixed dyslipidemia    Subjective            Trenton Adames presents to [facility] CARDIOLOGY  Coronary Artery Disease  Pertinent negatives include no chest pain, palpitations or shortness of breath.   Hypertension  Pertinent negatives include no chest pain, palpitations or shortness of breath.       Mr. Adames is here for follow-up evaluation management of coronary artery disease, native vessel with CABG 2023, essential hypertension, mixed hyperlipidemia.  Since last visit he is doing well.  He has had some tendon problems with his left wrist but otherwise denies cardiovascular symptoms.  He is compliant with his medical therapy.    [facility]  Past Medical History:   Diagnosis Date    Arthritis     Asthma     Atypical chest pain     Coronary artery disease involving native coronary artery of native heart with unstable angina pectoris 09/25/2023    Dysphagia, oral phase     NO CURRENT ISSUES    Erectile dysfunction     Essential (primary) hypertension     Foot pain, bilateral     Heart murmur 5/2023    Heart valve disease 7/2023    Hyperlipidemia, unspecified     Low back pain     Neuropathy in diabetes     Peripheral neuropathy     Pulmonary HTN 08/09/2023    Sleep apnea     USES CPAP    Type 2 diabetes mellitus without complication     Vitamin D deficiency, unspecified          SURGICALHX  Past Surgical History:   Procedure Laterality Date    CARDIAC CATHETERIZATION N/A 09/19/2023    Procedure: Left Heart Cath with possible coronary angioplasty;  Surgeon: Marc Charles MD;  Location: [facility] CATH INVASIVE LOCATION;  Service: Cardiology;  Laterality: N/A;    CATARACT EXTRACTION, BILATERAL  1/28/2014; 1/7/2014    COLONOSCOPY  2022    CORONARY ARTERY BYPASS GRAFT N/A 09/26/2023    Procedure: RUKHSANA STERNOTOMY CORONARY ARTERY BYPASS GRAFT TIMES 7 USING LEFT INTERNAL MAMMARY  ARTERY AND RIGHT GREATER SAPHENOUS VEIN GRAFT PER ENDOSCOPIC VEIN HARVESTING AND PRP;  Surgeon: Jr Frederic Groves MD;  Location: [facility];  Service: Cardiothoracic;  Laterality: N/A;    ENDOMETRIAL ABLATION      GASTROSTOMY      HEMORRHOIDECTOMY  2014    POLYPECTOMY      SINUS SURGERY          SOC  Social History     Socioeconomic History    Marital status:    Tobacco Use    Smoking status: Former     Current packs/day: 0.00     Average packs/day: 2.0 packs/day for 15.0 years (30.0 ttl pk-yrs)     Types: Cigarettes, Pipe     Start date: 1980     Quit date: 1995     Years since quittin.1     Passive exposure: Never    Smokeless tobacco: Never   Vaping Use    Vaping status: Never Used   Substance and Sexual Activity    Alcohol use: Yes     Alcohol/week: 8.0 standard drinks of alcohol     Types: 6 Cans of beer, 2 Drinks containing 0.5 oz of alcohol per week     Comment: Occasional     Drug use: Never    Sexual activity: Defer     Partners: Female         FAMHX  Family History   Problem Relation Age of Onset    Cancer Mother     Heart disease Father     Diabetes Father     Arthritis Father     Heart disease Maternal Grandmother     Cancer Maternal Grandfather     Diabetes Paternal Grandmother     Malig Hyperthermia Neg Hx           ALLERGY  Allergies   Allergen Reactions    Cat Dander Itching    Grass Itching    Pollen Extract Itching    Tape Rash        MEDSCURRENT    Current Outpatient Medications:     Accu-Chek Softclix Lancets lancets, USE TO TEST BLOOD SUGAR 2-3 TIMES DAILY AS NEEDED, Disp: 100 each, Rfl: 12    albuterol sulfate  (90 Base) MCG/ACT inhaler, Inhale 2 puffs Every 4 (Four) Hours As Needed for Wheezing., Disp: 18 g, Rfl: 5    aspirin 81 MG EC tablet, Take 1 tablet by mouth Daily., Disp: , Rfl:     Blood Glucose Monitoring Suppl (Accu-Chek Guide) w/Device kit, 1 each 3 (Three) Times a Day As Needed (check 2-3 times daily as needed)., Disp: 1 kit, Rfl: 0     clobetasol (TEMOVATE) 0.05 % cream, Apply 1 application topically to the appropriate area as directed 2 (Two) Times a Day., Disp: 30 g, Rfl: 2    docusate sodium (COLACE) 100 MG capsule, TAKE 1 CAPSULE BY MOUTH 2 TIMES A DAY, Disp: 180 capsule, Rfl: 1    esomeprazole (nexIUM) 20 MG capsule, Take 1 capsule by mouth Every Morning Before Breakfast., Disp: , Rfl:     fluticasone (FLONASE) 50 MCG/ACT nasal spray, Administer 1 spray into the nostril(s) as directed by provider Daily., Disp: , Rfl:     gabapentin (NEURONTIN) 800 MG tablet, Take 1 tablet by mouth 3 (Three) Times a Day., Disp: 90 tablet, Rfl: 5    glucose blood (Accu-Chek Guide) test strip, USE TO TEST BLOOD SUGAR 2-3 TIMES A DAY AS NEEDED, Disp: 100 each, Rfl: 12    guaifenesin-dextromethorphan 600-30 mg (MUCINEX DM)  MG tablet sustained-release 12 hour, Take 2 tablets by mouth 2 (Two) Times a Day As Needed (cough and congestion)., Disp: 40 tablet, Rfl: 0    hydrOXYzine (ATARAX) 10 MG tablet, Take 1 tablet by mouth 3 (Three) Times a Day As Needed for Itching., Disp: 30 tablet, Rfl: 1    Ibuprofen 3 %, Gabapentin 10 %, Baclofen 2 %, lidocaine 4 %, Ketamine HCl 4 %, Apply 1-2 g topically to the appropriate area as directed 3 (Three) to 4 (Four) times daily., Disp: 90 g, Rfl: 5    Krill Oil (Omega-3) 500 MG capsule, Take 1 capsule by mouth Daily., Disp: , Rfl:     loratadine (CLARITIN) 10 MG tablet, Take 1 tablet by mouth 2 (Two) Times a Day., Disp: , Rfl:     losartan (COZAAR) 25 MG tablet, Take 1 tablet by mouth Daily., Disp: 90 tablet, Rfl: 3    Melatonin 10 MG tablet, Take 1 tablet by mouth As Needed., Disp: , Rfl:     metoprolol tartrate (LOPRESSOR) 25 MG tablet, Take 1 tablet by mouth Every 12 (Twelve) Hours., Disp: 180 tablet, Rfl: 3    montelukast (SINGULAIR) 10 MG tablet, Take 1 tablet by mouth Daily., Disp: 90 tablet, Rfl: 3    ondansetron ODT (ZOFRAN-ODT) 4 MG disintegrating tablet, Place 1 tablet on the tongue Every 8 (Eight) Hours As Needed  "for Nausea or Vomiting., Disp: 30 tablet, Rfl: 1    Probiotic, Lactobacillus, capsule, Take 1 capsule by mouth Daily., Disp: 30 capsule, Rfl: 0    rosuvastatin (Crestor) 40 MG tablet, Take 1 tablet by mouth Every Night., Disp: 90 tablet, Rfl: 3    Semaglutide, 2 MG/DOSE, (OZEMPIC) 8 MG/3ML solution pen-injector, Inject 2 mg under the skin into the appropriate area as directed 1 (One) Time Per Week., Disp: 9 mL, Rfl: 3    Turmeric 500 MG capsule, Take 1 capsule by mouth Daily., Disp: , Rfl:     vitamin D3 125 MCG (5000 UT) capsule capsule, Take 1 capsule by mouth Daily., Disp: , Rfl:     glucose blood test strip, Test 2-3 times a week (Patient not taking: Reported on 2/4/2025), Disp: 100 each, Rfl: 1    Current Facility-Administered Medications:     cyanocobalamin injection 1,000 mcg, 1,000 mcg, Intramuscular, Q28 Days, Jacquelin Pelaez APRN, 1,000 mcg at 01/27/25 1256      Review of Systems   Cardiovascular:  Negative for chest pain, dyspnea on exertion and palpitations.   Respiratory:  Negative for shortness of breath.         Objective     /72   Pulse 60   Ht 177.8 cm (70\")   Wt 82.1 kg (181 lb)   BMI 25.97 kg/m²       General Appearance:   well developed  well nourished  HENT:   oropharynx moist  lips not cyanotic  Neck:  thyroid not enlarged  supple  Respiratory:  no respiratory distress  normal breath sounds  no rales  Cardiovascular:  no jugular venous distention  regular rhythm  apical impulse normal  S1 normal, S2 normal  no S3, no S4   no murmur  no rub, no thrill  carotid pulses normal; no bruit  pedal pulses normal  lower extremity edema: none    Musculoskeletal:  no clubbing of fingers.   normocephalic, head atraumatic  Skin:   warm, dry  Psychiatric:  judgement and insight appropriate  normal mood and affect      Result Review :     The following data was reviewed by: Erwin Graham MD on 02/26/2025:    CMP          6/13/2024    11:05 9/13/2024    10:28 12/30/2024    09:46   CMP "   Glucose 175  103  104    BUN 15  13  12    Creatinine 1.09  0.93  0.94    EGFR 70.3  85.1  83.5    Sodium 141  133  139    Potassium 3.8  4.6  4.2    Chloride 104  98  103    Calcium 9.0  9.7  9.2    Total Protein  7.3  6.8    Albumin  4.4  4.0    Globulin  2.9  2.8    Total Bilirubin  0.3  0.4    Alkaline Phosphatase  38  40    AST (SGOT)  23  16    ALT (SGPT)  24  15    Albumin/Globulin Ratio  1.5  1.4    BUN/Creatinine Ratio 13.8  14.0  12.8    Anion Gap 12.1  8.1  9.9      CBC          5/29/2024    09:48 9/13/2024    10:28 12/30/2024    09:46   CBC   WBC 7.94  5.91  6.80    RBC 4.83  4.37  4.58    Hemoglobin 13.5  12.9  13.6    Hematocrit 41.2  38.1  40.2    MCV 85.3  87.2  87.8    MCH 28.0  29.5  29.7    MCHC 32.8  33.9  33.8    RDW 13.6  12.4  12.4    Platelets 284  241  255      Lipid Panel          5/29/2024    09:48 9/13/2024    10:28   Lipid Panel   Total Cholesterol 93  89    Triglycerides 132  109    HDL Cholesterol 35  38    VLDL Cholesterol 23  20    LDL Cholesterol  35  31    LDL/HDL Ratio 0.90  0.77      TSH          5/29/2024    09:48 9/13/2024    10:28   TSH   TSH 1.040  1.660        Data reviewed : Primary care records reviewed, previous cardiology records reviewed      Procedures                  Assessment and Plan        ASSESSMENT:  Encounter Diagnoses   Name Primary?    Coronary artery disease involving native coronary artery of native heart without angina pectoris Yes    S/P CABG (coronary artery bypass graft)     Essential hypertension     Mixed dyslipidemia          PLAN:    1.  Coronary artery disease, native vessel status post CABG-she is clinically stable.  Continue secondary prevention medical therapy.  2.  Essential hypertension-controlled, continue current medical therapy  3.  Mixed dyslipidemia, LDL is controlled, continue statin therapy    Follow-up in about 7 months          Patient was given instructions and counseling regarding his condition or for health maintenance advice.  Please see specific information pulled into the AVS if appropriate.             C Sree Graham MD  2/26/2025    10:01 EST

## 2025-02-28 ENCOUNTER — CLINICAL SUPPORT (OUTPATIENT)
Dept: INTERNAL MEDICINE | Age: 80
End: 2025-02-28
Payer: MEDICARE

## 2025-02-28 DIAGNOSIS — E53.8 VITAMIN B 12 DEFICIENCY: Primary | ICD-10-CM

## 2025-02-28 RX ADMIN — CYANOCOBALAMIN 1000 MCG: 1000 INJECTION, SOLUTION INTRAMUSCULAR; SUBCUTANEOUS at 15:10

## 2025-03-18 ENCOUNTER — TELEPHONE (OUTPATIENT)
Dept: CARDIOLOGY | Facility: CLINIC | Age: 80
End: 2025-03-18
Payer: MEDICARE

## 2025-03-18 NOTE — TELEPHONE ENCOUNTER
The patient has undergone cardiovascular evaluation from a cardiac standpoint only. Patient is low risk and is ok to proceed.  Okay to hold aspirin for 5 to 7 days prior to the procedure.

## 2025-03-18 NOTE — TELEPHONE ENCOUNTER
----- Message from Bee NINO sent at 3/18/2025 11:02 AM EDT -----    ----- Message -----  From: Asuncion Galvan APRN  Sent: 3/17/2025   1:39 PM EDT  To: Bee Norwood RN      ----- Message -----  From: Daniela Foster MA  Sent: 3/17/2025  12:57 PM EDT  To: QUINCY Graham MD

## 2025-03-18 NOTE — TELEPHONE ENCOUNTER
Procedure: INFLATABLE PENILE PROSTHESIS PROCEDURE     Med Directive:Aspirin (5 days)    PMH:CAD post CABG (2023), HTN, HLD    Last Seen:02/26/2025

## 2025-03-21 ENCOUNTER — PREP FOR SURGERY (OUTPATIENT)
Dept: OTHER | Facility: HOSPITAL | Age: 80
End: 2025-03-21
Payer: MEDICARE

## 2025-03-21 DIAGNOSIS — N52.9 ERECTILE DYSFUNCTION, UNSPECIFIED ERECTILE DYSFUNCTION TYPE: Primary | ICD-10-CM

## 2025-03-21 NOTE — LETTER
[facility]  [address] [place] [address]  Fax [phone]  Phone [phone]       03/21/25        To Whom It May Concern:    Our records indicate this patient is currently under anticoagulant therapy Trenton Adames 1945 is to be cleared to hold his aspirin  for 5 days prior to his PENILE PROSTHESIS IMPLANT PROCEDURE on 4/21/2025. You may contact our office at [phone] with any questions. I appreciate your prompt response in this matter. Please return this form to our office as soon as possible to [phone]. Thank you for your time and assistance.     [] I approve my patient, Trenton Adames , to stop taking anticoagulant therapy medication 5 days prior to procedure  [] I do NOT approve my patient, Trenton Adames , to stop taking anticoagulant therapy medication at this time.   [] I approve my patient, Trenton Adames from a cardiac standpoint  [] I do NOT approve my patient, Trenton Adames from a cardiac standpoint      Thank you,      Approving physician name (please print): __________________________________    Approving Physician signature: _________________________________ Date: _____________________      Sincerely,         Rita Hernandez MD

## 2025-04-01 ENCOUNTER — CLINICAL SUPPORT (OUTPATIENT)
Dept: INTERNAL MEDICINE | Age: 80
End: 2025-04-01
Payer: MEDICARE

## 2025-04-01 DIAGNOSIS — E53.8 VITAMIN B 12 DEFICIENCY: Primary | ICD-10-CM

## 2025-04-01 RX ADMIN — CYANOCOBALAMIN 1000 MCG: 1000 INJECTION, SOLUTION INTRAMUSCULAR; SUBCUTANEOUS at 10:13

## 2025-04-03 ENCOUNTER — TELEPHONE (OUTPATIENT)
Dept: INTERNAL MEDICINE | Age: 80
End: 2025-04-03
Payer: MEDICARE

## 2025-04-03 DIAGNOSIS — E11.40 TYPE 2 DIABETES MELLITUS WITH DIABETIC NEUROPATHY, WITHOUT LONG-TERM CURRENT USE OF INSULIN: Primary | ICD-10-CM

## 2025-04-14 ENCOUNTER — PRE-ADMISSION TESTING (OUTPATIENT)
Dept: PREADMISSION TESTING | Facility: HOSPITAL | Age: 80
End: 2025-04-14
Payer: MEDICARE

## 2025-04-14 VITALS
TEMPERATURE: 97.8 F | DIASTOLIC BLOOD PRESSURE: 69 MMHG | SYSTOLIC BLOOD PRESSURE: 143 MMHG | OXYGEN SATURATION: 97 % | HEART RATE: 60 BPM | HEIGHT: 70 IN | RESPIRATION RATE: 18 BRPM | BODY MASS INDEX: 25.88 KG/M2 | WEIGHT: 180.78 LBS

## 2025-04-14 DIAGNOSIS — N52.9 ERECTILE DYSFUNCTION, UNSPECIFIED ERECTILE DYSFUNCTION TYPE: ICD-10-CM

## 2025-04-14 LAB
ANION GAP SERPL CALCULATED.3IONS-SCNC: 9.8 MMOL/L (ref 5–15)
BILIRUB UR QL STRIP: NEGATIVE
BUN SERPL-MCNC: 14 MG/DL (ref 8–23)
BUN/CREAT SERPL: 17.3 (ref 7–25)
CALCIUM SPEC-SCNC: 9.2 MG/DL (ref 8.6–10.5)
CHLORIDE SERPL-SCNC: 97 MMOL/L (ref 98–107)
CLARITY UR: CLEAR
CO2 SERPL-SCNC: 26.2 MMOL/L (ref 22–29)
COLOR UR: YELLOW
CREAT SERPL-MCNC: 0.81 MG/DL (ref 0.76–1.27)
DEPRECATED RDW RBC AUTO: 43.3 FL (ref 37–54)
EGFRCR SERPLBLD CKD-EPI 2021: 90.8 ML/MIN/1.73
ERYTHROCYTE [DISTWIDTH] IN BLOOD BY AUTOMATED COUNT: 13.1 % (ref 12.3–15.4)
GLUCOSE SERPL-MCNC: 110 MG/DL (ref 65–99)
GLUCOSE UR STRIP-MCNC: NEGATIVE MG/DL
HCT VFR BLD AUTO: 41.2 % (ref 37.5–51)
HGB BLD-MCNC: 13.4 G/DL (ref 13–17.7)
HGB UR QL STRIP.AUTO: NEGATIVE
KETONES UR QL STRIP: NEGATIVE
LEUKOCYTE ESTERASE UR QL STRIP.AUTO: NEGATIVE
MCH RBC QN AUTO: 29.5 PG (ref 26.6–33)
MCHC RBC AUTO-ENTMCNC: 32.5 G/DL (ref 31.5–35.7)
MCV RBC AUTO: 90.5 FL (ref 79–97)
NITRITE UR QL STRIP: NEGATIVE
PH UR STRIP.AUTO: 6.5 [PH] (ref 5–8)
PLATELET # BLD AUTO: 229 10*3/MM3 (ref 140–450)
PMV BLD AUTO: 10.4 FL (ref 6–12)
POTASSIUM SERPL-SCNC: 5 MMOL/L (ref 3.5–5.2)
PROT UR QL STRIP: ABNORMAL
QT INTERVAL: 421 MS
QTC INTERVAL: 410 MS
RBC # BLD AUTO: 4.55 10*6/MM3 (ref 4.14–5.8)
SODIUM SERPL-SCNC: 133 MMOL/L (ref 136–145)
SP GR UR STRIP: 1.01 (ref 1–1.03)
UROBILINOGEN UR QL STRIP: ABNORMAL
WBC NRBC COR # BLD AUTO: 6.87 10*3/MM3 (ref 3.4–10.8)

## 2025-04-14 PROCEDURE — 93005 ELECTROCARDIOGRAM TRACING: CPT

## 2025-04-14 PROCEDURE — 36415 COLL VENOUS BLD VENIPUNCTURE: CPT

## 2025-04-14 PROCEDURE — 80048 BASIC METABOLIC PNL TOTAL CA: CPT

## 2025-04-14 PROCEDURE — 81003 URINALYSIS AUTO W/O SCOPE: CPT

## 2025-04-14 PROCEDURE — 85027 COMPLETE CBC AUTOMATED: CPT

## 2025-04-14 NOTE — DISCHARGE INSTRUCTIONS
PATIENT INSTRUCTED TO BE:    - NOTHING TO EAT, DRINK OR CHEW AFTER MIDNIGHT      - TO HOLD ALL VITAMINS, SUPPLEMENTS, NSAIDS FOR ONE WEEK PRIOR TO THEIR SURGICAL PROCEDURE    - DO NOT TAKE __OZEMPIC__ 7 DAYS PRIOR TO PROCEDURE PER ANESTHESIA RECOMMENDATIONS/INSTRUCTIONS     HOLD ASPIRIN 5-7 DAYS PRIOR TO PROCEDURE    - IF DIABETIC, CHECK BLOOD GLUCOSE IF LESS THAN 70 OR HAVING SYMPTOMS CALL THE PREOP AREA FOR INSTRUCTIONS ON AM OF SURGERY ([phone])    -INSTRUCTED TO TAKE THE FOLLOWING MEDICATIONS THE DAY OF SURGERY WITH SIPS OF WATER: HYDROXYZINE IF NEEDED, INHALER, GABAPENTIN, ZOFRAN IF NEEDED, CLARITIN, METOPROLOL, FLONASE, NEXIUM    - DO NOT BRING ANY MEDICATIONS WITH YOU TO THE HOSPITAL THE DAY OF SURGERY, EXCEPT IF USE INHALERS. BRING INHALERS DAY OF SURGERY       - BRING CPAP OR BIPAP TO THE HOSPITAL ONLY IF YOU ARE SPENDING THE NIGHT    - DO NOT SMOKE OR VAPE 24 HOURS PRIOR TO PROCEDURE PER ANESTHESIA REQUEST     -MAKE SURE YOU HAVE A RIDE HOME OR SOMEONE TO STAY WITH YOU THE DAY OF THE PROCEDURE AFTER YOU GO HOME     - FOLLOW ANY OTHER INSTRUCTIONS GIVEN TO YOU BY YOUR SURGEON'S OFFICE.     - DAY OF SURGERY __4/21/25_____, MAIN ENTRANCE [facility]. TAKE ELEVATOR TO FIRST FLOOR, TURN LEFT AND CHECK IN WITH REGISTRATION    - YOU WILL RECEIVE A PHONE CALL THE DAY PRIOR TO SURGERY BETWEEN 1PM AND 4 PM WITH ARRIVAL TIME, IF YOUR SURGERY IS ON A MONDAY YOU WILL RECEIVE A CALL THE FRIDAY PRIOR TO SURGERY DATE    - BRING CASH OR CREDIT CARD FOR COPAYMENT OF MEDICATIONS AFTER SURGERY IF YOU USE THE HOSPITAL PHARMACY (MEDS TO BED)    - PREADMISSION TESTING NURSE RYAN TERRELL [phone] IF HAVE ANY QUESTIONS     -PATIENT PROVIDED THE NUMBER FOR PREOP SURGICAL DEPT IF HAD QUESTIONS AFTER HOURS PRIOR TO SURGERY ([phone] ).  INFORMED PT IF NO ANSWER, LEAVE A MESSAGE AND SOMEONE WILL RETURN THEIR CALL       PATIENT VERBALIZED UNDERSTANDING PREOPERATIVE (BEFORE SURGERY)              BATHING  INSTRUCTIONS  Instructions:    You will need to shower 1 separate times utilizing the soap provided; at the times indicated   below:    - 4/21 AM      Wash your hair and face with normal shampoo and soap, rinse it well before using the surgical soap.      In the shower, wet the skin completely with water from your neck to your feet. Apply the cleanser to your   body ONLY FROM THE NECK TO YOUR FEET.     Do NOT USE THE CLEANSER ON YOUR FACE, HEAD, OR GENITAL (PRIVATE) AREAS.   Keep it out of your eyes, ears, and mouth because of the risk of injury to those areas.      Scrub with a clean washcloth for each bath utilizing the soap provided from the top of your body to the   bottom starting at the neck area.      Pay close attention to your armpits, groin area, and the site of surgery.      Wash your body gently for 5 minutes. Stand outside the stream or turn off the water while scrubbing your   body. Do NOT wash with your regular soap after the surgical cleanser is used.      RINSE THE CLEANSER OFF COMPLETELY with plenty of water. Rinse the area again thoroughly.      Dry off with a clean towel. The surgical soap can cause dryness; however do NOT APPLY LOTION,   CREAM, POWDER, and/or DEODORANT AFTER SHOWERING.     Be sure to where clean clothes after showering.      Ensure CLEAN BED LINENS AFTER FIRST wash with the surgical soap.      NO PETS ALLOWED IN THE BED with you after utilizing the surgical soap.

## 2025-04-15 ENCOUNTER — ANESTHESIA EVENT (OUTPATIENT)
Dept: PERIOP | Facility: HOSPITAL | Age: 80
End: 2025-04-15
Payer: MEDICARE

## 2025-04-21 ENCOUNTER — HOSPITAL ENCOUNTER (OUTPATIENT)
Facility: HOSPITAL | Age: 80
Setting detail: HOSPITAL OUTPATIENT SURGERY
Discharge: HOME | End: 2025-04-21
Attending: UROLOGY | Admitting: ANESTHESIOLOGY
Payer: MEDICARE

## 2025-04-21 ENCOUNTER — ANESTHESIA (OUTPATIENT)
Dept: PERIOP | Facility: HOSPITAL | Age: 80
End: 2025-04-21
Payer: MEDICARE

## 2025-04-21 VITALS
WEIGHT: 179.01 LBS | TEMPERATURE: 97.6 F | BODY MASS INDEX: 25.63 KG/M2 | OXYGEN SATURATION: 98 % | HEART RATE: 62 BPM | RESPIRATION RATE: 13 BRPM | DIASTOLIC BLOOD PRESSURE: 62 MMHG | HEIGHT: 70 IN | SYSTOLIC BLOOD PRESSURE: 126 MMHG

## 2025-04-21 DIAGNOSIS — I10 ESSENTIAL HYPERTENSION: ICD-10-CM

## 2025-04-21 DIAGNOSIS — N52.9 ERECTILE DYSFUNCTION, UNSPECIFIED ERECTILE DYSFUNCTION TYPE: ICD-10-CM

## 2025-04-21 DIAGNOSIS — N52.9 VASCULOGENIC ERECTILE DYSFUNCTION, UNSPECIFIED VASCULOGENIC ERECTILE DYSFUNCTION TYPE: Primary | ICD-10-CM

## 2025-04-21 LAB — GLUCOSE BLDC GLUCOMTR-MCNC: 123 MG/DL (ref 70–99)

## 2025-04-21 PROCEDURE — C1813 PROSTHESIS, PENILE, INFLATAB: HCPCS | Performed by: UROLOGY

## 2025-04-21 PROCEDURE — 25010000002 LIDOCAINE 1 % SOLUTION: Performed by: UROLOGY

## 2025-04-21 PROCEDURE — 25810000003 SODIUM CHLORIDE 0.9 % SOLUTION: Performed by: UROLOGY

## 2025-04-21 PROCEDURE — 25010000002 PROPOFOL 10 MG/ML EMULSION

## 2025-04-21 PROCEDURE — 25010000002 VANCOMYCIN 5 G RECONSTITUTED SOLUTION: Performed by: UROLOGY

## 2025-04-21 PROCEDURE — 25010000002 GENTAMICIN PER 80 MG: Performed by: UROLOGY

## 2025-04-21 PROCEDURE — 25010000002 DEXAMETHASONE PER 1 MG

## 2025-04-21 PROCEDURE — 25010000002 ONDANSETRON PER 1 MG

## 2025-04-21 PROCEDURE — 82948 REAGENT STRIP/BLOOD GLUCOSE: CPT

## 2025-04-21 PROCEDURE — S0260 H&P FOR SURGERY: HCPCS | Performed by: UROLOGY

## 2025-04-21 PROCEDURE — 25010000002 HYDROMORPHONE 1 MG/ML SOLUTION

## 2025-04-21 PROCEDURE — 54405 INSERT MULTI-COMP PENIS PROS: CPT | Performed by: UROLOGY

## 2025-04-21 PROCEDURE — 25010000002 MIDAZOLAM PER 1MG: Performed by: ANESTHESIOLOGY

## 2025-04-21 PROCEDURE — 25810000003 LACTATED RINGERS PER 1000 ML: Performed by: ANESTHESIOLOGY

## 2025-04-21 PROCEDURE — 25010000002 LIDOCAINE PF 2% 2 % SOLUTION

## 2025-04-21 PROCEDURE — 25010000002 BUPIVACAINE (PF) 0.25 % SOLUTION: Performed by: UROLOGY

## 2025-04-21 PROCEDURE — 25010000002 SUGAMMADEX 200 MG/2ML SOLUTION

## 2025-04-21 PROCEDURE — 25010000002 FENTANYL CITRATE (PF) 50 MCG/ML SOLUTION

## 2025-04-21 DEVICE — NON-STACKABLE REAR TIP EXTENDERS FOR AMS 700 CX AND LGX CYLINDERS
Type: IMPLANTABLE DEVICE | Site: PENIS | Status: FUNCTIONAL
Brand: REAR TIP EXTENDER

## 2025-04-21 DEVICE — STACKABLE REAR TIP EXTENDER FOR AMS 700 INFLATABLE PENILE PROSTHESIS CX AND LGX CYLINDERS
Type: IMPLANTABLE DEVICE | Site: PENIS | Status: FUNCTIONAL
Brand: REAR TIP EXTENDER

## 2025-04-21 DEVICE — AMS 700 PENILE PROSTHESIS, 1 LOW PROFILE RESERVOIR, UP TO 100 ML, INHIBIZONE TREATED
Type: IMPLANTABLE DEVICE | Site: PENIS | Status: FUNCTIONAL
Brand: CONCEAL

## 2025-04-21 DEVICE — INFLATABLE PENILE PROSTHESIS WITH MS PUMP ACCESSORY KIT
Type: IMPLANTABLE DEVICE | Site: PENIS | Status: FUNCTIONAL
Brand: AMS 700 ACCESSORY KIT

## 2025-04-21 DEVICE — INFLATABLE PENILE PROSTHESIS, PRECONNECT INFRAPUBIC, 1 PUMP TO 2 CYLINDERS, INHIBIZONE TREATED
Type: IMPLANTABLE DEVICE | Site: PENIS | Status: FUNCTIONAL
Brand: AMS 700 LGX MS PUMP

## 2025-04-21 RX ORDER — OXYCODONE HYDROCHLORIDE 5 MG/1
5 TABLET ORAL
Status: COMPLETED | OUTPATIENT
Start: 2025-04-21 | End: 2025-04-21

## 2025-04-21 RX ORDER — ACETAMINOPHEN 500 MG
1000 TABLET ORAL ONCE
Status: COMPLETED | OUTPATIENT
Start: 2025-04-21 | End: 2025-04-21

## 2025-04-21 RX ORDER — ACETAMINOPHEN 325 MG/1
650 TABLET ORAL ONCE
Status: DISCONTINUED | OUTPATIENT
Start: 2025-04-21 | End: 2025-04-21 | Stop reason: HOSPADM

## 2025-04-21 RX ORDER — ASPIRIN 81 MG/1
81 TABLET ORAL DAILY
Start: 2025-04-24

## 2025-04-21 RX ORDER — BUPIVACAINE HYDROCHLORIDE 2.5 MG/ML
INJECTION, SOLUTION EPIDURAL; INFILTRATION; INTRACAUDAL; PERINEURAL AS NEEDED
Status: DISCONTINUED | OUTPATIENT
Start: 2025-04-21 | End: 2025-04-21 | Stop reason: HOSPADM

## 2025-04-21 RX ORDER — MIDAZOLAM HYDROCHLORIDE 2 MG/2ML
0.5 INJECTION, SOLUTION INTRAMUSCULAR; INTRAVENOUS ONCE
Status: COMPLETED | OUTPATIENT
Start: 2025-04-21 | End: 2025-04-21

## 2025-04-21 RX ORDER — CELECOXIB 200 MG/1
200 CAPSULE ORAL DAILY
Qty: 30 CAPSULE | Refills: 0 | Status: SHIPPED | OUTPATIENT
Start: 2025-04-21

## 2025-04-21 RX ORDER — LIDOCAINE HYDROCHLORIDE 10 MG/ML
INJECTION, SOLUTION INFILTRATION; PERINEURAL AS NEEDED
Status: DISCONTINUED | OUTPATIENT
Start: 2025-04-21 | End: 2025-04-21 | Stop reason: HOSPADM

## 2025-04-21 RX ORDER — EPHEDRINE SULFATE 50 MG/ML
INJECTION INTRAVENOUS AS NEEDED
Status: DISCONTINUED | OUTPATIENT
Start: 2025-04-21 | End: 2025-04-21 | Stop reason: SURG

## 2025-04-21 RX ORDER — ONDANSETRON 2 MG/ML
INJECTION INTRAMUSCULAR; INTRAVENOUS AS NEEDED
Status: DISCONTINUED | OUTPATIENT
Start: 2025-04-21 | End: 2025-04-21 | Stop reason: SURG

## 2025-04-21 RX ORDER — SULFAMETHOXAZOLE AND TRIMETHOPRIM 800; 160 MG/1; MG/1
1 TABLET ORAL 2 TIMES DAILY
Qty: 20 TABLET | Refills: 0 | Status: SHIPPED | OUTPATIENT
Start: 2025-04-21 | End: 2025-05-01

## 2025-04-21 RX ORDER — IBUPROFEN 600 MG/1
600 TABLET, FILM COATED ORAL EVERY 6 HOURS PRN
Status: DISCONTINUED | OUTPATIENT
Start: 2025-04-21 | End: 2025-04-21 | Stop reason: HOSPADM

## 2025-04-21 RX ORDER — DEXAMETHASONE SODIUM PHOSPHATE 4 MG/ML
INJECTION, SOLUTION INTRA-ARTICULAR; INTRALESIONAL; INTRAMUSCULAR; INTRAVENOUS; SOFT TISSUE AS NEEDED
Status: DISCONTINUED | OUTPATIENT
Start: 2025-04-21 | End: 2025-04-21 | Stop reason: SURG

## 2025-04-21 RX ORDER — ONDANSETRON 2 MG/ML
4 INJECTION INTRAMUSCULAR; INTRAVENOUS ONCE AS NEEDED
Status: DISCONTINUED | OUTPATIENT
Start: 2025-04-21 | End: 2025-04-21 | Stop reason: HOSPADM

## 2025-04-21 RX ORDER — PROMETHAZINE HYDROCHLORIDE 25 MG/1
25 SUPPOSITORY RECTAL ONCE AS NEEDED
Status: DISCONTINUED | OUTPATIENT
Start: 2025-04-21 | End: 2025-04-21 | Stop reason: HOSPADM

## 2025-04-21 RX ORDER — PROPOFOL 10 MG/ML
VIAL (ML) INTRAVENOUS AS NEEDED
Status: DISCONTINUED | OUTPATIENT
Start: 2025-04-21 | End: 2025-04-21 | Stop reason: SURG

## 2025-04-21 RX ORDER — LIDOCAINE HYDROCHLORIDE 20 MG/ML
INJECTION, SOLUTION EPIDURAL; INFILTRATION; INTRACAUDAL; PERINEURAL AS NEEDED
Status: DISCONTINUED | OUTPATIENT
Start: 2025-04-21 | End: 2025-04-21 | Stop reason: SURG

## 2025-04-21 RX ORDER — SODIUM CHLORIDE, SODIUM LACTATE, POTASSIUM CHLORIDE, CALCIUM CHLORIDE 600; 310; 30; 20 MG/100ML; MG/100ML; MG/100ML; MG/100ML
9 INJECTION, SOLUTION INTRAVENOUS CONTINUOUS PRN
Status: DISCONTINUED | OUTPATIENT
Start: 2025-04-21 | End: 2025-04-21 | Stop reason: HOSPADM

## 2025-04-21 RX ORDER — ACETAMINOPHEN 500 MG
500 TABLET ORAL EVERY 6 HOURS PRN
Qty: 20 TABLET | Refills: 0 | Status: SHIPPED | OUTPATIENT
Start: 2025-04-21

## 2025-04-21 RX ORDER — ROCURONIUM BROMIDE 10 MG/ML
INJECTION, SOLUTION INTRAVENOUS AS NEEDED
Status: DISCONTINUED | OUTPATIENT
Start: 2025-04-21 | End: 2025-04-21 | Stop reason: SURG

## 2025-04-21 RX ORDER — GABAPENTIN 300 MG/1
300 CAPSULE ORAL 3 TIMES DAILY PRN
Qty: 24 CAPSULE | Refills: 0 | Status: SHIPPED | OUTPATIENT
Start: 2025-04-21

## 2025-04-21 RX ORDER — DOCUSATE SODIUM 100 MG/1
100 CAPSULE, LIQUID FILLED ORAL 2 TIMES DAILY
Qty: 30 CAPSULE | Refills: 0 | Status: SHIPPED | OUTPATIENT
Start: 2025-04-21

## 2025-04-21 RX ORDER — PROMETHAZINE HYDROCHLORIDE 25 MG/1
25 TABLET ORAL ONCE AS NEEDED
Status: DISCONTINUED | OUTPATIENT
Start: 2025-04-21 | End: 2025-04-21 | Stop reason: HOSPADM

## 2025-04-21 RX ORDER — FENTANYL CITRATE 50 UG/ML
INJECTION, SOLUTION INTRAMUSCULAR; INTRAVENOUS AS NEEDED
Status: DISCONTINUED | OUTPATIENT
Start: 2025-04-21 | End: 2025-04-21 | Stop reason: SURG

## 2025-04-21 RX ORDER — OXYCODONE AND ACETAMINOPHEN 5; 325 MG/1; MG/1
.5-2 TABLET ORAL EVERY 6 HOURS PRN
Qty: 16 TABLET | Refills: 0 | Status: SHIPPED | OUTPATIENT
Start: 2025-04-21

## 2025-04-21 RX ORDER — ONDANSETRON 4 MG/1
4 TABLET, ORALLY DISINTEGRATING ORAL ONCE AS NEEDED
Status: DISCONTINUED | OUTPATIENT
Start: 2025-04-21 | End: 2025-04-21 | Stop reason: HOSPADM

## 2025-04-21 RX ORDER — SODIUM CHLORIDE 9 MG/ML
INJECTION, SOLUTION INTRAVENOUS AS NEEDED
Status: DISCONTINUED | OUTPATIENT
Start: 2025-04-21 | End: 2025-04-21 | Stop reason: HOSPADM

## 2025-04-21 RX ORDER — PROMETHAZINE HYDROCHLORIDE 12.5 MG/1
12.5 TABLET ORAL ONCE AS NEEDED
Status: DISCONTINUED | OUTPATIENT
Start: 2025-04-21 | End: 2025-04-21 | Stop reason: HOSPADM

## 2025-04-21 RX ADMIN — LIDOCAINE HYDROCHLORIDE 40 MG: 20 INJECTION, SOLUTION INTRAVENOUS at 10:43

## 2025-04-21 RX ADMIN — HYDROMORPHONE HYDROCHLORIDE 0.5 MG: 1 INJECTION, SOLUTION INTRAMUSCULAR; INTRAVENOUS; SUBCUTANEOUS at 12:43

## 2025-04-21 RX ADMIN — FENTANYL CITRATE 50 MCG: 50 INJECTION, SOLUTION INTRAMUSCULAR; INTRAVENOUS at 10:43

## 2025-04-21 RX ADMIN — ROCURONIUM BROMIDE 50 MG: 10 INJECTION, SOLUTION INTRAVENOUS at 10:43

## 2025-04-21 RX ADMIN — SUGAMMADEX 200 MG: 100 INJECTION, SOLUTION INTRAVENOUS at 12:06

## 2025-04-21 RX ADMIN — DEXAMETHASONE SODIUM PHOSPHATE 4 MG: 4 INJECTION, SOLUTION INTRAMUSCULAR; INTRAVENOUS at 10:50

## 2025-04-21 RX ADMIN — PROPOFOL 150 MG: 10 INJECTION, EMULSION INTRAVENOUS at 10:43

## 2025-04-21 RX ADMIN — FENTANYL CITRATE 50 MCG: 50 INJECTION, SOLUTION INTRAMUSCULAR; INTRAVENOUS at 11:50

## 2025-04-21 RX ADMIN — GENTAMICIN SULFATE 230 MG: 40 INJECTION, SOLUTION INTRAMUSCULAR; INTRAVENOUS at 10:46

## 2025-04-21 RX ADMIN — ONDANSETRON 4 MG: 2 INJECTION INTRAMUSCULAR; INTRAVENOUS at 10:50

## 2025-04-21 RX ADMIN — EPHEDRINE SULFATE 10 MG: 50 INJECTION INTRAVENOUS at 11:58

## 2025-04-21 RX ADMIN — EPHEDRINE SULFATE 10 MG: 50 INJECTION INTRAVENOUS at 10:52

## 2025-04-21 RX ADMIN — SODIUM CHLORIDE, POTASSIUM CHLORIDE, SODIUM LACTATE AND CALCIUM CHLORIDE: 600; 310; 30; 20 INJECTION, SOLUTION INTRAVENOUS at 10:36

## 2025-04-21 RX ADMIN — OXYCODONE 5 MG: 5 TABLET ORAL at 12:57

## 2025-04-21 RX ADMIN — Medication 1250 MG: at 10:57

## 2025-04-21 RX ADMIN — EPHEDRINE SULFATE 10 MG: 50 INJECTION INTRAVENOUS at 10:56

## 2025-04-21 RX ADMIN — HYDROMORPHONE HYDROCHLORIDE 0.5 MG: 1 INJECTION, SOLUTION INTRAMUSCULAR; INTRAVENOUS; SUBCUTANEOUS at 12:36

## 2025-04-21 RX ADMIN — ACETAMINOPHEN 1000 MG: 500 TABLET ORAL at 10:12

## 2025-04-21 RX ADMIN — MIDAZOLAM HYDROCHLORIDE 0.5 MG: 1 INJECTION, SOLUTION INTRAMUSCULAR; INTRAVENOUS at 10:29

## 2025-04-21 RX ADMIN — OXYCODONE 5 MG: 5 TABLET ORAL at 12:33

## 2025-04-21 NOTE — ANESTHESIA POSTPROCEDURE EVALUATION
Patient: Trenton Adames    Procedure Summary       Date: 04/21/25 Room / Location: [facility] OR 09 / [facility] MAIN OR    Anesthesia Start: 1036 Anesthesia Stop: 1215    Procedure: PENILE PROSTHESIS PLACEMENT  Plain text: Insert penile prosthesis (Penis) Diagnosis:       Erectile dysfunction, unspecified erectile dysfunction type      (Erectile dysfunction, unspecified erectile dysfunction type [N52.9])    Surgeons: Rita Hernandez MD Provider: Charly Gómez MD    Anesthesia Type: general ASA Status: 4            Anesthesia Type: general    Vitals  Vitals Value Taken Time   /58 04/21/25 12:50   Temp 36.4 °C (97.6 °F) 04/21/25 12:15   Pulse 61 04/21/25 12:51   Resp 12 04/21/25 12:40   SpO2 97 % 04/21/25 12:51   Vitals shown include unfiled device data.        Post Anesthesia Care and Evaluation    Patient location during evaluation: bedside  Patient participation: complete - patient participated  Level of consciousness: awake    Airway patency: patent  PONV Status: none  Cardiovascular status: acceptable  Respiratory status: acceptable  Hydration status: acceptable

## 2025-04-21 NOTE — ANESTHESIA PREPROCEDURE EVALUATION
Anesthesia Evaluation     NPO Solid Status: > 8 hours  NPO Liquid Status: > 2 hours           Airway   Mallampati: II  TM distance: >3 FB  Neck ROM: limited  Dental - normal exam         Pulmonary    (+) asthma,shortness of breath, recent URI, sleep apnea on CPAP  Cardiovascular   Exercise tolerance: poor (<4 METS)    ECG reviewed  Patient on routine beta blocker and Beta blocker given within 24 hours of surgery  Rhythm: regular  Rate: normal    (+) hypertension 2 medications or greater, valvular problems/murmurs TI and MR, CAD, CABG >6 Months, angina, murmur, hyperlipidemia    ROS comment:  Left ventricular systolic function is normal. Left ventricular ejection fraction appears to be 61 - 65%.  ·  Left ventricular diastolic function was normal.  ·  No regional wall motion abnormality  ·  Estimated right ventricular systolic pressure from tricuspid regurgitation is moderately elevated (45-55 mmHg).         Neuro/Psych  (+) numbness  GI/Hepatic/Renal/Endo    (+) renal disease-, diabetes mellitus type 2    Musculoskeletal     Abdominal    Substance History      OB/GYN          Other   arthritis,     ROS/Med Hx Other: CABGX7 Sep, 2023.    NORMAL ECG -  Sinus rhythm  Low voltage, extremity leads  Date and Time of Study:2025-04-14 09:52:24    ECHO 9/26/23 Post CPB  LVEF 60%  MR/TR are mild  No aortic dissection post decannulation                      Anesthesia Plan    ASA 4     general     intravenous induction     Anesthetic plan, risks, benefits, and alternatives have been provided, discussed and informed consent has been obtained with: patient.  Pre-procedure education provided      CODE STATUS:

## 2025-04-21 NOTE — H&P
[facility]   Urology Preop H&P Note    Patient Name: Trenton Adames  : 1945  MRN: [id number]  Primary Care Physician:  Jacquelin Pelaez APRN  Referring Physician: Rita Hernandez MD  Date of admission: 2025    Subjective   Subjective     Reason for Consult/ Chief Complaint: Erectile dysfunction, unspecified erectile dysfunction type [N52.9]    HPI:  Trenton Adames is a 79 y.o. male history ofErectile dysfunction, unspecified erectile dysfunction type [N52.9] who presents for further management OR.  Presents for planned Procedure(s):  PENILE PROSTHESIS PLACEMENT  Plain text: Insert penile prosthesis;  .    Risk, benefits, and alternatives discussed with patient prior to today. Benefits of prostheses include the ability to generate a functional erection sufficient for intercourse on-demand, for as long as is desired, and as frequently as is desired. The length of the implanted cylinders is fitted to his individual anatomy and this is unable to be altered; unable to increase size.  Per physical exam, believe him to be a candidate for IPP and anticipate good functional result.     The potential risks discussed of prosthesis surgery include the risks inherent in the surgical procedure, possible changes in the appearance of the penis including perceived loss of length, and the potential for device malfunction or failure.  The surgery was discussed at length including possible adverse effects of hematoma, penile edema, pain, infection, and damage to surrounding structures, corpus or urethral injury.  Aware that injury or infection may require device removal or delay in implant.  Discussed that IPP is considered irreversible; as it is unlikely that a man´s penis will be reliably responsive to other ED therapies after prosthesis explant.  Discussed anticipated perioperative and postoperative course and the learning curve required to reliably use the device.  We also discussed the risk of  anesthesia up to and including death.       Patient voiced understanding and would like to proceed.    All questions were addressed after providing time for discussion.  Patient denies significant changes since last visit.  No new complaints today.    Review of Systems   All systems were reviewed and negative except for the above  Personal History     Past Medical History:   Diagnosis Date    Arthritis     Asthma     INHALER    Atypical chest pain     Coronary artery disease involving native coronary artery of native heart with unstable angina pectoris 09/25/2023    CABG 7 VESSEL/NO STENTS    Dysphagia, oral phase     NO CURRENT ISSUES    Erectile dysfunction     Essential (primary) hypertension     Foot pain, bilateral     Heart murmur 5/2023    Heart valve disease 7/2023    Hyperlipidemia, unspecified     Low back pain     Neuropathy in diabetes     Peripheral neuropathy     Pulmonary HTN 08/09/2023    Sleep apnea     NO LONGER USES CPAP SINCE WT LOSS    Type 2 diabetes mellitus without complication     Vitamin D deficiency, unspecified        Past Surgical History:   Procedure Laterality Date    CARDIAC CATHETERIZATION N/A 09/19/2023    Procedure: Left Heart Cath with possible coronary angioplasty;  Surgeon: Marc Charles MD;  Location: [facility] CATH INVASIVE LOC[facility];  Service: Cardiology;  Laterality: N/A;    CATARACT EXTRACTION, BILATERAL  1/28/2014; 1/7/2014    COLONOSCOPY  2022    CORONARY ARTERY BYPASS GRAFT N/A 09/26/2023    Procedure: RUKHSANA STERNOTOMY CORONARY ARTERY BYPASS GRAFT TIMES 7 USING LEFT INTERNAL MAMMARY ARTERY AND RIGHT GREATER SAPHENOUS VEIN GRAFT PER ENDOSCOPIC VEIN HARVESTING AND PRP;  Surgeon: Jr Frederic Groves MD;  Location: [facility] CVOR;  Service: Cardiothoracic;  Laterality: N/A;    ENDOMETRIAL ABLATION      GASTROSTOMY      HEMORRHOIDECTOMY  06/20/2014    POLYPECTOMY      SINUS SURGERY         Family History: family history includes Arthritis in his father; Cancer in his maternal  grandfather and mother; Diabetes in his father and paternal grandmother; Heart disease in his father and maternal grandmother. Otherwise pertinent FHx was reviewed and not pertinent to current issue.    Social History:  reports that he quit smoking about 30 years ago. His smoking use included cigarettes and pipe. He started smoking about 45 years ago. He has a 30 pack-year smoking history. He has never been exposed to tobacco smoke. He has never used smokeless tobacco. He reports current alcohol use of about 8.0 standard drinks of alcohol per week. He reports that he does not use drugs.    Home Medications:  Accu-Chek Guide, Accu-Chek Softclix Lancets, (Ibuprofen 3 %, Gabapentin 10 %, Baclofen 2 %, lidocaine 4 %, Ketamine HCl 4 %), Melatonin, Omega-3, Semaglutide (2 MG/DOSE), Turmeric, albuterol sulfate HFA, aspirin, clobetasol propionate, docusate sodium, esomeprazole, fluticasone, gabapentin, glucose blood, guaifenesin-dextromethorphan 600-30 mg, hydrOXYzine, loratadine, losartan, metoprolol tartrate, montelukast, ondansetron ODT, rosuvastatin, and vitamin D3    Allergies:  Allergies   Allergen Reactions    Tape Rash       Objective    Objective     Vitals:        Physical Exam:   Constitutional: Awake, alert   Respiratory: Clear, nonlabored respirations    Cardiovascular: Regular rate, no chest retractions   gastrointestinal: Appears soft, nontender     Results:    Assessment & Plan   Assessment / Plan     Brief Patient Summary:  Trenton Adames is a 79 y.o. male who     Active Hospital Problems:  Active Hospital Problems    Diagnosis     **Erectile dysfunction        Plan:   Proceed to the OR for planned procedure, Procedure(s):  PENILE PROSTHESIS PLACEMENT  Plain text: Insert penile prosthesis,  ,   Risk, benefits, and alternatives discussed with patient at length he is agreeable to proceed  All questions addressed      Electronically signed by Rita Hernandez MD, 04/21/25, 9:26 AM EDT.

## 2025-04-21 NOTE — OP NOTE
Preoperative diagnosis  Erectile dysfunction (ED)-N529    Postoperative diagnosis  Erectile dysfunction (ED)-N529    Procedure performed  Insertion of 3 piece inflatable penile prosthesis (, infrapubic approach)-CPT 59245    Attending surgeon  Rita Hernandez MD     Assistant  Seamus Stephens, RN    Anesthesia  General    EBL  10 mL    Complications  None    Specimen  None    Findings   inflatable penile prosthesis (18 cm, LG X) with 2.5 cm rear tip  7 flat TIMOTEO drain    Indications  79 y.o. malewith erectile dysfunction.  Despite trying conservative options for treatment, he is unable to achieve a satisfactory erection in the fashion which he desires.  He elected to proceed with inflatable penile prosthesis.  He agreed to undergo the above named procedure after discussion of the alternatives, risks and benefits.  Preoperatively, as well as prior to surgery, I reviewed with the patient the risks of the surgery, including but not limited to bleeding, infection, damage to surrounding structures including nerves, vessels, and organs (including the bladder, urethra, and rectum).  He understood that changes in sensation, as well as perceived changes in penile length, acute and chronic pain, device malfunction, device leak, as well as the potential need for additional surgeries may be necessary in the future.    He was also counseled that placement of the IPP may require placing the reservoir ectopically and that this could result in a secondary incision.  He understood this and was willing to proceed.  He also comprehends the fact that the placement of this penile prosthesis was the final step in the management of ED; other management strategies after placement of IPP are ineffective.  He voiced understanding of these risks and signed an informed consent allowing us to proceed.  Informed consent was obtained.      Procedure  Preoperative antibiotics were initiated in the preoperative area at least 1 hour prior  to planned incision time.  After informed consent was obtained, the patient was correctly identified in the preoperative holding area and transferred to the operating room.  A timeout was called at which point the correct patient, site, and procedure were identified.  With all in agreement, general anesthesia was induced and his airway was secured.  Sequential compression devices were placed on the patient's lower extremities to reduce the risk of deep venous thrombosis.    He remained in supine position.  His scrotum and infrapubic region was clipped with electric shaver and then shaved.  Following this his abdomen and scrotal region were prepped with a 5-minute wash using Hibiclens followed by isopropyl alcohol.  12 Cypriot Barriga catheter was placed without difficulty, catheter secured.  The bladder was emptied and catheter plug was placed.  The bed was flexed elevated the infrapubic region.  The skin was then sterilized with 2 full ChloraPrep application followed by 3-minute drying time.    The patient was then draped in a sterile fashion using towels to square off the penis, U-shaped drape, a full drape and finally an extremity drape.    An artificial erection was performed with a 21-gauge butterfly needle, manual compression, and 90 cc of sterile saline.  A 3 cm transverse infrapubic incision was marked 1 fingerbreadth above the penis.  The incision was made with a 15 blade and carried down through subcutaneous tissues passed Rachel's fascia, with electrocautery.  A sweep maneuver was made over the bilateral corpora.  This nicely exposed the corpora bilaterally.  Care was taken to stay away from the midline in the penile/vascular complex.    After the corpora were exposed nicely, bilaterally, we preplaced two 2-0 Monocryl sutures for each corporotomy bilaterally.  Corporotomies were then made between these preplaced sutures using electrocautery on the cut power setting.  Intermittently throughout the whole case  the wound was copiously irrigated with Aricept.  After the corporotomies were made, size 10 and subsequently size 12 Méndez dilators were used to dilate the corporal space.  The Du was then inserted.  The corpora were measured to be 10 cm proximally and 10 cm distal bilaterally.  We chose a 18 cm implant with a 2.5 cm rear-tip extender.  This was assembled on the back table.  The device was cycled in a saline bath to get all of the air out on the back table.    The AMS conceal reservoir was then placed into the transversalis space on the patient's right side.  Access to the space was directed through the external inguinal ring and accomplished by using my finger as well as nasal speculum.  A total of 90 mL of fluid was placed in the reservoir.  There was no back pressure identified.    The implant was brought into the field.  Sterile towels soaked in antiseptic were placed over top of the scrotum and abdominal region to minimize contact of the implant to the skin.  The cylinders were placed in a standard fashion using a Du and Cirilo needle, bringing each cylinder out distally.  The placement of the cylinder tips were excellent in regards to their location in the mid glans.    Once the cylinders were properly seated in place, the Barriga catheter was removed and the device was connected to a 60 mL syringe and filled with saline and cycled.  This confirmed proper placement and revealed no clinically significant penile curvature.     The device was then deflated and the tubing was isolated with a rubber-shod.  The 60 mL syringe was removed.  After the device deflated the corporotomies were then closed with the preplaced Monocryl sutures.  At this point, the closure was watertight bilaterally without any evidence of corporal bleeding.    The reservoir and pump tubing was then cut to allow minimum tubing for secure connection the pump and the reservoir were connected using the accessory kit.  The device was cycled  again and found to be functioning appropriately.  We then made a place for the pump underneath the testicle in the dependent portion of the right hemiscrotum.  This was done with the assistance of a nasal speculum and ring forcep.  The pump was pulled down through the scrotum and nicely seated.  A drain was then inserted through the skin on the patient's right side in the lower abdominal region.  Then was called around the device and placed in the scrotum near the pump.  The drain was fixed with nonabsorbable silk suture outside of the skin.  The wound was irrigated once more with copious amount of Aricept.  We then buried the tubing by closing Rachel's fascia using a single layer of 2-0 Vicryl suture followed by a 4-0 Vicryl for the deep dermal layer.  The skin was then closed with a running 4-0 Monocryl suture.    The drain was placed to bulb suction and found to be working well.  Dermabond was placed on the infrapubic incision as well as over both needle marks in the glans.  A mummy wrap was then applied ensuring excellent glans visualization.  Jockstrap was also applied.  The drain was secured with a dressing.  The patient was then awoken from anesthesia and transferred to PACU in stable condition.    Sponge, instrument, and needle counts were all correct x2.        The first assist, Seamus Stephens RN, was present and actively participated throughout the case.        Signed:  Rita Hernandez MD  04/21/25  12:18 EDT

## 2025-04-21 NOTE — DISCHARGE INSTRUCTIONS
DISCHARGE INSTRUCTIONS  SURGICAL / AMBULATORY  PROCEDURES      For your surgery you had:  General anesthesia (you may have a sore throat for the first 24 hours)  You may experience dizziness, drowsiness, or light-headedness for several hours following surgery/procedure.  Do not stay alone today or tonight.  Limit your activity for 24 hours.  Resume your diet slowly.  Follow whatever special dietary instructions you may have been given by your doctor.  You should not drive or operate machinery, drink alcohol, or sign legally binding documents for 24 hours or while you are taking pain medication.    NOTIFY YOUR DOCTOR IF YOU EXPERIENCE ANY OF THE FOLLOWING:  Temperature greater than 101 degrees Fahrenheit  Shaking Chills  Redness or excessive drainage from incision  Nausea, vomiting and/or pain that is not controlled by prescribed medications  Increase in bleeding or bleeding that is excessive  Unable to urinate in 6 hours after surgery  If unable to reach your doctor, please go to the closest Emergency Room  SPECIAL INSTRUCTIONS:  See Dr. Hernandez's instructions on After Visit Summary.    Last dose of pain medication was given at:   Tylenol (1000mg) last at 10:12am. Do not exceed 4000mg of tylenol in a 24 hour period.  Oxycodone 5mg last at 12:33pm and 12:57pm. May take percocet next at 4:57pm if needed.  Start antibiotic tomorrow.  Start celebrex tomorrow.

## 2025-04-23 ENCOUNTER — TELEPHONE (OUTPATIENT)
Dept: UROLOGY | Age: 80
End: 2025-04-23
Payer: MEDICARE

## 2025-04-23 NOTE — TELEPHONE ENCOUNTER
Call made to pt to determine drain output; no ans; LVM; if pt calls back need to know output from day of surgery to now to determine if drain is able to come out at appt tomorrow.

## 2025-04-24 ENCOUNTER — OFFICE VISIT (OUTPATIENT)
Dept: UROLOGY | Age: 80
End: 2025-04-24
Payer: MEDICARE

## 2025-04-24 VITALS — HEIGHT: 70 IN | WEIGHT: 179 LBS | BODY MASS INDEX: 25.62 KG/M2

## 2025-04-24 DIAGNOSIS — N52.9 ERECTILE DYSFUNCTION, UNSPECIFIED ERECTILE DYSFUNCTION TYPE: Primary | ICD-10-CM

## 2025-04-24 DIAGNOSIS — Z98.890 S/P UROLOGICAL SURGERY: ICD-10-CM

## 2025-04-24 LAB
QT INTERVAL: 421 MS
QTC INTERVAL: 410 MS

## 2025-04-24 PROCEDURE — 99024 POSTOP FOLLOW-UP VISIT: CPT | Performed by: UROLOGY

## 2025-04-24 NOTE — PROGRESS NOTES
"UROLOGY OFFICE FOLLOW UP NOTE    Subjective   HPI  Trenton Adames is a 79 y.o. male.  Presents for follow-up ED, IPP placed 4/21/2025.  Drain in place, brings log of output with him.    History of Present Illness  He reports a gradual improvement in his condition, with a significant reduction in pain intensity. He has observed a decrease in the output from his drain, which was last emptied at 7:30 this morning. He notes that the color of the drainage has lightened considerably. He also mentions a gradual softening of his penis.   He expresses a desire to resume his aspirin regimen. His current medication regimen includes an antibiotic administered twice daily and Celebrex once daily as an anti-inflammatory agent.    He has a spot on his penile base on the right skin that has been present for a long time. Dr. Stephens froze it one time, but it has gradually come back.  Not painful or bothersome.              Review of systems  A review of systems was performed, and positive findings are noted in the HPI.    Objective     Vital Signs:   Ht 177.8 cm (70\")   Wt 81.2 kg (179 lb)   BMI 25.68 kg/m²       Physical exam  No acute distress, well-nourished  Awake alert and oriented  Mood normal; affect normal  : Incision clean, dry, intact; minimal ecchymosis of glans and shaft and scrotum; no significant edema; appropriately tender; pump in dependent portion of scrotum; drain serosanguineous  Physical Exam        Problem List:  Patient Active Problem List   Diagnosis    Allergic rhinitis    Anemia    Asthma    Benign prostatic hyperplasia    Cervical radiculopathy    Diabetes mellitus, type II    Hyperlipidemia    Hypertension    Impotence of organic origin    Kidney stones    Renal calculus    Prostate disorder    Shortness of breath    Vitamin D deficiency    Vitamin B 12 deficiency    Iron deficiency anemia    Ophthalmic herpes zoster    Acute URI    Pulmonary HTN    Snoring    Chest pain, atypical    Coronary " artery disease involving native coronary artery of native heart with unstable angina pectoris    CAD (coronary artery disease)    COVID-19 virus infection    HILLARY on CPAP    Sleep related hypoxia    Arthritis    Neuropathy of both feet    Hyponatremia    De Quervain's tenosynovitis, left    Acute non-recurrent frontal sinusitis    Erectile dysfunction       Assessment & Plan   Diagnoses and all orders for this visit:    1. Erectile dysfunction, unspecified erectile dysfunction type (Primary)    2. S/P urological surgery        Assessment & Plan    His condition is progressing well,  The drainage output has decreased, with a recorded output of 110 mL yesterday.     He was advised to gently pull down on the pump multiple times daily to prevent it from ascending due to scarring or healing processes    It was recommended that he continue with his current medication regimen, including the antibiotic taken twice a day and Celebrex once a day for anti-inflammatory purposes.   He was advised to resume his aspirin regimen after the drain is removed.     The plan is to remove the drain tomorrow, provided the output remains around 90 mL. If the output still elevated, the drain will be retained until Monday.           Patient encouraged to follow-up 1 month or earlier as needed   All questions addressed      Patient or patient representative verbalized consent for the use of Ambient Listening during the visit with  Rita Hernandez MD for chart documentation. 4/24/2025  16:11 EDT

## 2025-04-24 NOTE — TELEPHONE ENCOUNTER
PATIENT CALLED AND I READ THE MESSAGE TO HIM, PER JALEN.    HE REPORT REMOVING FOLLOWING DRAINAGE:    04/21/25 AT 3:00 PM - 90 ML  04/21/25 AT 5:10 PM - 91 ML  04/21/25 AT 7:40 PM - 90 ML  04/21/25 AT 9:30 PM - 40 ML    04/22/25 AT 2:30 AM - 50 ML  04/22/25 AT 2:00 PM - 80 ML  04/22/25 AT 9:30 PM - 50 ML    04/23/25 AT 8:30 AM - 50 ML  04/23/25 AT 9:20 AM - 60 ML    04/24/25 AT 8:15 AM - 40 ML    HE HOPED DRAINAGE HAS REDUCED ENOUGH TO GET DRAIN REMOVED TODAY.    HE APOLOGIZES FOR NOT ANSWERING YESTERDAY.  HIS PHONE DIDN'T RING AND HE DIDN'T SEE HE MISSED A CALL UNTIL 5:00 PM.    #[phone]

## 2025-04-25 ENCOUNTER — CLINICAL SUPPORT (OUTPATIENT)
Dept: UROLOGY | Age: 80
End: 2025-04-25
Payer: MEDICARE

## 2025-04-25 ENCOUNTER — TELEPHONE (OUTPATIENT)
Dept: UROLOGY | Age: 80
End: 2025-04-25
Payer: MEDICARE

## 2025-04-25 DIAGNOSIS — N52.9 ERECTILE DYSFUNCTION, UNSPECIFIED ERECTILE DYSFUNCTION TYPE: Primary | ICD-10-CM

## 2025-04-25 NOTE — PROGRESS NOTES
Procedure   Removal of Surgical Drain    Date/Time: 4/25/2025 2:22 PM    Performed by: Veronika Colón RN  Authorized by: Rita Hernandez MD  Local anesthesia used: no    Anesthesia:  Local anesthesia used: no    Sedation:  Patient sedated: no    Patient tolerance: patient tolerated the procedure well with no immediate complications  Comments: Pt presented to office for surgical drain removal per MD; pt in supine position; 5ml of sanguinous fluid observed in TIMOTEO drain; pt reported 35ml at 0830 and 10ml of fluid before coming in to office; per MD drain can be removed with less than 80ml output; TIMOTEO drain suction released; suture identified and cut at the skin; suture pulled through skin to release drain tube; penile prothesis pump identified in scrotum and held in place while TIMOTEO drain tube pulled gently and removed from skin; wound clean and dry; dry dressing applied to wound; pt tolerated well with no complications.

## 2025-04-25 NOTE — TELEPHONE ENCOUNTER
Call made to pt to triage drain output for the day to determine if drain should come out today; pt states output this morning from 9pm to 830am was 35 and emptied on phone w/ RN and 10 output from 830; RN states per MD that pt can come in to have drain removed; pt is agreeable.

## 2025-04-28 ENCOUNTER — TELEPHONE (OUTPATIENT)
Dept: UROLOGY | Age: 80
End: 2025-04-28
Payer: MEDICARE

## 2025-04-28 NOTE — TELEPHONE ENCOUNTER
PATIENT CALLED.  HE HAD PENILE PROSTHESIS PLACEMENT ON 04/21/25.  YESTERDAY, HIS PENIS WAS QUITE SWOLLEN, AND THE RIGHT SIDE OF THE HEAD WAS BLACK AND BLUE.  THE SWELLING HAS GONE DOWN QUITE A BIT, BUT STILL BRUISED.    IS THE NORMAL?  DOES HE NEED TO BE SEEN?    #[phone]

## 2025-04-28 NOTE — TELEPHONE ENCOUNTER
Call made to pt to return call; pt states swelling has gone down and still has bruising; RN asks if did any activity over the weekend and pt states he mowed the lawn; RN states that the body is adjusting to the foreign object and any movement or activity may cause swelling as the body heals; RN advises if going to do any sitting activities to have support under the scrotum but as long as the swelling is going down the body is doing what it is supposed to do; RN states to monitor activity and if has not done anything and notices significant swelling to inform the office; pt is agreeable and understanding with no further questions at this time.

## 2025-05-02 ENCOUNTER — CLINICAL SUPPORT (OUTPATIENT)
Dept: INTERNAL MEDICINE | Age: 80
End: 2025-05-02
Payer: MEDICARE

## 2025-05-02 DIAGNOSIS — E53.8 VITAMIN B 12 DEFICIENCY: Primary | ICD-10-CM

## 2025-05-02 PROCEDURE — 96372 THER/PROPH/DIAG INJ SC/IM: CPT | Performed by: NURSE PRACTITIONER

## 2025-05-02 RX ADMIN — CYANOCOBALAMIN 1000 MCG: 1000 INJECTION, SOLUTION INTRAMUSCULAR; SUBCUTANEOUS at 10:29

## 2025-05-20 RX ORDER — DOCUSATE SODIUM 100 MG/1
100 CAPSULE, LIQUID FILLED ORAL 2 TIMES DAILY
Qty: 180 CAPSULE | Refills: 1 | Status: SHIPPED | OUTPATIENT
Start: 2025-05-20

## 2025-05-27 ENCOUNTER — OFFICE VISIT (OUTPATIENT)
Dept: UROLOGY | Age: 80
End: 2025-05-27
Payer: MEDICARE

## 2025-05-27 VITALS — BODY MASS INDEX: 26.05 KG/M2 | WEIGHT: 182 LBS | RESPIRATION RATE: 16 BRPM | HEIGHT: 70 IN

## 2025-05-27 DIAGNOSIS — A63.0 CONDYLOMA: ICD-10-CM

## 2025-05-27 DIAGNOSIS — N52.9 ERECTILE DYSFUNCTION, UNSPECIFIED ERECTILE DYSFUNCTION TYPE: Primary | ICD-10-CM

## 2025-05-27 PROCEDURE — 99024 POSTOP FOLLOW-UP VISIT: CPT | Performed by: UROLOGY

## 2025-05-27 RX ORDER — PODOFILOX 5 MG/G
GEL TOPICAL
Qty: 3.5 G | Refills: 1 | Status: SHIPPED | OUTPATIENT
Start: 2025-05-27

## 2025-05-27 NOTE — PROGRESS NOTES
"    UROLOGY OFFICE FOLLOW UP NOTE    Subjective   HPI  Trenton Adames is a 79 y.o. male.  Presents for follow-up ED after penile prosthesis insertion 4/21/2025.  Presents for activation.  Some persistent discomfort but notes that it is getting better.  No incisional concerns.  Would like treatment for his small condyloma again noting it has been there unchanged for years and years.  History of Present Illness              Review of systems  A review of systems was performed, and positive findings are noted in the HPI.    Objective     Vital Signs:   Resp 16   Ht 177.8 cm (70\")   Wt 82.6 kg (182 lb)   BMI 26.11 kg/m²       Physical exam  No acute distress, well-nourished  Awake alert and oriented  Mood normal; affect normal  : Incision clean, dry, intact; uncircumcised male, mild edema of preputial tissue; small right penile lesion which is circumferential, slightly raised, consistent with condyloma approximately 1 cm x 1 cm;  Cylinders in good position; pump easily accessible right hemiscrotum; prosthesis cycled multiple times in office     Physical Exam        Problem List:  Patient Active Problem List   Diagnosis    Allergic rhinitis    Anemia    Asthma    Benign prostatic hyperplasia    Cervical radiculopathy    Diabetes mellitus, type II    Hyperlipidemia    Hypertension    Impotence of organic origin    Kidney stones    Renal calculus    Prostate disorder    Shortness of breath    Vitamin D deficiency    Vitamin B 12 deficiency    Iron deficiency anemia    Ophthalmic herpes zoster    Acute URI    Pulmonary HTN    Snoring    Chest pain, atypical    Coronary artery disease involving native coronary artery of native heart with unstable angina pectoris    CAD (coronary artery disease)    COVID-19 virus infection    HILLARY on CPAP    Sleep related hypoxia    Arthritis    Neuropathy of both feet    Hyponatremia    De Quervain's tenosynovitis, left    Acute non-recurrent frontal sinusitis    Erectile " dysfunction       Assessment & Plan   Diagnoses and all orders for this visit:    1. Erectile dysfunction, unspecified erectile dysfunction type (Primary)    2. Condyloma  -     podofilox (CONDYLOX) 0.5 % gel; Apply to affected tissue only twice a day for 4 days off for 3 days may repeat cycle x 4  Dispense: 3.5 g; Refill: 1      Doing well after penile prosthesis insertion  Patient able to demonstrate cycling in office  Activity may be performed as tolerated  Encouraged to cycle device twice daily, maximum inflation for 30 minutes as this has the potential to increase girth and length over time with his prosthesis    Small penile condyloma; medical management sent to pharmacy reassess 2 months  Assessment & Plan           Patient encouraged to follow-up 2 months or earlier as needed  All questions addressed      Patient or patient representative verbalized consent for the use of Ambient Listening during the visit with  Rita Hernandez MD for chart documentation. 5/27/2025  12:43 EDT

## 2025-06-04 ENCOUNTER — CLINICAL SUPPORT (OUTPATIENT)
Dept: INTERNAL MEDICINE | Age: 80
End: 2025-06-04
Payer: MEDICARE

## 2025-06-04 DIAGNOSIS — E53.8 VITAMIN B 12 DEFICIENCY: Primary | ICD-10-CM

## 2025-06-04 PROCEDURE — 96372 THER/PROPH/DIAG INJ SC/IM: CPT | Performed by: NURSE PRACTITIONER

## 2025-06-04 RX ADMIN — CYANOCOBALAMIN 1000 MCG: 1000 INJECTION, SOLUTION INTRAMUSCULAR; SUBCUTANEOUS at 15:10

## 2025-06-09 DIAGNOSIS — N52.9 VASCULOGENIC ERECTILE DYSFUNCTION, UNSPECIFIED VASCULOGENIC ERECTILE DYSFUNCTION TYPE: ICD-10-CM

## 2025-06-09 RX ORDER — GABAPENTIN 300 MG/1
300 CAPSULE ORAL 3 TIMES DAILY PRN
Qty: 90 CAPSULE | Refills: 0 | Status: SHIPPED | OUTPATIENT
Start: 2025-06-09

## 2025-06-25 NOTE — PROGRESS NOTES
Subjective   The ABCs of the Annual Wellness Visit  Medicare Wellness Visit      Trenton Adames is a 79 y.o. patient who presents for a Medicare Wellness Visit.    The following portions of the patient's history were reviewed and   updated as appropriate: allergies, current medications, past family history, past medical history, past social history, past surgical history, and problem list.    Compared to one year ago, the patient's physical   health is the same.  Compared to one year ago, the patient's mental   health is the same.    Recent Hospitalizations:  He was admitted within the past 365 days at [facility].     Current Medical Providers:  Patient Care Team:  Jacquelin Pelaez APRN as PCP - General (Nurse Practitioner)  Jose R Stephens MD as Consulting Physician (Dermatology)  Naheed George MD as Consulting Physician (Pulmonary Disease)  Marc Charles MD as Consulting Physician (Cardiology)  John Thakur MD as Consulting Physician (Ophthalmology)  Sonny Parish DPM as Consulting Physician (Podiatry)    Outpatient Medications Prior to Visit   Medication Sig Dispense Refill    Accu-Chek Softclix Lancets lancets USE TO TEST BLOOD SUGAR 2-3 TIMES DAILY AS NEEDED 100 each 12    acetaminophen (TYLENOL) 500 MG tablet Take 1 tablet by mouth Every 6 (Six) Hours As Needed for Mild Pain. Do not take greater than 4000 g (4 g) in a 24-hour period 20 tablet 0    albuterol sulfate  (90 Base) MCG/ACT inhaler Inhale 2 puffs Every 4 (Four) Hours As Needed for Wheezing. 18 g 5    aspirin 81 MG EC tablet Take 1 tablet by mouth Daily. If drain output decreasing and bruising has stabilized      Blood Glucose Monitoring Suppl (Accu-Chek Guide) w/Device kit 1 each 3 (Three) Times a Day As Needed (check 2-3 times daily as needed). 1 kit 0    docusate sodium (COLACE) 100 MG capsule Take 1 capsule by mouth 2 (Two) Times a Day. 180 capsule 1    esomeprazole (nexIUM) 20 MG capsule Take 1  capsule by mouth Every Morning Before Breakfast.      fluticasone (FLONASE) 50 MCG/ACT nasal spray Administer 1 spray into the nostril(s) as directed by provider Every Night.      glucose blood (Accu-Chek Guide) test strip USE TO TEST BLOOD SUGAR 2-3 TIMES A DAY AS NEEDED 100 each 12    glucose blood test strip Test 2-3 times a week 100 each 1    guaifenesin-dextromethorphan 600-30 mg (MUCINEX DM)  MG tablet sustained-release 12 hour Take 2 tablets by mouth 2 (Two) Times a Day As Needed (cough and congestion). 40 tablet 0    hydrOXYzine (ATARAX) 10 MG tablet Take 1 tablet by mouth 3 (Three) Times a Day As Needed for Itching. 30 tablet 1    Ibuprofen 3 %, Gabapentin 10 %, Baclofen 2 %, lidocaine 4 %, Ketamine HCl 4 % Apply 1-2 g topically to the appropriate area as directed 3 (Three) to 4 (Four) times daily. 90 g 5    Krill Oil (Omega-3) 500 MG capsule Take 1 capsule by mouth Daily.      loratadine (CLARITIN) 10 MG tablet Take 1 tablet by mouth 2 (Two) Times a Day.      losartan (COZAAR) 25 MG tablet Take 1 tablet by mouth Daily. (Patient taking differently: Take 1 tablet by mouth Every Night.) 90 tablet 3    montelukast (SINGULAIR) 10 MG tablet Take 1 tablet by mouth Daily. (Patient taking differently: Take 1 tablet by mouth Every Night.) 90 tablet 3    naloxone (NARCAN) 4 MG/0.1ML nasal spray Call 911. Don't prime. [place] in 1 nostril for overdose. Repeat in 2-3 minutes in other nostril if no or minimal breathing/responsiveness. 2 each 0    ondansetron ODT (ZOFRAN-ODT) 4 MG disintegrating tablet Place 1 tablet on the tongue Every 8 (Eight) Hours As Needed for Nausea or Vomiting. 30 tablet 1    podofilox (CONDYLOX) 0.5 % gel Apply to affected tissue only twice a day for 4 days off for 3 days may repeat cycle x 4 3.5 g 1    rosuvastatin (Crestor) 40 MG tablet Take 1 tablet by mouth Every Night. 90 tablet 3    Semaglutide, 2 MG/DOSE, (OZEMPIC) 8 MG/3ML solution pen-injector Inject 2 mg under the skin into the  appropriate area as directed 1 (One) Time Per Week. 9 mL 3    Turmeric 500 MG capsule Take 1 capsule by mouth Daily.      vitamin D3 125 MCG (5000 UT) capsule capsule Take 1 capsule by mouth Daily.      celecoxib (CeleBREX) 200 MG capsule Take 1 capsule by mouth Daily. 30 capsule 0    clobetasol (TEMOVATE) 0.05 % cream Apply 1 application topically to the appropriate area as directed 2 (Two) Times a Day. 30 g 2    gabapentin (Neurontin) 300 MG capsule Take 1 capsule by mouth 3 (Three) Times a Day As Needed (moderate pain). 90 capsule 0    gabapentin (NEURONTIN) 800 MG tablet Take 1 tablet by mouth 3 (Three) Times a Day. 90 tablet 5    Melatonin 10 MG tablet Take 1 tablet by mouth As Needed.      metoprolol tartrate (LOPRESSOR) 25 MG tablet Take 1 tablet by mouth Every 12 (Twelve) Hours. 180 tablet 3    docusate sodium (Colace) 100 MG capsule Take 1 capsule by mouth 2 (Two) Times a Day. May take as needed once having regular bowel movements (Patient not taking: Reported on 7/8/2025) 30 capsule 0    oxyCODONE-acetaminophen (Percocet) 5-325 MG per tablet Take 0.5-2 tablets by mouth Every 6 (Six) Hours As Needed for Severe Pain. (Patient not taking: Reported on 7/8/2025) 16 tablet 0     Facility-Administered Medications Prior to Visit   Medication Dose Route Frequency Provider Last Rate Last Admin    cyanocobalamin injection 1,000 mcg  1,000 mcg Intramuscular Q28 Days Jacquelin Pelaez APRN   1,000 mcg at 07/02/25 0852     No opioid medication identified on active medication list. I have reviewed chart for other potential  high risk medication/s and harmful drug interactions in the elderly.      Aspirin is on active medication list. Aspirin use is indicated based on review of current medical condition/s. Pros and cons of this therapy have been discussed today. Benefits of this medication outweigh potential harm.  Patient has been encouraged to continue taking this medication.  .      Patient Active Problem List  "  Diagnosis    Allergic rhinitis    Anemia    Asthma    Benign prostatic hyperplasia    Cervical radiculopathy    Diabetes mellitus, type II    Hyperlipidemia    Hypertension    Impotence of organic origin    Kidney stones    Renal calculus    Prostate disorder    Shortness of breath    Vitamin D deficiency    Vitamin B 12 deficiency    Iron deficiency anemia    Ophthalmic herpes zoster    Acute URI    Pulmonary HTN    Snoring    Chest pain, atypical    Coronary artery disease involving native coronary artery of native heart with unstable angina pectoris    CAD (coronary artery disease)    COVID-19 virus infection    HILLARY on CPAP    Sleep related hypoxia    Arthritis    Neuropathy of both feet    Hyponatremia    De Quervain's tenosynovitis, left    Acute non-recurrent frontal sinusitis    Erectile dysfunction     Advance Care Planning Advance Directive is not on file.  ACP discussion was declined by the patient. Patient does not have an advance directive, declines further assistance.            Objective   Vitals:    07/08/25 0800   BP: 138/64   BP Location: Left arm   Patient Position: Sitting   Cuff Size: Large Adult   Pulse: 58   Resp: 18   Temp: 98 °F (36.7 °C)   TempSrc: Temporal   SpO2: 97%   Weight: 83.5 kg (184 lb)   Height: 177.8 cm (70\")   PainSc: 0-No pain       Estimated body mass index is 26.4 kg/m² as calculated from the following:    Height as of this encounter: 177.8 cm (70\").    Weight as of this encounter: 83.5 kg (184 lb).                Does the patient have evidence of cognitive impairment? No  Lab Results   Component Value Date    TRIG 118 07/02/2025    HDL 35 (L) 07/02/2025    LDL 43 07/02/2025    VLDL 22 07/02/2025    HGBA1C 6.10 (H) 07/02/2025                                                                                                Health  Risk Assessment    Smoking Status:  Social History     Tobacco Use   Smoking Status Former    Current packs/day: 0.00    Average packs/day: 2.0 " packs/day for 15.0 years (30.0 ttl pk-yrs)    Types: Cigarettes, Pipe    Start date: 1980    Quit date: 1995    Years since quittin.5    Passive exposure: Never   Smokeless Tobacco Never     Alcohol Consumption:  Social History     Substance and Sexual Activity   Alcohol Use Yes    Alcohol/week: 8.0 standard drinks of alcohol    Types: 6 Cans of beer, 2 Drinks containing 0.5 oz of alcohol per week    Comment: Occasional        Fall Risk Screen  PEDROADI Fall Risk Assessment was completed, and patient is at LOW risk for falls.Assessment completed on:2025    Depression Screening   Little interest or pleasure in doing things? Not at all   Feeling down, depressed, or hopeless? Not at all   PHQ-2 Total Score 0      Health Habits and Functional and Cognitive Screenin/8/2025     8:03 AM   Functional & Cognitive Status   Do you have difficulty preparing food and eating? No   Do you have difficulty bathing yourself, getting dressed or grooming yourself? No   Do you have difficulty using the toilet? No   Do you have difficulty moving around from place to place? No   Do you have trouble with steps or getting out of a bed or a chair? No   Current Diet Limited Junk Food   Dental Exam Up to date   Eye Exam Up to date   Exercise (times per week) 5 times per week   Current Exercises Include Yard Work;Gardening   Do you need help using the phone?  No   Are you deaf or do you have serious difficulty hearing?  Yes   Do you need help to go to places out of walking distance? Yes   Do you need help shopping? No   Do you need help preparing meals?  No   Do you need help with housework?  No   Do you need help with laundry? No   Do you need help taking your medications? No   Do you need help managing money? No   Do you ever drive or ride in a car without wearing a seat belt? No   Have you felt unusual fatigue (could be tiredness), stress, anger or loneliness in the last month? No   Who do you live with? Spouse   If  you need help, do you have trouble finding someone available to you? No   Have you been bothered in the last four weeks by sexual problems? No   Do you have difficulty concentrating, remembering or making decisions? No           Age-appropriate Screening Schedule:  Refer to the list below for future screening recommendations based on patient's age, sex and/or medical conditions. Orders for these recommended tests are listed in the plan section. The patient has been provided with a written plan.    Health Maintenance List  Health Maintenance   Topic Date Due    COVID-19 Vaccine (8 - 2024-25 season) 07/09/2025 (Originally 4/15/2025)    INFLUENZA VACCINE  10/01/2025    HEMOGLOBIN A1C  01/02/2026    URINE MICROALBUMIN-CREATININE RATIO (uACR)  01/08/2026    DIABETIC FOOT EXAM  01/10/2026    DIABETIC EYE EXAM  06/16/2026    PROSTATE CANCER SCREENING  07/02/2026    LIPID PANEL  07/02/2026    ANNUAL WELLNESS VISIT  07/08/2026    TDAP/TD VACCINES (2 - Td or Tdap) 05/25/2032    HEPATITIS C SCREENING  Completed    RSV Vaccine - Adults  Completed    Pneumococcal Vaccine 50+  Completed    ZOSTER VACCINE  Completed    COLORECTAL CANCER SCREENING  Discontinued                                                                                                                                                CMS Preventative Services Quick Reference  Risk Factors Identified During Encounter  None Identified    The above risks/problems have been discussed with the patient.  Pertinent information has been shared with the patient in the After Visit Summary.  An After Visit Summary and PPPS were made available to the patient.    Follow Up:   Next Medicare Wellness visit to be scheduled in 1 year.          Additional E&M Note during same encounter follows:  Patient has multiple medical problems which are significant and separately identifiable that require additional work above and beyond the Medicare Wellness Visit.      Chief  Complaint  Medicare Wellness-subsequent (79 year old male here today for his annual medicare wellness. Pt needs lab review )    Trenton Adames is a 79 y.o. male who presents to [facility]     History of Present Illness  The patient is a 79-year-old male who presents for his Medicare wellness exam and follow-up on left leg pain, de Quervain's tenosynovitis, back pain, diabetes, hypertension, asthma, and GFR decline.    He reports experiencing throbbing pain in his left calf during prolonged walking. This issue has been present for some time but has worsened over the past year. He is uncertain if the cause is neuropathy or sciatica. The pain is not constant, with some days being pain-free even after walking to the mailbox and back, while on other days, the pain intensifies before he can return home. He describes the pain as similar to being pricked by numerous needles. He is currently taking gabapentin 1100 mg 3 times daily, which provides some relief.    He also mentions a recurrence of de Quervain's tenosynovitis in his left wrist. The brace provided relief during the winter, but the pain returned in the spring when he resumed gardening and motorcycle riding. He experiences aching and numbness in his fingers, along with a noticeable knot. He has not sought orthopedic consultation for this issue.    He has a history of lower back problems and bulging disc, which were previously managed with physical therapy. He was informed that the arthritis in his back could not be treated. He was advised to undergo surgery for his bulging disc but chose to live with the numbness in his left hand, which was expected to resolve within a year to a year and a half. However, the numbness persists. He is unsure if the disc has re-herniated or if the current symptoms are related to his previous condition.    He has undergone a diabetic eye exam in 06/2025 and continues to take Ozempic 2 mg weekly  "for his diabetes.    His blood pressure readings at home typically range from 110 to 120 systolic and 60s diastolic, with a pulse rate in the 60s. He is under the care of a cardiologist and takes metoprolol 25 mg twice daily and losartan 25 mg daily.    His asthma is well-managed with montelukast, although he occasionally needs to use an inhaler.    He requests a refill of his cream for mosquito bites, which he finds effective. He has tried Benadryl cream and Cortizone-10 without success.    He notes a decrease in his GFR from 90 to 65. He took Celebrex 200 mg capsules for 30 days in 04/2025 for inflammation but is no longer on this medication.    He continues to take generic Crestor 40 mg and MegaRed for cholesterol management.    Objective   Vital Signs:   Vitals:    07/08/25 0800   BP: 138/64   BP Location: Left arm   Patient Position: Sitting   Cuff Size: Large Adult   Pulse: 58   Resp: 18   Temp: 98 °F (36.7 °C)   TempSrc: Temporal   SpO2: 97%   Weight: 83.5 kg (184 lb)   Height: 177.8 cm (70\")   PainSc: 0-No pain       Wt Readings from Last 3 Encounters:   07/08/25 83.5 kg (184 lb)   05/27/25 82.6 kg (182 lb)   04/24/25 81.2 kg (179 lb)     BP Readings from Last 3 Encounters:   07/08/25 138/64   04/21/25 126/62   04/14/25 143/69       Physical Exam  Vitals reviewed.   Constitutional:       General: He is not in acute distress.  Cardiovascular:      Rate and Rhythm: Normal rate and regular rhythm.      Heart sounds: Normal heart sounds.   Pulmonary:      Effort: Pulmonary effort is normal.      Breath sounds: Normal breath sounds. No wheezing, rhonchi or rales.   Abdominal:      General: There is no distension.      Palpations: Abdomen is soft. There is no mass.      Tenderness: There is no abdominal tenderness.   Musculoskeletal:      Left wrist: Decreased range of motion.      Right lower leg: No edema.      Left lower leg: No edema.      Comments: Stiffness and pain noted in the left wrist with palpation and " movement   Lymphadenopathy:      Cervical: No cervical adenopathy.   Skin:     General: Skin is warm and dry.   Neurological:      General: No focal deficit present.      Mental Status: He is alert.   Psychiatric:         Mood and Affect: Mood normal.         Thought Content: Thought content normal.           The following data was reviewed by DARRELL Pardo on 07/08/2025  Common Labs   Common labs          1/8/2025    09:18 4/14/2025    10:05 7/2/2025    08:49   Common Labs   Glucose  110  111    BUN  14  15.0    Creatinine  0.81  1.15    Sodium  133  138    Potassium  5.0  4.5    Chloride  97  102    Calcium  9.2  9.8    Albumin   4.4    Total Bilirubin   0.4    Alkaline Phosphatase   38    AST (SGOT)   24    ALT (SGPT)   15    WBC  6.87  6.84    Hemoglobin  13.4  13.0    Hematocrit  41.2  39.8    Platelets  229  245    Total Cholesterol   100    Triglycerides   118    HDL Cholesterol   35    LDL Cholesterol    43    Hemoglobin A1C   6.10    Microalbumin, Urine 3.1      PSA   2.250        Results  Labs   - PSA: Normal   - A1c: 6.1   - B12: Normal   - Vitamin D: Normal   - Blood count: Normal   - Comprehensive panel: Normal   - GFR: Dropped from 90 to 65   - Cholesterol levels: Good        Assessment & Plan   Diagnoses and all orders for this visit:    1. Medicare annual wellness visit, subsequent (Primary)    2. Type 2 diabetes mellitus with diabetic neuropathy, without long-term current use of insulin  -     Hemoglobin A1c; Future  -     gabapentin (NEURONTIN) 300 MG capsule; Take 1 capsule by mouth 3 (Three) Times a Day.  Dispense: 90 capsule; Refill: 5  -     gabapentin (NEURONTIN) 800 MG tablet; Take 1 tablet by mouth 3 (Three) Times a Day.  Dispense: 90 tablet; Refill: 5    3. Primary hypertension  -     Comprehensive Metabolic Panel; Future  -     TSH Rfx On Abnormal To Free T4; Future    4. Hyperlipidemia, unspecified hyperlipidemia type  -     Comprehensive Metabolic Panel; Future  -     Lipid  Panel; Future    5. Chronic low back pain with sciatica, sciatica laterality unspecified, unspecified back pain laterality    6. Left wrist pain  -     Ambulatory Referral to Orthopedic Surgery    7. Vitamin B 12 deficiency  -     CBC & Differential; Future  -     Vitamin B12; Future  -     Folate; Future    8. Vitamin D deficiency  -     Vitamin D,25-Hydroxy; Future    9. Mild intermittent asthma without complication    Other orders  -     clobetasol propionate (TEMOVATE) 0.05 % cream; Apply 1 Application topically to the appropriate area as directed 2 (Two) Times a Day.  Dispense: 30 g; Refill: 2        Assessment & Plan  1. Left leg pain.  - The left leg pain is likely neuropathic in nature, rather than originating from the knee or ankle.  - Currently on gabapentin 800 mg plus 300 mg 3 times a day, which provides some relief.  - The option of switching to Lyrica was discussed but decided against due to the current effectiveness of gabapentin.  - Will continue with the current gabapentin regimen. If the pain worsens or the current medication becomes ineffective, switching to Lyrica may be considered.    2. Left wrist pain.  - Reports a recurrence of de Quervain's tenosynovitis in the left wrist, exacerbated by activities such as gardening and riding a motorcycle.  - Physical exam shows stiffness and pain upon movement.  - A referral to Dr. Martines, an orthopedic specialist, will be made for further evaluation and potential treatment options, including possible injections.  - Will follow up with Dr. Martines for further management.    3. Back pain.  - Has a history of back pain with a bulging disc and arthritis, previously managed by Dr. Selby.  - Physical therapy was attempted but worsened his symptoms.  - Advised to consult Dr. Selby again for his back pain and leg pain, as he is already under his care.  - Will follow up with Dr. Selby for further recommendations.    4. Diabetes.  - A1c level is well-controlled at  6.1.  - Will continue his current diabetes management regimen, including Ozempic 2 mg once a week.  - No changes to the current diabetes management plan.    5. Hypertension.  - Hypertension is well-managed with a blood pressure reading of 138/64 today and typically lower readings at home.  - Will continue his current medications, including metoprolol 25 mg twice a day and losartan 25 mg daily.  - No changes to the current hypertension management plan.    6. Asthma.  - Asthma is stable with occasional use of an inhaler.  - Will continue his current asthma management plan, including montelukast and as-needed use of the inhaler.  - No changes to the current asthma management plan.    7. GFR decline.  - GFR has decreased from 90 to 65, likely due to a short-term course of Celebrex 200 mg taken for 30 days in April. The GFR was 50 about a year ago.  - GFR level can fluctuate with age.  - Will continue to monitor kidney function regularly.  - No immediate intervention required, continue regular monitoring.    8. Cholesterol management.  - Cholesterol levels are excellent, except for a consistently low HDL level.  - Will continue his current cholesterol management regimen, including generic Crestor 40 mg and MegaRed.  - No changes to the current cholesterol management plan.    9. Medicare wellness exam.  - Had a diabetic eye exam in 06/2025.  - Vaccinations are up to date.  - PSA was normal. Prostate cancer screening was good.  - A1c was 6.1 for diabetes. B12 was normal. Vitamin D was normal. Blood count normal. Comprehensive panel normal.  - No additional interventions required at this time.    10. Mosquito bites.  - Requests a refill of his cream for mosquito bites, which he finds effective.  - Has tried Benadryl cream and Cortizone-10 without success.  - Prescription for the cream will be sent to the pharmacy.    Follow-up  - The patient will follow up in December 2025.       FOLLOW UP  Return in about 5 months (around  12/15/2025) for Recheck.  Patient was given instructions and counseling regarding his condition or for health maintenance advice. Please see specific information pulled into the AVS if appropriate.     Patient or patient representative verbalized consent for the use of Ambient Listening during the visit with  DARRELL Pardo for chart documentation. 7/8/2025  08:05 EDT    DARRELL Pardo  07/08/25  09:58 EDT

## 2025-07-02 ENCOUNTER — CLINICAL SUPPORT (OUTPATIENT)
Dept: INTERNAL MEDICINE | Age: 80
End: 2025-07-02
Payer: MEDICARE

## 2025-07-02 DIAGNOSIS — E55.9 VITAMIN D DEFICIENCY: ICD-10-CM

## 2025-07-02 DIAGNOSIS — I10 PRIMARY HYPERTENSION: ICD-10-CM

## 2025-07-02 DIAGNOSIS — Z12.5 SCREENING FOR MALIGNANT NEOPLASM OF PROSTATE: ICD-10-CM

## 2025-07-02 DIAGNOSIS — E78.5 HYPERLIPIDEMIA, UNSPECIFIED HYPERLIPIDEMIA TYPE: Chronic | ICD-10-CM

## 2025-07-02 DIAGNOSIS — E53.8 VITAMIN B 12 DEFICIENCY: ICD-10-CM

## 2025-07-02 DIAGNOSIS — E11.40 TYPE 2 DIABETES MELLITUS WITH DIABETIC NEUROPATHY, WITHOUT LONG-TERM CURRENT USE OF INSULIN: Chronic | ICD-10-CM

## 2025-07-02 LAB
25(OH)D3 SERPL-MCNC: 44.9 NG/ML (ref 30–100)
ALBUMIN SERPL-MCNC: 4.4 G/DL (ref 3.5–5.2)
ALBUMIN/GLOB SERPL: 1.4 G/DL
ALP SERPL-CCNC: 38 U/L (ref 39–117)
ALT SERPL W P-5'-P-CCNC: 15 U/L (ref 1–41)
ANION GAP SERPL CALCULATED.3IONS-SCNC: 12.2 MMOL/L (ref 5–15)
AST SERPL-CCNC: 24 U/L (ref 1–40)
BASOPHILS # BLD AUTO: 0.08 10*3/MM3 (ref 0–0.2)
BASOPHILS NFR BLD AUTO: 1.2 % (ref 0–1.5)
BILIRUB SERPL-MCNC: 0.4 MG/DL (ref 0–1.2)
BUN SERPL-MCNC: 15 MG/DL (ref 8–23)
BUN/CREAT SERPL: 13 (ref 7–25)
CALCIUM SPEC-SCNC: 9.8 MG/DL (ref 8.6–10.5)
CHLORIDE SERPL-SCNC: 102 MMOL/L (ref 98–107)
CHOLEST SERPL-MCNC: 100 MG/DL (ref 0–200)
CO2 SERPL-SCNC: 23.8 MMOL/L (ref 22–29)
CREAT SERPL-MCNC: 1.15 MG/DL (ref 0.76–1.27)
DEPRECATED RDW RBC AUTO: 41.9 FL (ref 37–54)
EGFRCR SERPLBLD CKD-EPI 2021: 65.5 ML/MIN/1.73
EOSINOPHIL # BLD AUTO: 0.34 10*3/MM3 (ref 0–0.4)
EOSINOPHIL NFR BLD AUTO: 5 % (ref 0.3–6.2)
ERYTHROCYTE [DISTWIDTH] IN BLOOD BY AUTOMATED COUNT: 12.6 % (ref 12.3–15.4)
FOLATE SERPL-MCNC: 7.91 NG/ML (ref 4.78–24.2)
GLOBULIN UR ELPH-MCNC: 3.2 GM/DL
GLUCOSE SERPL-MCNC: 111 MG/DL (ref 65–99)
HBA1C MFR BLD: 6.1 % (ref 4.8–5.6)
HCT VFR BLD AUTO: 39.8 % (ref 37.5–51)
HDLC SERPL-MCNC: 35 MG/DL (ref 40–60)
HGB BLD-MCNC: 13 G/DL (ref 13–17.7)
IMM GRANULOCYTES # BLD AUTO: 0.03 10*3/MM3 (ref 0–0.05)
IMM GRANULOCYTES NFR BLD AUTO: 0.4 % (ref 0–0.5)
LDLC SERPL CALC-MCNC: 43 MG/DL (ref 0–100)
LDLC/HDLC SERPL: 1.18 {RATIO}
LYMPHOCYTES # BLD AUTO: 2.39 10*3/MM3 (ref 0.7–3.1)
LYMPHOCYTES NFR BLD AUTO: 34.9 % (ref 19.6–45.3)
MCH RBC QN AUTO: 29.7 PG (ref 26.6–33)
MCHC RBC AUTO-ENTMCNC: 32.7 G/DL (ref 31.5–35.7)
MCV RBC AUTO: 90.9 FL (ref 79–97)
MONOCYTES # BLD AUTO: 0.51 10*3/MM3 (ref 0.1–0.9)
MONOCYTES NFR BLD AUTO: 7.5 % (ref 5–12)
NEUTROPHILS NFR BLD AUTO: 3.49 10*3/MM3 (ref 1.7–7)
NEUTROPHILS NFR BLD AUTO: 51 % (ref 42.7–76)
NRBC BLD AUTO-RTO: 0 /100 WBC (ref 0–0.2)
PLATELET # BLD AUTO: 245 10*3/MM3 (ref 140–450)
PMV BLD AUTO: 11.6 FL (ref 6–12)
POTASSIUM SERPL-SCNC: 4.5 MMOL/L (ref 3.5–5.2)
PROT SERPL-MCNC: 7.6 G/DL (ref 6–8.5)
PSA SERPL-MCNC: 2.25 NG/ML (ref 0–4)
RBC # BLD AUTO: 4.38 10*6/MM3 (ref 4.14–5.8)
SODIUM SERPL-SCNC: 138 MMOL/L (ref 136–145)
TRIGL SERPL-MCNC: 118 MG/DL (ref 0–150)
TSH SERPL DL<=0.05 MIU/L-ACNC: 1.43 UIU/ML (ref 0.27–4.2)
VIT B12 BLD-MCNC: 802 PG/ML (ref 211–946)
VLDLC SERPL-MCNC: 22 MG/DL (ref 5–40)
WBC NRBC COR # BLD AUTO: 6.84 10*3/MM3 (ref 3.4–10.8)

## 2025-07-02 PROCEDURE — 80053 COMPREHEN METABOLIC PANEL: CPT | Performed by: NURSE PRACTITIONER

## 2025-07-02 PROCEDURE — 82746 ASSAY OF FOLIC ACID SERUM: CPT | Performed by: NURSE PRACTITIONER

## 2025-07-02 PROCEDURE — 84443 ASSAY THYROID STIM HORMONE: CPT | Performed by: NURSE PRACTITIONER

## 2025-07-02 PROCEDURE — 82607 VITAMIN B-12: CPT | Performed by: NURSE PRACTITIONER

## 2025-07-02 PROCEDURE — 80061 LIPID PANEL: CPT | Performed by: NURSE PRACTITIONER

## 2025-07-02 PROCEDURE — 83036 HEMOGLOBIN GLYCOSYLATED A1C: CPT | Performed by: NURSE PRACTITIONER

## 2025-07-02 PROCEDURE — 36415 COLL VENOUS BLD VENIPUNCTURE: CPT | Performed by: NURSE PRACTITIONER

## 2025-07-02 PROCEDURE — 85025 COMPLETE CBC W/AUTO DIFF WBC: CPT | Performed by: NURSE PRACTITIONER

## 2025-07-02 PROCEDURE — 82306 VITAMIN D 25 HYDROXY: CPT | Performed by: NURSE PRACTITIONER

## 2025-07-02 PROCEDURE — 96372 THER/PROPH/DIAG INJ SC/IM: CPT | Performed by: NURSE PRACTITIONER

## 2025-07-02 PROCEDURE — G0103 PSA SCREENING: HCPCS | Performed by: NURSE PRACTITIONER

## 2025-07-02 RX ADMIN — CYANOCOBALAMIN 1000 MCG: 1000 INJECTION, SOLUTION INTRAMUSCULAR; SUBCUTANEOUS at 08:52

## 2025-07-08 ENCOUNTER — OFFICE VISIT (OUTPATIENT)
Dept: INTERNAL MEDICINE | Age: 80
End: 2025-07-08
Payer: MEDICARE

## 2025-07-08 VITALS
RESPIRATION RATE: 18 BRPM | OXYGEN SATURATION: 97 % | HEART RATE: 58 BPM | HEIGHT: 70 IN | TEMPERATURE: 98 F | BODY MASS INDEX: 26.34 KG/M2 | SYSTOLIC BLOOD PRESSURE: 138 MMHG | DIASTOLIC BLOOD PRESSURE: 64 MMHG | WEIGHT: 184 LBS

## 2025-07-08 DIAGNOSIS — M54.40 CHRONIC LOW BACK PAIN WITH SCIATICA, SCIATICA LATERALITY UNSPECIFIED, UNSPECIFIED BACK PAIN LATERALITY: ICD-10-CM

## 2025-07-08 DIAGNOSIS — G89.29 CHRONIC LOW BACK PAIN WITH SCIATICA, SCIATICA LATERALITY UNSPECIFIED, UNSPECIFIED BACK PAIN LATERALITY: ICD-10-CM

## 2025-07-08 DIAGNOSIS — Z00.00 MEDICARE ANNUAL WELLNESS VISIT, SUBSEQUENT: Primary | ICD-10-CM

## 2025-07-08 DIAGNOSIS — E78.5 HYPERLIPIDEMIA, UNSPECIFIED HYPERLIPIDEMIA TYPE: ICD-10-CM

## 2025-07-08 DIAGNOSIS — J45.20 MILD INTERMITTENT ASTHMA WITHOUT COMPLICATION: ICD-10-CM

## 2025-07-08 DIAGNOSIS — E53.8 VITAMIN B 12 DEFICIENCY: ICD-10-CM

## 2025-07-08 DIAGNOSIS — I10 PRIMARY HYPERTENSION: ICD-10-CM

## 2025-07-08 DIAGNOSIS — E55.9 VITAMIN D DEFICIENCY: ICD-10-CM

## 2025-07-08 DIAGNOSIS — E11.40 TYPE 2 DIABETES MELLITUS WITH DIABETIC NEUROPATHY, WITHOUT LONG-TERM CURRENT USE OF INSULIN: ICD-10-CM

## 2025-07-08 DIAGNOSIS — M25.532 LEFT WRIST PAIN: ICD-10-CM

## 2025-07-08 PROCEDURE — G0439 PPPS, SUBSEQ VISIT: HCPCS | Performed by: NURSE PRACTITIONER

## 2025-07-08 PROCEDURE — 3078F DIAST BP <80 MM HG: CPT | Performed by: NURSE PRACTITIONER

## 2025-07-08 PROCEDURE — 99214 OFFICE O/P EST MOD 30 MIN: CPT | Performed by: NURSE PRACTITIONER

## 2025-07-08 PROCEDURE — 1159F MED LIST DOCD IN RCRD: CPT | Performed by: NURSE PRACTITIONER

## 2025-07-08 PROCEDURE — 96160 PT-FOCUSED HLTH RISK ASSMT: CPT | Performed by: NURSE PRACTITIONER

## 2025-07-08 PROCEDURE — 1160F RVW MEDS BY RX/DR IN RCRD: CPT | Performed by: NURSE PRACTITIONER

## 2025-07-08 PROCEDURE — 1126F AMNT PAIN NOTED NONE PRSNT: CPT | Performed by: NURSE PRACTITIONER

## 2025-07-08 PROCEDURE — 1170F FXNL STATUS ASSESSED: CPT | Performed by: NURSE PRACTITIONER

## 2025-07-08 PROCEDURE — 3075F SYST BP GE 130 - 139MM HG: CPT | Performed by: NURSE PRACTITIONER

## 2025-07-08 RX ORDER — GABAPENTIN 300 MG/1
300 CAPSULE ORAL 3 TIMES DAILY
Qty: 90 CAPSULE | Refills: 5 | Status: SHIPPED | OUTPATIENT
Start: 2025-07-08

## 2025-07-08 RX ORDER — CLOBETASOL PROPIONATE 0.5 MG/G
1 CREAM TOPICAL 2 TIMES DAILY
Qty: 30 G | Refills: 2 | Status: SHIPPED | OUTPATIENT
Start: 2025-07-08

## 2025-07-08 RX ORDER — GABAPENTIN 800 MG/1
800 TABLET ORAL 3 TIMES DAILY
Qty: 90 TABLET | Refills: 5 | Status: SHIPPED | OUTPATIENT
Start: 2025-07-08

## 2025-07-23 ENCOUNTER — EXTERNAL PBMM DATA (OUTPATIENT)
Dept: PHARMACY | Facility: OTHER | Age: 80
End: 2025-07-23
Payer: MEDICARE

## 2025-07-31 ENCOUNTER — CLINICAL SUPPORT (OUTPATIENT)
Dept: INTERNAL MEDICINE | Age: 80
End: 2025-07-31
Payer: MEDICARE

## 2025-07-31 ENCOUNTER — OFFICE VISIT (OUTPATIENT)
Dept: UROLOGY | Age: 80
End: 2025-07-31
Payer: MEDICARE

## 2025-07-31 VITALS — WEIGHT: 184 LBS | RESPIRATION RATE: 17 BRPM | BODY MASS INDEX: 26.34 KG/M2 | HEIGHT: 70 IN

## 2025-07-31 DIAGNOSIS — E53.8 VITAMIN B 12 DEFICIENCY: Primary | ICD-10-CM

## 2025-07-31 DIAGNOSIS — A63.0 CONDYLOMA: Primary | ICD-10-CM

## 2025-07-31 DIAGNOSIS — N52.9 ERECTILE DYSFUNCTION, UNSPECIFIED ERECTILE DYSFUNCTION TYPE: ICD-10-CM

## 2025-07-31 PROCEDURE — 96372 THER/PROPH/DIAG INJ SC/IM: CPT | Performed by: NURSE PRACTITIONER

## 2025-07-31 RX ADMIN — CYANOCOBALAMIN 1000 MCG: 1000 INJECTION, SOLUTION INTRAMUSCULAR; SUBCUTANEOUS at 09:51

## 2025-07-31 NOTE — PROGRESS NOTES
"    UROLOGY OFFICE FOLLOW UP NOTE    Subjective   HPI  Trenton Adames is a 79 y.o. male.   Presents for follow-up ED after penile prosthesis insertion 4/21/2025.  Presents for activation.  Some persistent discomfort but notes that it is getting better.  No incisional concerns.  Would like treatment for his small condyloma again noting it has been there unchanged for years and years.    Update 7/31/2025: Presents for follow-up condyloma, trial of topical treatment; history of ED; penile prosthesis insertion 4/21/2025.   History of Present Illness  He has undergone 1 cycles of cream treatment, which initially showed improvement. However, he now reports a small spot reappearing. He has been using the cream for four weeks, followed by a break of three days. He has a yearly appointment with Dr. Stephens, a dermatologist, scheduled for 10/2025. He also mentions that he had a spot frozen off in the past, which resulted in the loss of his belly button.    He is currently adjusting to an implant, which he can inflate and deflate, but not to the extent/length he had anticipated. The bulb remains tight and hard, and he feels it could be more effective. Despite this, he has been able to engage in sexual intercourse. He notes that some of the soreness has subsided.            Review of systems  A review of systems was performed, and positive findings are noted in the HPI.    Objective     Vital Signs:   Resp 17   Ht 177.8 cm (70\")   Wt 83.5 kg (184 lb)   BMI 26.40 kg/m²       Physical exam  No acute distress, well-nourished  Awake alert and oriented  Mood normal; affect normal  Physical Exam    Genitourinary: Incision site looks great.  Cylinders is in good position; pump is functional and inflates adequately. Mild swelling noted in the foreskin.  small right penile lesion which is circumferential, slightly raised, consistent with condyloma approximately [id number], smaller than previous;       Problem List:  Patient Active " Problem List   Diagnosis    Allergic rhinitis    Anemia    Asthma    Benign prostatic hyperplasia    Cervical radiculopathy    Diabetes mellitus, type II    Hyperlipidemia    Hypertension    Impotence of organic origin    Kidney stones    Renal calculus    Prostate disorder    Shortness of breath    Vitamin D deficiency    Vitamin B 12 deficiency    Iron deficiency anemia    Ophthalmic herpes zoster    Acute URI    Pulmonary HTN    Snoring    Chest pain, atypical    Coronary artery disease involving native coronary artery of native heart with unstable angina pectoris    CAD (coronary artery disease)    COVID-19 virus infection    HILLARY on CPAP    Sleep related hypoxia    Arthritis    Neuropathy of both feet    Hyponatremia    De Quervain's tenosynovitis, left    Acute non-recurrent frontal sinusitis    Erectile dysfunction       Assessment & Plan   Diagnoses and all orders for this visit:    1. Condyloma (Primary)    2. Erectile dysfunction, unspecified erectile dysfunction type        Assessment & Plan  1. Erectile dysfunction.  Good result after penile prosthesis insertion; provided reassurance.  The penile implant is functioning well, and the patient is able to cycle it up and down. He was advised to continue inflating the implant and to use techniques such as helicoptering the penis and using a two-handed  to achieve more firmness. The importance of ensuring the foreskin returns to its natural state post-use was emphasized to prevent swelling. The patient was reassured that over-pumping is not possible, and the implant provides adequate rigidity for penetration.     Penile condyloma.  The skin lesion has significantly reduced in size after one cycle of the cream. The patient was advised to continue using the cream for another cycle, applying it twice a day for 4 days followed by 3 days off. If the lesion does not completely resolve, a follow-up with Dr. Stephens, a dermatologist, should be made. If Dr. Stephens  determines that the cream is ineffective, surgical excision or cauterization may be considered. The patient was informed about the potential recurrence of the lesion and the benefits of avoiding surgical intervention if possible.    Follow-up  A follow-up visit is scheduled in 11/2025.       All questions addressed      Patient or patient representative verbalized consent for the use of Ambient Listening during the visit with  Rita Hernandez MD for chart documentation. 7/31/2025  19:56 EDT    Total time for encounter was 22 minutes including same day preparation prior to encounter (e.g. reviewing labs, prior notes), face to face time, counseling and coordination of care and ordering medications, test, or procedures.

## 2025-08-04 ENCOUNTER — OFFICE VISIT (OUTPATIENT)
Dept: ORTHOPEDIC SURGERY | Facility: CLINIC | Age: 80
End: 2025-08-04
Payer: MEDICARE

## 2025-08-04 VITALS
OXYGEN SATURATION: 97 % | WEIGHT: 177 LBS | BODY MASS INDEX: 25.34 KG/M2 | HEIGHT: 70 IN | DIASTOLIC BLOOD PRESSURE: 76 MMHG | HEART RATE: 60 BPM | SYSTOLIC BLOOD PRESSURE: 154 MMHG

## 2025-08-04 DIAGNOSIS — M18.12 PRIMARY OSTEOARTHRITIS OF FIRST CARPOMETACARPAL JOINT OF LEFT HAND: ICD-10-CM

## 2025-08-04 DIAGNOSIS — M25.532 LEFT WRIST PAIN: Primary | ICD-10-CM

## 2025-08-04 RX ADMIN — TRIAMCINOLONE ACETONIDE 40 MG: 40 INJECTION, SUSPENSION INTRA-ARTICULAR; INTRAMUSCULAR at 08:30

## 2025-08-04 RX ADMIN — LIDOCAINE HYDROCHLORIDE 1 ML: 10 INJECTION, SOLUTION INFILTRATION; PERINEURAL at 08:30

## 2025-08-05 ENCOUNTER — PATIENT ROUNDING (BHMG ONLY) (OUTPATIENT)
Dept: ORTHOPEDIC SURGERY | Facility: CLINIC | Age: 80
End: 2025-08-05
Payer: MEDICARE

## 2025-08-06 RX ORDER — TRIAMCINOLONE ACETONIDE 40 MG/ML
40 INJECTION, SUSPENSION INTRA-ARTICULAR; INTRAMUSCULAR
Status: COMPLETED | OUTPATIENT
Start: 2025-08-04 | End: 2025-08-04

## 2025-08-06 RX ORDER — LIDOCAINE HYDROCHLORIDE 10 MG/ML
1 INJECTION, SOLUTION INFILTRATION; PERINEURAL
Status: COMPLETED | OUTPATIENT
Start: 2025-08-04 | End: 2025-08-04

## 2025-08-25 ENCOUNTER — OFFICE VISIT (OUTPATIENT)
Dept: INTERNAL MEDICINE | Age: 80
End: 2025-08-25
Payer: MEDICARE

## 2025-08-25 VITALS
TEMPERATURE: 98 F | WEIGHT: 180 LBS | HEART RATE: 60 BPM | OXYGEN SATURATION: 97 % | HEIGHT: 70 IN | DIASTOLIC BLOOD PRESSURE: 78 MMHG | SYSTOLIC BLOOD PRESSURE: 150 MMHG | RESPIRATION RATE: 18 BRPM | BODY MASS INDEX: 25.77 KG/M2

## 2025-08-25 DIAGNOSIS — J01.90 ACUTE SINUSITIS, RECURRENCE NOT SPECIFIED, UNSPECIFIED LOCATION: Primary | ICD-10-CM

## 2025-08-25 PROCEDURE — 3078F DIAST BP <80 MM HG: CPT | Performed by: NURSE PRACTITIONER

## 2025-08-25 PROCEDURE — 1160F RVW MEDS BY RX/DR IN RCRD: CPT | Performed by: NURSE PRACTITIONER

## 2025-08-25 PROCEDURE — 1159F MED LIST DOCD IN RCRD: CPT | Performed by: NURSE PRACTITIONER

## 2025-08-25 PROCEDURE — 99213 OFFICE O/P EST LOW 20 MIN: CPT | Performed by: NURSE PRACTITIONER

## 2025-08-25 PROCEDURE — 87428 SARSCOV & INF VIR A&B AG IA: CPT | Performed by: NURSE PRACTITIONER

## 2025-08-25 PROCEDURE — 3077F SYST BP >= 140 MM HG: CPT | Performed by: NURSE PRACTITIONER

## 2025-08-25 PROCEDURE — 1126F AMNT PAIN NOTED NONE PRSNT: CPT | Performed by: NURSE PRACTITIONER

## (undated) DEVICE — SUT MNCRYL PLS ANTIB UD 4/0 PS2 18IN

## (undated) DEVICE — DRSNG WND GZ PAD BORDERED 4X8IN STRL

## (undated) DEVICE — SYS PERFUS SEP PLATLT W TIPS CUST

## (undated) DEVICE — APPL CHLORAPREP HI/LITE 26ML ORNG

## (undated) DEVICE — PK HEART OPN 40

## (undated) DEVICE — GLIDESHEATH SLENDER STAINLESS STEEL KIT: Brand: GLIDESHEATH SLENDER

## (undated) DEVICE — BIOPATCH™ ANTIMICROBIAL DRESSING WITH CHLORHEXIDINE GLUCONATE IS A HYDROPHILLIC POLYURETHANE ABSORPTIVE FOAM WITH CHLORHEXIDINE GLUCONATE (CHG) WHICH INHIBITS BACTERIAL GROWTH UNDER THE DRESSING. THE DRESSING IS INTENDED TO BE USED TO ABSORB EXUDATE, COVER A WOUND CAUSED BY VASCULAR AND NONVASCULAR PERCUTANEOUS MEDICAL DEVICES DURING SURGERY, AS WELL AS REDUCE LOCAL INFECTION AND COLONIZATION OF MICROORGANISMS.: Brand: BIOPATCH

## (undated) DEVICE — ANTIBACTERIAL VIOLET BRAIDED (POLYGLACTIN 910), SYNTHETIC ABSORBABLE SUTURE: Brand: COATED VICRYL

## (undated) DEVICE — DRP SLUSH WARMR MACH CIR 44X44IN

## (undated) DEVICE — MAJOR-LF: Brand: MEDLINE INDUSTRIES, INC.

## (undated) DEVICE — GLV SURG BIOGEL LTX PF 7

## (undated) DEVICE — SOL IRR NACL 0.9PCT BO 1000ML

## (undated) DEVICE — Device

## (undated) DEVICE — SENSR CERBRL O2 PK/2

## (undated) DEVICE — ROTATING SURGICAL PUNCHES, 1 PER POUCH: Brand: A&E MEDICAL / ROTATING SURGICAL PUNCHES

## (undated) DEVICE — BLOWER/MISTER AXIOUS OPCAB W/TBG

## (undated) DEVICE — DECANTER BAG 9": Brand: MEDLINE INDUSTRIES, INC.

## (undated) DEVICE — DRSNG SURESITE WNDW 2.38X2.75

## (undated) DEVICE — RESERVOIR,SUCTION,100CC,SILICONE: Brand: MEDLINE

## (undated) DEVICE — CLAMP INSERT: Brand: STEALTH® CLAMP INSERT

## (undated) DEVICE — SYS VASOVIEW HEMOPRO ENDOSCOPIC HARVST VESL

## (undated) DEVICE — SLV SCD KN/LEN ADJ EXPRSS BLENDED MD 1P/U

## (undated) DEVICE — DISPOSABLE INSERTION TOOL: Brand: FURLOW

## (undated) DEVICE — SPONGE,DISSECTOR,ROUND CHERRY,XR,ST,5/PK: Brand: MEDLINE

## (undated) DEVICE — DECANT BG O JET

## (undated) DEVICE — LP VESL MAXI 2.5X1MM RED 2PK

## (undated) DEVICE — SOL IRR H2O BTL 1000ML STRL

## (undated) DEVICE — SUT SILK 2/0 FS BLK 18IN 685G

## (undated) DEVICE — ADHS SKIN SURG TISS VISC PREMIERPRO EXOFIN HI/VISC FAST/DRY

## (undated) DEVICE — SUT PROLN MO.5 7/0 DBLARM BV175 6 2X30 BX/12

## (undated) DEVICE — CATHETER,FOLEY,100%SILICONE,16FR,10ML,LF: Brand: MEDLINE

## (undated) DEVICE — PK PERFUS CUST W/CARDIOPLEGIA

## (undated) DEVICE — SUT VIC 0 CT1 CR8 27IN JJ41G

## (undated) DEVICE — 28 FR RIGHT ANGLE – SOFT PVC CATHETER: Brand: PVC THORACIC CATHETERS

## (undated) DEVICE — SOL IRR NACL 0.9PCT BT 1000ML

## (undated) DEVICE — SUT VIC 2/0 UR6 27IN J602H

## (undated) DEVICE — 450 ML BOTTLE OF 0.05% CHLORHEXIDINE GLUCONATE IN 99.95% STERILE WATER FOR IRRIGATION, USP AND APPLICATOR.: Brand: IRRISEPT ANTIMICROBIAL WOUND LAVAGE

## (undated) DEVICE — SUT SILK 0 CT1 CR8 18IN C021D

## (undated) DEVICE — LOU OPEN HEART DR POLLOCK: Brand: MEDLINE INDUSTRIES, INC.

## (undated) DEVICE — HEMOCONCENTRATOR PERFUS LPS06

## (undated) DEVICE — BG TRANSF W/COUPLER SPK 600ML

## (undated) DEVICE — PK ATS CUST W CARDIOTOMY RESEVOIR

## (undated) DEVICE — HVTA: Brand: EXOFIN HVTA, 1G

## (undated) DEVICE — CATH LAB PACK: Brand: MEDLINE INDUSTRIES, INC.

## (undated) DEVICE — GW FC FLOP/TP .035 260CM 3MM

## (undated) DEVICE — SOL ISO/ALC 70PCT 4OZ

## (undated) DEVICE — CORONARY ARTERY BYPASS GRAFT MARKERS, STAINLESS STEEL, DISTAL, WITHOUT HOLDER: Brand: ANASTOMARK CORONARY ARTERY BYPASS GRAFT MARKERS, STAINLESS STEEL, DISTAL

## (undated) DEVICE — GLV SURG BIOGEL M LTX PF 7 1/2

## (undated) DEVICE — CANN ART SOFTFLOW EXT W/SUT/RNG 7MM

## (undated) DEVICE — GLV SURG SENSICARE PI LF PF 7.5 GRN STRL

## (undated) DEVICE — RADIFOCUS OPTITORQUE ANGIOGRAPHIC CATHETER: Brand: OPTITORQUE

## (undated) DEVICE — CVR PROB 96IN LF STRL

## (undated) DEVICE — INTENDED FOR TISSUE SEPARATION, AND OTHER PROCEDURES THAT REQUIRE A SHARP SURGICAL BLADE TO PUNCTURE OR CUT.: Brand: BARD-PARKER ® CARBON RIB-BACK BLADES

## (undated) DEVICE — GLV SURG BIOGEL LTX PF 6 1/2

## (undated) DEVICE — ST. SORBAVIEW ULTIMATE IJ SYSTEM A,C: Brand: CENTURION

## (undated) DEVICE — Device: Brand: LEVEL 1

## (undated) DEVICE — GAMMEX® NON-LATEX SIZE 7.5, STERILE NEOPRENE POWDER-FREE SURGICAL GLOVE: Brand: GAMMEX

## (undated) DEVICE — DRAPE,U/ SHT,SPLIT,PLAS,STERIL: Brand: MEDLINE

## (undated) DEVICE — SYR LUERLOK 50ML

## (undated) DEVICE — PK SUT OPN HEART POLLOCK CUST

## (undated) DEVICE — ELECTRD BLD EDGE/INSUL1P SFTY SLV 2.75IN

## (undated) DEVICE — PENCL SMOKE/EVAC MEGADYNE TELESCP 10FT

## (undated) DEVICE — GLOVE,SURG,SENSICARE SLT,LF,PF,7: Brand: MEDLINE

## (undated) DEVICE — 28 FR STRAIGHT – SOFT PVC CATHETER: Brand: PVC THORACIC CATHETERS

## (undated) DEVICE — PREP TRAY WITH CHG: Brand: MEDLINE INDUSTRIES, INC.

## (undated) DEVICE — TRAY,ADD A CATH,FOLEY,DRAIN BG,10ML SYR: Brand: MEDLINE

## (undated) DEVICE — GAUZE,SPONGE,4"X4",16PLY,STRL,LF,10/TRAY: Brand: MEDLINE

## (undated) DEVICE — TBG INSUFFLATION LUER LOCK: Brand: MEDLINE INDUSTRIES, INC.

## (undated) DEVICE — OASIS DRAIN, SINGLE, INLINE & ATS COMPATIBLE: Brand: OASIS

## (undated) DEVICE — DRSNG WND GEL FIBR OPTICELL AG PLS W/SLV LF 4X5IN  STRL

## (undated) DEVICE — HARMONIC SYNERGY DISSECTING HOOK WITH TORQUE WRENCH. FOR USE WITH BLUE HAND PIECE ONLY: Brand: HARMONIC SYNERGY

## (undated) DEVICE — THE STERILE LIGHT HANDLE COVER IS USED WITH STERIS SURGICAL LIGHTING AND VISUALIZATION SYSTEMS.